# Patient Record
Sex: FEMALE | Race: WHITE | NOT HISPANIC OR LATINO | Employment: OTHER | URBAN - METROPOLITAN AREA
[De-identification: names, ages, dates, MRNs, and addresses within clinical notes are randomized per-mention and may not be internally consistent; named-entity substitution may affect disease eponyms.]

---

## 2017-01-24 ENCOUNTER — OFFICE VISIT (OUTPATIENT)
Dept: PULMONOLOGY | Facility: HOSPICE | Age: 72
End: 2017-01-24
Payer: MEDICARE

## 2017-01-24 VITALS
BODY MASS INDEX: 28.63 KG/M2 | HEART RATE: 96 BPM | RESPIRATION RATE: 16 BRPM | SYSTOLIC BLOOD PRESSURE: 150 MMHG | HEIGHT: 70 IN | TEMPERATURE: 97.3 F | WEIGHT: 200 LBS | OXYGEN SATURATION: 91 % | DIASTOLIC BLOOD PRESSURE: 90 MMHG

## 2017-01-24 DIAGNOSIS — J44.9 CHRONIC OBSTRUCTIVE PULMONARY DISEASE, UNSPECIFIED COPD, UNSPECIFIED CHRONIC BRONCHITIS TYPE: ICD-10-CM

## 2017-01-24 DIAGNOSIS — J44.9 CHRONIC OBSTRUCTIVE PULMONARY DISEASE, UNSPECIFIED COPD TYPE (HCC): ICD-10-CM

## 2017-01-24 PROCEDURE — G8482 FLU IMMUNIZE ORDER/ADMIN: HCPCS | Performed by: NURSE PRACTITIONER

## 2017-01-24 PROCEDURE — 4040F PNEUMOC VAC/ADMIN/RCVD: CPT | Performed by: NURSE PRACTITIONER

## 2017-01-24 PROCEDURE — 99213 OFFICE O/P EST LOW 20 MIN: CPT | Performed by: NURSE PRACTITIONER

## 2017-01-24 PROCEDURE — 3017F COLORECTAL CA SCREEN DOC REV: CPT | Performed by: NURSE PRACTITIONER

## 2017-01-24 PROCEDURE — 1036F TOBACCO NON-USER: CPT | Performed by: NURSE PRACTITIONER

## 2017-01-24 PROCEDURE — 3014F SCREEN MAMMO DOC REV: CPT | Performed by: NURSE PRACTITIONER

## 2017-01-24 PROCEDURE — 1101F PT FALLS ASSESS-DOCD LE1/YR: CPT | Performed by: NURSE PRACTITIONER

## 2017-01-24 PROCEDURE — G8432 DEP SCR NOT DOC, RNG: HCPCS | Performed by: NURSE PRACTITIONER

## 2017-01-24 PROCEDURE — G8420 CALC BMI NORM PARAMETERS: HCPCS | Performed by: NURSE PRACTITIONER

## 2017-01-24 RX ORDER — METHYLPREDNISOLONE 4 MG/1
TABLET ORAL
Qty: 21 TAB | Refills: 1 | Status: SHIPPED | OUTPATIENT
Start: 2017-01-24 | End: 2017-09-09

## 2017-01-24 RX ORDER — AZITHROMYCIN 250 MG/1
TABLET, FILM COATED ORAL
Qty: 6 TAB | Refills: 1 | Status: SHIPPED | OUTPATIENT
Start: 2017-01-24 | End: 2017-09-09

## 2017-01-24 ASSESSMENT — PULMONARY FUNCTION TESTS
FEV1/FVC_PERCENT_CHANGE: 900
FEV1_PREDICTED: 2.72
FEV1/FVC: 60
FVC: 2.51
FEV1_PERCENT_PREDICTED: 55
FVC_PERCENT_PREDICTED: 70
FEV1: 1.66
FVC: 2.54
FVC_PREDICTED: 3.58
FVC_PERCENT_PREDICTED: 70
FEV1: 1.51
FEV1_PERCENT_PREDICTED: 61
FEV1/FVC: 65.35
FEV1/FVC_PERCENT_PREDICTED: 76
FEV1/FVC_PERCENT_PREDICTED: 87
FEV1_PERCENT_CHANGE: 1
FEV1/FVC_PERCENT_PREDICTED: 79
FEV1_PERCENT_CHANGE: 9

## 2017-01-24 NOTE — PROCEDURES
Technician: Kim Reyes, RRT/CPFT  Technician Comments:  Good patient effort & cooperation.  The results of this test meet the ATS/ERS standards for acceptability and repeatability.  Test was performed on the [employer] Body Plethysmograph- Elite DX system.  Predicted equations for Spirometry are NHanes, DLCO- [facility].  The DLCO was uncorrected for Hgb.  A bronchodilator of Ventolin HFA- 2puffs via spacer were administered.    The FVC is 2.54 L or 70%, FEV1 is 1.66 L or 61%, FEV1/FVC 65%. TLC 83%. DLCO 128%. No bronchodilator response.    Interpretation:  PFT show moderately severe obstructive ventilatory defect, with FEV1 1.66 L or 61%.  Normal lung volumes and gas transfer.  The lack of bronchodilator response does not preclude using inhalers when clinically indicated.

## 2017-01-24 NOTE — MR AVS SNAPSHOT
"Jillian Gottlieb   2017 10:00 AM   Office Visit   MRN: [id number]    Department:  [facility]   Dept Phone:  [phone]    Description:  Female : 1945   Provider:  Eliana Meehan M.D.           Allergies as of 2017     Allergen Noted Reactions    Alcohol 2013   Vomiting, Nausea, Unspecified    injested-facial numbness, n/v  RXN=50 years ago    Pcn [Penicillins] 2012   Unspecified    Flushed; \"felt weird\"  Tolerates cephalosporins  RXN=age 30's      You were diagnosed with     Chronic obstructive pulmonary disease, unspecified COPD, unspecified chronic bronchitis type   [[id number]]         Vital Signs     Smoking Status                   Never Smoker            Basic Information     Date Of Birth Sex Race Ethnicity Preferred Language    1945 Female White Non- English      Problem List              ICD-10-CM Priority Class Noted - Resolved    Right kidney stone N20.0   2013 - Present    Hyperlipidemia E78.5   2015 - Present    Hypothyroidism E03.9   2015 - Present    Personal history of renal cancer Z85.528   2015 - Present    Asthma J45.909   2015 - Present    Calculus of kidney N20.0   2015 - Present    Impaired fasting glucose R73.01   2015 - Present    Essential hypertension I10   2016 - Present    Hyperglycemia R73.9   2016 - Present    Ureteral stone N20.1   2016 - Present    Kidney stones N20.0   3/4/2016 - Present    COPD (chronic obstructive pulmonary disease) (HCC) J44.9   2016 - Present    Chronic pain of right knee M25.561, G89.29   2016 - Present    Mass of left lower leg R22.42   2016 - Present    GERD (gastroesophageal reflux disease) K21.9   10/3/2016 - Present    Primary osteoarthritis of right knee M17.11   10/17/2016 - Present      Health Maintenance        Date Due Completion Dates    BONE DENSITY 2010 ---    MAMMOGRAM 2017    COLONOSCOPY 2020  "    IMM DTaP/Tdap/Td Vaccine (2 - Td) 2/10/2024 2/10/2014            Current Immunizations     13-VALENT PCV PREVNAR 10/3/2015    Influenza Vaccine Adult HD 9/1/2016    Pneumococcal polysaccharide vaccine (PPSV-23) 8/7/2013    SHINGLES VACCINE 5/3/2014    Tdap Vaccine 2/10/2014      Below and/or attached are the medications your provider expects you to take. Review all of your home medications and newly ordered medications with your provider and/or pharmacist. Follow medication instructions as directed by your provider and/or pharmacist. Please keep your medication list with you and share with your provider. Update the information when medications are discontinued, doses are changed, or new medications (including over-the-counter products) are added; and carry medication information at all times in the event of emergency situations     Allergies:  ALCOHOL - Vomiting,Nausea,Unspecified     PCN - Unspecified               Medications  Valid as of: January 24, 2017 - 11:01 AM    Generic Name Brand Name Tablet Size Instructions for use    Albuterol Sulfate (Aero Soln) albuterol 108 (90 BASE) MCG/ACT Inhale 2 Puffs by mouth every 6 hours as needed for Shortness of Breath.        Albuterol Sulfate (Nebu Soln) PROVENTIL 2.5mg/3ml 2.5 mg by Nebulization route every four hours as needed for Shortness of Breath.        Budesonide-Formoterol Fumarate (Aerosol) SYMBICORT 160-4.5 MCG/ACT Inhale 2 Puffs by mouth 2 Times a Day. RINSE MOUTH AFTER USE        Esomeprazole Magnesium (CAPSULE DELAYED RELEASE) NEXIUM 40 MG Take 1 Cap by mouth every morning before breakfast.        Fenofibrate (Tab) TRIGLIDE 160 MG Take 160 mg by mouth every evening.        Levothyroxine Sodium (Tab) SYNTHROID 88 MCG Take 1 Tab by mouth Every morning on an empty stomach.        Losartan Potassium-HCTZ (Tab) HYZAAR 100-25 MG Take 1 Tab by mouth every morning.        Metoprolol Succinate (TABLET SR 24 HR) TOPROL XL 25 MG Take 25 mg by mouth every evening.         Pregabalin (Cap) LYRICA 50 MG Take 1 Cap by mouth at bedtime as needed.        Raloxifene HCl (Tab) EVISTA 60 MG Take 60 mg by mouth every morning.        Simvastatin (Tab) ZOCOR 20 MG Take 20 mg by mouth every evening.        Sulfamethoxazole-Trimethoprim (Tab) BACTRIM 400-80 MG Take 1 Tab by mouth every day.        Tiotropium Bromide Monohydrate (Cap) SPIRIVA 18 MCG Inhale 1 Cap by mouth every day.        .                 Medicines prescribed today were sent to:     [facility] [address] [place] [address], [place] - [address]    [address] [place] [address]    Phone: [phone] Fax: [phone]    Open 24 Hours?: No      Medication refill instructions:       If your prescription bottle indicates you have medication refills left, it is not necessary to call your provider’s office. Please contact your pharmacy and they will refill your medication.    If your prescription bottle indicates you do not have any refills left, you may request refills at any time through one of the following ways: The online LicenseStream system (except Urgent Care), by calling your provider’s office, or by asking your pharmacy to contact your provider’s office with a refill request. Medication refills are processed only during regular business hours and may not be available until the next business day. Your provider may request additional information or to have a follow-up visit with you prior to refilling your medication.   *Please Note: Medication refills are assigned a new Rx number when refilled electronically. Your pharmacy may indicate that no refills were authorized even though a new prescription for the same medication is available at the pharmacy. Please request the medicine by name with the pharmacy before contacting your provider for a refill.           LicenseStream Access Code: [email or web address]  Expires: 2/12/2017 10:56 AM    LicenseStream  A secure, online tool to manage your health information     [employer]’s Exo Protein Bars  is a secure, online tool that connects you to your personalized health information from the privacy of your home -- day or night - making it very easy for you to manage your healthcare. Once the activation process is completed, you can even access your medical information using the Solvoyo marlene, which is available for free in the Apple Marlene store or Google Play store.     Solvoyo provides the following levels of access (as shown below):   My Chart Features   [facility] Doctor [facility]  Specialists [facility]  Urgent  Care Non-[facility]  Primary Care  Doctor   Email your healthcare team securely and privately 24/7 X X X    Manage appointments: schedule your next appointment; view details of past/upcoming appointments X      Request prescription refills. X      View recent personal medical records, including lab and immunizations X X X X   View health record, including health history, allergies, medications X X X X   Read reports about your outpatient visits, procedures, consult and ER notes X X X X   See your discharge summary, which is a recap of your hospital and/or ER visit that includes your diagnosis, lab results, and care plan. X X       How to register for Solvoyo:  1. Go to  [email or web address].  2. Click on the Sign Up Now box, which takes you to the New Member Sign Up page. You will need to provide the following information:  a. Enter your Solvoyo Access Code exactly as it appears at the top of this page. (You will not need to use this code after you’ve completed the sign-up process. If you do not sign up before the expiration date, you must request a new code.)   b. Enter your date of birth.   c. Enter your home email address.   d. Click Submit, and follow the next screen’s instructions.  3. Create a Versant Online Solutionst ID. This will be your Solvoyo login ID and cannot be changed, so think of one that is secure and easy to remember.  4. Create a Solvoyo password. You can change your password at any  time.  5. Enter your Password Reset Question and Answer. This can be used at a later time if you forget your password.   6. Enter your e-mail address. This allows you to receive e-mail notifications when new information is available in Likvat.  7. Click Sign Up. You can now view your health information.    For assistance activating your Decurate account, call [phone]

## 2017-01-24 NOTE — MR AVS SNAPSHOT
"Jillian Gottlieb   2017 11:00 AM   Office Visit   MRN: [id number]    Department:  [facility]   Dept Phone:  [phone]    Description:  Female : 1945   Provider:  ABDIEL Patel           Reason for Visit     COPD 6 Mth Follow UP/ PFT      Allergies as of 2017     Allergen Noted Reactions    Alcohol 2013   Vomiting, Nausea, Unspecified    injested-facial numbness, n/v  RXN=50 years ago    Pcn [Penicillins] 2012   Unspecified    Flushed; \"felt weird\"  Tolerates cephalosporins  RXN=age 30's      You were diagnosed with     Chronic obstructive pulmonary disease, unspecified COPD type (CMS-HCC)   [[id number]]         Vital Signs     Blood Pressure Pulse Temperature Respirations Height Weight    150/90 mmHg 96 36.3 °C (97.3 °F) 16 1.778 m (5' 10\") 90.719 kg (200 lb)    Body Mass Index Oxygen Saturation Smoking Status             28.70 kg/m2 91% Never Smoker          Basic Information     Date Of Birth Sex Race Ethnicity Preferred Language    1945 Female White Non- English      Problem List              ICD-10-CM Priority Class Noted - Resolved    Right kidney stone N20.0   2013 - Present    Hyperlipidemia E78.5   2015 - Present    Hypothyroidism E03.9   2015 - Present    Personal history of renal cancer Z85.528   2015 - Present    Asthma J45.909   2015 - Present    Calculus of kidney N20.0   2015 - Present    Impaired fasting glucose R73.01   2015 - Present    Essential hypertension I10   2016 - Present    Hyperglycemia R73.9   2016 - Present    Ureteral stone N20.1   2016 - Present    Kidney stones N20.0   3/4/2016 - Present    COPD (chronic obstructive pulmonary disease) ([facility]) J44.9   2016 - Present    Chronic pain of right knee M25.561, G89.29   2016 - Present    Mass of left lower leg R22.42   2016 - Present    GERD (gastroesophageal reflux disease) K21.9   10/3/2016 - Present    Primary " osteoarthritis of right knee M17.11   10/17/2016 - Present      Health Maintenance        Date Due Completion Dates    BONE DENSITY 8/28/2010 ---    MAMMOGRAM 12/30/2017 12/30/2016    COLONOSCOPY 11/11/2020 11/11/2015    IMM DTaP/Tdap/Td Vaccine (2 - Td) 2/10/2024 2/10/2014            Current Immunizations     13-VALENT PCV PREVNAR 10/3/2015    Influenza Vaccine Adult HD 9/1/2016    Pneumococcal polysaccharide vaccine (PPSV-23) 8/7/2013    SHINGLES VACCINE 5/3/2014    Tdap Vaccine 2/10/2014      Below and/or attached are the medications your provider expects you to take. Review all of your home medications and newly ordered medications with your provider and/or pharmacist. Follow medication instructions as directed by your provider and/or pharmacist. Please keep your medication list with you and share with your provider. Update the information when medications are discontinued, doses are changed, or new medications (including over-the-counter products) are added; and carry medication information at all times in the event of emergency situations     Allergies:  ALCOHOL - Vomiting,Nausea,Unspecified     PCN - Unspecified               Medications  Valid as of: January 24, 2017 - 11:10 AM    Generic Name Brand Name Tablet Size Instructions for use    Albuterol Sulfate (Aero Soln) albuterol 108 (90 BASE) MCG/ACT Inhale 2 Puffs by mouth every 6 hours as needed for Shortness of Breath.        Albuterol Sulfate (Nebu Soln) PROVENTIL 2.5mg/3ml 2.5 mg by Nebulization route every four hours as needed for Shortness of Breath.        Azithromycin (Tab) ZITHROMAX 250 MG Take 2 tablets on day 1, then take 1 tablet a day for 4 days.        Budesonide-Formoterol Fumarate (Aerosol) SYMBICORT 160-4.5 MCG/ACT Inhale 2 Puffs by mouth 2 Times a Day. RINSE MOUTH AFTER USE        Esomeprazole Magnesium (CAPSULE DELAYED RELEASE) NEXIUM 40 MG Take 1 Cap by mouth every morning before breakfast.        Fenofibrate (Tab) TRIGLIDE 160 MG Take  160 mg by mouth every evening.        Levothyroxine Sodium (Tab) SYNTHROID 88 MCG Take 1 Tab by mouth Every morning on an empty stomach.        Losartan Potassium-HCTZ (Tab) HYZAAR 100-25 MG Take 1 Tab by mouth every morning.        MethylPREDNISolone (Tablet Therapy Pack) MEDROL DOSEPAK 4 MG Take as directed.        Metoprolol Succinate (TABLET SR 24 HR) TOPROL XL 25 MG Take 25 mg by mouth every evening.        Pregabalin (Cap) LYRICA 50 MG Take 1 Cap by mouth at bedtime as needed.        Raloxifene HCl (Tab) EVISTA 60 MG Take 60 mg by mouth every morning.        Simvastatin (Tab) ZOCOR 20 MG Take 20 mg by mouth every evening.        Sulfamethoxazole-Trimethoprim (Tab) BACTRIM 400-80 MG Take 1 Tab by mouth every day.        Tiotropium Bromide Monohydrate (Cap) SPIRIVA 18 MCG Inhale 1 Cap by mouth every day.        .                 Medicines prescribed today were sent to:     [facility] [address] [place] [address], [place] - [address]    [address] [address]    Phone: [phone] Fax: [phone]    Open 24 Hours?: No      Medication refill instructions:       If your prescription bottle indicates you have medication refills left, it is not necessary to call your provider’s office. Please contact your pharmacy and they will refill your medication.    If your prescription bottle indicates you do not have any refills left, you may request refills at any time through one of the following ways: The online [employer] system (except Urgent Care), by calling your provider’s office, or by asking your pharmacy to contact your provider’s office with a refill request. Medication refills are processed only during regular business hours and may not be available until the next business day. Your provider may request additional information or to have a follow-up visit with you prior to refilling your medication.   *Please Note: Medication refills are assigned a new Rx number when refilled electronically. Your  pharmacy may indicate that no refills were authorized even though a new prescription for the same medication is available at the pharmacy. Please request the medicine by name with the pharmacy before contacting your provider for a refill.        Instructions    1. Continue Symbicort 160/4.5 µg 2 puffs twice daily, Spiriva 2.5 micrograms 2 inhalations daily, and Ventolin and albuterol nebulized treatments as needed.   2. Prescription for Z-Paul and Medrol Dosepak to have on hand for quick treatment bronchitic symptoms.            GigaMedia Access Code: [email or web address]  Expires: 2/12/2017 10:56 AM    GigaMedia  A secure, online tool to manage your health information     [employer]’s GigaMedia® is a secure, online tool that connects you to your personalized health information from the privacy of your home -- day or night - making it very easy for you to manage your healthcare. Once the activation process is completed, you can even access your medical information using the GigaMedia marlene, which is available for free in the Apple Marlene store or Google Play store.     GigaMedia provides the following levels of access (as shown below):   My Chart Features   [facility] Primary Care Doctor [facility]  Specialists [facility]  Urgent  Care Non-[facility]  Primary Care  Doctor   Email your healthcare team securely and privately 24/7 X X X    Manage appointments: schedule your next appointment; view details of past/upcoming appointments X      Request prescription refills. X      View recent personal medical records, including lab and immunizations X X X X   View health record, including health history, allergies, medications X X X X   Read reports about your outpatient visits, procedures, consult and ER notes X X X X   See your discharge summary, which is a recap of your hospital and/or ER visit that includes your diagnosis, lab results, and care plan. X X       How to register for GigaMedia:  1. Go to  [email or web address].  2. Click on the Sign  Up Now box, which takes you to the New Member Sign Up page. You will need to provide the following information:  a. Enter your Sentrinsic Access Code exactly as it appears at the top of this page. (You will not need to use this code after you’ve completed the sign-up process. If you do not sign up before the expiration date, you must request a new code.)   b. Enter your date of birth.   c. Enter your home email address.   d. Click Submit, and follow the next screen’s instructions.  3. Create a Sentrinsic ID. This will be your Sentrinsic login ID and cannot be changed, so think of one that is secure and easy to remember.  4. Create a Sentrinsic password. You can change your password at any time.  5. Enter your Password Reset Question and Answer. This can be used at a later time if you forget your password.   6. Enter your e-mail address. This allows you to receive e-mail notifications when new information is available in Sentrinsic.  7. Click Sign Up. You can now view your health information.    For assistance activating your Sentrinsic account, call [phone]

## 2017-01-24 NOTE — PATIENT INSTRUCTIONS
1. Continue Symbicort 160/4.5 µg 2 puffs twice daily, Spiriva 2.5 micrograms 2 inhalations daily, and Ventolin and albuterol nebulized treatments as needed.   2. Prescription for Z-Paul and Medrol Dosepak to have on hand for quick treatment bronchitic symptoms.

## 2017-02-13 RX ORDER — FENOFIBRATE 160 MG/1
TABLET ORAL
Qty: 90 TAB | Refills: 3 | Status: SHIPPED | OUTPATIENT
Start: 2017-02-13 | End: 2018-02-26 | Stop reason: SDUPTHER

## 2017-02-21 RX ORDER — SIMVASTATIN 20 MG
20 TABLET ORAL EVERY EVENING
Qty: 90 TAB | Refills: 3 | Status: SHIPPED | OUTPATIENT
Start: 2017-02-21 | End: 2018-02-15 | Stop reason: SDUPTHER

## 2017-02-27 RX ORDER — METOPROLOL SUCCINATE 25 MG/1
TABLET, EXTENDED RELEASE ORAL
Qty: 90 TAB | Refills: 3 | Status: SHIPPED | OUTPATIENT
Start: 2017-02-27 | End: 2018-02-25 | Stop reason: SDUPTHER

## 2017-03-01 ENCOUNTER — OFFICE VISIT (OUTPATIENT)
Dept: URGENT CARE | Facility: CLINIC | Age: 72
End: 2017-03-01
Payer: MEDICARE

## 2017-03-01 VITALS
SYSTOLIC BLOOD PRESSURE: 140 MMHG | HEART RATE: 94 BPM | DIASTOLIC BLOOD PRESSURE: 92 MMHG | OXYGEN SATURATION: 91 % | TEMPERATURE: 98.8 F | RESPIRATION RATE: 16 BRPM

## 2017-03-01 DIAGNOSIS — J32.9 OTHER SINUSITIS: ICD-10-CM

## 2017-03-01 DIAGNOSIS — J40 BRONCHITIS: ICD-10-CM

## 2017-03-01 PROCEDURE — 99214 OFFICE O/P EST MOD 30 MIN: CPT | Performed by: PHYSICIAN ASSISTANT

## 2017-03-01 PROCEDURE — 3014F SCREEN MAMMO DOC REV: CPT | Performed by: PHYSICIAN ASSISTANT

## 2017-03-01 PROCEDURE — 1036F TOBACCO NON-USER: CPT | Performed by: PHYSICIAN ASSISTANT

## 2017-03-01 PROCEDURE — 3017F COLORECTAL CA SCREEN DOC REV: CPT | Performed by: PHYSICIAN ASSISTANT

## 2017-03-01 PROCEDURE — G8432 DEP SCR NOT DOC, RNG: HCPCS | Performed by: PHYSICIAN ASSISTANT

## 2017-03-01 PROCEDURE — 4040F PNEUMOC VAC/ADMIN/RCVD: CPT | Performed by: PHYSICIAN ASSISTANT

## 2017-03-01 PROCEDURE — G8482 FLU IMMUNIZE ORDER/ADMIN: HCPCS | Performed by: PHYSICIAN ASSISTANT

## 2017-03-01 PROCEDURE — G8420 CALC BMI NORM PARAMETERS: HCPCS | Performed by: PHYSICIAN ASSISTANT

## 2017-03-01 PROCEDURE — 1101F PT FALLS ASSESS-DOCD LE1/YR: CPT | Performed by: PHYSICIAN ASSISTANT

## 2017-03-01 RX ORDER — METHYLPREDNISOLONE 4 MG/1
TABLET ORAL
Qty: 1 TAB | Refills: 0 | Status: SHIPPED | OUTPATIENT
Start: 2017-03-01 | End: 2017-09-09

## 2017-03-01 RX ORDER — LEVOFLOXACIN 500 MG/1
500 TABLET, FILM COATED ORAL DAILY
Qty: 10 TAB | Refills: 0 | Status: SHIPPED | OUTPATIENT
Start: 2017-03-01 | End: 2017-03-11

## 2017-03-01 RX ORDER — ALBUTEROL SULFATE 90 UG/1
2 AEROSOL, METERED RESPIRATORY (INHALATION) EVERY 4 HOURS PRN
Qty: 1 INHALER | Refills: 0 | Status: SHIPPED | OUTPATIENT
Start: 2017-03-01 | End: 2019-03-29 | Stop reason: SDUPTHER

## 2017-03-01 RX ORDER — PROMETHAZINE HYDROCHLORIDE AND CODEINE PHOSPHATE 6.25; 1 MG/5ML; MG/5ML
5-10 SYRUP ORAL 3 TIMES DAILY PRN
Qty: 150 ML | Refills: 0 | Status: SHIPPED | OUTPATIENT
Start: 2017-03-01 | End: 2017-09-09

## 2017-03-01 ASSESSMENT — ENCOUNTER SYMPTOMS
EYES NEGATIVE: 1
RHINORRHEA: 1
CARDIOVASCULAR NEGATIVE: 1
SHORTNESS OF BREATH: 0
CONSTITUTIONAL NEGATIVE: 1
FEVER: 0
COUGH: 1
SPUTUM PRODUCTION: 1
SORE THROAT: 0

## 2017-03-01 NOTE — PROGRESS NOTES
Subjective:      Jillian Gottlieb is a 71 y.o. female who presents with Cough and Sinus Problem            Cough  This is a new problem. The current episode started 1 to 4 weeks ago (2wks prod cough, sinus juliane/dc). The problem has been unchanged. The problem occurs every few minutes. The cough is productive of sputum. Associated symptoms include nasal congestion and rhinorrhea. Pertinent negatives include no fever, sore throat or shortness of breath. Nothing aggravates the symptoms. She has tried nothing for the symptoms. The treatment provided no relief. There is no history of asthma or environmental allergies.   Sinus Problem  Associated symptoms include congestion and coughing. Pertinent negatives include no shortness of breath or sore throat.       Review of Systems   Constitutional: Negative.  Negative for fever.   HENT: Positive for congestion and rhinorrhea. Negative for sore throat.    Eyes: Negative.    Respiratory: Positive for cough and sputum production. Negative for shortness of breath.    Cardiovascular: Negative.    Skin: Negative.    Endo/Heme/Allergies: Negative for environmental allergies.          Objective:     There were no vitals taken for this visit.     Physical Exam   Constitutional: She is oriented to person, place, and time. She appears well-developed and well-nourished. No distress.   HENT:   Head: Normocephalic and atraumatic.   Mouth/Throat: Oropharynx is clear and moist.   +maxill.sinus press.     Eyes: EOM are normal. Pupils are equal, round, and reactive to light.   Neck: Normal range of motion. Neck supple.   Cardiovascular: Normal rate.    Pulmonary/Chest: Effort normal and breath sounds normal. No respiratory distress. She has no wheezes. She has no rales.   Lymphadenopathy:     She has no cervical adenopathy.   Neurological: She is alert and oriented to person, place, and time.   Skin: Skin is warm and dry.   Psychiatric: She has a normal mood and affect. Her behavior is normal.  Judgment and thought content normal.   Nursing note and vitals reviewed.    Filed Vitals:    03/01/17 1237   Temp: 37.1 °C (98.8 °F)   Resp: 16     Active Ambulatory Problems     Diagnosis Date Noted   • Right kidney stone 01/31/2013   • Hyperlipidemia 04/20/2015   • Hypothyroidism 04/20/2015   • Personal history of renal cancer 04/20/2015   • Asthma 04/20/2015   • Calculus of kidney 06/26/2015   • Impaired fasting glucose 08/24/2015   • Essential hypertension 02/08/2016   • Hyperglycemia 02/08/2016   • Ureteral stone 02/12/2016   • Kidney stones 03/04/2016   • COPD (chronic obstructive pulmonary disease) ([facility]) 06/30/2016   • Chronic pain of right knee 07/01/2016   • Mass of left lower leg 07/27/2016   • GERD (gastroesophageal reflux disease) 10/03/2016   • Primary osteoarthritis of right knee 10/17/2016     Resolved Ambulatory Problems     Diagnosis Date Noted   • Right renal mass 01/31/2013   • DM2 (diabetes mellitus, type 2) (CMS-HCC) 02/13/2016   • Acute bronchitis 09/16/2016   • Acute sinusitis 09/16/2016   • Chest pain at rest 10/03/2016     Past Medical History   Diagnosis Date   • Hypertension    • Heart burn    • Indigestion    • Arthritis    • Cancer (CMS-HCC) 01/13   • Bronchitis 08/04/14   • Unspecified disorder of thyroid    • Unspecified hypothyroidism 4/20/2015   • Dyspnea    • Renal disorder    • Renal disorder 2013   • Acid reflux    • Diabetes (CMS-HCC)    • Breath shortness      Current Outpatient Prescriptions on File Prior to Visit   Medication Sig Dispense Refill   • metoprolol SR (TOPROL XL) 25 MG TABLET SR 24 HR TAKE ONE TABLET BY MOUTH ONCE DAILY 90 Tab 3   • simvastatin (ZOCOR) 20 MG Tab Take 1 Tab by mouth every evening. 90 Tab 3   • fenofibrate (TRIGLIDE) 160 MG tablet TAKE ONE TABLET BY MOUTH ONCE DAILY 90 Tab 3   • tiotropium (SPIRIVA HANDIHALER) 18 MCG Cap Inhale 1 Cap by mouth every day. 30 Cap 5   • LYRICA 50 MG capsule Take 1 Cap by mouth at bedtime as needed.     • esomeprazole  (NEXIUM) 40 MG delayed-release capsule Take 1 Cap by mouth every morning before breakfast. 90 Cap 3   • budesonide-formoterol (SYMBICORT) 160-4.5 MCG/ACT Aerosol Inhale 2 Puffs by mouth 2 Times a Day. RINSE MOUTH AFTER USE 1 Inhaler 6   • losartan-hydrochlorothiazide (HYZAAR) 100-25 MG per tablet Take 1 Tab by mouth every morning. 90 Tab 3   • levothyroxine (SYNTHROID) 88 MCG Tab Take 1 Tab by mouth Every morning on an empty stomach. 90 Tab 3   • albuterol (PROVENTIL) 2.5mg/3ml Nebu Soln solution for nebulization 2.5 mg by Nebulization route every four hours as needed for Shortness of Breath.     • albuterol (VENTOLIN OR PROVENTIL) 108 (90 BASE) MCG/ACT Aero Soln inhalation aerosol Inhale 2 Puffs by mouth every 6 hours as needed for Shortness of Breath.     • raloxifene (EVISTA) 60 MG Tab Take 60 mg by mouth every morning.     • azithromycin (ZITHROMAX) 250 MG Tab Take 2 tablets on day 1, then take 1 tablet a day for 4 days. 6 Tab 1   • MethylPREDNISolone (MEDROL DOSEPAK) 4 MG Tablet Therapy Pack Take as directed. 21 Tab 1   • sulfamethoxazole-trimethoprim (BACTRIM) 400-80 MG Tab Take 1 Tab by mouth every day.       No current facility-administered medications on file prior to visit.     Gargles, Cepacol lozenges, Aleve/Advil as needed for throat pain  Family History   Problem Relation Age of Onset   • Cancer Father      colon cancer   • Kidney Disease Mother      Alcohol and Pcn              Assessment/Plan:     ·  sinusitis, bronchitis/RAD      · levaquin rx; medrol [joint precautions given; has taken quins w/o prob before]; phen/cod; albut

## 2017-03-01 NOTE — MR AVS SNAPSHOT
"Jillian Gottlieb   3/1/2017 12:30 PM   Office Visit   MRN: [id number]    Department:  [facility]   Dept Phone:  [phone]    Description:  Female : 1945   Provider:  Rajat Card PA-C           Reason for Visit     Cough x 12 days, productive cough, chest congestion and little shortness of breath   \"Just finished taking z-noah \"    Sinus Problem x 12 days, nasal congestion, stuffy and runny nose, no energy      Allergies as of 3/1/2017     Allergen Noted Reactions    Alcohol 2013   Vomiting, Nausea, Unspecified    injested-facial numbness, n/v  RXN=50 years ago    Pcn [Penicillins] 2012   Unspecified    Flushed; \"felt weird\"  Tolerates cephalosporins  RXN=age 30's      You were diagnosed with     Other sinusitis   [3210701]       Bronchitis   [619535]         Vital Signs     Blood Pressure Pulse Temperature Respirations Oxygen Saturation Smoking Status    140/92 mmHg 94 37.1 °C (98.8 °F) 16 91% Never Smoker       Basic Information     Date Of Birth Sex Race Ethnicity Preferred Language    1945 Female White Non- English      Problem List              ICD-10-CM Priority Class Noted - Resolved    Right kidney stone N20.0   2013 - Present    Hyperlipidemia E78.5   2015 - Present    Hypothyroidism E03.9   2015 - Present    Personal history of renal cancer Z85.528   2015 - Present    Asthma J45.909   2015 - Present    Calculus of kidney N20.0   2015 - Present    Impaired fasting glucose R73.01   2015 - Present    Essential hypertension I10   2016 - Present    Hyperglycemia R73.9   2016 - Present    Ureteral stone N20.1   2016 - Present    Kidney stones N20.0   3/4/2016 - Present    COPD (chronic obstructive pulmonary disease) (HCC) J44.9   2016 - Present    Chronic pain of right knee M25.561, G89.29   2016 - Present    Mass of left lower leg R22.42   2016 - Present    GERD (gastroesophageal reflux disease) K21.9 "   10/3/2016 - Present    Primary osteoarthritis of right knee M17.11   10/17/2016 - Present      Health Maintenance        Date Due Completion Dates    BONE DENSITY 8/28/2010 ---    MAMMOGRAM 12/30/2017 12/30/2016    COLONOSCOPY 11/11/2020 11/11/2015    IMM DTaP/Tdap/Td Vaccine (2 - Td) 2/10/2024 2/10/2014            Current Immunizations     13-VALENT PCV PREVNAR 10/3/2015    Influenza Vaccine Adult HD 9/1/2016    Pneumococcal polysaccharide vaccine (PPSV-23) 8/7/2013    SHINGLES VACCINE 5/3/2014    Tdap Vaccine 2/10/2014      Below and/or attached are the medications your provider expects you to take. Review all of your home medications and newly ordered medications with your provider and/or pharmacist. Follow medication instructions as directed by your provider and/or pharmacist. Please keep your medication list with you and share with your provider. Update the information when medications are discontinued, doses are changed, or new medications (including over-the-counter products) are added; and carry medication information at all times in the event of emergency situations     Allergies:  ALCOHOL - Vomiting,Nausea,Unspecified     PCN - Unspecified               Medications  Valid as of: March 01, 2017 -  1:28 PM    Generic Name Brand Name Tablet Size Instructions for use    Albuterol Sulfate (Aero Soln) albuterol 108 (90 BASE) MCG/ACT Inhale 2 Puffs by mouth every 6 hours as needed for Shortness of Breath.        Albuterol Sulfate (Nebu Soln) PROVENTIL 2.5mg/3ml 2.5 mg by Nebulization route every four hours as needed for Shortness of Breath.        Albuterol Sulfate (Aero Soln) albuterol 108 (90 BASE) MCG/ACT Inhale 2 Puffs by mouth every four hours as needed for Shortness of Breath.        Azithromycin (Tab) ZITHROMAX 250 MG Take 2 tablets on day 1, then take 1 tablet a day for 4 days.        Budesonide-Formoterol Fumarate (Aerosol) SYMBICORT 160-4.5 MCG/ACT Inhale 2 Puffs by mouth 2 Times a Day. RINSE MOUTH  AFTER USE        Esomeprazole Magnesium (CAPSULE DELAYED RELEASE) NEXIUM 40 MG Take 1 Cap by mouth every morning before breakfast.        Fenofibrate (Tab) TRIGLIDE 160 MG TAKE ONE TABLET BY MOUTH ONCE DAILY        LevoFLOXacin (Tab) LEVAQUIN 500 MG Take 1 Tab by mouth every day for 10 days.        Levothyroxine Sodium (Tab) SYNTHROID 88 MCG Take 1 Tab by mouth Every morning on an empty stomach.        Losartan Potassium-HCTZ (Tab) HYZAAR 100-25 MG Take 1 Tab by mouth every morning.        MethylPREDNISolone (Tablet Therapy Pack) MEDROL DOSEPAK 4 MG Take as directed.        MethylPREDNISolone (Tablet Therapy Pack) MEDROL DOSEPAK 4 MG U.D.        Metoprolol Succinate (TABLET SR 24 HR) TOPROL XL 25 MG TAKE ONE TABLET BY MOUTH ONCE DAILY        Pregabalin (Cap) LYRICA 50 MG Take 1 Cap by mouth at bedtime as needed.        Promethazine-Codeine (Syrup) PHENERGAN-CODEINE 6.25-10 MG/5ML Take 5-10 mL by mouth 3 times a day as needed for Cough (or use qhs).        Raloxifene HCl (Tab) EVISTA 60 MG Take 60 mg by mouth every morning.        Simvastatin (Tab) ZOCOR 20 MG Take 1 Tab by mouth every evening.        Sulfamethoxazole-Trimethoprim (Tab) BACTRIM 400-80 MG Take 1 Tab by mouth every day.        Tiotropium Bromide Monohydrate (Cap) SPIRIVA 18 MCG Inhale 1 Cap by mouth every day.        .                 Medicines prescribed today were sent to:     [facility] [address] [place] [address], [place] [address]    [address] [facility] [place] [address]    Phone: [phone] Fax: [phone]    Open 24 Hours?: No      Medication refill instructions:       If your prescription bottle indicates you have medication refills left, it is not necessary to call your provider’s office. Please contact your pharmacy and they will refill your medication.    If your prescription bottle indicates you do not have any refills left, you may request refills at any time through one of the following ways: The online Ovuline system (except Urgent  Care), by calling your provider’s office, or by asking your pharmacy to contact your provider’s office with a refill request. Medication refills are processed only during regular business hours and may not be available until the next business day. Your provider may request additional information or to have a follow-up visit with you prior to refilling your medication.   *Please Note: Medication refills are assigned a new Rx number when refilled electronically. Your pharmacy may indicate that no refills were authorized even though a new prescription for the same medication is available at the pharmacy. Please request the medicine by name with the pharmacy before contacting your provider for a refill.           OralWise Access Code: [email or web address]  Expires: 3/13/2017  9:51 AM    OralWise  A secure, online tool to manage your health information     [employer]’s OralWise® is a secure, online tool that connects you to your personalized health information from the privacy of your home -- day or night - making it very easy for you to manage your healthcare. Once the activation process is completed, you can even access your medical information using the OralWise marlene, which is available for free in the Apple Marlene store or Google Play store.     OralWise provides the following levels of access (as shown below):   My Chart Features   [facility] Doctor [facility]  Specialists [facility]  Urgent  Care Non-[facility]  Primary Care  Doctor   Email your healthcare team securely and privately 24/7 X X X    Manage appointments: schedule your next appointment; view details of past/upcoming appointments X      Request prescription refills. X      View recent personal medical records, including lab and immunizations X X X X   View health record, including health history, allergies, medications X X X X   Read reports about your outpatient visits, procedures, consult and ER notes X X X X   See your discharge summary, which is a recap of your  hospital and/or ER visit that includes your diagnosis, lab results, and care plan. X X       How to register for Autopilot:  1. Go to  [email or web address].  2. Click on the Sign Up Now box, which takes you to the New Member Sign Up page. You will need to provide the following information:  a. Enter your Autopilot Access Code exactly as it appears at the top of this page. (You will not need to use this code after you’ve completed the sign-up process. If you do not sign up before the expiration date, you must request a new code.)   b. Enter your date of birth.   c. Enter your home email address.   d. Click Submit, and follow the next screen’s instructions.  3. Create a Noise Freakst ID. This will be your Autopilot login ID and cannot be changed, so think of one that is secure and easy to remember.  4. Create a Noise Freakst password. You can change your password at any time.  5. Enter your Password Reset Question and Answer. This can be used at a later time if you forget your password.   6. Enter your e-mail address. This allows you to receive e-mail notifications when new information is available in Autopilot.  7. Click Sign Up. You can now view your health information.    For assistance activating your Autopilot account, call [phone]

## 2017-03-21 ENCOUNTER — TELEPHONE (OUTPATIENT)
Dept: MEDICAL GROUP | Facility: MEDICAL CENTER | Age: 72
End: 2017-03-21

## 2017-03-21 RX ORDER — PANTOPRAZOLE SODIUM 40 MG/1
40 TABLET, DELAYED RELEASE ORAL
Qty: 90 TAB | Refills: 3 | Status: SHIPPED | OUTPATIENT
Start: 2017-03-21 | End: 2017-12-07

## 2017-03-21 NOTE — TELEPHONE ENCOUNTER
Received a fax from the pharmacy and Nexium is not covered by insurance. It does cover Omeprazole or Pantoprazole or we can start a PA at [phone]. Please advise.

## 2017-03-22 NOTE — TELEPHONE ENCOUNTER
Herve Conrad M.D.  Erna Harden, Med Ass't        Caller: Unspecified (Yesterday, 8:52 AM)                     I sent in a prescription for pantoprazole.  She also could  Try Pepcid Complete

## 2017-04-05 ENCOUNTER — TELEPHONE (OUTPATIENT)
Dept: PULMONOLOGY | Facility: HOSPICE | Age: 72
End: 2017-04-05

## 2017-04-05 DIAGNOSIS — J44.9 CHRONIC OBSTRUCTIVE PULMONARY DISEASE, UNSPECIFIED COPD TYPE (HCC): ICD-10-CM

## 2017-04-05 DIAGNOSIS — J45.20 MILD INTERMITTENT ASTHMA WITHOUT COMPLICATION: ICD-10-CM

## 2017-04-05 RX ORDER — ALBUTEROL SULFATE 2.5 MG/3ML
2.5 SOLUTION RESPIRATORY (INHALATION) EVERY 4 HOURS PRN
Qty: 360 ML | Refills: 5 | Status: SHIPPED | OUTPATIENT
Start: 2017-04-05 | End: 2017-12-07

## 2017-04-05 NOTE — TELEPHONE ENCOUNTER
Please send nebulizer order to DME - she is medicare, she can go anywhere. RX signed for nebulizer medication.

## 2017-04-05 NOTE — TELEPHONE ENCOUNTER
1. Caller Name: Jillian                                         Call Back Number: [phone] (home)       Patient approves a detailed voicemail message: yes    Patient called states her nebulizer broke and needs a new one also would like RX for Nebulizer sent to [facility] - [place], [place] - [address]. Per patient does not have a DME company. I do believe the pharmacy can supply her one.     Last seen 1/24/17 ROJELIO Greenfield  Next OV: Per Gin back in a YR, no pending appt   Last RX filled UNKNOWN   Diagnosis: COPD, Asthma

## 2017-04-05 NOTE — TELEPHONE ENCOUNTER
Order, demo, last Ov faxed to DME:  [facility] / ph [phone] / fax [phone]      Left message advised RX sent to the pharmacy, and Order for Nebulizer faxed to [facility]. I left [facility] and left number.

## 2017-04-11 ENCOUNTER — TELEPHONE (OUTPATIENT)
Dept: PULMONOLOGY | Facility: HOSPICE | Age: 72
End: 2017-04-11

## 2017-05-24 DIAGNOSIS — Z79.899 MEDICATION MANAGEMENT: ICD-10-CM

## 2017-05-24 RX ORDER — LEVOTHYROXINE SODIUM 88 UG/1
88 TABLET ORAL
Qty: 90 TAB | Refills: 3 | Status: SHIPPED | OUTPATIENT
Start: 2017-05-24 | End: 2018-01-29 | Stop reason: SDUPTHER

## 2017-06-12 ENCOUNTER — HOSPITAL ENCOUNTER (OUTPATIENT)
Dept: RADIOLOGY | Facility: MEDICAL CENTER | Age: 72
End: 2017-06-12
Attending: UROLOGY
Payer: MEDICARE

## 2017-06-12 DIAGNOSIS — Z87.442 PERSONAL HISTORY OF URINARY CALCULI: ICD-10-CM

## 2017-06-12 DIAGNOSIS — Z85.528 PERSONAL HISTORY OF MALIGNANT NEOPLASM OF KIDNEY(V10.52): ICD-10-CM

## 2017-06-12 PROCEDURE — 74000 DX-ABDOMEN-1 VIEW: CPT

## 2017-06-16 ENCOUNTER — HOSPITAL ENCOUNTER (OUTPATIENT)
Facility: MEDICAL CENTER | Age: 72
End: 2017-06-16
Attending: UROLOGY
Payer: MEDICARE

## 2017-06-16 PROCEDURE — 87086 URINE CULTURE/COLONY COUNT: CPT

## 2017-06-19 LAB
BACTERIA UR CULT: ABNORMAL
BACTERIA UR CULT: ABNORMAL
SIGNIFICANT IND 70042: ABNORMAL
SITE SITE: ABNORMAL
SOURCE SOURCE: ABNORMAL

## 2017-06-22 DIAGNOSIS — J44.9 CHRONIC OBSTRUCTIVE PULMONARY DISEASE, UNSPECIFIED COPD TYPE (HCC): ICD-10-CM

## 2017-06-22 RX ORDER — TIOTROPIUM BROMIDE 18 UG/1
CAPSULE ORAL; RESPIRATORY (INHALATION)
Qty: 30 CAP | Refills: 11 | Status: SHIPPED | OUTPATIENT
Start: 2017-06-22 | End: 2018-07-19 | Stop reason: SDUPTHER

## 2017-06-22 NOTE — TELEPHONE ENCOUNTER
Have we ever prescribed this med? Yes.  If yes, what date? 12/8/2016    Last OV: 01/24/2017 - Martha Greenfield     Next OV: none scheduled; not due till January 2018    DX: COPD    Medications: Spiriva

## 2017-06-26 ENCOUNTER — TELEPHONE (OUTPATIENT)
Dept: MEDICAL GROUP | Facility: MEDICAL CENTER | Age: 72
End: 2017-06-26

## 2017-06-26 NOTE — TELEPHONE ENCOUNTER
1. Caller Name: Jillian Gottlieb                                           Call Back Number: [phone] (home)         Patient approves a detailed voicemail message: N\      Patient said she had blood work done on 6/12 with Dr. Dobbins, and wanted to know if she should keep taking the medication simvastatin, patient said that Dr Dobbins stated that her liver function was up? Please advise

## 2017-07-05 DIAGNOSIS — I10 ESSENTIAL HYPERTENSION: ICD-10-CM

## 2017-07-05 DIAGNOSIS — E78.5 HYPERLIPIDEMIA, UNSPECIFIED HYPERLIPIDEMIA TYPE: ICD-10-CM

## 2017-07-05 DIAGNOSIS — R73.01 IMPAIRED FASTING GLUCOSE: ICD-10-CM

## 2017-07-05 DIAGNOSIS — E03.9 HYPOTHYROIDISM, UNSPECIFIED TYPE: ICD-10-CM

## 2017-07-05 DIAGNOSIS — J44.9 CHRONIC OBSTRUCTIVE PULMONARY DISEASE, UNSPECIFIED COPD TYPE (HCC): ICD-10-CM

## 2017-07-05 DIAGNOSIS — R79.89 ELEVATED LFTS: ICD-10-CM

## 2017-07-05 RX ORDER — BUDESONIDE AND FORMOTEROL FUMARATE DIHYDRATE 160; 4.5 UG/1; UG/1
AEROSOL RESPIRATORY (INHALATION)
Qty: 1 INHALER | Refills: 5 | Status: SHIPPED | OUTPATIENT
Start: 2017-07-05 | End: 2017-08-17 | Stop reason: SDUPTHER

## 2017-07-05 NOTE — TELEPHONE ENCOUNTER
Caller Name: Jillian Gottlieb                 Call Back Number: [phone] (home)         Patient approves a detailed voicemail message: N\A    Have we ever prescribed this med? Yes.  If yes, what date? 8/29/16    Last OV: 1/24/17- Martha Gin     Next OV: Annual, no follow up on file     DX: Copd    Medications: Symbicort     Current Outpatient Prescriptions   Medication Sig Dispense Refill   • SPIRIVA HANDIHALER 18 MCG Cap INHALE ONE CAPSULE BY MOUTH ONCE DAILY 30 Cap 11   • levothyroxine (SYNTHROID) 88 MCG Tab Take 1 Tab by mouth Every morning on an empty stomach. 90 Tab 3   • albuterol (PROVENTIL) 2.5mg/3ml Nebu Soln solution for nebulization 3 mL by Nebulization route every four hours as needed for Shortness of Breath. 360 mL 5   • pantoprazole (PROTONIX) 40 MG Tablet Delayed Response Take 1 Tab by mouth Every morning on an empty stomach. 90 Tab 3   • albuterol 108 (90 BASE) MCG/ACT Aero Soln inhalation aerosol Inhale 2 Puffs by mouth every four hours as needed for Shortness of Breath. 1 Inhaler 0   • promethazine-codeine (PHENERGAN-CODEINE) 6.25-10 MG/5ML Syrup Take 5-10 mL by mouth 3 times a day as needed for Cough (or use qhs). 150 mL 0   • MethylPREDNISolone (MEDROL DOSEPAK) 4 MG Tablet Therapy Pack U.D. 1 Tab 0   • metoprolol SR (TOPROL XL) 25 MG TABLET SR 24 HR TAKE ONE TABLET BY MOUTH ONCE DAILY 90 Tab 3   • simvastatin (ZOCOR) 20 MG Tab Take 1 Tab by mouth every evening. 90 Tab 3   • fenofibrate (TRIGLIDE) 160 MG tablet TAKE ONE TABLET BY MOUTH ONCE DAILY 90 Tab 3   • azithromycin (ZITHROMAX) 250 MG Tab Take 2 tablets on day 1, then take 1 tablet a day for 4 days. 6 Tab 1   • MethylPREDNISolone (MEDROL DOSEPAK) 4 MG Tablet Therapy Pack Take as directed. 21 Tab 1   • LYRICA 50 MG capsule Take 1 Cap by mouth at bedtime as needed.     • esomeprazole (NEXIUM) 40 MG delayed-release capsule Take 1 Cap by mouth every morning before breakfast. 90 Cap 3   • sulfamethoxazole-trimethoprim (BACTRIM) 400-80 MG  Tab Take 1 Tab by mouth every day.     • budesonide-formoterol (SYMBICORT) 160-4.5 MCG/ACT Aerosol Inhale 2 Puffs by mouth 2 Times a Day. RINSE MOUTH AFTER USE 1 Inhaler 6   • losartan-hydrochlorothiazide (HYZAAR) 100-25 MG per tablet Take 1 Tab by mouth every morning. 90 Tab 3   • albuterol (VENTOLIN OR PROVENTIL) 108 (90 BASE) MCG/ACT Aero Soln inhalation aerosol Inhale 2 Puffs by mouth every 6 hours as needed for Shortness of Breath.     • raloxifene (EVISTA) 60 MG Tab Take 60 mg by mouth every morning.       No current facility-administered medications for this visit.

## 2017-07-24 ENCOUNTER — TELEPHONE (OUTPATIENT)
Dept: PULMONOLOGY | Facility: HOSPICE | Age: 72
End: 2017-07-24

## 2017-07-24 DIAGNOSIS — B37.0 THRUSH: ICD-10-CM

## 2017-07-24 NOTE — TELEPHONE ENCOUNTER
Pt called requesting an order for Nystatin, states that she need the rx for her thrush.     Pt is requesting an Alternative for Symbicort and Spiriva 18 MCG, states that the rx's are too expensive ($300) pharmacy and she is in the doughnut hole.    Last OV: 1/24/17  Next OV: 1 yr return  COPD  Nystatin, Alternative to Spiriva 18 MCG/ Symbicort

## 2017-07-25 NOTE — TELEPHONE ENCOUNTER
Please check formulary to see what inhalers have better coverage.   Nystatin RX signed. OV if thrush does not resolve.

## 2017-08-16 ENCOUNTER — PATIENT OUTREACH (OUTPATIENT)
Dept: HEALTH INFORMATION MANAGEMENT | Facility: OTHER | Age: 72
End: 2017-08-16

## 2017-08-17 DIAGNOSIS — J44.9 CHRONIC OBSTRUCTIVE PULMONARY DISEASE, UNSPECIFIED COPD TYPE (HCC): ICD-10-CM

## 2017-08-17 RX ORDER — BUDESONIDE AND FORMOTEROL FUMARATE DIHYDRATE 160; 4.5 UG/1; UG/1
2 AEROSOL RESPIRATORY (INHALATION) 2 TIMES DAILY
Qty: 1 INHALER | Refills: 0 | Status: SHIPPED | OUTPATIENT
Start: 2017-08-17 | End: 2018-09-05 | Stop reason: SDUPTHER

## 2017-08-19 LAB
ALBUMIN SERPL-MCNC: 4.4 G/DL (ref 3.5–4.8)
ALBUMIN/GLOB SERPL: 2.2 {RATIO} (ref 1.2–2.2)
ALP SERPL-CCNC: 60 IU/L (ref 39–117)
ALT SERPL-CCNC: 52 IU/L (ref 0–32)
AST SERPL-CCNC: 58 IU/L (ref 0–40)
BILIRUB SERPL-MCNC: 0.4 MG/DL (ref 0–1.2)
BUN SERPL-MCNC: 24 MG/DL (ref 8–27)
BUN/CREAT SERPL: 25 (ref 12–28)
CALCIUM SERPL-MCNC: 9.7 MG/DL (ref 8.7–10.3)
CHLORIDE SERPL-SCNC: 100 MMOL/L (ref 96–106)
CHOLEST SERPL-MCNC: 155 MG/DL (ref 100–199)
CO2 SERPL-SCNC: 23 MMOL/L (ref 18–29)
COMMENT 011824: ABNORMAL
CREAT SERPL-MCNC: 0.95 MG/DL (ref 0.57–1)
GLOBULIN SER CALC-MCNC: 2 G/DL (ref 1.5–4.5)
GLUCOSE SERPL-MCNC: 124 MG/DL (ref 65–99)
HBA1C MFR BLD: 7.2 % (ref 4.8–5.6)
HDLC SERPL-MCNC: 45 MG/DL
LDLC SERPL CALC-MCNC: 53 MG/DL (ref 0–99)
POTASSIUM SERPL-SCNC: 4.5 MMOL/L (ref 3.5–5.2)
PROT SERPL-MCNC: 6.4 G/DL (ref 6–8.5)
SODIUM SERPL-SCNC: 140 MMOL/L (ref 134–144)
TRIGL SERPL-MCNC: 284 MG/DL (ref 0–149)
TSH SERPL DL<=0.005 MIU/L-ACNC: 1.35 UIU/ML (ref 0.45–4.5)
VLDLC SERPL CALC-MCNC: 57 MG/DL (ref 5–40)

## 2017-08-22 ENCOUNTER — OFFICE VISIT (OUTPATIENT)
Dept: MEDICAL GROUP | Facility: MEDICAL CENTER | Age: 72
End: 2017-08-22
Payer: MEDICARE

## 2017-08-22 VITALS
SYSTOLIC BLOOD PRESSURE: 136 MMHG | HEART RATE: 83 BPM | TEMPERATURE: 97.8 F | DIASTOLIC BLOOD PRESSURE: 80 MMHG | HEIGHT: 70 IN | WEIGHT: 196 LBS | BODY MASS INDEX: 28.06 KG/M2 | OXYGEN SATURATION: 95 % | RESPIRATION RATE: 16 BRPM

## 2017-08-22 DIAGNOSIS — J45.20 MILD INTERMITTENT ASTHMA WITHOUT COMPLICATION: ICD-10-CM

## 2017-08-22 DIAGNOSIS — R73.9 HYPERGLYCEMIA: ICD-10-CM

## 2017-08-22 DIAGNOSIS — E03.9 HYPOTHYROIDISM, UNSPECIFIED TYPE: ICD-10-CM

## 2017-08-22 DIAGNOSIS — M25.562 CHRONIC PAIN OF LEFT KNEE: ICD-10-CM

## 2017-08-22 DIAGNOSIS — G89.29 CHRONIC PAIN OF LEFT KNEE: ICD-10-CM

## 2017-08-22 DIAGNOSIS — E78.5 HYPERLIPIDEMIA, UNSPECIFIED HYPERLIPIDEMIA TYPE: ICD-10-CM

## 2017-08-22 DIAGNOSIS — R74.8 ELEVATED LIVER ENZYMES: ICD-10-CM

## 2017-08-22 DIAGNOSIS — I10 ESSENTIAL HYPERTENSION: ICD-10-CM

## 2017-08-22 DIAGNOSIS — K21.9 GASTROESOPHAGEAL REFLUX DISEASE WITHOUT ESOPHAGITIS: ICD-10-CM

## 2017-08-22 DIAGNOSIS — R73.01 IMPAIRED FASTING GLUCOSE: ICD-10-CM

## 2017-08-22 DIAGNOSIS — Z85.528 PERSONAL HISTORY OF RENAL CANCER: ICD-10-CM

## 2017-08-22 PROCEDURE — 99214 OFFICE O/P EST MOD 30 MIN: CPT | Performed by: INTERNAL MEDICINE

## 2017-08-22 RX ORDER — METFORMIN HYDROCHLORIDE 500 MG/1
1 TABLET, FILM COATED, EXTENDED RELEASE ORAL DAILY
Qty: 90 TAB | Refills: 3 | Status: SHIPPED | OUTPATIENT
Start: 2017-08-22 | End: 2017-08-23

## 2017-08-22 NOTE — PROGRESS NOTES
Attempt #:2    WebIZ Checked & Epic Updated: yes  HealthConnect Verified: yes  Verify PCP: yes    Communication Preference Obtained: yes     Review Care Team: yes    Annual Wellness Visit Scheduling  1. Scheduling Status:Scheduled     Care Gap Scheduling (Attempt to Schedule EACH Overdue Care Gap!)     Health Maintenance Due   Topic Date Due   • BONE DENSITY  Pt will schedule later    • Annual Wellness Visit  Scheduled          MyChart Activation: sent activation code  Voxify Marlene: no  Virtual Visits: no  Opt In to Text Messages: no

## 2017-08-22 NOTE — ASSESSMENT & PLAN NOTE
When she first came in today her blood pressure was elevated but had repeat result was in the normal range. She continues losartan with hydrochlorothiazide. She denies lightheadedness.

## 2017-08-22 NOTE — ASSESSMENT & PLAN NOTE
Alkaline phosphatase is normal and SGOT and SGPT are elevated at 58 and 52 respectively. She drinks minimal amounts of alcohol. She is unaware of any history of hepatitis.

## 2017-08-22 NOTE — ASSESSMENT & PLAN NOTE
She continues taking simvastatin and fenofibrate without significant difficulty. She denies significant myalgias. Recent lipids show cholesterol 155, triglycerides 284, HDL 45, LDL 53.

## 2017-08-22 NOTE — ASSESSMENT & PLAN NOTE
She has chronic pain involving her left knee and anticipates knee replacement surgery next spring. She reports that she recently also developed pain from her left knee to her left lateral foot that significantly limited her ability to walk. She was told that it was tendinitis. It is slowly improving with physical therapy.

## 2017-08-22 NOTE — PROGRESS NOTES
Subjective:     Chief Complaint   Patient presents with   • Follow-Up     routine f/u     Jillian Gottlieb is a 71 y.o. female here today for follow-up of her various health problems.    Personal history of renal cancer  This is followed by Dr. Cintron and without evidence of recurrence.    Impaired fasting glucose  Blood sugar is elevated at 124 and glycohemoglobin is 7.2. She tries to follow appropriate diet with limited success. She remains somewhat overweight. She cannot really exercise much because of left knee pain.    Hypothyroidism  Clinically she is euthyroid. She continues taking levothyroxin daily. TSH is normal at 1.35.    Hyperlipidemia  She continues taking simvastatin and fenofibrate without significant difficulty. She denies significant myalgias. Recent lipids show cholesterol 155, triglycerides 284, HDL 45, LDL 53.    GERD (gastroesophageal reflux disease)  Gastroesophageal reflux is under good control with diet and pantoprazole.    Essential hypertension  When she first came in today her blood pressure was elevated but had repeat result was in the normal range. She continues losartan with hydrochlorothiazide. She denies lightheadedness.    Elevated liver enzymes  Alkaline phosphatase is normal and SGOT and SGPT are elevated at 58 and 52 respectively. She drinks minimal amounts of alcohol. She is unaware of any history of hepatitis.    Asthma  Her asthma she reports is under fairly good control.    Chronic pain of left knee  She has chronic pain involving her left knee and anticipates knee replacement surgery next spring. She reports that she recently also developed pain from her left knee to her left lateral foot that significantly limited her ability to walk. She was told that it was tendinitis. It is slowly improving with physical therapy.         Diagnoses of Essential hypertension, Hyperglycemia, Hyperlipidemia, unspecified hyperlipidemia type, Elevated liver enzymes, Hypothyroidism, unspecified  type, Mild intermittent asthma without complication, Impaired fasting glucose, Gastroesophageal reflux disease without esophagitis, Personal history of renal cancer, and Chronic pain of left knee were pertinent to this visit.    Allergies: Alcohol and Pcn  Current medicines (including changes today)  Current Outpatient Prescriptions   Medication Sig Dispense Refill   • metformin ER modified (GLUMETZA) 500 MG TABLET SR 24 HR Take 1 Tab by mouth every day. 90 Tab 3   • budesonide-formoterol (SYMBICORT) 160-4.5 MCG/ACT Aerosol Inhale 2 Puffs by mouth 2 Times a Day. With spacer, rinse mouth after use 1 Inhaler 0   • Tiotropium Bromide Monohydrate (SPIRIVA RESPIMAT) 2.5 MCG/ACT Aero Soln Inhale 2 Inhalation by mouth every day. Assemble and prime. 1 Inhaler 0   • nystatin (MYCOSTATIN) 836906 UNIT/ML Suspension Swish and swallow 1 teaspoonful (= 5mL) 4 times daily. 250 mL 0   • SPIRIVA HANDIHALER 18 MCG Cap INHALE ONE CAPSULE BY MOUTH ONCE DAILY 30 Cap 11   • levothyroxine (SYNTHROID) 88 MCG Tab Take 1 Tab by mouth Every morning on an empty stomach. 90 Tab 3   • albuterol (PROVENTIL) 2.5mg/3ml Nebu Soln solution for nebulization 3 mL by Nebulization route every four hours as needed for Shortness of Breath. 360 mL 5   • pantoprazole (PROTONIX) 40 MG Tablet Delayed Response Take 1 Tab by mouth Every morning on an empty stomach. 90 Tab 3   • albuterol 108 (90 BASE) MCG/ACT Aero Soln inhalation aerosol Inhale 2 Puffs by mouth every four hours as needed for Shortness of Breath. 1 Inhaler 0   • metoprolol SR (TOPROL XL) 25 MG TABLET SR 24 HR TAKE ONE TABLET BY MOUTH ONCE DAILY 90 Tab 3   • simvastatin (ZOCOR) 20 MG Tab Take 1 Tab by mouth every evening. 90 Tab 3   • fenofibrate (TRIGLIDE) 160 MG tablet TAKE ONE TABLET BY MOUTH ONCE DAILY 90 Tab 3   • LYRICA 50 MG capsule Take 1 Cap by mouth at bedtime as needed.     • esomeprazole (NEXIUM) 40 MG delayed-release capsule Take 1 Cap by mouth every morning before breakfast. 90 Cap 3    • sulfamethoxazole-trimethoprim (BACTRIM) 400-80 MG Tab Take 1 Tab by mouth every day.     • losartan-hydrochlorothiazide (HYZAAR) 100-25 MG per tablet Take 1 Tab by mouth every morning. 90 Tab 3   • albuterol (VENTOLIN OR PROVENTIL) 108 (90 BASE) MCG/ACT Aero Soln inhalation aerosol Inhale 2 Puffs by mouth every 6 hours as needed for Shortness of Breath.     • raloxifene (EVISTA) 60 MG Tab Take 60 mg by mouth every morning.     • promethazine-codeine (PHENERGAN-CODEINE) 6.25-10 MG/5ML Syrup Take 5-10 mL by mouth 3 times a day as needed for Cough (or use qhs). 150 mL 0   • MethylPREDNISolone (MEDROL DOSEPAK) 4 MG Tablet Therapy Pack U.D. 1 Tab 0   • azithromycin (ZITHROMAX) 250 MG Tab Take 2 tablets on day 1, then take 1 tablet a day for 4 days. 6 Tab 1   • MethylPREDNISolone (MEDROL DOSEPAK) 4 MG Tablet Therapy Pack Take as directed. 21 Tab 1     No current facility-administered medications for this visit.       She  has a past medical history of Hypertension; Heart burn; Indigestion; Arthritis; Cancer (CMS-HCC) (01/13); Bronchitis (08/04/14); Asthma; Unspecified disorder of thyroid; Unspecified hypothyroidism (4/20/2015); Personal history of renal cancer (4/20/2015); Impaired fasting glucose (8/24/2015); Dyspnea; COPD (chronic obstructive pulmonary disease) (CMS-HCC); COPD (chronic obstructive pulmonary disease) (CMS-HCC) (6/30/2016); Chronic pain of right knee (7/1/2016); Mass of left lower leg (7/27/2016); Renal disorder; Renal disorder (2013); Acid reflux; Diabetes (CMS-HCC); Breath shortness; and Chronic pain of left knee (8/22/2017).  Health Maintenance: She was encouraged to get an annual wellness check.  ROS    Patient denies significant change in strength, weight or appetite.  No significant lightheadedness or headaches.  No change in vision, hearing, or swallowing.  No new dyspnea, coughing, chest pain, or palpitations.  No indigestion, abdominal pain, or change in bowel habits.  No change in  "urinating.  No new ankle swelling.       Objective:     PE:  /80 mmHg  Pulse 83  Temp(Src) 36.6 °C (97.8 °F)  Resp 16  Ht 1.778 m (5' 10\")  Wt 88.905 kg (196 lb)  BMI 28.12 kg/m2  SpO2 95%   Neck is supple without significant lymphadenopathy or masses.  Lungs are clear with normal breath sounds without wheezes or rales .  Cardiovascular: peripheral circulation is satisfactory, heart sounds are unchanged and unremarkable.  Abdomen is soft, without masses or tenderness, with normal bowel sounds.  Extremities are without significant edema, cyanosis or deformity.      Assessment and Plan:   The following treatment plan was discussed  1. Essential hypertension  BASIC METABOLIC PANEL    Continue same medications. We reviewed low-salt diet and weight loss.   2. Hyperglycemia  metformin ER modified (GLUMETZA) 500 MG TABLET SR 24 HR    HEMOGLOBIN A1C    Long discussion regarding appropriate diet. She also was started on metformin 500 mg daily. We reviewed potential side effects.   3. Hyperlipidemia, unspecified hyperlipidemia type      No medication changes at this point. We again discussed appropriate diet and weight loss.   4. Elevated liver enzymes  HEPATIC FUNCTION PANEL    HEPATITIS B SURFACE ANTIGEN    HEP C VIRUS ANTIBODY    We will check for hepatitis B and C and repeat hepatic panel.   5. Hypothyroidism, unspecified type      Continue levothyroxine, check TSH at least yearly.   6. Mild intermittent asthma without complication      Continue same medications. She will report any significant acute exacerbation.   7. Impaired fasting glucose      Long discussion regarding appropriate diet and weight loss. She was started on metformin. We reviewed potential side effects.   8. Gastroesophageal reflux disease without esophagitis      We again reviewed appropriate diet. She will try switching to Pepcid Complete.   9. Personal history of renal cancer      Follow-up regularly with Dr. Cintron.   10. Chronic pain of " left knee      Continue physical therapy and follow-up with orthopedics.       Followup: Return in about 3 months (around 11/22/2017) for Short.

## 2017-08-22 NOTE — ASSESSMENT & PLAN NOTE
Blood sugar is elevated at 124 and glycohemoglobin is 7.2. She tries to follow appropriate diet with limited success. She remains somewhat overweight. She cannot really exercise much because of left knee pain.

## 2017-08-22 NOTE — MR AVS SNAPSHOT
"Jillian Gottlieb   2017 10:00 AM   Office Visit   MRN: [id number]    Department:  [address] [place] [address]   Dept Phone:  [phone]    Description:  Female : 1945   Provider:  Herve Conrad M.D.           Reason for Visit     Follow-Up routine f/u      Allergies as of 2017     Allergen Noted Reactions    Alcohol 2013   Vomiting, Nausea, Unspecified    injested-facial numbness, n/v  RXN=50 years ago    Pcn [Penicillins] 2012   Unspecified    Flushed; \"felt weird\"  Tolerates cephalosporins  RXN=age 30's      You were diagnosed with     Essential hypertension   [1291053]       Hyperglycemia   [104676]       Hyperlipidemia, unspecified hyperlipidemia type   [8536479]       Elevated liver enzymes   [057198]         Vital Signs     Blood Pressure Pulse Temperature Respirations Height Weight    162/78 mmHg 83 36.6 °C (97.8 °F) 16 1.778 m (5' 10\") 88.905 kg (196 lb)    Body Mass Index Oxygen Saturation Smoking Status             28.12 kg/m2 95% Never Smoker          Basic Information     Date Of Birth Sex Race Ethnicity Preferred Language    1945 Female White Non- English      Your appointments     2017  1:40 PM   Established Patient with Herve Conrad M.D.   [facility] [address]    [address] [place] [address] [address]  [facility] [address]   [phone]           You will be receiving a confirmation call a few days before your appointment from our automated call confirmation system.              Problem List              ICD-10-CM Priority Class Noted - Resolved    Right kidney stone N20.0   2013 - Present    Hyperlipidemia E78.5   2015 - Present    Hypothyroidism E03.9   2015 - Present    Personal history of renal cancer Z85.528   2015 - Present    Asthma J45.909   2015 - Present    Calculus of kidney N20.0   2015 - Present    Impaired fasting glucose R73.01   2015 - Present    Essential hypertension I10   2016 - " Present    Hyperglycemia R73.9   2/8/2016 - Present    Ureteral stone N20.1   2/12/2016 - Present    Kidney stones N20.0   3/4/2016 - Present    COPD (chronic obstructive pulmonary disease) ([facility]) J44.9   6/30/2016 - Present    Chronic pain of right knee M25.561, G89.29   7/1/2016 - Present    Mass of left lower leg R22.42   7/27/2016 - Present    GERD (gastroesophageal reflux disease) K21.9   10/3/2016 - Present    Primary osteoarthritis of right knee M17.11   10/17/2016 - Present      Health Maintenance        Date Due Completion Dates    BONE DENSITY 8/28/2010 ---    MAMMOGRAM 12/30/2017 12/30/2016    COLONOSCOPY 11/11/2020 11/11/2015    IMM DTaP/Tdap/Td Vaccine (2 - Td) 2/10/2024 2/10/2014            Current Immunizations     13-VALENT PCV PREVNAR 10/3/2015    Influenza Vaccine Adult HD 8/18/2017, 9/1/2016    Pneumococcal polysaccharide vaccine (PPSV-23) 8/7/2013    SHINGLES VACCINE 5/3/2014    Tdap Vaccine 2/10/2014      Below and/or attached are the medications your provider expects you to take. Review all of your home medications and newly ordered medications with your provider and/or pharmacist. Follow medication instructions as directed by your provider and/or pharmacist. Please keep your medication list with you and share with your provider. Update the information when medications are discontinued, doses are changed, or new medications (including over-the-counter products) are added; and carry medication information at all times in the event of emergency situations     Allergies:  ALCOHOL - Vomiting,Nausea,Unspecified     PCN - Unspecified               Medications  Valid as of: August 22, 2017 - 10:28 AM    Generic Name Brand Name Tablet Size Instructions for use    Albuterol Sulfate (Aero Soln) albuterol 108 (90 Base) MCG/ACT Inhale 2 Puffs by mouth every 6 hours as needed for Shortness of Breath.        Albuterol Sulfate (Aero Soln) albuterol 108 (90 Base) MCG/ACT Inhale 2 Puffs by mouth every four hours  as needed for Shortness of Breath.        Albuterol Sulfate (Nebu Soln) PROVENTIL 2.5mg/3ml 3 mL by Nebulization route every four hours as needed for Shortness of Breath.        Azithromycin (Tab) ZITHROMAX 250 MG Take 2 tablets on day 1, then take 1 tablet a day for 4 days.        Budesonide-Formoterol Fumarate (Aerosol) SYMBICORT 160-4.5 MCG/ACT Inhale 2 Puffs by mouth 2 Times a Day. With spacer, rinse mouth after use        Esomeprazole Magnesium (CAPSULE DELAYED RELEASE) NEXIUM 40 MG Take 1 Cap by mouth every morning before breakfast.        Fenofibrate (Tab) TRIGLIDE 160 MG TAKE ONE TABLET BY MOUTH ONCE DAILY        Levothyroxine Sodium (Tab) SYNTHROID 88 MCG Take 1 Tab by mouth Every morning on an empty stomach.        Losartan Potassium-HCTZ (Tab) HYZAAR 100-25 MG Take 1 Tab by mouth every morning.        MetFORMIN HCl (TABLET SR 24 HR) GLUMETZA 500 MG Take 1 Tab by mouth every day.        MethylPREDNISolone (Tablet Therapy Pack) MEDROL DOSEPAK 4 MG Take as directed.        MethylPREDNISolone (Tablet Therapy Pack) MEDROL DOSEPAK 4 MG U.D.        Metoprolol Succinate (TABLET SR 24 HR) TOPROL XL 25 MG TAKE ONE TABLET BY MOUTH ONCE DAILY        Nystatin (Suspension) MYCOSTATIN 176663 UNIT/ML Swish and swallow 1 teaspoonful (= 5mL) 4 times daily.        Pantoprazole Sodium (Tablet Delayed Response) PROTONIX 40 MG Take 1 Tab by mouth Every morning on an empty stomach.        Pregabalin (Cap) LYRICA 50 MG Take 1 Cap by mouth at bedtime as needed.        Promethazine-Codeine (Syrup) PHENERGAN-CODEINE 6.25-10 MG/5ML Take 5-10 mL by mouth 3 times a day as needed for Cough (or use qhs).        Raloxifene HCl (Tab) EVISTA 60 MG Take 60 mg by mouth every morning.        Simvastatin (Tab) ZOCOR 20 MG Take 1 Tab by mouth every evening.        Sulfamethoxazole-Trimethoprim (Tab) BACTRIM 400-80 MG Take 1 Tab by mouth every day.        Tiotropium Bromide Monohydrate (Cap) SPIRIVA HANDIHALER 18 MCG INHALE ONE CAPSULE BY MOUTH  ONCE DAILY        Tiotropium Bromide Monohydrate (Aero Soln) Tiotropium Bromide Monohydrate 2.5 MCG/ACT Inhale 2 Inhalation by mouth every day. Assemble and prime.        .                 Medicines prescribed today were sent to:     [id number] - [place], [place] - [address]    [address] [place] [address]    Phone: [phone] Fax: [phone]    Open 24 Hours?: No    [facility] - [place], [place] - [address]    [address] [place] [address]    Phone: [phone] Fax: [phone]    Open 24 Hours?: No      Medication refill instructions:       If your prescription bottle indicates you have medication refills left, it is not necessary to call your provider’s office. Please contact your pharmacy and they will refill your medication.    If your prescription bottle indicates you do not have any refills left, you may request refills at any time through one of the following ways: The online [employer] system (except Urgent Care), by calling your provider’s office, or by asking your pharmacy to contact your provider’s office with a refill request. Medication refills are processed only during regular business hours and may not be available until the next business day. Your provider may request additional information or to have a follow-up visit with you prior to refilling your medication.   *Please Note: Medication refills are assigned a new Rx number when refilled electronically. Your pharmacy may indicate that no refills were authorized even though a new prescription for the same medication is available at the pharmacy. Please request the medicine by name with the pharmacy before contacting your provider for a refill.        Your To Do List     Future Labs/Procedures Complete By Expires    BASIC METABOLIC PANEL  As directed 8/23/2018    HEMOGLOBIN A1C  As directed 8/23/2018    HEP C VIRUS ANTIBODY  As directed 8/22/2018    HEPATIC FUNCTION PANEL  As directed 8/22/2018    HEPATITIS B  SURFACE ANTIGEN  As directed 8/23/2018         PDV Access Code: [email or web address]  Expires: 9/21/2017  9:45 AM    PDV  A secure, online tool to manage your health information     [facility]’s PDV® is a secure, online tool that connects you to your personalized health information from the privacy of your home -- day or night - making it very easy for you to manage your healthcare. Once the activation process is completed, you can even access your medical information using the PDV marlene, which is available for free in the Apple Marlene store or Google Play store.     PDV provides the following levels of access (as shown below):   My Chart Features   [facility] Doctor [facility]  Specialists [facility]  Urgent  Care Non-[facility]  Primary Care  Doctor   Email your healthcare team securely and privately 24/7 X X X    Manage appointments: schedule your next appointment; view details of past/upcoming appointments X      Request prescription refills. X      View recent personal medical records, including lab and immunizations X X X X   View health record, including health history, allergies, medications X X X X   Read reports about your outpatient visits, procedures, consult and ER notes X X X X   See your discharge summary, which is a recap of your hospital and/or ER visit that includes your diagnosis, lab results, and care plan. X X       How to register for PDV:  1. Go to  [email or web address].  2. Click on the Sign Up Now box, which takes you to the New Member Sign Up page. You will need to provide the following information:  a. Enter your PDV Access Code exactly as it appears at the top of this page. (You will not need to use this code after you’ve completed the sign-up process. If you do not sign up before the expiration date, you must request a new code.)   b. Enter your date of birth.   c. Enter your home email address.   d. Click Submit, and follow the next screen’s  instructions.  3. Create a [employer] ID. This will be your [employer] login ID and cannot be changed, so think of one that is secure and easy to remember.  4. Create a [employer] password. You can change your password at any time.  5. Enter your Password Reset Question and Answer. This can be used at a later time if you forget your password.   6. Enter your e-mail address. This allows you to receive e-mail notifications when new information is available in [facility] [employer].  7. Click Sign Up. You can now view your health information.    For assistance activating your [employer] account, call [phone]

## 2017-08-23 ENCOUNTER — TELEPHONE (OUTPATIENT)
Dept: MEDICAL GROUP | Facility: MEDICAL CENTER | Age: 72
End: 2017-08-23

## 2017-08-23 RX ORDER — METFORMIN HYDROCHLORIDE 500 MG/1
500 TABLET, EXTENDED RELEASE ORAL DAILY
Qty: 90 TAB | Refills: 3 | Status: SHIPPED | OUTPATIENT
Start: 2017-08-23 | End: 2018-08-13 | Stop reason: SDUPTHER

## 2017-08-23 NOTE — TELEPHONE ENCOUNTER
1. Caller Name: Jillian                      Call Back Number: [phone] (home)       2. Message: patient needs new script sent to pharmacy, her insurance will not cover the glumetza    3. Patient approves office to leave a detailed voicemail/MyChart message: N\A

## 2017-08-25 RX ORDER — RALOXIFENE HYDROCHLORIDE 60 MG/1
60 TABLET, FILM COATED ORAL EVERY MORNING
Qty: 30 TAB | Refills: 11 | Status: SHIPPED | OUTPATIENT
Start: 2017-08-25 | End: 2018-09-12 | Stop reason: SDUPTHER

## 2017-09-08 NOTE — ASSESSMENT & PLAN NOTE
Chronic condition managed with current medical regimen  Stable per review   Continue with diet management  Followed by Herve Conrad M.D..

## 2017-09-08 NOTE — ASSESSMENT & PLAN NOTE
Chronic condition managed with current medical regimen  Stable per review   Continue with current meds  Followed by Herve Conrad M.D..

## 2017-09-08 NOTE — ASSESSMENT & PLAN NOTE
10/3/2016   AST(SGOT) 34 12 - 45 U/L Final   ALT(SGPT) 41 2 - 50 U/L Final   Alkaline Phosphatase 56 30 - 99 U/L Final   Followed by Herve Conrad M.D..

## 2017-09-08 NOTE — ASSESSMENT & PLAN NOTE
Chronic condition managed with current medical regimen  Stable per review   Continue with current meds  Followed by ortho      ????? TKR..

## 2017-09-08 NOTE — ASSESSMENT & PLAN NOTE
Chronic condition managed with current medical regimen  Labs reviewed    Continue with current meds  Followed by Herve Conrad M.D..

## 2017-09-08 NOTE — ASSESSMENT & PLAN NOTE
8/18/2017   Glycohemoglobin 7.2  4.8 - 5.6 % Final     Diet managed  Followed by Herve Conrad M.D..

## 2017-09-08 NOTE — ASSESSMENT & PLAN NOTE
Chronic condition managed with current medical regimen  Stable per review   Continue with current meds, effective  Followed by pulm

## 2017-09-09 ENCOUNTER — OFFICE VISIT (OUTPATIENT)
Dept: MEDICAL GROUP | Facility: CLINIC | Age: 72
End: 2017-09-09

## 2017-09-09 VITALS
DIASTOLIC BLOOD PRESSURE: 84 MMHG | SYSTOLIC BLOOD PRESSURE: 148 MMHG | OXYGEN SATURATION: 95 % | TEMPERATURE: 97.9 F | HEIGHT: 71 IN | BODY MASS INDEX: 26.46 KG/M2 | HEART RATE: 83 BPM | WEIGHT: 189 LBS

## 2017-09-09 DIAGNOSIS — M17.11 PRIMARY OSTEOARTHRITIS OF RIGHT KNEE: ICD-10-CM

## 2017-09-09 DIAGNOSIS — I10 ESSENTIAL HYPERTENSION: ICD-10-CM

## 2017-09-09 DIAGNOSIS — N20.0 RIGHT KIDNEY STONE: ICD-10-CM

## 2017-09-09 DIAGNOSIS — E03.9 HYPOTHYROIDISM, UNSPECIFIED TYPE: ICD-10-CM

## 2017-09-09 DIAGNOSIS — M25.562 CHRONIC PAIN OF LEFT KNEE: ICD-10-CM

## 2017-09-09 DIAGNOSIS — E78.5 HYPERLIPIDEMIA, UNSPECIFIED HYPERLIPIDEMIA TYPE: ICD-10-CM

## 2017-09-09 DIAGNOSIS — N20.0 KIDNEY STONES: ICD-10-CM

## 2017-09-09 DIAGNOSIS — K21.9 GASTROESOPHAGEAL REFLUX DISEASE WITHOUT ESOPHAGITIS: ICD-10-CM

## 2017-09-09 DIAGNOSIS — Z85.528 PERSONAL HISTORY OF RENAL CANCER: ICD-10-CM

## 2017-09-09 DIAGNOSIS — R22.42 MASS OF LEFT LOWER LEG: ICD-10-CM

## 2017-09-09 DIAGNOSIS — R73.01 IMPAIRED FASTING GLUCOSE: ICD-10-CM

## 2017-09-09 DIAGNOSIS — J44.9 CHRONIC OBSTRUCTIVE PULMONARY DISEASE, UNSPECIFIED COPD TYPE (HCC): ICD-10-CM

## 2017-09-09 DIAGNOSIS — N20.0 CALCULUS OF KIDNEY: ICD-10-CM

## 2017-09-09 DIAGNOSIS — G89.29 CHRONIC PAIN OF LEFT KNEE: ICD-10-CM

## 2017-09-09 DIAGNOSIS — R74.8 ELEVATED LIVER ENZYMES: ICD-10-CM

## 2017-09-09 DIAGNOSIS — N20.1 URETERAL STONE: ICD-10-CM

## 2017-09-09 DIAGNOSIS — Z00.00 MEDICARE ANNUAL WELLNESS VISIT, SUBSEQUENT: Primary | ICD-10-CM

## 2017-09-09 DIAGNOSIS — J45.20 MILD INTERMITTENT ASTHMA WITHOUT COMPLICATION: ICD-10-CM

## 2017-09-09 DIAGNOSIS — Z96.651 H/O TOTAL KNEE REPLACEMENT, RIGHT: ICD-10-CM

## 2017-09-09 PROCEDURE — G0439 PPPS, SUBSEQ VISIT: HCPCS | Performed by: NURSE PRACTITIONER

## 2017-09-09 RX ORDER — SODIUM PHOSPHATE,MONO-DIBASIC 19G-7G/118
500 ENEMA (ML) RECTAL
COMMUNITY
End: 2017-12-07

## 2017-09-09 ASSESSMENT — PATIENT HEALTH QUESTIONNAIRE - PHQ9
CLINICAL INTERPRETATION OF PHQ2 SCORE: 1
SUM OF ALL RESPONSES TO PHQ QUESTIONS 1-9: 4
5. POOR APPETITE OR OVEREATING: 1 - SEVERAL DAYS

## 2017-09-09 NOTE — PROGRESS NOTES
CC:   Medicare Annual Wellness Visit    HPI:  Jillian is a 72 y.o. here for Medicare Annual Wellness Visit    Patient Active Problem List    Diagnosis Date Noted   • Elevated liver enzymes 08/22/2017   • Chronic pain of left knee 08/22/2017   • GERD (gastroesophageal reflux disease) 10/03/2016   • Mass of left lower leg 07/27/2016   • H/O total knee replacement 07/01/2016   • COPD (chronic obstructive pulmonary disease) (HCC) 06/30/2016   • Kidney stones 03/04/2016   • Essential hypertension 02/08/2016   • Impaired fasting glucose 08/24/2015   • Hyperlipidemia 04/20/2015   • Hypothyroidism 04/20/2015   • Personal history of renal cancer 04/20/2015   • Asthma 04/20/2015     Current Outpatient Prescriptions   Medication Sig Dispense Refill   • glucosamine Sulfate 500 MG Cap Take 500 mg by mouth 3 times a day, with meals.     • raloxifene (EVISTA) 60 MG Tab Take 1 Tab by mouth every morning. 30 Tab 11   • metformin ER (GLUCOPHAGE XR) 500 MG TABLET SR 24 HR Take 1 Tab by mouth every day. 90 Tab 3   • budesonide-formoterol (SYMBICORT) 160-4.5 MCG/ACT Aerosol Inhale 2 Puffs by mouth 2 Times a Day. With spacer, rinse mouth after use 1 Inhaler 0   • Tiotropium Bromide Monohydrate (SPIRIVA RESPIMAT) 2.5 MCG/ACT Aero Soln Inhale 2 Inhalation by mouth every day. Assemble and prime. 1 Inhaler 0   • SPIRIVA HANDIHALER 18 MCG Cap INHALE ONE CAPSULE BY MOUTH ONCE DAILY 30 Cap 11   • levothyroxine (SYNTHROID) 88 MCG Tab Take 1 Tab by mouth Every morning on an empty stomach. 90 Tab 3   • albuterol (PROVENTIL) 2.5mg/3ml Nebu Soln solution for nebulization 3 mL by Nebulization route every four hours as needed for Shortness of Breath. 360 mL 5   • pantoprazole (PROTONIX) 40 MG Tablet Delayed Response Take 1 Tab by mouth Every morning on an empty stomach. 90 Tab 3   • albuterol 108 (90 BASE) MCG/ACT Aero Soln inhalation aerosol Inhale 2 Puffs by mouth every four hours as needed for Shortness of Breath. 1 Inhaler 0   • metoprolol SR  (TOPROL XL) 25 MG TABLET SR 24 HR TAKE ONE TABLET BY MOUTH ONCE DAILY 90 Tab 3   • simvastatin (ZOCOR) 20 MG Tab Take 1 Tab by mouth every evening. 90 Tab 3   • fenofibrate (TRIGLIDE) 160 MG tablet TAKE ONE TABLET BY MOUTH ONCE DAILY 90 Tab 3   • sulfamethoxazole-trimethoprim (BACTRIM) 400-80 MG Tab Take 1 Tab by mouth every day.     • losartan-hydrochlorothiazide (HYZAAR) 100-25 MG per tablet Take 1 Tab by mouth every morning. 90 Tab 3   • LYRICA 50 MG capsule Take 1 Cap by mouth at bedtime as needed.       No current facility-administered medications for this visit.       Current supplements: no  Chronic narcotic pain medicines: no  Allergies: Alcohol and Pcn [penicillins]  Exercise: yes  Current social contact/activities: Has support of family and friends      Screening:  Depression Screening    Little interest or pleasure in doing things?  0 - not at all  Feeling down, depressed , or hopeless? 1 - several days  Trouble falling or staying asleep, or sleeping too much?  0 - not at all  Feeling tired or having little energy?  1 - several days  Poor appetite or overeating?  1 - several days  Feeling bad about yourself - or that you are a failure or have let yourself or your family down? 0 - not at all  Trouble concentrating on things, such as reading the newspaper or watching television? 0 - not at all  Moving or speaking so slowly that other people could have noticed.  Or the opposite - being so fidgety or restless that you have been moving around a lot more than usual?  1 - several days  Thoughts that you would be better off dead, or of hurting yourself?  0 - not at all  Patient Health Questionnaire Score: 4    If depressive symptoms identified deferred to follow up visit unless specifically addressed in assessment and plan.    Interpretation of PHQ-9 Total Score   Score Severity   1-4 No Depression   5-9 Mild Depression   10-14 Moderate Depression   15-19 Moderately Severe Depression   20-27 Severe  Depression      Screening for Cognitive Impairment    Three Minute Recall (apple, watch, dariana)  2/3    Draw clock face with all 12 numbers set to the hand to show 10 minutes past 11 o'clock  1 5  Cognitive concerns identified deferred for follow up unless specifically addressed in assessment and plan.    Fall Risk Assessment    Has the patient had two or more falls in the last year or any fall with injury in the last year?  No    Safety Assessment    Throw rugs on floor.  Yes  Handrails on all stairs.  Yes  Good lighting in all hallways.  Yes  Difficulty hearing.  No  Patient counseled about all safety risks that were identified.    Functional Assessment ADLs    Are there any barriers preventing you from cooking for yourself or meeting nutritional needs?  No.    Are there any barriers preventing you from driving safely or obtaining transportation?  No.    Are there any barriers preventing you from using a telephone or calling for help?  No.    Are there any barriers preventing you from shopping?  No.    Are there any barriers preventing you from taking care of your own finances?  No.    Are there any barriers preventing you from managing your medications?  No.    Are currently engaging any exercise or physical activity?  Yes.       Health Maintenance Summary                BONE DENSITY Overdue 8/28/2010     Annual Wellness Visit Overdue 2/8/2017      Not specified 2/8/2016     MAMMOGRAM Next Due 12/30/2017      Done 12/30/2016 [id number]    PFT SCREENING-FEV1 AND FEV/FVC RATIO / SPIROMETRY SHOULD BE PERFORMED ANNUALLY Next Due 1/24/2018      Done 1/24/2017 AMB PULMONARY FUNCTION TEST/LAB     Patient has more history with this topic...    COLONOSCOPY Next Due 11/11/2020      Done 11/11/2015 AMB REFERRAL TO GI FOR COLONOSCOPY    IMM DTaP/Tdap/Td Vaccine Next Due 2/10/2024      Done 2/10/2014 Imm Admin: Tdap Vaccine          Patient Care Team:  Herve Conrad M.D. as PCP - General (Internal  Medicine)  Fredy Riddle M.D. as Consulting Physician (Urology)  Gabriela Tomlinson M.D. as Consulting Physician (Pulmonary Medicine)  Ricky Castaneda M.D. as Consulting Physician (Orthopaedics)        Social History   Substance Use Topics   • Smoking status: Never Smoker   • Smokeless tobacco: Never Used   • Alcohol use No      Comment: allergic to it       Family History   Problem Relation Age of Onset   • Kidney Disease Mother    • Cancer Father      colon cancer     She  has a past medical history of Acid reflux; Arthritis; Asthma; Breath shortness; Bronchitis (08/04/14); Cancer (CMS-HCC) (01/13); Chronic pain of left knee (8/22/2017); Chronic pain of right knee (7/1/2016); COPD (chronic obstructive pulmonary disease) (CMS-HCC); COPD (chronic obstructive pulmonary disease) (CMS-HCC) (6/30/2016); Diabetes (CMS-HCC); Dyspnea; Heart burn; Hypertension; Impaired fasting glucose (8/24/2015); Indigestion; Mass of left lower leg (7/27/2016); Personal history of renal cancer (4/20/2015); Renal disorder; Renal disorder (2013); Unspecified disorder of thyroid; and Unspecified hypothyroidism (4/20/2015). She also has no past medical history of Encounter for long-term (current) use of other medications.   Past Surgical History:   Procedure Laterality Date   • KNEE ARTHROPLASTY TOTAL Right 10/17/2016    Procedure: KNEE ARTHROPLASTY TOTAL;  Surgeon: Ricky Castaneda M.D.;  Location: [facility];  Service:    • RECOVERY  3/4/2016    Procedure: IR2-PERQ NEPHRO-URETERAL CATH RIGHT-DR. RIDDLE-Surgery to follow in [facility] ;  Surgeon: Ir-Recovery Surgery;  Location: [facility];  Service:    • CYSTOSCOPY STENT REMOVAL Right 3/4/2016    Procedure: RIGID CYSTOSCOPY STENT REMOVAL;  Surgeon: Fredy Riddle M.D.;  Location: [facility];  Service:    • URETEROSCOPY Right 3/4/2016    Procedure: URETEROSCOPY;  Surgeon: Fredy Riddle M.D.;  Location: [facility];  Service:    • PERCUTANEOUS  NEPHROSTOLITHOTOMY Right 3/4/2016    Procedure: PERCUTANEOUS NEPHROSTOLITHOTOMY FOR NEPHROLITHOTRIPSY;  Surgeon: Fredy Cintron M.D.;  Location: [facility];  Service:    • CYSTOSCOPY STENT PLACEMENT Right 2/12/2016    Procedure: CYSTOSCOPY STENT PLACEMENT;  Surgeon: Fredy Cintron M.D.;  Location: [facility];  Service:    • CYSTOSCOPY STENT PLACEMENT Right 6/26/2015    Procedure: CYSTOSCOPY STENT PLACEMENT RIGID POSSIBLE STENT;  Surgeon: Fredy Cintron M.D.;  Location: [facility];  Service:    • URETEROSCOPY N/A 6/26/2015    Procedure: URETEROSCOPY;  Surgeon: Fredy Cintron M.D.;  Location: [facility];  Service:    • LASERTRIPSY Right 6/26/2015    Procedure: LASERTRIPSY LITHO;  Surgeon: Fredy Cintron M.D.;  Location: [facility];  Service:    • URETEROSCOPY  8/20/2014    Performed by Fredy Cintron M.D. at [facility]   • LASERTRIPSY  8/20/2014    Performed by Fredy Cintron M.D. at [facility]   • CYSTOSCOPY  8/20/2014    Performed by Fredy Cintron M.D. at [facility]   • CYSTOSCOPY STENT PLACEMENT  1/31/2013    Performed by Fredy Cintron M.D. at [facility]   • URETEROSCOPY  1/31/2013    Performed by Fredy Cintron M.D. at [facility]   • NEPHRECTOMY PARTIAL  1/31/2013    Performed by Fredy Cintron M.D. at [facility]   • LASERTRIPSY  1/31/2013    Performed by Fredy Cintron M.D. at [facility]   • OTHER ORTHOPEDIC SURGERY  2006    right knee meniscus   • OTHER ORTHOPEDIC SURGERY  1996    ORIF right arm   • OTHER  1995    sinus surgery   • GYN SURGERY  1994    hysterectomy   • OTHER  1980    thyroid lumpectomy   • CUATE BY LAPAROSCOPY     • OTHER ORTHOPEDIC SURGERY      achilles tendon reattach    • TONSILLECTOMY         ROS:    Ostomy or other tubes or amputations: no  Chronic oxygen use no  Last eye exam 2016  : Denies incontinence.   Gait: Uses no assistive device  "  Hearing excellent.    Dentition good    Exam: Blood pressure 148/84, pulse 83, temperature 36.6 °C (97.9 °F), height 1.791 m (5' 10.5\"), weight 85.7 kg (189 lb), SpO2 95 %. Body mass index is 26.74 kg/m².  Alert, oriented in no acute distress.  Eye contact is good, speech goal directed, affect calm      Assessment and Plan. The following treatment and monitoring plan is recommended for all problems as listed below:   Right kidney stone  duplicate    Hyperlipidemia  Chronic condition managed with current medical regimen  Labs reviewed    Continue with current meds  Followed by Herve Conrad M.D..        Hypothyroidism  Chronic condition managed with current medical regimen  Stable per review   Continue with current meds  Followed by Herve Conrad M.D..        Personal history of renal cancer  Occurrence ~ 7 yrs ago  No recurrence  Followed by specialty    Asthma  Chronic condition managed with current medical regimen  Stable per review   Continue with current meds, effective  Followed by pulm    Calculus of kidney  duplicate    Impaired fasting glucose  8/18/2017   Glycohemoglobin 7.2  4.8 - 5.6 % Final     Diet managed  Followed by Herve Conrad M.D..       Essential hypertension  Chronic condition managed with current medical regimen  Stable per review   Continue with current meds  Followed by Herve Conrad M.D..        Ureteral stone  duplicate    Kidney stones  Last event 3/4/2016, [place], surgical intervention  Followed by urology      COPD (chronic obstructive pulmonary disease) (HCC)  Chronic condition managed with current medical regimen  Stable per review   Continue with current meds  Followed by pulm       H/O total knee replacement  Since R TKR has good mobility and pain free  L TKR planned for 3/2018  Followed by ortho.        Mass of left lower leg  Followed by Herve Conrad M.D.   Per pt over past yr, believes it is gone    GERD (gastroesophageal reflux disease)  Chronic " condition managed with current medical regimen  Stable per review   Continue with diet management  Followed by Herve Conrad M.D..        Primary osteoarthritis of right knee  duplicate    Elevated liver enzymes  10/3/2016   AST(SGOT) 34 12 - 45 U/L Final   ALT(SGPT) 41 2 - 50 U/L Final   Alkaline Phosphatase 56 30 - 99 U/L Final   Followed by Herve Conrad M.D..       Chronic pain of left knee  L TKR planned for 3/2018  Followed by ortho      Health Care Screening recommendations reviewed with patient today: per Patient Instructions  DPA/Advanced directive: .has an advanced directive - a copy has been provided in chart    Next office visit for recheck of chronic medical conditions is due with Herve Conrad M.D. 11/27/2017    DEXA scan 2 yrs ago

## 2017-09-09 NOTE — PATIENT INSTRUCTIONS
Continue with care through Herve Conrad M.D..  Next Medicare Annual Wellness Visit is due in one year.    Continue care with specialists you are seeing for your chronic problems.    Attached is some preventative information for you to read.          Fall Prevention and Home Safety  Falls cause injuries and can affect all age groups. It is possible to prevent falls.   HOW TO PREVENT FALLS  · Wear shoes with rubber soles that do not have an opening for your toes.   · Keep the inside and outside of your house well lit.   · Use night lights throughout your home.   · Remove clutter from floors.   · Clean up floor spills.   · Remove throw rugs or fasten them to the floor with carpet tape.   · Do not place electrical cords across pathways.   · Put grab bars by your tub, shower, and toilet. Do not use towel bars as grab bars.   · Put handrails on both sides of the stairway. Fix loose handrails.   · Do not climb on stools or stepladders, if possible.   · Do not wax your floors.   · Repair uneven or unsafe sidewalks, walkways, or stairs.   · Keep items you use a lot within reach.   · Be aware of pets.   · Keep emergency numbers next to the telephone.   · Put smoke detectors in your home and near bedrooms.   Ask your doctor what other things you can do to prevent falls.  Document Released: 10/14/2010 Document Revised: 06/18/2013 Document Reviewed: 03/19/2013  ExitCare® Patient Information ©2013 [employer].    Exercise to Stay Healthy      Exercise helps you become and stay healthy.  EXERCISE IDEAS AND TIPS  Choose exercises that:  · You enjoy.   · Fit into your day.   You do not need to exercise really hard to be healthy. You can do exercises at a slow or medium level and stay healthy. You can:  · Stretch before and after working out.   · Try yoga, Pilates, or lalita chi.   · Lift weights.   · Walk fast, swim, jog, run, climb stairs, bicycle, dance, or rollerskate.   · Take aerobic classes.   Exercises that burn about 150  calories:  · Running 1 ½ miles in 15 minutes.   · Playing volleyball for 45 to 60 minutes.   · Washing and waxing a car for 45 to 60 minutes.   · Playing touch football for 45 minutes.   · Walking 1 ¾ miles in 35 minutes.   · Pushing a stroller 1 ½ miles in 30 minutes.   · Playing basketball for 30 minutes.   · Raking leaves for 30 minutes.   · Bicycling 5 miles in 30 minutes.   · Walking 2 miles in 30 minutes.   · Dancing for 30 minutes.   · Shoveling snow for 15 minutes.   · Swimming laps for 20 minutes.   · Walking up stairs for 15 minutes.   · Bicycling 4 miles in 15 minutes.   · Gardening for 30 to 45 minutes.   · Jumping rope for 15 minutes.   · Washing windows or floors for 45 to 60 minutes.     One suggestion is to start walking for 10 minutes after each meal.  This will help with digestion and help you to metabolize your meal.       For Weight Loss -   Recommend portion sizes with more fruits/vegetables/high fiber foods.  Stay away from white bread/pastas/white rice/white potatoes.  Increase Fluid intake to 6-8 glasses of water daily.   Eliminate soda's (diet and regular) from your fluid intake.      Document Released: 01/20/2012 Document Revised: 03/11/2013 Document Reviewed: 01/20/2012  ExitCare® Patient Information ©2013 [employer].    Fat and Cholesterol Control Diet  Cholesterol is a wax-like substance. It comes from your liver and is found in certain foods. There is good (HDL) and bad (LDL) cholesterol. Too much cholesterol in your blood can affect your heart. Certain foods can lower or raise your cholesterol. Eat foods that are low in cholesterol.  Saturated and trans fats are bad fats found in foods that will raise your cholesterol. Do not eat foods that are high in saturated and trans fats.  FOODS HIGHER IN SATURATED AND TRANS FATS  · Dairy products, such as whole milk, eggs, cheese, cream, and butter.   · Fatty meats, such as hot dogs, sausage, and salami.   · Fried foods.   · Trans fats which  are found in margarine and pre-made cookies, crackers, and baked goods.   · Tropical oils, such as coconut and palm oils.   Read package labels at the store. Do not buy products that use saturated or trans fats or hydrogenated oils. Find foods labeled:  · Low-fat.   · Low-saturated fat.   · Trans-fat-free.   · Low-cholesterol.   FOODS LOWER IN CHOLESTEROL  ·  Fruit.   · Vegetables.   · Beans, peas, and lentils.   · Fish.   · Lean meat, such as chicken (without skin) or ground turkey.   · Grains, such as barley, rice, couscous, bulgur wheat, and pasta.   · Heart-healthy tub margarine.   PREPARING YOUR FOOD  · Broil, bake, steam, or roast foods. Do not eaton food.   · Use non-stick cooking sprays.   · Use lemon or herbs to flavor food instead of using butter or stick margarine.   · Use nonfat yogurt, salsa, or low-fat dressings for salads.   Document Released: 06/18/2013 Document Reviewed: 06/18/2013  ExitCare® Patient Information ©2013 [employer].    Recommend annual flu vaccine.  Next due in Fall, 2017.  If you decide not to have the flu vaccine, recommend good handwashing and staying out of crowds during flu season.

## 2017-10-02 DIAGNOSIS — I10 ESSENTIAL HYPERTENSION: ICD-10-CM

## 2017-10-02 RX ORDER — LOSARTAN POTASSIUM AND HYDROCHLOROTHIAZIDE 25; 100 MG/1; MG/1
TABLET ORAL
Qty: 90 TAB | Refills: 3 | Status: SHIPPED | OUTPATIENT
Start: 2017-10-02 | End: 2018-10-11 | Stop reason: SDUPTHER

## 2017-10-26 ENCOUNTER — TELEPHONE (OUTPATIENT)
Dept: PULMONOLOGY | Facility: HOSPICE | Age: 72
End: 2017-10-26

## 2017-10-26 DIAGNOSIS — J44.9 CHRONIC OBSTRUCTIVE PULMONARY DISEASE, UNSPECIFIED COPD TYPE (HCC): ICD-10-CM

## 2017-10-26 NOTE — TELEPHONE ENCOUNTER
Pt called requesting surgical clearance for the knee surgery that she will have done on 12/18/17 w/ Dr. Castaneda.  PFT done on 1/24/17, not showing a CXR done this yr.  A pended order is attached to the message.    Last OV: 1/24/17- DBaker  Next OV: 1 yr return- no visit on file  COPD    This message was only sent to the pool for Milly Greenfield isn't in the office.

## 2017-11-19 LAB
ALBUMIN SERPL-MCNC: 4.7 G/DL (ref 3.5–4.8)
ALP SERPL-CCNC: 61 IU/L (ref 39–117)
ALT SERPL-CCNC: 37 IU/L (ref 0–32)
AST SERPL-CCNC: 30 IU/L (ref 0–40)
BILIRUB DIRECT SERPL-MCNC: 0.15 MG/DL (ref 0–0.4)
BILIRUB SERPL-MCNC: 0.5 MG/DL (ref 0–1.2)
BUN SERPL-MCNC: 28 MG/DL (ref 8–27)
BUN/CREAT SERPL: 30 (ref 12–28)
CALCIUM SERPL-MCNC: 9.8 MG/DL (ref 8.7–10.3)
CHLORIDE SERPL-SCNC: 102 MMOL/L (ref 96–106)
CO2 SERPL-SCNC: 22 MMOL/L (ref 18–29)
CREAT SERPL-MCNC: 0.94 MG/DL (ref 0.57–1)
GFR SERPLBLD CREATININE-BSD FMLA CKD-EPI: 61 ML/MIN/1.73
GFR SERPLBLD CREATININE-BSD FMLA CKD-EPI: 70 ML/MIN/1.73
GLUCOSE SERPL-MCNC: 141 MG/DL (ref 65–99)
HBA1C MFR BLD: 6.8 % (ref 4.8–5.6)
HBV E AG SERPL QL IA: NEGATIVE
HCV AB S/CO SERPL IA: <0.1 S/CO RATIO (ref 0–0.9)
POTASSIUM SERPL-SCNC: 4.4 MMOL/L (ref 3.5–5.2)
SODIUM SERPL-SCNC: 143 MMOL/L (ref 134–144)

## 2017-11-22 ENCOUNTER — OFFICE VISIT (OUTPATIENT)
Dept: MEDICAL GROUP | Facility: MEDICAL CENTER | Age: 72
End: 2017-11-22
Payer: MEDICARE

## 2017-11-22 VITALS
TEMPERATURE: 98.6 F | BODY MASS INDEX: 27.02 KG/M2 | SYSTOLIC BLOOD PRESSURE: 136 MMHG | OXYGEN SATURATION: 96 % | RESPIRATION RATE: 16 BRPM | HEIGHT: 71 IN | DIASTOLIC BLOOD PRESSURE: 72 MMHG | WEIGHT: 193 LBS | HEART RATE: 83 BPM

## 2017-11-22 DIAGNOSIS — I10 ESSENTIAL HYPERTENSION: ICD-10-CM

## 2017-11-22 DIAGNOSIS — Z85.528 PERSONAL HISTORY OF RENAL CANCER: ICD-10-CM

## 2017-11-22 DIAGNOSIS — E03.9 HYPOTHYROIDISM, UNSPECIFIED TYPE: ICD-10-CM

## 2017-11-22 DIAGNOSIS — E78.5 HYPERLIPIDEMIA, UNSPECIFIED HYPERLIPIDEMIA TYPE: ICD-10-CM

## 2017-11-22 DIAGNOSIS — M25.562 CHRONIC PAIN OF LEFT KNEE: ICD-10-CM

## 2017-11-22 DIAGNOSIS — R73.01 IMPAIRED FASTING GLUCOSE: ICD-10-CM

## 2017-11-22 DIAGNOSIS — J45.20 MILD INTERMITTENT ASTHMA WITHOUT COMPLICATION: ICD-10-CM

## 2017-11-22 DIAGNOSIS — G89.29 CHRONIC PAIN OF LEFT KNEE: ICD-10-CM

## 2017-11-22 DIAGNOSIS — Z96.651 HISTORY OF TOTAL RIGHT KNEE REPLACEMENT: ICD-10-CM

## 2017-11-22 DIAGNOSIS — K21.9 GASTROESOPHAGEAL REFLUX DISEASE WITHOUT ESOPHAGITIS: ICD-10-CM

## 2017-11-22 DIAGNOSIS — R74.8 ELEVATED LIVER ENZYMES: ICD-10-CM

## 2017-11-22 PROCEDURE — 99214 OFFICE O/P EST MOD 30 MIN: CPT | Performed by: INTERNAL MEDICINE

## 2017-11-22 NOTE — ASSESSMENT & PLAN NOTE
Liver enzymes were low elevated previously. Currently SGOT is normal at 30 and SGPT is minimally high at 37. Hepatitis A and B were both negative. She denies any history of hepatitis, jaundice, or liver disease.

## 2017-11-22 NOTE — ASSESSMENT & PLAN NOTE
Blood sugar remains elevated at 141, glycohemoglobin is better at 6.8. She continues taking metformin. She occasionally gets some loose bowel movements with it. She is not particularly careful about her diet. Weight is about the same.

## 2017-11-22 NOTE — ASSESSMENT & PLAN NOTE
Chronic pain in her left knee. She anticipates left total knee replacement by Dr. Castaneda on December 18.

## 2017-11-22 NOTE — ASSESSMENT & PLAN NOTE
Prior renal cancer without evidence of recurrence. She reports no recent change in urinating or gross hematuria.

## 2017-11-22 NOTE — PROGRESS NOTES
Subjective:     Chief Complaint   Patient presents with   • Medical Clearance     left knee replacement     Jillian Gottlieb is a 72 y.o. female here today forFollow-up of her various health problems and evaluation prior to elective left total knee replacement.    Personal history of renal cancer  Prior renal cancer without evidence of recurrence. She reports no recent change in urinating or gross hematuria.    Impaired fasting glucose  Blood sugar remains elevated at 141, glycohemoglobin is better at 6.8. She continues taking metformin. She occasionally gets some loose bowel movements with it. She is not particularly careful about her diet. Weight is about the same.    Hypothyroidism  Clinically she is euthyroid. TSH in August was normal.    Hyperlipidemia  She continues taking simvastatin and fenofibrate without difficulty. She tries to follow appropriate diet.    GERD (gastroesophageal reflux disease)  Gastroesophageal reflux is under fairly good control with Protonix. Weight is about the same.    Essential hypertension  Blood pressure when she came in today was a little high but repeat check was in the normal range. She continues losartan with hydrochlorothiazide. She denies lightheadedness or headaches.    Elevated liver enzymes  Liver enzymes were low elevated previously. Currently SGOT is normal at 30 and SGPT is minimally high at 37. Hepatitis A and B were both negative. She denies any history of hepatitis, jaundice, or liver disease.    Chronic pain of left knee  Chronic pain in her left knee. She anticipates left total knee replacement by Dr. Castaneda on December 18.    Asthma  Her asthma remains under good control. She uses her inhalers.       Diagnoses of Hyperlipidemia, unspecified hyperlipidemia type, Impaired fasting glucose, Essential hypertension, Elevated liver enzymes, Chronic pain of left knee, Gastroesophageal reflux disease without esophagitis, Hypothyroidism, unspecified type, Personal history of  renal cancer, Mild intermittent asthma without complication, and History of total right knee replacement were pertinent to this visit.    Allergies: Alcohol and Pcn [penicillins]  Current medicines (including changes today)  Current Outpatient Prescriptions   Medication Sig Dispense Refill   • losartan-hydrochlorothiazide (HYZAAR) 100-25 MG per tablet TAKE ONE TABLET BY MOUTH ONCE DAILY IN THE MORNING 90 Tab 3   • glucosamine Sulfate 500 MG Cap Take 500 mg by mouth 3 times a day, with meals.     • raloxifene (EVISTA) 60 MG Tab Take 1 Tab by mouth every morning. 30 Tab 11   • metformin ER (GLUCOPHAGE XR) 500 MG TABLET SR 24 HR Take 1 Tab by mouth every day. 90 Tab 3   • budesonide-formoterol (SYMBICORT) 160-4.5 MCG/ACT Aerosol Inhale 2 Puffs by mouth 2 Times a Day. With spacer, rinse mouth after use 1 Inhaler 0   • Tiotropium Bromide Monohydrate (SPIRIVA RESPIMAT) 2.5 MCG/ACT Aero Soln Inhale 2 Inhalation by mouth every day. Assemble and prime. 1 Inhaler 0   • SPIRIVA HANDIHALER 18 MCG Cap INHALE ONE CAPSULE BY MOUTH ONCE DAILY 30 Cap 11   • levothyroxine (SYNTHROID) 88 MCG Tab Take 1 Tab by mouth Every morning on an empty stomach. 90 Tab 3   • albuterol (PROVENTIL) 2.5mg/3ml Nebu Soln solution for nebulization 3 mL by Nebulization route every four hours as needed for Shortness of Breath. 360 mL 5   • pantoprazole (PROTONIX) 40 MG Tablet Delayed Response Take 1 Tab by mouth Every morning on an empty stomach. 90 Tab 3   • albuterol 108 (90 BASE) MCG/ACT Aero Soln inhalation aerosol Inhale 2 Puffs by mouth every four hours as needed for Shortness of Breath. 1 Inhaler 0   • metoprolol SR (TOPROL XL) 25 MG TABLET SR 24 HR TAKE ONE TABLET BY MOUTH ONCE DAILY 90 Tab 3   • simvastatin (ZOCOR) 20 MG Tab Take 1 Tab by mouth every evening. 90 Tab 3   • fenofibrate (TRIGLIDE) 160 MG tablet TAKE ONE TABLET BY MOUTH ONCE DAILY 90 Tab 3   • LYRICA 50 MG capsule Take 1 Cap by mouth at bedtime as needed.     •  "sulfamethoxazole-trimethoprim (BACTRIM) 400-80 MG Tab Take 1 Tab by mouth every day.       No current facility-administered medications for this visit.        She  has a past medical history of Acid reflux; Arthritis; Asthma; Breath shortness; Bronchitis (08/04/14); Cancer (CMS-HCC) (01/13); Chronic pain of left knee (8/22/2017); Chronic pain of right knee (7/1/2016); COPD (chronic obstructive pulmonary disease) (CMS-HCC); COPD (chronic obstructive pulmonary disease) (CMS-HCC) (6/30/2016); Diabetes (CMS-HCC); Dyspnea; Heart burn; Hypertension; Impaired fasting glucose (8/24/2015); Indigestion; Mass of left lower leg (7/27/2016); Personal history of renal cancer (4/20/2015); Renal disorder; Renal disorder (2013); Unspecified disorder of thyroid; and Unspecified hypothyroidism (4/20/2015). She also has no past medical history of Encounter for long-term (current) use of other medications.    ROS  Patient denies significant change in strength, weight or appetite.  No significant lightheadedness or headaches.  No change in vision, hearing, or swallowing.  No new dyspnea, coughing, chest pain, or palpitations. Asthma is under good control.  No indigestion, abdominal pain, or change in bowel habits. Gastroesophageal reflux is under good control.  No change in urinating.  No new ankle swelling.       Objective:     PE:  /72   Pulse 83   Temp 37 °C (98.6 °F)   Resp 16   Ht 1.791 m (5' 10.5\")   Wt 87.5 kg (193 lb)   SpO2 96%   BMI 27.30 kg/m²    Neck is supple without significant lymphadenopathy or masses.  Lungs are clear with normal breath sounds without wheezes or rales .  Cardiovascular: peripheral circulation is satisfactory, heart sounds are unchanged and unremarkable.  Abdomen is soft, without masses or tenderness, with normal bowel sounds.  Extremities are without significant edema, cyanosis or deformity.      Assessment and Plan:   The following treatment plan was discussed  1. Hyperlipidemia, " unspecified hyperlipidemia type  LIPID PROFILE    No medication change. We again reviewed appropriate diet. Check panel at next visit.   2. Impaired fasting glucose  HEMOGLOBIN A1C    We again discussed appropriate diet and importance of exercise. She will continue metformin. We may need to increase the dose.   3. Essential hypertension  BASIC METABOLIC PANEL    We reviewed low-salt diet. No medication change.   4. Elevated liver enzymes  HEPATIC FUNCTION PANEL    We will continue to follow liver enzymes and evaluate further as indicated.   5. Chronic pain of left knee      Proceed with total knee replacement. At this time I see no significant medical contraindication to the contemplated surgery.   6. Gastroesophageal reflux disease without esophagitis      We reviewed appropriate conservative therapy of gastroesophageal reflux. She will continue Protonix as needed.   7. Hypothyroidism, unspecified type      Continue levothyroxine, check TSH at least yearly.   8. Personal history of renal cancer      Follow up regularly with Dr. Cintron.   9. Mild intermittent asthma without complication      Continue same regimen. Report any significant exacerbations. Follow up regularly with Dr. Tomlinson.   10. History of total right knee replacement         Followup: Return in about 4 months (around 3/22/2018) for Short. At this time I see no significant medical contraindication to the contemplated surgery.

## 2017-11-22 NOTE — ASSESSMENT & PLAN NOTE
Blood pressure when she came in today was a little high but repeat check was in the normal range. She continues losartan with hydrochlorothiazide. She denies lightheadedness or headaches.

## 2017-11-22 NOTE — ASSESSMENT & PLAN NOTE
She continues taking simvastatin and fenofibrate without difficulty. She tries to follow appropriate diet.

## 2017-12-01 ENCOUNTER — OFFICE VISIT (OUTPATIENT)
Dept: PULMONOLOGY | Facility: HOSPICE | Age: 72
End: 2017-12-01
Payer: MEDICARE

## 2017-12-01 ENCOUNTER — APPOINTMENT (OUTPATIENT)
Dept: RADIOLOGY | Facility: IMAGING CENTER | Age: 72
End: 2017-12-01
Payer: MEDICARE

## 2017-12-01 VITALS
HEIGHT: 70 IN | BODY MASS INDEX: 27.2 KG/M2 | HEART RATE: 79 BPM | RESPIRATION RATE: 16 BRPM | SYSTOLIC BLOOD PRESSURE: 120 MMHG | OXYGEN SATURATION: 97 % | TEMPERATURE: 98.1 F | DIASTOLIC BLOOD PRESSURE: 80 MMHG | WEIGHT: 190 LBS

## 2017-12-01 DIAGNOSIS — J44.9 CHRONIC OBSTRUCTIVE PULMONARY DISEASE, UNSPECIFIED COPD TYPE (HCC): ICD-10-CM

## 2017-12-01 PROCEDURE — 99213 OFFICE O/P EST LOW 20 MIN: CPT | Performed by: NURSE PRACTITIONER

## 2017-12-01 PROCEDURE — 71020 DX-CHEST-2 VIEWS: CPT | Mod: TC | Performed by: NURSE PRACTITIONER

## 2017-12-01 NOTE — PROGRESS NOTES
"Chief Complaint   Patient presents with   • COPD     Surgery Clearance  12/18/17/ Knee Surgery/ PFT done 1/17/ Xray         HPI:  This is a 72 y.o. female with a history of chronic obstructive pulmonary disease. Pulmonary function tests from 1/24/17 indicating FEV1 1.51 L, 55% predicted, FEV1/FVC 60%, DLCO 128% predicted. The patient is compliant with Symbicort 160/4.5 µg 2 inhalations twice daily, Spiriva 2.5 µg 2 inhalations daily and Ventolin and albuterol nebulized treatments as needed. A chest x-ray was obtained today indicating no acute cardiopulmonary process. The patient is here for surgical clearance for left total knee replacement. Ultimately she has been doing well. She denies shortness of breath and wheezing. She has no fevers, chills, sweats, or hemoptysis. She is feeling tired in the afternoon was unsure whether that is related to.    Past Medical History:   Diagnosis Date   • Acid reflux    • Arthritis     knees, hands, hips, neck-Osteo   • Asthma     inhalers   • Breath shortness     asthma related   • Bronchitis 08/04/14   • Cancer (CMS-HCC) 01/13    R kidney   • Chronic pain of left knee 8/22/2017   • Chronic pain of right knee 7/1/2016   • COPD (chronic obstructive pulmonary disease) (CMS-HCC)    • COPD (chronic obstructive pulmonary disease) (CMS-HCC) 6/30/2016   • Diabetes (CMS-HCC)     \"pre\"   • Dyspnea    • Heart burn    • Hypertension    • Impaired fasting glucose 8/24/2015   • Indigestion    • Mass of left lower leg 7/27/2016   • Personal history of renal cancer 4/20/2015   • Renal disorder     stones   • Renal disorder 2013    cancer right kidney 7% removed   • Unspecified disorder of thyroid     benign tumor removal   • Unspecified hypothyroidism 4/20/2015       Past Surgical History:   Procedure Laterality Date   • KNEE ARTHROPLASTY TOTAL Right 10/17/2016    Procedure: KNEE ARTHROPLASTY TOTAL;  Surgeon: Ricky Castaneda M.D.;  Location: SURGERY [facility];  Service:    • RECOVERY  " 3/4/2016    Procedure: IR2-PERQ NEPHRO-URETERAL CATH RIGHT-DR. RIDDLE-Surgery to follow in [facility] ;  Surgeon: Stefan-Recovery Surgery;  Location: SURGERY [facility];  Service:    • CYSTOSCOPY STENT REMOVAL Right 3/4/2016    Procedure: RIGID CYSTOSCOPY STENT REMOVAL;  Surgeon: Fredy Riddle M.D.;  Location: SURGERY [facility];  Service:    • URETEROSCOPY Right 3/4/2016    Procedure: URETEROSCOPY;  Surgeon: Fredy Riddle M.D.;  Location: [facility];  Service:    • PERCUTANEOUS NEPHROSTOLITHOTOMY Right 3/4/2016    Procedure: PERCUTANEOUS NEPHROSTOLITHOTOMY FOR NEPHROLITHOTRIPSY;  Surgeon: Fredy Riddle M.D.;  Location: [facility];  Service:    • CYSTOSCOPY STENT PLACEMENT Right 2/12/2016    Procedure: CYSTOSCOPY STENT PLACEMENT;  Surgeon: Fredy Riddle M.D.;  Location: [facility];  Service:    • CYSTOSCOPY STENT PLACEMENT Right 6/26/2015    Procedure: CYSTOSCOPY STENT PLACEMENT RIGID POSSIBLE STENT;  Surgeon: Fredy Riddle M.D.;  Location: [facility];  Service:    • URETEROSCOPY N/A 6/26/2015    Procedure: URETEROSCOPY;  Surgeon: Fredy Riddle M.D.;  Location: [facility];  Service:    • LASERTRIPSY Right 6/26/2015    Procedure: LASERTRIPSY LITHO;  Surgeon: Fredy Riddle M.D.;  Location: [facility];  Service:    • URETEROSCOPY  8/20/2014    Performed by Fredy Riddle M.D. at [facility]   • LASERTRIPSY  8/20/2014    Performed by Fredy Riddle M.D. at [facility]   • CYSTOSCOPY  8/20/2014    Performed by Fredy Riddle M.D. at [facility]   • CYSTOSCOPY STENT PLACEMENT  1/31/2013    Performed by Fredy Riddle M.D. at [facility]   • URETEROSCOPY  1/31/2013    Performed by Fredy Riddle M.D. at [facility]   • NEPHRECTOMY PARTIAL  1/31/2013    Performed by Fredy Riddle M.D. at SURGERY [facility]   • LASERTRIPSY  1/31/2013    Performed by Fredy Riddle M.D. at [facility]  "TOWER ORS   • OTHER ORTHOPEDIC SURGERY  2006    right knee meniscus   • OTHER ORTHOPEDIC SURGERY  1996    ORIF right arm   • OTHER  1995    sinus surgery   • GYN SURGERY  1994    hysterectomy   • OTHER  1980    thyroid lumpectomy   • CUATE BY LAPAROSCOPY     • OTHER ORTHOPEDIC SURGERY      achilles tendon reattach    • TONSILLECTOMY         Social History   Substance Use Topics   • Smoking status: Never Smoker   • Smokeless tobacco: Never Used   • Alcohol use No      Comment: allergic to it       ROS:   Constitutional: Denies fevers, chills, sweats, fatigue, and weight loss.  Eyes: Denies glasses.  Ears/nose/mouth/throat: Denies injury.  Cardiovascular: Denies chest pain, tightness.  Respiratory: See history of present illness.  GI: Denies heartburn, difficulty swallowing, nausea, and vomiting.  Neurological: Denies frequent headaches, dizziness, weakness.    Vitals:  Vitals:    12/01/17 0804   Height: 1.778 m (5' 10\")   Weight: 86.2 kg (190 lb)   Weight % change since last entry.: 0 %   BP: 120/80   Pulse: 79   BMI (Calculated): 27.26   Resp: 16   Temp: 36.7 °C (98.1 °F)   O2 sat % room air: 97 %       Allergies:  Alcohol and Pcn [penicillins]    Medications:  Current Outpatient Prescriptions   Medication Sig Dispense Refill   • budesonide-formoterol (SYMBICORT) 160-4.5 MCG/ACT Aerosol Inhale 2 Puffs by mouth 2 Times a Day. With spacer, rinse mouth after use 1 Inhaler 0   • Tiotropium Bromide Monohydrate (SPIRIVA RESPIMAT) 2.5 MCG/ACT Aero Soln Inhale 2 Inhalation by mouth every day. Assemble and prime. 1 Inhaler 0   • albuterol (PROVENTIL) 2.5mg/3ml Nebu Soln solution for nebulization 3 mL by Nebulization route every four hours as needed for Shortness of Breath. 360 mL 5   • albuterol 108 (90 BASE) MCG/ACT Aero Soln inhalation aerosol Inhale 2 Puffs by mouth every four hours as needed for Shortness of Breath. 1 Inhaler 0   • losartan-hydrochlorothiazide (HYZAAR) 100-25 MG per tablet TAKE ONE TABLET BY MOUTH ONCE " DAILY IN THE MORNING 90 Tab 3   • glucosamine Sulfate 500 MG Cap Take 500 mg by mouth 3 times a day, with meals.     • raloxifene (EVISTA) 60 MG Tab Take 1 Tab by mouth every morning. 30 Tab 11   • metformin ER (GLUCOPHAGE XR) 500 MG TABLET SR 24 HR Take 1 Tab by mouth every day. 90 Tab 3   • SPIRIVA HANDIHALER 18 MCG Cap INHALE ONE CAPSULE BY MOUTH ONCE DAILY 30 Cap 11   • levothyroxine (SYNTHROID) 88 MCG Tab Take 1 Tab by mouth Every morning on an empty stomach. 90 Tab 3   • pantoprazole (PROTONIX) 40 MG Tablet Delayed Response Take 1 Tab by mouth Every morning on an empty stomach. 90 Tab 3   • metoprolol SR (TOPROL XL) 25 MG TABLET SR 24 HR TAKE ONE TABLET BY MOUTH ONCE DAILY 90 Tab 3   • simvastatin (ZOCOR) 20 MG Tab Take 1 Tab by mouth every evening. 90 Tab 3   • fenofibrate (TRIGLIDE) 160 MG tablet TAKE ONE TABLET BY MOUTH ONCE DAILY 90 Tab 3   • LYRICA 50 MG capsule Take 1 Cap by mouth at bedtime as needed.     • sulfamethoxazole-trimethoprim (BACTRIM) 400-80 MG Tab Take 1 Tab by mouth every day.       No current facility-administered medications for this visit.        PHYSICAL EXAM:  Appearance: Well-developed, well-nourished, no acute distress.  Eyes. PERRL.  Hearing: Grossly intact.  Oropharynx: Tongue normal, posterior pharynx without erythema or exudate.  Respiratory effort: No intercostal retractions or use of accessory muscles.  Lung auscultation: No crackles, wheezing.  Heart auscultation: No murmur, gallop, or rub. Regular rate and rhythm.  Extremities: No cyanosis or edema.  Gait and Station: Normal  Orientation: Oriented to time, place, and person.    Assessment:  1. Chronic obstructive pulmonary disease, unspecified COPD type (CMS-HCC)           Plan:  1. Continue Simcor 160/4.5 µg 2 inhalations twice daily, Spiriva 2.5 µg 2 inhalations daily, and Ventolin and albuterol nebulized treatments.    Return in about 1 year (around 12/1/2018).

## 2017-12-01 NOTE — PATIENT INSTRUCTIONS
1. Continue Simcor 160/4.5 µg 2 inhalations twice daily, Spiriva 2.5 µg 2 inhalations daily, and Ventolin and albuterol nebulized treatments.

## 2017-12-07 DIAGNOSIS — Z01.810 PRE-OPERATIVE CARDIOVASCULAR EXAMINATION: ICD-10-CM

## 2017-12-07 DIAGNOSIS — Z01.812 PRE-OPERATIVE LABORATORY EXAMINATION: ICD-10-CM

## 2017-12-07 LAB
ANION GAP SERPL CALC-SCNC: 9 MMOL/L (ref 0–11.9)
APPEARANCE UR: CLEAR
BASOPHILS # BLD AUTO: 0.4 % (ref 0–1.8)
BASOPHILS # BLD: 0.03 K/UL (ref 0–0.12)
BILIRUB UR QL STRIP.AUTO: NEGATIVE
BUN SERPL-MCNC: 25 MG/DL (ref 8–22)
CALCIUM SERPL-MCNC: 10.2 MG/DL (ref 8.5–10.5)
CHLORIDE SERPL-SCNC: 104 MMOL/L (ref 96–112)
CO2 SERPL-SCNC: 26 MMOL/L (ref 20–33)
COLOR UR: YELLOW
CREAT SERPL-MCNC: 0.92 MG/DL (ref 0.5–1.4)
CULTURE IF INDICATED INDCX: NO UA CULTURE
EKG IMPRESSION: NORMAL
EOSINOPHIL # BLD AUTO: 0.09 K/UL (ref 0–0.51)
EOSINOPHIL NFR BLD: 1.2 % (ref 0–6.9)
ERYTHROCYTE [DISTWIDTH] IN BLOOD BY AUTOMATED COUNT: 42.8 FL (ref 35.9–50)
GFR SERPL CREATININE-BSD FRML MDRD: 60 ML/MIN/1.73 M 2
GLUCOSE SERPL-MCNC: 109 MG/DL (ref 65–99)
GLUCOSE UR STRIP.AUTO-MCNC: NEGATIVE MG/DL
HCT VFR BLD AUTO: 47 % (ref 37–47)
HGB BLD-MCNC: 15.6 G/DL (ref 12–16)
IMM GRANULOCYTES # BLD AUTO: 0.03 K/UL (ref 0–0.11)
IMM GRANULOCYTES NFR BLD AUTO: 0.4 % (ref 0–0.9)
KETONES UR STRIP.AUTO-MCNC: NEGATIVE MG/DL
LEUKOCYTE ESTERASE UR QL STRIP.AUTO: NEGATIVE
LYMPHOCYTES # BLD AUTO: 2.4 K/UL (ref 1–4.8)
LYMPHOCYTES NFR BLD: 32.1 % (ref 22–41)
MCH RBC QN AUTO: 31.5 PG (ref 27–33)
MCHC RBC AUTO-ENTMCNC: 33.2 G/DL (ref 33.6–35)
MCV RBC AUTO: 94.9 FL (ref 81.4–97.8)
MICRO URNS: NORMAL
MONOCYTES # BLD AUTO: 0.41 K/UL (ref 0–0.85)
MONOCYTES NFR BLD AUTO: 5.5 % (ref 0–13.4)
NEUTROPHILS # BLD AUTO: 4.51 K/UL (ref 2–7.15)
NEUTROPHILS NFR BLD: 60.4 % (ref 44–72)
NITRITE UR QL STRIP.AUTO: NEGATIVE
NRBC # BLD AUTO: 0 K/UL
NRBC BLD AUTO-RTO: 0 /100 WBC
PH UR STRIP.AUTO: 5 [PH]
PLATELET # BLD AUTO: 339 K/UL (ref 164–446)
PMV BLD AUTO: 10.7 FL (ref 9–12.9)
POTASSIUM SERPL-SCNC: 3.7 MMOL/L (ref 3.6–5.5)
PROT UR QL STRIP: NEGATIVE MG/DL
RBC # BLD AUTO: 4.95 M/UL (ref 4.2–5.4)
RBC UR QL AUTO: NEGATIVE
SCCMEC + MECA PNL NOSE NAA+PROBE: NEGATIVE
SCCMEC + MECA PNL NOSE NAA+PROBE: NEGATIVE
SODIUM SERPL-SCNC: 139 MMOL/L (ref 135–145)
SP GR UR STRIP.AUTO: 1.02
UROBILINOGEN UR STRIP.AUTO-MCNC: NORMAL MG/DL
WBC # BLD AUTO: 7.5 K/UL (ref 4.8–10.8)

## 2017-12-07 PROCEDURE — 36415 COLL VENOUS BLD VENIPUNCTURE: CPT

## 2017-12-07 PROCEDURE — 87641 MR-STAPH DNA AMP PROBE: CPT

## 2017-12-07 PROCEDURE — 93005 ELECTROCARDIOGRAM TRACING: CPT

## 2017-12-07 PROCEDURE — 80048 BASIC METABOLIC PNL TOTAL CA: CPT

## 2017-12-07 PROCEDURE — 85025 COMPLETE CBC W/AUTO DIFF WBC: CPT

## 2017-12-07 PROCEDURE — 87640 STAPH A DNA AMP PROBE: CPT

## 2017-12-07 PROCEDURE — 93010 ELECTROCARDIOGRAM REPORT: CPT | Performed by: INTERNAL MEDICINE

## 2017-12-07 PROCEDURE — 81003 URINALYSIS AUTO W/O SCOPE: CPT

## 2017-12-07 RX ORDER — MULTIVIT WITH MINERALS/LUTEIN
1 TABLET ORAL DAILY
COMMUNITY
End: 2021-08-04

## 2017-12-07 NOTE — DISCHARGE PLANNING
DISCHARGE PLANNING NOTE - TOTAL JOINT     Procedure: Procedure(s):  KNEE ARTHROPLASTY TOTAL  Procedure Date: 12/18/2017  Insurance:  Payor: MEDICARE / Plan: MEDICARE PART A & B  Equipment currently available at home? cane, crutches, front-wheel walker, shower chair and Polar ice machine, sock aide, long shoe horn  Steps into the home? 3  Steps within the home? 0  Toilet height? ADA  Type of shower? walk-in shower  Who will be with you during your recovery? daughter, spouse  Is Outpatient Physical Therapy set up after surgery? Yes   Did you take the Total Joint Class and where? No, previous total knee replacement Oct 2016       Plan: There are no identified discharge needs. Anticipate discharge home.

## 2017-12-15 RX ORDER — CEFAZOLIN SODIUM 2 G/100ML
2 INJECTION, SOLUTION INTRAVENOUS
Status: DISCONTINUED | OUTPATIENT
Start: 2017-12-18 | End: 2017-12-19 | Stop reason: HOSPADM

## 2017-12-18 ENCOUNTER — HOSPITAL ENCOUNTER (INPATIENT)
Facility: MEDICAL CENTER | Age: 72
LOS: 1 days | DRG: 470 | End: 2017-12-19
Attending: ORTHOPAEDIC SURGERY | Admitting: ORTHOPAEDIC SURGERY
Payer: MEDICARE

## 2017-12-18 ENCOUNTER — APPOINTMENT (OUTPATIENT)
Dept: RADIOLOGY | Facility: MEDICAL CENTER | Age: 72
DRG: 470 | End: 2017-12-18
Attending: PHYSICIAN ASSISTANT
Payer: MEDICARE

## 2017-12-18 LAB — GLUCOSE BLD-MCNC: 128 MG/DL (ref 65–99)

## 2017-12-18 PROCEDURE — 160029 HCHG SURGERY MINUTES - 1ST 30 MINS LEVEL 4: Performed by: ORTHOPAEDIC SURGERY

## 2017-12-18 PROCEDURE — 73560 X-RAY EXAM OF KNEE 1 OR 2: CPT | Mod: LT

## 2017-12-18 PROCEDURE — 501838 HCHG SUTURE GENERAL: Performed by: ORTHOPAEDIC SURGERY

## 2017-12-18 PROCEDURE — 700111 HCHG RX REV CODE 636 W/ 250 OVERRIDE (IP)

## 2017-12-18 PROCEDURE — 700101 HCHG RX REV CODE 250

## 2017-12-18 PROCEDURE — 160036 HCHG PACU - EA ADDL 30 MINS PHASE I: Performed by: ORTHOPAEDIC SURGERY

## 2017-12-18 PROCEDURE — 700105 HCHG RX REV CODE 258

## 2017-12-18 PROCEDURE — 82962 GLUCOSE BLOOD TEST: CPT

## 2017-12-18 PROCEDURE — 502000 HCHG MISC OR IMPLANTS RC 0278: Performed by: ORTHOPAEDIC SURGERY

## 2017-12-18 PROCEDURE — 700102 HCHG RX REV CODE 250 W/ 637 OVERRIDE(OP)

## 2017-12-18 PROCEDURE — 700105 HCHG RX REV CODE 258: Performed by: PHYSICIAN ASSISTANT

## 2017-12-18 PROCEDURE — 501480 HCHG STOCKINETTE: Performed by: ORTHOPAEDIC SURGERY

## 2017-12-18 PROCEDURE — A9270 NON-COVERED ITEM OR SERVICE: HCPCS | Performed by: ORTHOPAEDIC SURGERY

## 2017-12-18 PROCEDURE — 3E0T3BZ INTRODUCTION OF ANESTHETIC AGENT INTO PERIPHERAL NERVES AND PLEXI, PERCUTANEOUS APPROACH: ICD-10-PCS | Performed by: ORTHOPAEDIC SURGERY

## 2017-12-18 PROCEDURE — 770001 HCHG ROOM/CARE - MED/SURG/GYN PRIV*

## 2017-12-18 PROCEDURE — A9270 NON-COVERED ITEM OR SERVICE: HCPCS

## 2017-12-18 PROCEDURE — 160035 HCHG PACU - 1ST 60 MINS PHASE I: Performed by: ORTHOPAEDIC SURGERY

## 2017-12-18 PROCEDURE — 700111 HCHG RX REV CODE 636 W/ 250 OVERRIDE (IP): Performed by: PHYSICIAN ASSISTANT

## 2017-12-18 PROCEDURE — 160041 HCHG SURGERY MINUTES - EA ADDL 1 MIN LEVEL 4: Performed by: ORTHOPAEDIC SURGERY

## 2017-12-18 PROCEDURE — 160002 HCHG RECOVERY MINUTES (STAT): Performed by: ORTHOPAEDIC SURGERY

## 2017-12-18 PROCEDURE — 502579 HCHG PACK, TOTAL KNEE: Performed by: ORTHOPAEDIC SURGERY

## 2017-12-18 PROCEDURE — 0SRD0J9 REPLACEMENT OF LEFT KNEE JOINT WITH SYNTHETIC SUBSTITUTE, CEMENTED, OPEN APPROACH: ICD-10-PCS | Performed by: ORTHOPAEDIC SURGERY

## 2017-12-18 PROCEDURE — 160048 HCHG OR STATISTICAL LEVEL 1-5: Performed by: ORTHOPAEDIC SURGERY

## 2017-12-18 PROCEDURE — A9270 NON-COVERED ITEM OR SERVICE: HCPCS | Performed by: PHYSICIAN ASSISTANT

## 2017-12-18 PROCEDURE — 700101 HCHG RX REV CODE 250: Performed by: PHYSICIAN ASSISTANT

## 2017-12-18 PROCEDURE — 700102 HCHG RX REV CODE 250 W/ 637 OVERRIDE(OP): Performed by: ORTHOPAEDIC SURGERY

## 2017-12-18 PROCEDURE — 700105 HCHG RX REV CODE 258: Performed by: ORTHOPAEDIC SURGERY

## 2017-12-18 PROCEDURE — 160023 HCHG SPINAL: Performed by: ORTHOPAEDIC SURGERY

## 2017-12-18 PROCEDURE — 700102 HCHG RX REV CODE 250 W/ 637 OVERRIDE(OP): Performed by: PHYSICIAN ASSISTANT

## 2017-12-18 PROCEDURE — 700101 HCHG RX REV CODE 250: Performed by: ORTHOPAEDIC SURGERY

## 2017-12-18 DEVICE — BONE CEMENT SIMPLEX FULL DOSE - (10EA/PK): Type: IMPLANTABLE DEVICE | Status: FUNCTIONAL

## 2017-12-18 DEVICE — IMPLANTABLE DEVICE: Type: IMPLANTABLE DEVICE | Status: FUNCTIONAL

## 2017-12-18 DEVICE — IMPLANT GNS II RESURF PAT 35MM (1EA): Type: IMPLANTABLE DEVICE | Status: FUNCTIONAL

## 2017-12-18 RX ORDER — BUDESONIDE AND FORMOTEROL FUMARATE DIHYDRATE 160; 4.5 UG/1; UG/1
2 AEROSOL RESPIRATORY (INHALATION) 2 TIMES DAILY
Status: DISCONTINUED | OUTPATIENT
Start: 2017-12-18 | End: 2017-12-19 | Stop reason: HOSPADM

## 2017-12-18 RX ORDER — ONDANSETRON 4 MG/1
4 TABLET, ORALLY DISINTEGRATING ORAL EVERY 4 HOURS PRN
Status: DISCONTINUED | OUTPATIENT
Start: 2017-12-18 | End: 2017-12-19 | Stop reason: HOSPADM

## 2017-12-18 RX ORDER — CELECOXIB 200 MG/1
CAPSULE ORAL
Status: COMPLETED
Start: 2017-12-18 | End: 2017-12-18

## 2017-12-18 RX ORDER — METOPROLOL SUCCINATE 25 MG/1
25 TABLET, EXTENDED RELEASE ORAL
Status: DISCONTINUED | OUTPATIENT
Start: 2017-12-18 | End: 2017-12-19 | Stop reason: HOSPADM

## 2017-12-18 RX ORDER — LIDOCAINE HYDROCHLORIDE 10 MG/ML
0.5 INJECTION, SOLUTION INFILTRATION; PERINEURAL
Status: ACTIVE | OUTPATIENT
Start: 2017-12-18 | End: 2017-12-19

## 2017-12-18 RX ORDER — OXYCODONE HCL 10 MG/1
TABLET, FILM COATED, EXTENDED RELEASE ORAL
Status: COMPLETED
Start: 2017-12-18 | End: 2017-12-18

## 2017-12-18 RX ORDER — PROCHLORPERAZINE MALEATE 10 MG
10 TABLET ORAL EVERY 6 HOURS PRN
Status: DISCONTINUED | OUTPATIENT
Start: 2017-12-18 | End: 2017-12-19 | Stop reason: HOSPADM

## 2017-12-18 RX ORDER — ONDANSETRON 2 MG/ML
INJECTION INTRAMUSCULAR; INTRAVENOUS
Status: COMPLETED
Start: 2017-12-18 | End: 2017-12-18

## 2017-12-18 RX ORDER — DEXAMETHASONE SODIUM PHOSPHATE 4 MG/ML
4 INJECTION, SOLUTION INTRA-ARTICULAR; INTRALESIONAL; INTRAMUSCULAR; INTRAVENOUS; SOFT TISSUE
Status: DISCONTINUED | OUTPATIENT
Start: 2017-12-18 | End: 2017-12-19 | Stop reason: HOSPADM

## 2017-12-18 RX ORDER — KETOROLAC TROMETHAMINE 30 MG/ML
15 INJECTION, SOLUTION INTRAMUSCULAR; INTRAVENOUS EVERY 6 HOURS
Status: DISCONTINUED | OUTPATIENT
Start: 2017-12-18 | End: 2017-12-19 | Stop reason: HOSPADM

## 2017-12-18 RX ORDER — DIAZEPAM 5 MG/1
5 TABLET ORAL EVERY 6 HOURS PRN
Status: DISCONTINUED | OUTPATIENT
Start: 2017-12-18 | End: 2017-12-19 | Stop reason: HOSPADM

## 2017-12-18 RX ORDER — ONDANSETRON 2 MG/ML
4 INJECTION INTRAMUSCULAR; INTRAVENOUS EVERY 4 HOURS PRN
Status: DISCONTINUED | OUTPATIENT
Start: 2017-12-18 | End: 2017-12-19 | Stop reason: HOSPADM

## 2017-12-18 RX ORDER — CEFAZOLIN SODIUM 2 G/100ML
2 INJECTION, SOLUTION INTRAVENOUS EVERY 8 HOURS
Status: COMPLETED | OUTPATIENT
Start: 2017-12-18 | End: 2017-12-19

## 2017-12-18 RX ORDER — SCOLOPAMINE TRANSDERMAL SYSTEM 1 MG/1
1 PATCH, EXTENDED RELEASE TRANSDERMAL
Status: DISCONTINUED | OUTPATIENT
Start: 2017-12-18 | End: 2017-12-19 | Stop reason: HOSPADM

## 2017-12-18 RX ORDER — PROMETHAZINE HYDROCHLORIDE 25 MG/1
25 SUPPOSITORY RECTAL EVERY 6 HOURS PRN
Status: DISCONTINUED | OUTPATIENT
Start: 2017-12-18 | End: 2017-12-19 | Stop reason: HOSPADM

## 2017-12-18 RX ORDER — DIPHENHYDRAMINE HCL 25 MG
25 TABLET ORAL EVERY 6 HOURS PRN
Status: DISCONTINUED | OUTPATIENT
Start: 2017-12-18 | End: 2017-12-19 | Stop reason: HOSPADM

## 2017-12-18 RX ORDER — METFORMIN HYDROCHLORIDE 500 MG/1
500 TABLET, EXTENDED RELEASE ORAL
Status: DISCONTINUED | OUTPATIENT
Start: 2017-12-18 | End: 2017-12-19 | Stop reason: HOSPADM

## 2017-12-18 RX ORDER — RALOXIFENE HYDROCHLORIDE 60 MG/1
60 TABLET, FILM COATED ORAL EVERY MORNING
Status: DISCONTINUED | OUTPATIENT
Start: 2017-12-19 | End: 2017-12-19 | Stop reason: HOSPADM

## 2017-12-18 RX ORDER — AMOXICILLIN 250 MG
1 CAPSULE ORAL NIGHTLY
Status: DISCONTINUED | OUTPATIENT
Start: 2017-12-18 | End: 2017-12-19 | Stop reason: HOSPADM

## 2017-12-18 RX ORDER — TIOTROPIUM BROMIDE 18 UG/1
1 CAPSULE ORAL; RESPIRATORY (INHALATION)
Status: DISCONTINUED | OUTPATIENT
Start: 2017-12-18 | End: 2017-12-19 | Stop reason: HOSPADM

## 2017-12-18 RX ORDER — SODIUM CHLORIDE 9 MG/ML
INJECTION, SOLUTION INTRAVENOUS CONTINUOUS
Status: DISCONTINUED | OUTPATIENT
Start: 2017-12-18 | End: 2017-12-18 | Stop reason: ALTCHOICE

## 2017-12-18 RX ORDER — HYDROCHLOROTHIAZIDE 25 MG/1
25 TABLET ORAL
Status: DISCONTINUED | OUTPATIENT
Start: 2017-12-18 | End: 2017-12-19 | Stop reason: HOSPADM

## 2017-12-18 RX ORDER — DEXAMETHASONE SODIUM PHOSPHATE 4 MG/ML
8 INJECTION, SOLUTION INTRA-ARTICULAR; INTRALESIONAL; INTRAMUSCULAR; INTRAVENOUS; SOFT TISSUE ONCE
Status: COMPLETED | OUTPATIENT
Start: 2017-12-19 | End: 2017-12-19

## 2017-12-18 RX ORDER — ACETAMINOPHEN 500 MG
TABLET ORAL
Status: COMPLETED
Start: 2017-12-18 | End: 2017-12-18

## 2017-12-18 RX ORDER — KETOROLAC TROMETHAMINE 30 MG/ML
INJECTION, SOLUTION INTRAMUSCULAR; INTRAVENOUS
Status: DISCONTINUED | OUTPATIENT
Start: 2017-12-18 | End: 2017-12-18 | Stop reason: HOSPADM

## 2017-12-18 RX ORDER — AMOXICILLIN 250 MG
1 CAPSULE ORAL
Status: DISCONTINUED | OUTPATIENT
Start: 2017-12-18 | End: 2017-12-19 | Stop reason: HOSPADM

## 2017-12-18 RX ORDER — HYDROMORPHONE HYDROCHLORIDE 2 MG/ML
0.5 INJECTION, SOLUTION INTRAMUSCULAR; INTRAVENOUS; SUBCUTANEOUS
Status: DISCONTINUED | OUTPATIENT
Start: 2017-12-18 | End: 2017-12-19 | Stop reason: HOSPADM

## 2017-12-18 RX ORDER — ACETAMINOPHEN 500 MG
TABLET ORAL
Status: DISPENSED
Start: 2017-12-18 | End: 2017-12-19

## 2017-12-18 RX ORDER — LEVOTHYROXINE SODIUM 88 UG/1
88 TABLET ORAL
Status: DISCONTINUED | OUTPATIENT
Start: 2017-12-19 | End: 2017-12-19 | Stop reason: HOSPADM

## 2017-12-18 RX ORDER — DEXTROSE AND SODIUM CHLORIDE 5; .45 G/100ML; G/100ML
INJECTION, SOLUTION INTRAVENOUS
Status: COMPLETED
Start: 2017-12-18 | End: 2017-12-18

## 2017-12-18 RX ORDER — MAGNESIUM HYDROXIDE 1200 MG/15ML
LIQUID ORAL
Status: DISCONTINUED | OUTPATIENT
Start: 2017-12-18 | End: 2017-12-18 | Stop reason: HOSPADM

## 2017-12-18 RX ORDER — LIDOCAINE AND PRILOCAINE 25; 25 MG/G; MG/G
1 CREAM TOPICAL
Status: ACTIVE | OUTPATIENT
Start: 2017-12-18 | End: 2017-12-19

## 2017-12-18 RX ORDER — CHLORPROMAZINE HYDROCHLORIDE 25 MG/ML
25 INJECTION INTRAMUSCULAR EVERY 6 HOURS PRN
Status: DISCONTINUED | OUTPATIENT
Start: 2017-12-18 | End: 2017-12-19 | Stop reason: HOSPADM

## 2017-12-18 RX ORDER — TAMSULOSIN HYDROCHLORIDE 0.4 MG/1
0.4 CAPSULE ORAL DAILY
Status: DISCONTINUED | OUTPATIENT
Start: 2017-12-18 | End: 2017-12-19 | Stop reason: HOSPADM

## 2017-12-18 RX ORDER — DOCUSATE SODIUM 100 MG/1
100 CAPSULE, LIQUID FILLED ORAL 2 TIMES DAILY
Status: DISCONTINUED | OUTPATIENT
Start: 2017-12-18 | End: 2017-12-19 | Stop reason: HOSPADM

## 2017-12-18 RX ORDER — DIPHENHYDRAMINE HYDROCHLORIDE 50 MG/ML
25 INJECTION INTRAMUSCULAR; INTRAVENOUS EVERY 6 HOURS PRN
Status: DISCONTINUED | OUTPATIENT
Start: 2017-12-18 | End: 2017-12-19 | Stop reason: HOSPADM

## 2017-12-18 RX ORDER — DEXTROSE AND SODIUM CHLORIDE 5; .45 G/100ML; G/100ML
INJECTION, SOLUTION INTRAVENOUS CONTINUOUS
Status: DISCONTINUED | OUTPATIENT
Start: 2017-12-18 | End: 2017-12-19 | Stop reason: HOSPADM

## 2017-12-18 RX ORDER — LOSARTAN POTASSIUM 50 MG/1
100 TABLET ORAL
Status: DISCONTINUED | OUTPATIENT
Start: 2017-12-18 | End: 2017-12-19 | Stop reason: HOSPADM

## 2017-12-18 RX ORDER — SIMVASTATIN 20 MG
20 TABLET ORAL EVERY EVENING
Status: DISCONTINUED | OUTPATIENT
Start: 2017-12-18 | End: 2017-12-19 | Stop reason: HOSPADM

## 2017-12-18 RX ORDER — ACETAMINOPHEN 500 MG
1000 TABLET ORAL EVERY 8 HOURS
Status: DISCONTINUED | OUTPATIENT
Start: 2017-12-18 | End: 2017-12-19 | Stop reason: HOSPADM

## 2017-12-18 RX ORDER — HALOPERIDOL 5 MG/ML
1 INJECTION INTRAMUSCULAR EVERY 6 HOURS PRN
Status: DISCONTINUED | OUTPATIENT
Start: 2017-12-18 | End: 2017-12-19 | Stop reason: HOSPADM

## 2017-12-18 RX ORDER — PREGABALIN 75 MG/1
75 CAPSULE ORAL 3 TIMES DAILY
Status: DISCONTINUED | OUTPATIENT
Start: 2017-12-18 | End: 2017-12-19 | Stop reason: HOSPADM

## 2017-12-18 RX ORDER — OXYCODONE HYDROCHLORIDE 5 MG/1
5 TABLET ORAL EVERY 4 HOURS PRN
Status: DISCONTINUED | OUTPATIENT
Start: 2017-12-18 | End: 2017-12-19 | Stop reason: HOSPADM

## 2017-12-18 RX ORDER — TRAMADOL HYDROCHLORIDE 50 MG/1
50 TABLET ORAL EVERY 4 HOURS PRN
Status: DISCONTINUED | OUTPATIENT
Start: 2017-12-18 | End: 2017-12-19 | Stop reason: HOSPADM

## 2017-12-18 RX ORDER — OXYCODONE HYDROCHLORIDE 10 MG/1
10 TABLET ORAL EVERY 4 HOURS PRN
Status: DISCONTINUED | OUTPATIENT
Start: 2017-12-18 | End: 2017-12-19 | Stop reason: HOSPADM

## 2017-12-18 RX ORDER — LOSARTAN POTASSIUM AND HYDROCHLOROTHIAZIDE 25; 100 MG/1; MG/1
1 TABLET ORAL DAILY
Status: DISCONTINUED | OUTPATIENT
Start: 2017-12-18 | End: 2017-12-18

## 2017-12-18 RX ORDER — KETOROLAC TROMETHAMINE 30 MG/ML
INJECTION, SOLUTION INTRAMUSCULAR; INTRAVENOUS
Status: COMPLETED
Start: 2017-12-18 | End: 2017-12-18

## 2017-12-18 RX ORDER — ZOLPIDEM TARTRATE 5 MG/1
5 TABLET ORAL NIGHTLY PRN
Status: DISCONTINUED | OUTPATIENT
Start: 2017-12-19 | End: 2017-12-19 | Stop reason: HOSPADM

## 2017-12-18 RX ORDER — CHLORPROMAZINE HYDROCHLORIDE 10 MG/1
25 TABLET, FILM COATED ORAL EVERY 6 HOURS PRN
Status: DISCONTINUED | OUTPATIENT
Start: 2017-12-18 | End: 2017-12-19 | Stop reason: HOSPADM

## 2017-12-18 RX ORDER — LIDOCAINE HYDROCHLORIDE 10 MG/ML
INJECTION, SOLUTION INFILTRATION; PERINEURAL
Status: DISPENSED
Start: 2017-12-18 | End: 2017-12-18

## 2017-12-18 RX ORDER — ENEMA 19; 7 G/133ML; G/133ML
1 ENEMA RECTAL
Status: DISCONTINUED | OUTPATIENT
Start: 2017-12-18 | End: 2017-12-19 | Stop reason: HOSPADM

## 2017-12-18 RX ORDER — POLYETHYLENE GLYCOL 3350 17 G/17G
1 POWDER, FOR SOLUTION ORAL 2 TIMES DAILY PRN
Status: DISCONTINUED | OUTPATIENT
Start: 2017-12-18 | End: 2017-12-19 | Stop reason: HOSPADM

## 2017-12-18 RX ORDER — SULFAMETHOXAZOLE AND TRIMETHOPRIM 400; 80 MG/1; MG/1
1 TABLET ORAL DAILY
Status: DISCONTINUED | OUTPATIENT
Start: 2017-12-19 | End: 2017-12-19 | Stop reason: HOSPADM

## 2017-12-18 RX ORDER — BISACODYL 10 MG
10 SUPPOSITORY, RECTAL RECTAL
Status: DISCONTINUED | OUTPATIENT
Start: 2017-12-18 | End: 2017-12-19 | Stop reason: HOSPADM

## 2017-12-18 RX ORDER — BUPIVACAINE HYDROCHLORIDE AND EPINEPHRINE 2.5; 5 MG/ML; UG/ML
INJECTION, SOLUTION EPIDURAL; INFILTRATION; INTRACAUDAL; PERINEURAL
Status: DISCONTINUED | OUTPATIENT
Start: 2017-12-18 | End: 2017-12-18 | Stop reason: HOSPADM

## 2017-12-18 RX ADMIN — DEXTROSE AND SODIUM CHLORIDE: 5; 450 INJECTION, SOLUTION INTRAVENOUS at 12:05

## 2017-12-18 RX ADMIN — KETOROLAC TROMETHAMINE 15 MG: 30 INJECTION, SOLUTION INTRAMUSCULAR at 12:47

## 2017-12-18 RX ADMIN — SIMVASTATIN 20 MG: 20 TABLET, FILM COATED ORAL at 20:48

## 2017-12-18 RX ADMIN — ACETAMINOPHEN 1000 MG: 500 TABLET, FILM COATED ORAL at 08:44

## 2017-12-18 RX ADMIN — DEXTROSE AND SODIUM CHLORIDE: 5; 450 INJECTION, SOLUTION INTRAVENOUS at 20:56

## 2017-12-18 RX ADMIN — KETOROLAC TROMETHAMINE 15 MG: 30 INJECTION, SOLUTION INTRAMUSCULAR at 18:17

## 2017-12-18 RX ADMIN — CELECOXIB 400 MG: 200 CAPSULE ORAL at 08:44

## 2017-12-18 RX ADMIN — ONDANSETRON 4 MG: 2 INJECTION INTRAMUSCULAR; INTRAVENOUS at 12:10

## 2017-12-18 RX ADMIN — METOPROLOL SUCCINATE 25 MG: 25 TABLET, EXTENDED RELEASE ORAL at 18:17

## 2017-12-18 RX ADMIN — CEFAZOLIN SODIUM 2 G: 2 INJECTION, SOLUTION INTRAVENOUS at 18:46

## 2017-12-18 RX ADMIN — TRANEXAMIC ACID 1000 MG: 100 INJECTION, SOLUTION INTRAVENOUS at 12:12

## 2017-12-18 RX ADMIN — SODIUM CHLORIDE: 9 INJECTION, SOLUTION INTRAVENOUS at 08:45

## 2017-12-18 RX ADMIN — ONDANSETRON 4 MG: 2 INJECTION INTRAMUSCULAR; INTRAVENOUS at 20:48

## 2017-12-18 RX ADMIN — OXYCODONE HYDROCHLORIDE 10 MG: 10 TABLET, FILM COATED, EXTENDED RELEASE ORAL at 08:44

## 2017-12-18 RX ADMIN — ASPIRIN 81 MG: 81 TABLET, COATED ORAL at 18:17

## 2017-12-18 ASSESSMENT — PAIN SCALES - GENERAL
PAINLEVEL_OUTOF10: 0
PAINLEVEL_OUTOF10: 3
PAINLEVEL_OUTOF10: 0
PAINLEVEL_OUTOF10: 1
PAINLEVEL_OUTOF10: 0

## 2017-12-18 ASSESSMENT — PATIENT HEALTH QUESTIONNAIRE - PHQ9
1. LITTLE INTEREST OR PLEASURE IN DOING THINGS: NOT AT ALL
SUM OF ALL RESPONSES TO PHQ QUESTIONS 1-9: 0
SUM OF ALL RESPONSES TO PHQ9 QUESTIONS 1 AND 2: 0
2. FEELING DOWN, DEPRESSED, IRRITABLE, OR HOPELESS: NOT AT ALL

## 2017-12-18 ASSESSMENT — LIFESTYLE VARIABLES
DO YOU DRINK ALCOHOL: NO
EVER_SMOKED: NEVER
ALCOHOL_USE: NO
EVER_SMOKED: NEVER

## 2017-12-18 ASSESSMENT — COPD QUESTIONNAIRES
COPD SCREENING SCORE: 4
DURING THE PAST 4 WEEKS HOW MUCH DID YOU FEEL SHORT OF BREATH: NONE/LITTLE OF THE TIME
HAVE YOU SMOKED AT LEAST 100 CIGARETTES IN YOUR ENTIRE LIFE: NO/DON'T KNOW
DO YOU EVER COUGH UP ANY MUCUS OR PHLEGM?: NO/ONLY WITH OCCASIONAL COLDS OR INFECTIONS

## 2017-12-18 NOTE — OP REPORT
DATE OF SERVICE:  12/18/2017    PREOPERATIVE DIAGNOSIS:  Degenerative arthritis, left knee.    POSTOPERATIVE DIAGNOSIS:  Degenerative arthritis, left knee.    PROCEDURE PERFORMED:  Left total knee arthroplasty.    SURGEON:  Ricky Castaneda MD    ANESTHESIA:  Spinal and adductor canal block.    ANESTHESIOLOGIST:  Charlie Quintero MD    ASSISTANT:  Nixon Erwin PA-C    ESTIMATED BLOOD LOSS:  100 mL    COMPONENTS PLACED:  Cemented [employer] size 5 cobalt chrome   posterior stabilized femur, size 4x9 mm all poly tibia, and 35 mm round   patella.    FINDINGS:  Severe medial and patellofemoral disease.    COMPLICATIONS:  None.    INDICATIONS FOR SURGERY:  The patient is a 72-year-old female with   progressively worsening pain and limitation of function secondary to   degenerative arthritis of the left knee.  She has tried to manage this with   activity modification, antiinflammatory medications, physical therapy,   bracing, and injections.  She is now still having regular limiting pain.  Her   exam shows a varus deformity of the left knee with slight flexion contracture,   a lot of crepitus, grating and palpable osteophytes.  Range of motion was   only about 5-110, stability was good.  Her x-rays, standing 3 views of the   left knee shows severe bone-on-bone degenerative arthritis of the medial and   patellofemoral joint.  She is therefore a candidate for left knee replacement.    The radiographic findings are subchondral sclerosis and peripheral   osteophytes.    SUMMARY OF PROCEDURE:  The patient was brought to the operating room and   anesthesia was administered.  Antibiotics and tranexamic acid were given IV.    Left leg was prepped and draped in usual sterile manner.  Leg was   exsanguinated, tourniquet inflated, time-out was called.  I made an anterior   incision centered over the medial aspect of the patella and then a standard   medial parapatellar arthrotomy with a medial release done distally.   Patella   was everted.  It was severely worn.  We took it down from 25 of maximum   thickness to 15 mm uniform thickness.  The 35 button had good coverage.  We   medialized it, prepared it, and resected the excess bone and osteophytes.    That was retracted laterally and the knee flexed up.  Tourniquet was released   at this point.  We used a 5-degree jig on the distal femur and just took the   standard amount off that was figured.  With all the spur removal, we would be   able to regain full extension.  It measured to a size 5.  We set rotation of   Whitesides line and made all the cuts for a size 5 posterior stabilized insert   and all peripheral osteophytes were removed.    The tibia was then exposed.  The extramedullary jig utilized and lined off the   medial third tubercle, centered the ankle with neutral slope and took the   standard amount off the less involved lateral side.  Tibia cut piece was   removed.  There were large amount of posterior osteophytes, which were   tentatively removed as well as the posterior menisci and the cruciate   remnants.  Medial osteophytes removed from the tibia and a size 4 tibia had   the best coverage.  We pinned it and trialed it.  We had nice full extension   with the 9, good symmetrical stability throughout motion.  Patella tracked   perfectly.  We were comfortable at this point.  We made all appropriate   punches.  The knee was irrigated out thoroughly and the components were   cemented into place.  Cement debris was removed.  The wound was soaked with   dilute Betadine for a few minutes and then flushed with saline.    We placed the knee in flexion, closed the tendon with a running #2 Quill.  The   skin was closed with interrupted 2-0 Vicryl and running 3-0 Monocryl.  We   injected the knee intraarticularly with solution of analgesic and anesthetic.    A silver impregnated dressing was placed followed by compression wrap and a   PolarCare unit.  The patient was stable  during the procedure and went to   recovery room in good condition.       ____________________________________     MD ZACKARY VIEYRA / MICAH    DD:  12/18/2017 10:41:24  DT:  12/18/2017 10:58:55    D#:  [id number]  Job#:  [id number]

## 2017-12-18 NOTE — H&P
CHIEF COMPLAINT:  Left knee pain.    HISTORY OF PRESENT ILLNESS:  The patient is a very pleasant 72-year-old   female.  She is a patient of Dr. Mo.  She has suffered with severe left   knee arthritis for quite some time.  She has tried and failed conservative   measures with activity modifications, physical therapy, home exercises, the   use of a cane, weight loss, oral pain medicine, and anti-inflammatories, none   of which have improved her symptoms.  Her plan is to proceed with a left total   knee arthroplasty by Dr. Castaneda on the date of admission.    ALLERGIES:  PENICILLIN.    HOME MEDICATIONS:  Spiriva, Lyrica, aspirin, Bactrim, albuterol, metoprolol,   Symbicort, fenofibrate, simvastatin, levothyroxine, raloxifene, losartan, and   metformin.    PAST MEDICAL HISTORY:  Significant for arthritis, asthma, insulin resistance,   hypertension, osteopenia, hypothyroidism, dyslipidemia, and hypertension.    SOCIAL HISTORY:  She is , lives with her .  She does not smoke.    She is allergic to oral alcohol.    REVIEW OF SYSTEMS:  ROS is negative per CMS guidelines.    PHYSICAL EXAMINATION:  GENERAL:  She is a well-nourished, well-developed 72-year-old female, in no   acute distress.  She is alert and oriented x3.  HEENT:  Eyes are PERRL.  SKIN:  Intact, warm, pink and dry.  LUNGS:  Clear to auscultation bilaterally.  HEART:  Regular rate and rhythm.  ABDOMEN:  Soft, nontender, nondistended.  EXTREMITIES:  On examination of her left knee, she has varus deformity, trace   effusion, no palpable warmth.  The knee is stable, no obvious laxity.    Tenderness to palpation in medial joint line, well-maintained active range of   motion and strength with crepitus.  Distal CMS is intact.    RADIOGRAPHS:  Four views of the left knee shows severe medial compartment   arthritis with varus deformity, complete joint space ablation, osteophyte   formation, and subchondral sclerosis.    IMPRESSION:  Severe left knee  arthritis.    PLAN:  Plan is to proceed with a left total knee arthroplasty by Dr. Castaneda on   the date of admission.  The risks versus benefits and treatment alternatives   have been discussed.  She has been consented for the procedure.  She   understands there are no guarantees with surgery.       ____________________________________     ZULEMA ESPARZA / MICAH    DD:  12/17/2017 19:05:56  DT:  12/17/2017 19:29:21    D#:  [id number]  Job#:  [id number]

## 2017-12-18 NOTE — OR SURGEON
Immediate Post OP Note    PreOp Diagnosis: DJD L knee    PostOp Diagnosis: same    Procedure(s):  KNEE ARTHROPLASTY TOTAL - Wound Class: Clean    Surgeon(s):  Ricky Castaneda M.D.    Anesthesiologist/Type of Anesthesia:  Anesthesiologist: Charlie Quintero M.D./Spinal    Surgical Staff:  Assistant: NY Ontiveros  Circulator: Babita Saldivar R.N.  Relief Circulator: Heather Holm R.N.  Scrub Person: Suzanne Guido    Specimens:  * No specimens in log *    Estimated Blood Loss: 100    Findings: severe medial and PF disease    Complications: none        12/18/2017 10:35 AM Ricky Castaneda

## 2017-12-19 VITALS
RESPIRATION RATE: 16 BRPM | BODY MASS INDEX: 26.08 KG/M2 | HEART RATE: 72 BPM | DIASTOLIC BLOOD PRESSURE: 70 MMHG | WEIGHT: 186.29 LBS | OXYGEN SATURATION: 91 % | SYSTOLIC BLOOD PRESSURE: 135 MMHG | TEMPERATURE: 98.3 F | HEIGHT: 71 IN

## 2017-12-19 LAB
HCT VFR BLD AUTO: 39.4 % (ref 37–47)
HGB BLD-MCNC: 13.4 G/DL (ref 12–16)

## 2017-12-19 PROCEDURE — 85014 HEMATOCRIT: CPT

## 2017-12-19 PROCEDURE — A9270 NON-COVERED ITEM OR SERVICE: HCPCS | Performed by: PHYSICIAN ASSISTANT

## 2017-12-19 PROCEDURE — 97165 OT EVAL LOW COMPLEX 30 MIN: CPT

## 2017-12-19 PROCEDURE — G8987 SELF CARE CURRENT STATUS: HCPCS | Mod: CI

## 2017-12-19 PROCEDURE — 85018 HEMOGLOBIN: CPT

## 2017-12-19 PROCEDURE — G8989 SELF CARE D/C STATUS: HCPCS | Mod: CI

## 2017-12-19 PROCEDURE — 700102 HCHG RX REV CODE 250 W/ 637 OVERRIDE(OP): Performed by: PHYSICIAN ASSISTANT

## 2017-12-19 PROCEDURE — 97161 PT EVAL LOW COMPLEX 20 MIN: CPT

## 2017-12-19 PROCEDURE — G8988 SELF CARE GOAL STATUS: HCPCS | Mod: CI

## 2017-12-19 PROCEDURE — G8979 MOBILITY GOAL STATUS: HCPCS | Mod: CI

## 2017-12-19 PROCEDURE — 36415 COLL VENOUS BLD VENIPUNCTURE: CPT

## 2017-12-19 PROCEDURE — G8978 MOBILITY CURRENT STATUS: HCPCS | Mod: CI

## 2017-12-19 PROCEDURE — 700112 HCHG RX REV CODE 229: Performed by: PHYSICIAN ASSISTANT

## 2017-12-19 PROCEDURE — G8980 MOBILITY D/C STATUS: HCPCS | Mod: CI

## 2017-12-19 PROCEDURE — 700111 HCHG RX REV CODE 636 W/ 250 OVERRIDE (IP): Performed by: PHYSICIAN ASSISTANT

## 2017-12-19 RX ADMIN — LEVOTHYROXINE SODIUM 88 MCG: 88 TABLET ORAL at 06:35

## 2017-12-19 RX ADMIN — PREGABALIN 75 MG: 75 CAPSULE ORAL at 08:42

## 2017-12-19 RX ADMIN — KETOROLAC TROMETHAMINE 15 MG: 30 INJECTION, SOLUTION INTRAMUSCULAR at 06:40

## 2017-12-19 RX ADMIN — TAMSULOSIN HYDROCHLORIDE 0.4 MG: 0.4 CAPSULE ORAL at 08:42

## 2017-12-19 RX ADMIN — LOSARTAN POTASSIUM 100 MG: 50 TABLET, FILM COATED ORAL at 08:42

## 2017-12-19 RX ADMIN — SULFAMETHOXAZOLE AND TRIMETHOPRIM 1 TABLET: 400; 80 TABLET ORAL at 08:42

## 2017-12-19 RX ADMIN — DEXAMETHASONE SODIUM PHOSPHATE 8 MG: 4 INJECTION, SOLUTION INTRAMUSCULAR; INTRAVENOUS at 06:14

## 2017-12-19 RX ADMIN — BUDESONIDE AND FORMOTEROL FUMARATE DIHYDRATE 2 PUFF: 160; 4.5 AEROSOL RESPIRATORY (INHALATION) at 08:41

## 2017-12-19 RX ADMIN — KETOROLAC TROMETHAMINE 15 MG: 30 INJECTION, SOLUTION INTRAMUSCULAR at 01:00

## 2017-12-19 RX ADMIN — HYDROCHLOROTHIAZIDE 25 MG: 25 TABLET ORAL at 08:43

## 2017-12-19 RX ADMIN — CEFAZOLIN SODIUM 2 G: 2 INJECTION, SOLUTION INTRAVENOUS at 01:45

## 2017-12-19 RX ADMIN — ASPIRIN 81 MG: 81 TABLET, COATED ORAL at 08:43

## 2017-12-19 RX ADMIN — DOCUSATE SODIUM 100 MG: 100 CAPSULE ORAL at 08:43

## 2017-12-19 RX ADMIN — ONDANSETRON 4 MG: 2 INJECTION INTRAMUSCULAR; INTRAVENOUS at 06:20

## 2017-12-19 RX ADMIN — RALOXIFENE HYDROCHLORIDE 60 MG: 60 TABLET, FILM COATED ORAL at 08:42

## 2017-12-19 RX ADMIN — METOPROLOL SUCCINATE 25 MG: 25 TABLET, EXTENDED RELEASE ORAL at 08:43

## 2017-12-19 RX ADMIN — METFORMIN HYDROCHLORIDE 500 MG: 500 TABLET, EXTENDED RELEASE ORAL at 08:42

## 2017-12-19 ASSESSMENT — PAIN SCALES - GENERAL
PAINLEVEL_OUTOF10: 0

## 2017-12-19 ASSESSMENT — COGNITIVE AND FUNCTIONAL STATUS - GENERAL
DAILY ACTIVITIY SCORE: 21
MOBILITY SCORE: 20
TOILETING: A LITTLE
SUGGESTED CMS G CODE MODIFIER DAILY ACTIVITY: CJ
HELP NEEDED FOR BATHING: A LITTLE
STANDING UP FROM CHAIR USING ARMS: A LITTLE
WALKING IN HOSPITAL ROOM: A LITTLE
MOVING FROM LYING ON BACK TO SITTING ON SIDE OF FLAT BED: A LITTLE
SUGGESTED CMS G CODE MODIFIER MOBILITY: CJ
CLIMB 3 TO 5 STEPS WITH RAILING: A LITTLE
DRESSING REGULAR LOWER BODY CLOTHING: A LITTLE

## 2017-12-19 ASSESSMENT — GAIT ASSESSMENTS
DISTANCE (FEET): 175
DEVIATION: DECREASED TOE OFF;DECREASED HEEL STRIKE;ANTALGIC
ASSISTIVE DEVICE: FRONT WHEEL WALKER
GAIT LEVEL OF ASSIST: SUPERVISED

## 2017-12-19 ASSESSMENT — ACTIVITIES OF DAILY LIVING (ADL): TOILETING: INDEPENDENT

## 2017-12-19 ASSESSMENT — LIFESTYLE VARIABLES: EVER_SMOKED: NEVER

## 2017-12-19 NOTE — DISCHARGE SUMMARY
DATE OF DISCHARGE:  12/19/2017    DISCHARGE DIAGNOSIS:  Degenerative arthritis, left knee.    HOSPITAL COURSE:  Patient is admitted for left knee replacement.  She has done   very well postoperatively with minimal pain, excellent motion, walking well,   and no fever.    DISPOSITION:  Transfer home.    DISCHARGE MEDICATIONS:  She will continue with all of her home meds.  She has   got plenty of pain killers so not to prescribe anything.  She is going to take   1 baby aspirin twice daily for a few weeks for DVT prophylaxis.    FOLLOWUP:  Followup will be in our office next week for wound check.       ____________________________________     MD ZACKARY VIEYRA / MICAH    DD:  12/19/2017 08:02:34  DT:  12/19/2017 08:09:04    D#:  [id number]  Job#:  [id number]

## 2017-12-19 NOTE — PROGRESS NOTES
Pt discharge home via wheelchair with . IV d/c prior to discharge. Discharge instructions given to pt, pt verbalized understanding. All questions and concerns answered. Pt has follow up appointment with MD. All questions and concerns answered. No further needs noted.

## 2017-12-19 NOTE — CARE PLAN
Problem: Safety  Goal: Will remain free from injury  Outcome: PROGRESSING AS EXPECTED  Call light within reach. Pt calls for assistance at all times.  Bed in lowest position, upper bedrails up, personal possessions within reach, treaded socks on.  Hourly rounding in place.        Problem: Mobility  Goal: Risk for activity intolerance will decrease  Pt up to restroom multiple times throughout NOC shift, becoming more steady with each ambulation. Pt explains the numbness in her LLE is decreasing and she is feeling more steady on her feet. Calls for assistance, up with FWW and one person assist. Able to move/ bend left knee without pain or resistance.

## 2017-12-19 NOTE — CARE PLAN
Problem: Safety  Goal: Free from accidental injury    Intervention: Initiate Safety Measures  Bed in the lowest locked position. Call light within reach. Hourly rounding in place.       Problem: Risk for Impaired Mobility  Goal: Mobilization  Outcome: PROGRESSING AS EXPECTED  Pt up standby assist with FWW    Problem: Elimination  Goal: Regular urinary elimination    Intervention: Monitor ability to void  Pt voiding with no difficulty       Problem: Pain  Goal: Alleviation of Pain or a reduction in pain to the patient's comfort goal    Intervention: Pain Management-Medications  No pain noted at this time      Problem: Risk for Deep Vein Thrombosis/Venous Thromboembolism  Goal: DVT/VTE Prevention Measures in Place    Intervention: Intermittent sequential compression device/foot pumps/RUI hose  SCD's on

## 2017-12-19 NOTE — DISCHARGE PLANNING
Care Transition Team Assessment    IHD met with pt at bedside. Pt currently lives at home with her spouse, who will be able to provide transportation home upon d/c. Pt does not have home O2 or HHC at this time. She states that she has a walker, cane, and wheelchair available at home if needed.     Information Source  Orientation : Oriented x 4  Information Given By: Patient  Informant's Name: Jillian  Who is responsible for making decisions for patient? : Patient         Elopement Risk  Legal Hold: No  Ambulatory or Self Mobile in Wheelchair: Yes  Disoriented: No  Psychiatric Symptoms: None  History of Wandering: No  Elopement this Admit: No  Vocalizing Wanting to Leave: No  Displays Behaviors, Body Language Wanting to Leave: No-Not at Risk for Elopement  Elopement Risk: Not at Risk for Elopement    Interdisciplinary Discharge Planning  Does Admitting Nurse Feel This Could be a Complex Discharge?: Yes  Primary Care Physician: Dr. Shakir Conrad  Lives with - Patient's Self Care Capacity: Spouse  Patient or legal guardian wants to designate a caregiver (see row info): No  Support Systems: Family Member(s)  Housing / Facility: [address]  Do You Take your Prescribed Medications Regularly: Yes  Able to Return to Previous ADL's: Future Time w/Therapy  Mobility Issues: Yes  Prior Services: Home-Independent  Patient Expects to be Discharged to:: tomorrow  Assistance Needed: No  Durable Medical Equipment: Not Applicable    Discharge Preparedness  What is your plan after discharge?: Home with help  What are your discharge supports?: Spouse  Prior Functional Level: Ambulatory, Drives Self, Independent with Activities of Daily Living, Independent with Medication Management  Difficulity with ADLs: None  Difficulity with IADLs: None    Functional Assesment  Prior Functional Level: Ambulatory, Drives Self, Independent with Activities of Daily Living, Independent with Medication Management    Finances  Financial Barriers to  Discharge: No  Prescription Coverage: Yes ([employer] in [facility])    Vision / Hearing Impairment  Vision Impairment : Yes  Right Eye Vision: Wears Glasses  Left Eye Vision: Wears Glasses  Hearing Impairment : No    Values / Beliefs / Concerns  Cultural Requests During Hospitalization: [facility]/Dr Teo Calderon  Spiritual Requests During Hospitalization: No  Special Hospitalization Concerns: none         Domestic Abuse  Have you ever been the victim of abuse or violence?: No  Physical Abuse or Sexual Abuse: No  Verbal Abuse or Emotional Abuse: No  Possible Abuse Reported to:: Not Applicable    Psychological Assessment  History of Substance Abuse: None  History of Psychiatric Problems: No  Non-compliant with Treatment: No    Discharge Risks or Barriers  Discharge risks or barriers?: No    Anticipated Discharge Information  Anticipated discharge disposition: Home  Discharge Address: [address] [place] [address]  Discharge Contact Phone Number: [phone]

## 2017-12-19 NOTE — PROGRESS NOTES
"Assumed care of pt at 1915, report received from day RN. Pt resting in bed, A&Ox4, denies pain at this time.  Dressing to left knee CDI. Explained feeling slight nausea, relieved with PRN zofran per MAR. Up to restroom with one assist and FWW. When in restroom pt describe her knee \"buckled\" as she had a slight stumble but caught herself with assist of the RN. Was able to void 300mL. Running D5 1/2 NS in patent right forearm 18g. Pillow under left ankle for comfort. SCDs and polar ice in place. Daughter at bedside. Needs met at this time. Rounding in place.   "

## 2017-12-19 NOTE — PROGRESS NOTES
Pt arrived to unit 1730. A&Ox4 denies pain. Polar ice in use. SCD's in use. CMS intact. FELIX. IVF fluids infusing, no void yet. Pt straight cath in PACU at 1642. 2 RN skin check done with Malena HELMS, witness skin assessment. No breakdown noted. Pt oriented to unit and call light fall precaution in place, call light in place. Hourly rounding in place.

## 2017-12-19 NOTE — DISCHARGE INSTRUCTIONS
Discharge Instructions  *Follow up with Dr. Castaneda at scheduled appointment  *Weight bearing as tolerated                     *Activity as tolerated  *Use assistive device for all activity  *Continue exercises provided by physical therapy and range of motion  *Elevate leg as needed; Ok to have pillow under the ankle NO pillow under knee  *Ice as needed (20 minutes every 1-2 hours)  *Keep silver dressing in place until follow up with doctor, ok to remove ace wrap  *Ok to shower with waterproof dressing in place  *No soaking of the incision; no baths, hot tubs, or swimming until cleared by doctor  *Aspirin 81 mg twice a day for blood clot prevention           *Take medications as prescribed by doctor  *Call doctor’s office with any questions or concerns     Discharged to home by car with relative. Discharged via wheelchair, hospital escort: Yes.  Special equipment needed: Not Applicable    Be sure to schedule a follow-up appointment with your primary care doctor or any specialists as instructed.     Discharge Plan:   Diet Plan: Discussed  Activity Level: Discussed  Confirmed Follow up Appointment: Patient to Call and Schedule Appointment  Confirmed Symptoms Management: Discussed  Medication Reconciliation Updated: Yes  Influenza Vaccine Indication: Not indicated: Previously immunized this influenza season and > 8 years of age    I understand that a diet low in cholesterol, fat, and sodium is recommended for good health. Unless I have been given specific instructions below for another diet, I accept this instruction as my diet prescription.   Other diet: Regular as tolerated    Special Instructions: Discharge instructions for the Orthopedic Patient    Follow up with Primary Care Physician within 2 weeks of discharge to home, regarding:  Review of medications and diagnostic testing.  Surveillance for medical complications.  Workup and treatment of osteoporosis, if appropriate.     -Is this a Joint Replacement patient?  Yes Total Joint Knee Replacement Discharge Instructions    Pain  - The goal is to slowly wean off the prescription pain medicine.  - Ice can be used for pain control.  20 minutes at a time is recommended, and never directly against your skin or incision.  - Most patients are off the pain pills by 3 weeks; others may require a low level of pain medications for many months.  If your pain continues to be severe, follow up with your physician.  Infection    Knee joint infections; occur in fewer than 2% of patients. The most common causes of infection following total knee replacement surgery are from bacteria that enter the bloodstream during dental procedures, urinary tract infections, or skin infections. These bacteria can lodge around your knee replacement and cause an infection.  - Keep the incision as clean and dry as possible.  - Always wash your hands before touching your incision.  - Skin infections tend to develop around 7-10 days after surgery; most can be treated with oral antibiotics.  - Dental Care should be delayed for 3 months after surgery, your surgeon recommends taking a dose of antibiotics 1 hour prior to any dental procedure. After 2 years, most surgeons recommend antibiotics only before an extensive procedure.  Ask your surgeon what he recommends.  - Signs and symptoms of infection can include:  low grade fever, redness, pain, swelling and drainage from your incision.  Notify your surgeon immediately if you develop any of these symptoms.  Other instructions  - Bowel habits - constipation is extremely common and is caused by a combination of anesthesia, lack of mobility and pain medicine.  Use stool softeners or laxatives if necessary. It is important not to ignore this problem, as bowel obstructions can be a serious complication after joint replacement surgery.  - Mood/Energy Level - Many patients experience a lack of energy and endurance for up to 2-3 months after surgery.  Some may also feel down  and can even become depressed.  This is likely due to the postoperative anemia, change in activity level, lack of sleep, pain medicine and just the emotional reaction to the surgery itself that is a big disruption in a person’s life.  This usually passes.  If symptoms persist, follow up with your primary physician.  - Returning to work - Your surgeon will give you more specific instructions. Depending on the type of activities you perform, it may be 6 to 8 weeks before you return to work.   Generally, if you work a sedentary job requiring little standing or walking, most patients may return within 2-6 weeks.  Manual labor jobs involving walking, lifting and standing may take longer. Your surgeon’s office can provide a release to part-time or light duty work early on in your recovery and progress you to full duty as able.    - Driving - If your left knee was replaced and you have an automatic transmission, you may be able to begin driving in a week or so, provided you are no longer taking narcotic pain medication. If your right knee was replaced, avoid driving for 6 to 8 weeks. Remember that your reflexes may not be as sharp as before your surgery. Ask your surgeon for specific instructions.   - Avoiding falls - A fall during the first few weeks after surgery can damage your new knee and may result in a need for further surgery.   throw rugs and tack down loose carpeting.  Be aware of floor hazards such as pets, small objects or uneven surfaces.    - [employer] Metal Detectors - The sensitivity of metal detectors varies and it is likely that your prosthesis will cause an alarm.  Inform the  of your artificial joint.  Diet  - Resume your normal diet as tolerated.  - It is important to achieve a healthy nutritional status by eating a well balanced diet on a regular basis.  - Your physician may recommend that you take iron and vitamin supplements.   - Continue to drink plenty of  fluids.  Shower/Bathing  - You may shower as soon as you get home from the hospital unless otherwise instructed.  - Keep your incision out of water.  To keep the incision dry when showering, cover it with a plastic bag or plastic wrap.  - Pat incision dry if it gets wet.  Don’t rub.  - Do not submerge in a bath until staples are out and the incision is completely healed. (Approximately 6-8 weeks)  Dressing Change:  Procedure (if recommended by your physician)  - Wash hands.  - Open all new dressing change materials.  - Remove old dressing and discard.  - Inspect incision for redness, increase in clear drainage, yellow/green drainage, odor and surrounding skin hot to touch.  -  ABD (large gauze) pad or “island dressing” by one corner and lay over the incision.  Be careful not to touch the inside of the dressing that will lay over the incision.  - Secure in place as instructed (Ace wrap or tape).    Swelling/Bruising    - Swelling can last from 3-6 months.  - Elevate your leg higher than your heart while reclining.   The first week you are home you should elevate your leg an equal amount of time, as you are active.    - Anti-inflammatory pills can be taken once you have stopped the blood thinners.  - The swelling is usually worse after you go home since you are upright for longer periods of time.  - Bruising is common and can involve the entire leg including the thigh, calf and even foot. Bruising often does not appear until after you arrive home and it can be quite dramatic- purple, black, and green.  The bruising you can see is not usually concerning and will subside without any treatment.      Blood Clot Prevention  Blood clots in the legs and the less common, but frightening, clots that travel to the lungs are a real focus of our preventative. Most patients are at standard risk for them, but those patients who are at higher risk include people who have had previous clots, a family history of clotting,  smoking, diabetes, obesity, advanced age, use of estrogen and a sedentary lifestyle.    - Signs of blood clots in legs - Swelling in thigh, calf or ankle that does not go down with elevation.  Pain, heat and tenderness in calf, back of calf or groin area.  NOTE: blood clots can occur in either leg.  - You have been receiving anticoagulant therapy (blood thinners) in the hospital and you may be instructed to continue at home depending on your risk factors.  - Your risk for developing a clot continues for up to 2-3 months after surgery.  You should avoid prolonged sitting and dehydration during that time (long air trips and car trips).  If you do take a trip during this time, please get up and move around every 1- 1.5 hours.  - If you are prescribed blood-thinning medication for home, follow instructions as directed. (Handouts provided if applicable).      Activity  Once home, you should continue to stay active. The key is to remember not to overdo it! While you can expect some good days and some bad days, you should notice a gradual improvement and a gradual increase in your endurance over the next 6 to 12 months. Exercise is a critical component of home care, particularly during the first few weeks after surgery.     - Normal activities of daily living You should be able to resume most within 3 to 6 weeks following surgery. Some pain with activity and at night is common for several weeks after surgery  -  Physical Therapy Exercises - Continue to do the exercises prescribed for at least two months after surgery. Riding a stationary bicycle can help maintain muscle tone and keep your knee flexible. Try to achieve the maximum degree of bending and extension possible. (handout provided by Therapist).  - Sexual Activity -. Your surgeon can tell you when it safe to resume sexual activity.    - Sleeping Positions - You can safely sleep on your back, on either side, or on your stomach.   - Other Activities - Walk as much as  you like, but remember that walking is no substitute for the exercises your doctor and physical therapist will prescribe. Lower impact activities are preferred.  If you have specific questions, consult your Surgeon.    When to Call the Doctor   Call the physician if:   - Fever over 100.5? F  - Increased pain, drainage, redness, odor or heat around the incision area  - Shaking chills  - Increased knee pain with activity and rest  - Increased pain in calf, tenderness or redness above or below the knee  - Increased swelling of calf, ankle, foot  - Sudden increased shortness of breath, sudden onset of chest pain, localized chest pain with coughing  - Incision opening  Or, if there are any questions or concerns about medications or care.       -Is this patient being discharged with medication to prevent blood clots?  Yes, Aspirin Aspirin, ASA oral tablets  What is this medicine?  ASPIRIN (AS pir in) is a pain reliever. It is used to treat mild pain and fever. This medicine is also used as directed by a doctor to prevent and to treat heart attacks, to prevent strokes, and to treat arthritis or inflammation.  This medicine may be used for other purposes; ask your health care provider or pharmacist if you have questions.  COMMON BRAND NAME(S): Aspir-Low, Aspir-Edilma , Aspirtab , Unity 4 Humanity Advanced Aspirin, Unity 4 Humanity Aspirin Extra Strength, Unity 4 Humanity Aspirin Plus, Merced Aspirin, Merced Genuine Aspirin, Unity 4 Humanity Womens Aspirin , Bufferin Extra Strength, Bufferin Low Dose, Bufferin  What should I tell my health care provider before I take this medicine?  They need to know if you have any of these conditions:  -anemia  -asthma  -bleeding problems  -child with chickenpox, the flu, or other viral infection  -diabetes  -gout  -if you frequently drink alcohol containing drinks  -kidney disease  -liver disease  -low level of vitamin K  -lupus  -smoke tobacco  -stomach ulcers or other problems  -an unusual or allergic reaction to aspirin, tartrazine  dye, other medicines, dyes, or preservatives  -pregnant or trying to get pregnant  -breast-feeding  How should I use this medicine?  Take this medicine by mouth with a glass of water. Follow the directions on the package or prescription label. You can take this medicine with or without food. If it upsets your stomach, take it with food. Do not take your medicine more often than directed.  Talk to your pediatrician regarding the use of this medicine in children. While this drug may be prescribed for children as young as 12 years of age for selected conditions, precautions do apply. Children and teenagers should not use this medicine to treat chicken pox or flu symptoms unless directed by a doctor.  Patients over 65 years old may have a stronger reaction and need a smaller dose.  Overdosage: If you think you have taken too much of this medicine contact a poison control center or emergency room at once.  NOTE: This medicine is only for you. Do not share this medicine with others.  What if I miss a dose?  If you are taking this medicine on a regular schedule and miss a dose, take it as soon as you can. If it is almost time for your next dose, take only that dose. Do not take double or extra doses.  What may interact with this medicine?  Do not take this medicine with any of the following medications:  -cidofovir  -ketorolac  -probenecid  This medicine may also interact with the following medications:  -alcohol  -alendronate  -bismuth subsalicylate  -flavocoxid  -herbal supplements like feverfew, garlic, kvng, ginkgo biloba, horse chestnut  -medicines for diabetes or glaucoma like acetazolamide, methazolamide  -medicines for gout  -medicines that treat or prevent blood clots like enoxaparin, heparin, ticlopidine, warfarin  -other aspirin and aspirin-like medicines  -NSAIDs, medicines for pain and inflammation, like ibuprofen or naproxen  -pemetrexed  -sulfinpyrazone  -varicella live vaccine  This list may not describe  all possible interactions. Give your health care provider a list of all the medicines, herbs, non-prescription drugs, or dietary supplements you use. Also tell them if you smoke, drink alcohol, or use illegal drugs. Some items may interact with your medicine.  What should I watch for while using this medicine?  If you are treating yourself for pain, tell your doctor or health care professional if the pain lasts more than 10 days, if it gets worse, or if there is a new or different kind of pain. Tell your doctor if you see redness or swelling. Also, check with your doctor if you have a fever that lasts for more than 3 days. Only take this medicine to prevent heart attacks or blood clotting if prescribed by your doctor or health care professional.  Do not take aspirin or aspirin-like medicines with this medicine. Too much aspirin can be dangerous. Always read the labels carefully.  This medicine can irritate your stomach or cause bleeding problems. Do not smoke cigarettes or drink alcohol while taking this medicine. Do not lie down for 30 minutes after taking this medicine to prevent irritation to your throat.  If you are scheduled for any medical or dental procedure, tell your healthcare provider that you are taking this medicine. You may need to stop taking this medicine before the procedure.  What side effects may I notice from receiving this medicine?  Side effects that you should report to your doctor or health care professional as soon as possible:  -allergic reactions like skin rash, itching or hives, swelling of the face, lips, or tongue  -black, tarry stools  -bloody, coffee ground-like vomit  -breathing problems  -changes in hearing, ringing in the ears  -confusion  -general ill feeling or flu-like symptoms  -pain on swallowing  -redness, blistering, peeling or loosening of the skin, including inside the mouth or nose  -trouble passing urine or change in the amount of urine  -unusual bleeding or  bruising  -unusually weak or tired  -yellowing of the eyes or skin  Side effects that usually do not require medical attention (report to your doctor or health care professional if they continue or are bothersome):  -diarrhea or constipation  -nausea, vomiting  -stomach gas, heartburn  This list may not describe all possible side effects. Call your doctor for medical advice about side effects. You may report side effects to FDA at 5-169-EGK-7792.  Where should I keep my medicine?  Keep out of the reach of children.  Store at room temperature between 15 and 30 degrees C (59 and 86 degrees F). Protect from heat and moisture. Do not use this medicine if it has a strong vinegar smell. Throw away any unused medicine after the expiration date.  NOTE: This sheet is a summary. It may not cover all possible information. If you have questions about this medicine, talk to your doctor, pharmacist, or health care provider.  © 2014, [employer]Gold Standard. (3/10/2009 10:44:17 AM)      · Is patient discharged on Warfarin / Coumadin?   No     · Is patient Post Blood Transfusion?  No      Total Knee Replacement   Total knee replacement is a surgery to replace your damaged knee joint. Your knee joint is replaced with a man-made (artificial) knee joint. The man-made knee joint is called a prosthesis. This surgery is done to reduce pain. It is also done to allow you to be more active.  BEFORE THE PROCEDURE   · Do not eat or drink anything after midnight on the night before the procedure or as told by your doctor.  · Ask your doctor if you need to change or stop any medicines. This is important if you take diabetes medicines or blood thinners.  PROCEDURE   · You will be given medicine that makes you relax (sedative). This will be given through an IV tube.  · Then you will be given one of the following:  ¨ A medicine that numbs your body from the waist down (spinal anesthetic).  ¨ A medicine that makes you fall asleep (general  anesthetic).  ¨ A medicine to block feeling in your leg (nerve block).  · A cut (incision) will be made in the front of your knee.  · Damaged parts of your knee joint will be taken out.  · A new metal liner will be put over the part of the thigh bone that is taken out.  · A plastic liner will be put over the shin bone.  · A plastic piece will often be put over the surface of your knee cap.  AFTER THE PROCEDURE   · You will be taken to the recovery area.  · You may have tubes to drain fluid from your knee.  · Once you are doing okay, you will be taken to your hospital room.  · You will do some exercises (physical therapy) at the hospital.  · Your doctor may want you to spend some time in an extended-care facility. They can help you begin walking again before you go home.  · Your doctor may have you take medicine to thin your blood. This helps to prevent blood clots in your leg.     This information is not intended to replace advice given to you by your health care provider. Make sure you discuss any questions you have with your health care provider.     Document Released: 03/11/2013 Document Revised: 05/03/2016 Document Reviewed: 03/11/2013  [employer] Patient Education ©2016 [employer]        Total Knee Replacement, Care After  These instructions give you information on caring for yourself after your procedure. Your doctor also may give you specific instructions. Call your doctor if you have any problems or questions after your procedure.  HOME CARE  · See a physical therapist as told by your doctor.  · Take medicines as told by your doctor.  · Do not lift or drive until your doctor says it is okay.  · Use crutches, a walker, or a cane as told by your doctor.  · If you were given a knee joint motion machine, use it as told.  GET HELP IF:  · You have trouble breathing.  · Your wound is red, puffy, or is getting more painful.  · You have yellowish-white fluid (pus) coming from your wound.  · You have a  bad smell coming from your wound.  · Your wound will not stop bleeding.  · Your wound opens up after sutures (stitches) or staples are removed.  · You have a fever.  GET HELP RIGHT AWAY IF:   · You have a rash.  · You have shortness of breath or chest pain.  · You have pain or puffiness (swelling) in your calf or thigh.  · Your ability to move your knee is decreasing rather than increasing.  · Your knee pain is increasing daily.  MAKE SURE YOU:   · Understand these instructions.  · Will watch your condition.  · Will get help right away if you are not doing well or get worse.     This information is not intended to replace advice given to you by your health care provider. Make sure you discuss any questions you have with your health care provider.     Document Released: 03/11/2013 Document Revised: 01/08/2016 Document Reviewed: 03/11/2013  [employer] Interactive Patient Education ©2016 [employer]    Infection Control in the Home  If you have an infection or you are taking care of someone who has an infection, it is important to know how to keep the infection from spreading. Follow these guidelines to help stop the spread of infection, and talk to your health care provider.  HOW ARE INFECTIONS SPREAD?  In order for an infection to spread, the following must be present:  · A germ. This may be a virus, bacteria, fungus, or parasite.  · A place for the germ to live. This may be:  ¨ On or in a person, animal, plant, or food.  ¨ In soil or water.  ¨ On surfaces, such as a door handle.  · A susceptible host. This is a person or animal who does not have resistance (immunity) to the germ.  · A way for the germ to enter the host. This may occur by:  ¨ Direct contact. This may happen by making contact--such as shaking hands or hugging--with an infected person or animal. Some germs can also travel through the air and spread to you if an infected person coughs or sneezes on you or near you.  ¨ Indirect contact. This is when the  germ enters the host through contact with an infected object. Examples include eating contaminated food, drinking contaminated water, or touching a contaminated surface with your hands and then touching your face, nose, or mouth soon after that.  HOW CAN I HELP TO PREVENT INFECTION FROM SPREADING?  There are several things that you can do to help prevent infection from spreading.  Hand Washing  It is very important to wash your hands correctly, following these steps:  1. Wet your hands with clean, running water.  2. Apply soap to your hands. Liquid soap is better than bar soap.  3. Rub your hands together quickly to create lather.  4. Keep rubbing your hands together for at least 20 seconds. Thoroughly scrub all parts of your hands, including under your fingernails and between your fingers.  5. Rinse your hands with clean, running water until all of the soap is gone.  6. Dry your hands with an air dryer or a clean paper or cloth towel, or let your hands air-dry. Do not use your clothing or a soiled towel to dry your hands.  7. If you are in a public restroom, use your towel to turn off the water faucet and to open the bathroom door.  Make sure to wash your hands:  · Before:  ¨ Visiting a baby or anyone with a weakened or lowered defense (immune) system.  ¨ Putting in and taking out any contact lenses.  · After:  ¨ Working or playing outside.  ¨ Touching an animal or its toys or leash.  ¨ Handling livestock.  ¨ Using the bathroom or helping a child or adult to use the bathroom.  ¨ Using household  or toxic chemicals.  ¨ Touching or taking out the garbage.  ¨ Touching anything dirty around your home.  ¨ Handling soiled clothes or rags.  ¨ Taking care of a sick child. This includes touching used tissues, toys, and clothes.  ¨ Sneezing, coughing, or blowing your nose.  ¨ Using public transportation.  ¨ Shaking hands.  ¨ Using a phone, including your mobile phone.  ¨ Touching money.  · Before and  after:  ¨ Preparing food.  ¨ Preparing a bottle for a baby.  ¨ Feeding a baby or a young child.  ¨ Eating.  ¨ Visiting or taking care of someone who is sick.  ¨ Changing a diaper.  ¨ Changing a bandage (dressing) or taking care of an injury or wound.  ¨ Giving or taking medicine.  Taking Care of Your Home  · Make sure that you have enough cleaning supplies at all times. These include:  ¨ Disinfectants.  ¨ Reusable cleaning cloths. Wash these after each use.  ¨ Paper towels.  ¨ Utility gloves. Replace your gloves if they are cracked or torn or if they start to peel.  · Use bleach safely. Never mix it with other cleaning products, especially those that contain ammonia. This mixture can create a dangerous gas that may be deadly.  · Take care of your cleaning supplies. Toilet brushes, mops, and sponges can breed germs. Soak them in bleach and water for 5 minutes after each use.  · Do not pour used mop water down the sink. Pour it down the toilet instead.  · Maintain proper ventilation in your home.  · If you have a pet, ensure that your pet stays clean. Do not let people with weak immune systems touch bird droppings, fish tank water, or a litter box.  ¨ If you have a cat, be sure to change the litter every day.  · In the bathroom, make sure you:  ¨ Provide liquid soap.  ¨ Change towels and washcloths frequently. Avoid sharing towels and washcloths.  ¨ Change toothbrushes often and store them separately in a clean, dry place.  ¨ Disinfect the toilet.  ¨ Clean the tub, shower, and sink with standard cleaning products.    ¨ Mop the floor with a standard .  ¨ Do not share personal items, such as razors, toothbrushes, drinking glasses, deodorant, ceron, brushes, towels, and washcloths.    · In the kitchen, make sure you:  ¨ Store food carefully.  ¨ Refrigerate leftovers promptly in covered containers.  ¨ Throw out stale or spoiled food.  ¨ Clean the inside of your refrigerator each week.  ¨ Keep your refrigerator set  at 40°F (4°C) or less, and set your freezer at 0°F (-18°C) or less.  ¨ Thaw foods in the refrigerator or microwave, not at room temperature.  ¨ Serve foods at the proper temperature. Do not eat raw meat. Make sure it is cooked to the appropriate temperature. Cook eggs until they are firm.  ¨ Wash fruits and vegetables under running water.  ¨ Use separate cutting boards, plates, and utensils for raw foods and cooked foods.  ¨ Keep work surfaces clean.  ¨ Use a clean spoon each time you sample food while cooking.  ¨ Wash your dishes in hot, soapy water. Air-dry your dishes or use a .  ¨ Do not share forks, cups, or spoons during meals.  · Wear gloves if laundry is visibly soiled.  · Change linens each week or whenever they are soiled.  · Do not shake soiled linens. Doing that may send germs into the air. Put dressings, sanitary or incontinence pads, diapers, and gloves in plastic garbage bags for disposal.     This information is not intended to replace advice given to you by your health care provider. Make sure you discuss any questions you have with your health care provider.     Document Released: 09/26/2009 Document Revised: 01/08/2016 Document Reviewed: 08/20/2015  [employer] Interactive Patient Education ©2016 [employer]      Pain Medicine Instructions  HOW CAN PAIN MEDICINE AFFECT ME?  You were prescribed pain medicine. This medicine may:  Make you tired or sleepy.  Affect how well you can:  Drive  Do certain activities.  Pain medicine may not make all of your pain go away. You should be comfortable enough to:  Move.  Breathe.  Take care of yourself.  HOW OFTEN SHOULD I TAKE PAIN MEDICINE AND HOW MUCH SHOULD I TAKE?  Take pain medicine only as told by your doctor and only as needed for pain.  You do not need to take pain medicine if you are not having pain, unless your doctor tells you to do that.  You can take less than the prescribed dose if you find that less medicine helps your pain.  WHAT SHOULD  I AVOID WHILE I AM TAKING PAIN MEDICINE?  Follow these instructions after you start taking pain medicine, while you are taking the medicine, and for 8 hours after you stop taking the medicine:  Do not drive.  Do not use machinery.  Do not use power tools.  Do not sign legal documents.  Do not drink alcohol.  Do not take sleeping pills.  Do not take care of children by yourself.  Do not do any activities that involve climbing or being in high places.  Do not go into any body of water unless there is an adult nearby who can watch and help you. This includes:  Lakes.  Rivers.  Oceans.  Spas.  Swimming pools.  HOW CAN I KEEP OTHERS SAFE WHILE I AM TAKING PAIN MEDICINE?  Store your pain medicine as told by your doctor. Make sure that you keep it where children and pets cannot reach it.  Do not share your pain medicine with anyone.  Do not save any leftover pills. If you have any leftover pain medicine, get rid of it or destroy it as told by your doctor.  WHAT ELSE DO I NEED TO KNOW ABOUT TAKING PAIN MEDICINE?  Use a poop (stool) softener if you have trouble pooping (constipation) because of your pain medicine. Eating more fruits and vegetables also helps with constipation.  Write down the times when you take your pain medicine. Look at the times before you take your next dose of medicine.  If your pain is very bad, do not take more pills than told by your doctor. Call your doctor for help.  Your pain medicine might have acetaminophen in it. Do not take any other acetaminophen while you are taking this medicine. An overdose of acetaminophen can do very bad damage to your liver. If you are taking any medicines in addition to your pain medicine, check the active ingredients on those medicines to see if acetaminophen is listed.  WHEN SHOULD I CALL MY DOCTOR?  Your medicine is not helping the pain.  You do either of these soon after you take the medicine:  Throw up (vomit).  Have watery poop (diarrhea).  You have new pain in  areas that did not hurt before.  You have an allergic reaction to your medicine. This may include:  Feeling itchy.  Swelling.  Feeling dizzy.  Getting a new rash.  WHEN SHOULD I CALL 911 OR GO TO THE EMERGENCY ROOM?  You feel dizzy or you faint.  You feel very confused.  You throw up again and again.  Your skin or lips turn pale or bluish in color.  You are:  Short of breath.  Breathing much more slowly than usual.  You have a very bad allergic reaction to your medicine. This includes:  Developing a swollen tongue.  Having trouble breathing.     This information is not intended to replace advice given to you by your health care provider. Make sure you discuss any questions you have with your health care provider.     Document Released: 06/05/2009 Document Revised: 05/03/2016 Document Reviewed: 10/22/2015  [employer] Interactive Patient Education ©2016 [employer]    Depression / Suicide Risk    As you are discharged from this [facility] facility, it is important to learn how to keep safe from harming yourself.    Recognize the warning signs:  · Abrupt changes in personality, positive or negative- including increase in energy   · Giving away possessions  · Change in eating patterns- significant weight changes-  positive or negative  · Change in sleeping patterns- unable to sleep or sleeping all the time   · Unwillingness or inability to communicate  · Depression  · Unusual sadness, discouragement and loneliness  · Talk of wanting to die  · Neglect of personal appearance   · Rebelliousness- reckless behavior  · Withdrawal from people/activities they love  · Confusion- inability to concentrate     If you or a loved one observes any of these behaviors or has concerns about self-harm, here's what you can do:  · Talk about it- your feelings and reasons for harming yourself  · Remove any means that you might use to hurt yourself (examples: pills, rope, extension cords, firearm)  · Get professional help from the  community (Mental Health, Substance Abuse, psychological counseling)  · Do not be alone:Call your Safe Contact- someone whom you trust who will be there for you.  · Call your local CRISIS HOTLINE 028-7371 or 423-053-4562  · Call your local Children's Mobile Crisis Response Team [place] (145) 250-3340 or www.DuraFizz  · Call the toll free National Suicide Prevention Hotlines   · National Suicide Prevention Lifeline 827-918-EWDV (9851)  · National Hope Line Network 800-SUICIDE (966-2526)

## 2017-12-19 NOTE — PROGRESS NOTES
"S: Dong well, no pain    O: Great knee ROM    Blood pressure 120/71, pulse 60, temperature 36.1 °C (97 °F), resp. rate 16, height 1.803 m (5' 11\"), weight 84.5 kg (186 lb 4.6 oz), SpO2 95 %.    Recent Labs      12/19/17   0426   HEMOGLOBIN  13.4   HEMATOCRIT  39.4         Intake/Output Summary (Last 24 hours) at 12/19/17 0759  Last data filed at 12/18/17 1642   Gross per 24 hour   Intake             1500 ml   Output              575 ml   Net              925 ml         A:  Patient Active Problem List    Diagnosis Date Noted   • Elevated liver enzymes 08/22/2017   • Chronic pain of left knee 08/22/2017   • GERD (gastroesophageal reflux disease) 10/03/2016   • Mass of left lower leg 07/27/2016   • H/O total knee replacement 07/01/2016   • COPD (chronic obstructive pulmonary disease) (HCC) 06/30/2016   • Kidney stones 03/04/2016   • Essential hypertension 02/08/2016   • Impaired fasting glucose 08/24/2015   • Hyperlipidemia 04/20/2015   • Hypothyroidism 04/20/2015   • Personal history of renal cancer 04/20/2015   • Asthma 04/20/2015       Stable after TKA    PLAN: Home today after PT  "

## 2017-12-19 NOTE — PROGRESS NOTES
"Pt did well through NOC shift, up multiple times to the restroom w one assist and FWW. Voiding adequately. Denied pain throughout night. CMS intact. Polar ice and SCDs in place.  in place, denies SOB.+ for flautus, no BM, bowel sounds active. Pt on 2L NC throughout night with sats above 92%. Agreed to get OOB for breakfast, walked to restroom and to chair and described feeling \"flushed and nauseous\" and requested to lay back in bed. Given zofran with relief. Refused tylenol stating she \"does not need at this time\". Resting in bed, doing well.    "

## 2017-12-20 ENCOUNTER — PATIENT OUTREACH (OUTPATIENT)
Dept: HEALTH INFORMATION MANAGEMENT | Facility: OTHER | Age: 72
End: 2017-12-20

## 2017-12-21 ENCOUNTER — PATIENT OUTREACH (OUTPATIENT)
Dept: HEALTH INFORMATION MANAGEMENT | Facility: OTHER | Age: 72
End: 2017-12-21

## 2018-01-19 ENCOUNTER — PATIENT OUTREACH (OUTPATIENT)
Dept: HEALTH INFORMATION MANAGEMENT | Facility: OTHER | Age: 73
End: 2018-01-19

## 2018-01-22 NOTE — PROGRESS NOTES
Patient Jillian Gottlieb discharged 12/19. IHD confirmed patient kept 1 appointment scheduled with Orthopedic Surgeon, Dr Castaneda and received Physical Therapy twice a week starting 12/26 at [facility]. The patient has future appointment scheduled with Orthopedic Surgeon 1/25 and with PCP 3/26;  declined sooner appointment.

## 2018-01-29 DIAGNOSIS — Z79.899 MEDICATION MANAGEMENT: ICD-10-CM

## 2018-01-29 RX ORDER — LEVOTHYROXINE SODIUM 88 UG/1
88 TABLET ORAL
Qty: 90 TAB | Refills: 3 | Status: SHIPPED | OUTPATIENT
Start: 2018-01-29 | End: 2019-02-05 | Stop reason: SDUPTHER

## 2018-02-15 RX ORDER — SIMVASTATIN 20 MG
TABLET ORAL
Qty: 90 TAB | Refills: 3 | Status: SHIPPED | OUTPATIENT
Start: 2018-02-15 | End: 2019-02-06

## 2018-02-26 RX ORDER — FENOFIBRATE 160 MG/1
TABLET ORAL
Qty: 90 TAB | Refills: 3 | Status: CANCELLED | OUTPATIENT
Start: 2018-02-26

## 2018-02-26 RX ORDER — METOPROLOL SUCCINATE 25 MG/1
TABLET, EXTENDED RELEASE ORAL
Qty: 90 TAB | Refills: 3 | Status: SHIPPED | OUTPATIENT
Start: 2018-02-26 | End: 2019-02-06

## 2018-02-26 RX ORDER — FENOFIBRATE 160 MG/1
160 TABLET ORAL DAILY
Qty: 90 TAB | Refills: 3 | Status: SHIPPED | OUTPATIENT
Start: 2018-02-26 | End: 2019-02-06

## 2018-03-07 NOTE — PROGRESS NOTES
"Chief Complaint   Patient presents with   • COPD     6 Mth Follow UP/ PFT         HPI:  This is a 71 y.o. female with a history of chronic obstructive pulmonary disease.  Pulmonary function tests from 1/24/17 indicated FEV1 1.51 L, 55% predicted, FEV1/FVC 60%, and DLCO 128% predicted. The patient is compliant with Symbicort 160/4.5 µg 2 puffs twice daily, Spiriva 2.5 micrograms 2 inhalations daily, and Ventolin and albuterol nebulized treatments as needed. In general she is doing quite well. She denies fevers, chills, sweats. She had her right knee replaced approximately 3 months ago. She has been doing well since then. She's had use her nebulizer a few times in the past 2 weeks. There is been some improvement to her pulmonary function tests. She is then practicing abdominal breathing and has lost some weight. She is less short of breath than previously.    Past Medical History   Diagnosis Date   • Hypertension    • Heart burn    • Indigestion    • Arthritis      knees, hands, hips, neck-Osteo   • Cancer (CMS-HCC) 01/13     R kidney   • Bronchitis 08/04/14   • Asthma      inhalers   • Unspecified disorder of thyroid      benign tumor removal   • Unspecified hypothyroidism 4/20/2015   • Personal history of renal cancer 4/20/2015   • Impaired fasting glucose 8/24/2015   • Dyspnea    • COPD (chronic obstructive pulmonary disease) (CMS-HCC)    • COPD (chronic obstructive pulmonary disease) (CMS-HCC) 6/30/2016   • Chronic pain of right knee 7/1/2016   • Mass of left lower leg 7/27/2016   • Renal disorder      stones   • Renal disorder 2013     cancer right kidney 7% removed   • Acid reflux    • Diabetes (CMS-HCC)      \"pre\"   • Breath shortness      asthma related       Past Surgical History   Procedure Laterality Date   • Darleen by laparoscopy     • Tonsillectomy     • Cystoscopy stent placement  1/31/2013     Performed by Fredy Cintron M.D. at SUR[facility]ERY [facility]   • Ureteroscopy  1/31/2013     Performed by Fredy EVERETT" History and physical reviewed in chart. No changes. Proceed with surgery.     MIRNA Riddle at [facility]   • Nephrectomy partial  1/31/2013     Performed by Fredy Riddle M.D. at [facility]   • Lasertripsy  1/31/2013     Performed by Fredy Riddle M.D. at [facility]   • Gyn surgery  1994     hysterectomy   • Ureteroscopy  8/20/2014     Performed by Fredy Riddle M.D. at [facility]   • Lasertripsy  8/20/2014     Performed by Fredy Riddle M.D. at [facility]   • Cystoscopy  8/20/2014     Performed by Fredy Riddle M.D. at [facility]   • Other  1980     thyroid lumpectomy   • Other  1995     sinus surgery   • Cystoscopy stent placement Right 6/26/2015     Procedure: CYSTOSCOPY STENT PLACEMENT RIGID POSSIBLE STENT;  Surgeon: Fredy Riddle M.D.;  Location: [facility];  Service:    • Ureteroscopy N/A 6/26/2015     Procedure: URETEROSCOPY;  Surgeon: Fredy Riddle M.D.;  Location: [facility];  Service:    • Lasertripsy Right 6/26/2015     Procedure: LASERTRIPSY LITHO;  Surgeon: Fredy Riddle M.D.;  Location: [facility];  Service:    • Cystoscopy stent placement Right 2/12/2016     Procedure: CYSTOSCOPY STENT PLACEMENT;  Surgeon: Fredy Riddle M.D.;  Location: [facility];  Service:    • Recovery  3/4/2016     Procedure: IR2-PERQ NEPHRO-URETERAL CATH RIGHT-DR. RIDDLE-Surgery to follow in [facility] ;  Surgeon: Ir-Recovery Surgery;  Location: [facility];  Service:    • Cystoscopy stent removal Right 3/4/2016     Procedure: RIGID CYSTOSCOPY STENT REMOVAL;  Surgeon: Fredy Riddle M.D.;  Location: [facility];  Service:    • Ureteroscopy Right 3/4/2016     Procedure: URETEROSCOPY;  Surgeon: Fredy Riddle M.D.;  Location: [facility];  Service:    • Percutaneous nephrostolithotomy Right 3/4/2016     Procedure: PERCUTANEOUS NEPHROSTOLITHOTOMY FOR NEPHROLITHOTRIPSY;  Surgeon: Fredy Riddle M.D.;  Location: SURGERY [facility];  Service:    •  "Other orthopedic surgery       achilles tendon reattach    • Other orthopedic surgery  2006     right knee meniscus   • Other orthopedic surgery  1996     ORIF right arm   • Knee arthroplasty total Right 10/17/2016     Procedure: KNEE ARTHROPLASTY TOTAL;  Surgeon: Ricky Castaneda M.D.;  Location: SURGERY [facility];  Service:        Social History   Substance Use Topics   • Smoking status: Never Smoker    • Smokeless tobacco: Never Used   • Alcohol Use: No      Comment: allergic to it       ROS:   Constitutional: Denies fevers, chills, sweats, fatigue, and weight loss.  Eyes: Glasses.  Ears/nose/mouth/throat: Denies injury.  Cardiovascular: Denies chest pain, tightness.  Respiratory: See history of present illness.  GI: Denies heartburn, difficulty swallowing, nausea, and vomiting.  Neurological: Denies frequent headaches, dizziness, weakness.    Vitals:  Filed Vitals:    01/24/17 1052   Height: 1.778 m (5' 10\")   Weight: 90.719 kg (200 lb)   Weight % change since last entry.: 0 %   BP: 150/90   Pulse: 96   BMI (Calculated): 28.7   Resp: 16   Temp: 36.3 °C (97.3 °F)   O2 sat % room air: 91 %       Allergies:  Alcohol and Pcn    Medications:  Current Outpatient Prescriptions   Medication Sig Dispense Refill   • azithromycin (ZITHROMAX) 250 MG Tab Take 2 tablets on day 1, then take 1 tablet a day for 4 days. 6 Tab 1   • MethylPREDNISolone (MEDROL DOSEPAK) 4 MG Tablet Therapy Pack Take as directed. 21 Tab 1   • tiotropium (SPIRIVA HANDIHALER) 18 MCG Cap Inhale 1 Cap by mouth every day. 30 Cap 5   • LYRICA 50 MG capsule Take 1 Cap by mouth at bedtime as needed.     • esomeprazole (NEXIUM) 40 MG delayed-release capsule Take 1 Cap by mouth every morning before breakfast. 90 Cap 3   • sulfamethoxazole-trimethoprim (BACTRIM) 400-80 MG Tab Take 1 Tab by mouth every day.     • budesonide-formoterol (SYMBICORT) 160-4.5 MCG/ACT Aerosol Inhale 2 Puffs by mouth 2 Times a Day. RINSE MOUTH AFTER USE 1 Inhaler 6   • " losartan-hydrochlorothiazide (HYZAAR) 100-25 MG per tablet Take 1 Tab by mouth every morning. 90 Tab 3   • fenofibrate (TRIGLIDE) 160 MG tablet Take 160 mg by mouth every evening.     • levothyroxine (SYNTHROID) 88 MCG Tab Take 1 Tab by mouth Every morning on an empty stomach. 90 Tab 3   • albuterol (PROVENTIL) 2.5mg/3ml Nebu Soln solution for nebulization 2.5 mg by Nebulization route every four hours as needed for Shortness of Breath.     • metoprolol SR (TOPROL XL) 25 MG TABLET SR 24 HR Take 25 mg by mouth every evening.     • simvastatin (ZOCOR) 20 MG Tab Take 20 mg by mouth every evening.     • albuterol (VENTOLIN OR PROVENTIL) 108 (90 BASE) MCG/ACT Aero Soln inhalation aerosol Inhale 2 Puffs by mouth every 6 hours as needed for Shortness of Breath.     • raloxifene (EVISTA) 60 MG Tab Take 60 mg by mouth every morning.       No current facility-administered medications for this visit.       PHYSICAL EXAM:  Appearance: Well-developed, well-nourished, no acute distress.  Eyes. PERRL.  Hearing: Grossly intact.  Oropharynx: Tongue normal, posterior pharynx without erythema or exudate.  Respiratory effort: No intercostal retractions or use of accessory muscles.  Lung auscultation: No crackles, wheezing.  Heart auscultation: No murmur, gallop, or rub. Regular rate and rhythm.  Extremities: No cyanosis or edema.  Gait and Station: Normal  Orientation: Oriented to time, place, and person.    Assessment:  1. Chronic obstructive pulmonary disease, unspecified COPD type (CMS-[facility])  azithromycin (ZITHROMAX) 250 MG Tab    MethylPREDNISolone (MEDROL DOSEPAK) 4 MG Tablet Therapy Pack         Plan:  1. Continue Symbicort 160/4.5 µg 2 puffs twice daily, Spiriva 2.5 micrograms 2 inhalations daily, and Ventolin and albuterol nebulized treatments as needed.   2. Prescription for Z-Paul and Medrol Dosepak to have on hand for quick treatment bronchitic symptoms.    Return in about 1 year (around 1/24/2018) for With Milly Greenfield  APRN.

## 2018-03-24 LAB
ALBUMIN SERPL-MCNC: 4.6 G/DL (ref 3.5–4.8)
ALP SERPL-CCNC: 59 IU/L (ref 39–117)
ALT SERPL-CCNC: 25 IU/L (ref 0–32)
AST SERPL-CCNC: 27 IU/L (ref 0–40)
BILIRUB DIRECT SERPL-MCNC: 0.11 MG/DL (ref 0–0.4)
BILIRUB SERPL-MCNC: 0.4 MG/DL (ref 0–1.2)
BUN SERPL-MCNC: 29 MG/DL (ref 8–27)
BUN/CREAT SERPL: 34 (ref 12–28)
CALCIUM SERPL-MCNC: 9.6 MG/DL (ref 8.7–10.3)
CHLORIDE SERPL-SCNC: 102 MMOL/L (ref 96–106)
CHOLEST SERPL-MCNC: 158 MG/DL (ref 100–199)
CO2 SERPL-SCNC: 23 MMOL/L (ref 18–29)
CREAT SERPL-MCNC: 0.85 MG/DL (ref 0.57–1)
GFR SERPLBLD CREATININE-BSD FMLA CKD-EPI: 69 ML/MIN/1.73
GFR SERPLBLD CREATININE-BSD FMLA CKD-EPI: 79 ML/MIN/1.73
GLUCOSE SERPL-MCNC: 114 MG/DL (ref 65–99)
HBA1C MFR BLD: 6.4 % (ref 4.8–5.6)
HDLC SERPL-MCNC: 54 MG/DL
LABORATORY COMMENT REPORT: ABNORMAL
LDLC SERPL CALC-MCNC: 72 MG/DL (ref 0–99)
POTASSIUM SERPL-SCNC: 4.4 MMOL/L (ref 3.5–5.2)
PROT SERPL-MCNC: 6.7 G/DL (ref 6–8.5)
SODIUM SERPL-SCNC: 142 MMOL/L (ref 134–144)
TRIGL SERPL-MCNC: 160 MG/DL (ref 0–149)
VLDLC SERPL CALC-MCNC: 32 MG/DL (ref 5–40)

## 2018-03-26 ENCOUNTER — OFFICE VISIT (OUTPATIENT)
Dept: MEDICAL GROUP | Facility: MEDICAL CENTER | Age: 73
End: 2018-03-26
Payer: MEDICARE

## 2018-03-26 VITALS
TEMPERATURE: 98.9 F | BODY MASS INDEX: 26.34 KG/M2 | OXYGEN SATURATION: 100 % | RESPIRATION RATE: 18 BRPM | DIASTOLIC BLOOD PRESSURE: 70 MMHG | HEIGHT: 70 IN | WEIGHT: 184 LBS | HEART RATE: 77 BPM | SYSTOLIC BLOOD PRESSURE: 124 MMHG

## 2018-03-26 DIAGNOSIS — Z96.653 HISTORY OF TOTAL BILATERAL KNEE REPLACEMENT: ICD-10-CM

## 2018-03-26 DIAGNOSIS — R73.01 IMPAIRED FASTING GLUCOSE: ICD-10-CM

## 2018-03-26 DIAGNOSIS — E78.5 HYPERLIPIDEMIA, UNSPECIFIED HYPERLIPIDEMIA TYPE: ICD-10-CM

## 2018-03-26 DIAGNOSIS — I10 ESSENTIAL HYPERTENSION: ICD-10-CM

## 2018-03-26 DIAGNOSIS — K21.9 GASTROESOPHAGEAL REFLUX DISEASE WITHOUT ESOPHAGITIS: ICD-10-CM

## 2018-03-26 DIAGNOSIS — Z85.528 PERSONAL HISTORY OF RENAL CANCER: ICD-10-CM

## 2018-03-26 DIAGNOSIS — R74.8 ELEVATED LIVER ENZYMES: ICD-10-CM

## 2018-03-26 DIAGNOSIS — E03.9 HYPOTHYROIDISM, UNSPECIFIED TYPE: ICD-10-CM

## 2018-03-26 DIAGNOSIS — J45.20 MILD INTERMITTENT ASTHMA WITHOUT COMPLICATION: ICD-10-CM

## 2018-03-26 PROCEDURE — 99214 OFFICE O/P EST MOD 30 MIN: CPT | Performed by: INTERNAL MEDICINE

## 2018-03-26 NOTE — PROGRESS NOTES
Subjective:     Chief Complaint   Patient presents with   • Blood Pressure Problem     follow up     Jillian Gottlieb is a 72 y.o. female here today for follow-up of her hyperlipidemia, asthma, elevated blood sugar, hypertension, gastroesophageal reflux, elevated liver enzymes.    Hyperlipidemia  She has hyperlipidemia for which she is taking fenofibrate and simvastatin. She tries to follow appropriate diet with some success. Weight is down about 6 pounds. Cholesterol is 158, triglycerides 160, HDL 54, LDL 72.    Hypothyroidism  Clinically she is euthyroid. She continues taking levothyroxine.    Personal history of renal cancer  Prior renal cancer followed by Dr. Cintron. No evidence of recurrence. It is been 5 years.    Asthma  She reports that her asthma is under fairly good control. She denies any wheezing or significant dyspnea.    Impaired fasting glucose  Blood sugar again is little high at 114 with glycohemoglobin 6.4. She continues taking metformin without difficulty. She denies low blood sugar reactions.    Essential hypertension  Blood pressure is satisfactory. She continues losartan with hydrochlorothiazide. She denies lightheadedness.    H/O total knee replacement  She has had bilateral knee replacements and is quite pleased with the result.    GERD (gastroesophageal reflux disease)  Gastroesophageal reflux is moderately well controlled primarily with diet    Elevated liver enzymes  Liver enzymes are normal currently.       Diagnoses of Essential hypertension, Impaired fasting glucose, Hyperlipidemia, unspecified hyperlipidemia type, Hypothyroidism, unspecified type, Personal history of renal cancer, Mild intermittent asthma without complication, Gastroesophageal reflux disease without esophagitis, Elevated liver enzymes, and History of total bilateral knee replacement were pertinent to this visit.    Allergies: Alcohol and Pcn [penicillins]  Current medicines (including changes today)  Current Outpatient  Prescriptions   Medication Sig Dispense Refill   • metoprolol SR (TOPROL XL) 25 MG TABLET SR 24 HR TAKE ONE TABLET BY MOUTH ONCE DAILY 90 Tab 3   • fenofibrate (TRIGLIDE) 160 MG tablet Take 1 Tab by mouth every day. 90 Tab 3   • simvastatin (ZOCOR) 20 MG Tab TAKE ONE TABLET BY MOUTH IN THE EVENING 90 Tab 3   • levothyroxine (SYNTHROID) 88 MCG Tab Take 1 Tab by mouth Every morning on an empty stomach. 90 Tab 3   • vitamin E (VITAMIN E) 1000 UNIT Cap Take 1 Cap by mouth every day.     • multivitamin (THERAGRAN) Tab Take 1 Tab by mouth every day.     • vitamin D (CHOLECALCIFEROL) 1000 UNIT Tab Take 1,000 Units by mouth every day.     • Pseudoephedrine-Guaifenesin (MUCINEX D PO) Take 1 Tab by mouth every bedtime.     • aspirin EC (ECOTRIN) 81 MG Tablet Delayed Response Take 81 mg by mouth every day.     • losartan-hydrochlorothiazide (HYZAAR) 100-25 MG per tablet TAKE ONE TABLET BY MOUTH ONCE DAILY IN THE MORNING 90 Tab 3   • raloxifene (EVISTA) 60 MG Tab Take 1 Tab by mouth every morning. 30 Tab 11   • metformin ER (GLUCOPHAGE XR) 500 MG TABLET SR 24 HR Take 1 Tab by mouth every day. (Patient taking differently: Take 500 mg by mouth every evening.) 90 Tab 3   • budesonide-formoterol (SYMBICORT) 160-4.5 MCG/ACT Aerosol Inhale 2 Puffs by mouth 2 Times a Day. With spacer, rinse mouth after use 1 Inhaler 0   • SPIRIVA HANDIHALER 18 MCG Cap INHALE ONE CAPSULE BY MOUTH ONCE DAILY 30 Cap 11   • albuterol 108 (90 BASE) MCG/ACT Aero Soln inhalation aerosol Inhale 2 Puffs by mouth every four hours as needed for Shortness of Breath. 1 Inhaler 0   • sulfamethoxazole-trimethoprim (BACTRIM) 400-80 MG Tab Take 1 Tab by mouth every day. Kidney stone prevention       No current facility-administered medications for this visit.        She  has a past medical history of Acid reflux; Arthritis; Asthma; Breath shortness; Bronchitis (08/04/14); Cancer ([facility]) (01/13); Chronic pain of left knee (8/22/2017); Chronic pain of right knee  "(7/1/2016); COPD (chronic obstructive pulmonary disease) (CMS-HCC); COPD (chronic obstructive pulmonary disease) (CMS-HCC) (6/30/2016); Diabetes (CMS-HCC); Dyspnea; Emphysema of lung (CMS-HCC); Heart burn; Hypertension; Impaired fasting glucose (8/24/2015); Indigestion; Mass of left lower leg (7/27/2016); Personal history of renal cancer (4/20/2015); Renal disorder; Renal disorder (2013); Unspecified disorder of thyroid; and Unspecified hypothyroidism (4/20/2015). She also has no past medical history of Encounter for long-term (current) use of other medications.    ROS    Patient denies significant change in strength, weight or appetite.  No significant lightheadedness or headaches.  No change in vision, hearing, or swallowing.  No new dyspnea, coughing, chest pain, or palpitations. Asthma is not bothering her significantly currently.  No indigestion, abdominal pain, or change in bowel habits. Gastroesophageal reflux is under fairly good control.  No change in urinating.  No new ankle swelling.       Objective:     PE:  /70   Pulse 77   Temp 37.2 °C (98.9 °F)   Resp 18   Ht 1.778 m (5' 10\")   Wt 83.5 kg (184 lb)   SpO2 100%   BMI 26.40 kg/m²    Neck is supple without significant lymphadenopathy or masses.  Lungs are clear with normal breath sounds without wheezes or rales .  Cardiovascular: peripheral circulation is satisfactory, heart sounds are unchanged and unremarkable.  Abdomen is soft, without masses or tenderness, with normal bowel sounds.  Extremities are without significant edema, cyanosis or deformity.      Assessment and Plan:   The following treatment plan was discussed  1. Essential hypertension  BASIC METABOLIC PANEL    No medication change. Continue low-salt diet.   2. Impaired fasting glucose  HEMOGLOBIN A1C    MICROALBUMIN CREAT RATIO URINE    Continue same regimen. We briefly reviewed appropriate diet and weight loss and exercise.   3. Hyperlipidemia, unspecified hyperlipidemia type  " LIPID PROFILE    No medication change. We again reviewed appropriate diet.   4. Hypothyroidism, unspecified type  TSH    Continue levothyroxine, check TSH at next visit.   5. Personal history of renal cancer      Continue follow-up with Dr. Cintron.   6. Mild intermittent asthma without complication      She will report any acute exacerbations of her asthma. She is followed for this by pulmonary.   7. Gastroesophageal reflux disease without esophagitis      We again briefly reviewed appropriate diet.   8. Elevated liver enzymes      Continue to follow liver enzymes periodically.   9. History of total bilateral knee replacement         Followup: Return in about 6 months (around 9/26/2018) for Short.

## 2018-03-26 NOTE — ASSESSMENT & PLAN NOTE
She has hyperlipidemia for which she is taking fenofibrate and simvastatin. She tries to follow appropriate diet with some success. Weight is down about 6 pounds. Cholesterol is 158, triglycerides 160, HDL 54, LDL 72.

## 2018-03-26 NOTE — ASSESSMENT & PLAN NOTE
She reports that her asthma is under fairly good control. She denies any wheezing or significant dyspnea.

## 2018-03-26 NOTE — ASSESSMENT & PLAN NOTE
Blood pressure is satisfactory. She continues losartan with hydrochlorothiazide. She denies lightheadedness.

## 2018-05-24 ENCOUNTER — HOSPITAL ENCOUNTER (OUTPATIENT)
Dept: RADIOLOGY | Facility: MEDICAL CENTER | Age: 73
End: 2018-05-24
Attending: UROLOGY
Payer: MEDICARE

## 2018-05-24 DIAGNOSIS — Z85.528 PERSONAL HISTORY OF KIDNEY CANCER: ICD-10-CM

## 2018-05-24 DIAGNOSIS — Z87.442 PERSONAL HISTORY OF URINARY CALCULI: ICD-10-CM

## 2018-05-24 PROCEDURE — 74018 RADEX ABDOMEN 1 VIEW: CPT

## 2018-05-25 ENCOUNTER — OFFICE VISIT (OUTPATIENT)
Dept: URGENT CARE | Facility: CLINIC | Age: 73
End: 2018-05-25
Payer: MEDICARE

## 2018-05-25 VITALS
WEIGHT: 189 LBS | OXYGEN SATURATION: 95 % | HEART RATE: 78 BPM | RESPIRATION RATE: 16 BRPM | DIASTOLIC BLOOD PRESSURE: 78 MMHG | SYSTOLIC BLOOD PRESSURE: 132 MMHG | BODY MASS INDEX: 27.06 KG/M2 | TEMPERATURE: 98.9 F | HEIGHT: 70 IN

## 2018-05-25 DIAGNOSIS — M54.31 SCIATICA OF RIGHT SIDE: ICD-10-CM

## 2018-05-25 LAB
APPEARANCE UR: NORMAL
BILIRUB UR STRIP-MCNC: NORMAL MG/DL
COLOR UR AUTO: YELLOW
GLUCOSE UR STRIP.AUTO-MCNC: NORMAL MG/DL
KETONES UR STRIP.AUTO-MCNC: NORMAL MG/DL
LEUKOCYTE ESTERASE UR QL STRIP.AUTO: NORMAL
NITRITE UR QL STRIP.AUTO: NORMAL
PH UR STRIP.AUTO: 6 [PH] (ref 5–8)
PROT UR QL STRIP: NORMAL MG/DL
RBC UR QL AUTO: NORMAL
SP GR UR STRIP.AUTO: 1.02
UROBILINOGEN UR STRIP-MCNC: NORMAL MG/DL

## 2018-05-25 PROCEDURE — 81002 URINALYSIS NONAUTO W/O SCOPE: CPT | Performed by: PHYSICIAN ASSISTANT

## 2018-05-25 PROCEDURE — 99214 OFFICE O/P EST MOD 30 MIN: CPT | Performed by: PHYSICIAN ASSISTANT

## 2018-05-25 RX ORDER — METHYLPREDNISOLONE 4 MG/1
TABLET ORAL
Qty: 1 KIT | Refills: 0 | Status: SHIPPED | OUTPATIENT
Start: 2018-05-25 | End: 2019-04-22

## 2018-05-25 ASSESSMENT — ENCOUNTER SYMPTOMS
SENSORY CHANGE: 0
DOUBLE VISION: 0
SHORTNESS OF BREATH: 0
FOCAL WEAKNESS: 0
BLURRED VISION: 0
TREMORS: 0
PALPITATIONS: 0
BACK PAIN: 1
FEVER: 0
SEIZURES: 0
CHILLS: 0
SPEECH CHANGE: 0
COUGH: 0
HEADACHES: 0
LOSS OF CONSCIOUSNESS: 0
TINGLING: 0
DIZZINESS: 0

## 2018-05-26 NOTE — PROGRESS NOTES
Subjective:      Jillian Gottlieb is a 72 y.o. female who presents with Back Pain (x2wks right lower back pain radiates down right leg )            Back Pain   This is a new problem. The current episode started 1 to 4 weeks ago. The pain is present in the gluteal. The pain is moderate. Pertinent negatives include no chest pain, fever, headaches or tingling. She has tried nothing for the symptoms.       Review of Systems   Constitutional: Negative for chills and fever.   Eyes: Negative for blurred vision and double vision.   Respiratory: Negative for cough and shortness of breath.    Cardiovascular: Negative for chest pain and palpitations.   Musculoskeletal: Positive for back pain.   Skin: Negative for rash.   Neurological: Negative for dizziness, tingling, tremors, sensory change, speech change, focal weakness, seizures, loss of consciousness and headaches.   All other systems reviewed and are negative.    PMH:  has a past medical history of Acid reflux; Arthritis; Asthma; Breath shortness; Bronchitis (08/04/14); Cancer ([facility]) (01/13); Chronic pain of left knee (8/22/2017); Chronic pain of right knee (7/1/2016); COPD (chronic obstructive pulmonary disease) ([facility]); COPD (chronic obstructive pulmonary disease) ([facility]) (6/30/2016); Diabetes ([facility]); Dyspnea; Emphysema of lung ([facility]); Heart burn; Hypertension; Impaired fasting glucose (8/24/2015); Indigestion; Mass of left lower leg (7/27/2016); Personal history of renal cancer (4/20/2015); Renal disorder; Renal disorder (2013); Unspecified disorder of thyroid; and Unspecified hypothyroidism (4/20/2015). She also has no past medical history of Encounter for long-term (current) use of other medications.  MEDS:   Current Outpatient Prescriptions:   •  MethylPREDNISolone (MEDROL DOSEPAK) 4 MG Tablet Therapy Pack, Take as directed on package., Disp: 1 Kit, Rfl: 0  •  SYMBICORT 160-4.5 MCG/ACT Aerosol, INHALE 2 PUFFS BY MOUTH TWICE A DAY RINSE MOUTH AFTER USE, Disp: 1 Inhaler, Rfl:  5  •  metoprolol SR (TOPROL XL) 25 MG TABLET SR 24 HR, TAKE ONE TABLET BY MOUTH ONCE DAILY, Disp: 90 Tab, Rfl: 3  •  fenofibrate (TRIGLIDE) 160 MG tablet, Take 1 Tab by mouth every day., Disp: 90 Tab, Rfl: 3  •  simvastatin (ZOCOR) 20 MG Tab, TAKE ONE TABLET BY MOUTH IN THE EVENING, Disp: 90 Tab, Rfl: 3  •  levothyroxine (SYNTHROID) 88 MCG Tab, Take 1 Tab by mouth Every morning on an empty stomach., Disp: 90 Tab, Rfl: 3  •  vitamin E (VITAMIN E) 1000 UNIT Cap, Take 1 Cap by mouth every day., Disp: , Rfl:   •  multivitamin (THERAGRAN) Tab, Take 1 Tab by mouth every day., Disp: , Rfl:   •  vitamin D (CHOLECALCIFEROL) 1000 UNIT Tab, Take 1,000 Units by mouth every day., Disp: , Rfl:   •  Pseudoephedrine-Guaifenesin (MUCINEX D PO), Take 1 Tab by mouth every bedtime., Disp: , Rfl:   •  aspirin EC (ECOTRIN) 81 MG Tablet Delayed Response, Take 81 mg by mouth every day., Disp: , Rfl:   •  losartan-hydrochlorothiazide (HYZAAR) 100-25 MG per tablet, TAKE ONE TABLET BY MOUTH ONCE DAILY IN THE MORNING, Disp: 90 Tab, Rfl: 3  •  raloxifene (EVISTA) 60 MG Tab, Take 1 Tab by mouth every morning., Disp: 30 Tab, Rfl: 11  •  metformin ER (GLUCOPHAGE XR) 500 MG TABLET SR 24 HR, Take 1 Tab by mouth every day. (Patient taking differently: Take 500 mg by mouth every evening.), Disp: 90 Tab, Rfl: 3  •  budesonide-formoterol (SYMBICORT) 160-4.5 MCG/ACT Aerosol, Inhale 2 Puffs by mouth 2 Times a Day. With spacer, rinse mouth after use, Disp: 1 Inhaler, Rfl: 0  •  SPIRIVA HANDIHALER 18 MCG Cap, INHALE ONE CAPSULE BY MOUTH ONCE DAILY, Disp: 30 Cap, Rfl: 11  •  albuterol 108 (90 BASE) MCG/ACT Aero Soln inhalation aerosol, Inhale 2 Puffs by mouth every four hours as needed for Shortness of Breath., Disp: 1 Inhaler, Rfl: 0  •  sulfamethoxazole-trimethoprim (BACTRIM) 400-80 MG Tab, Take 1 Tab by mouth every day. Kidney stone prevention, Disp: , Rfl:   ALLERGIES:   Allergies   Allergen Reactions   • Alcohol Vomiting, Nausea and Unspecified      "injested-facial numbness, n/v  RXN=50 years ago   • Pcn [Penicillins] Unspecified     Flushed; \"felt weird\"  Tolerates cephalosporins  RXN=age 30's     SURGHX:   Past Surgical History:   Procedure Laterality Date   • KNEE ARTHROPLASTY TOTAL Left 12/18/2017    Procedure: KNEE ARTHROPLASTY TOTAL;  Surgeon: Ricky Castaneda M.D.;  Location: [facility];  Service: Orthopedics   • KNEE ARTHROPLASTY TOTAL Right 10/17/2016    Procedure: KNEE ARTHROPLASTY TOTAL;  Surgeon: Ricky Castaneda M.D.;  Location: [facility];  Service:    • RECOVERY  3/4/2016    Procedure: IR2-PERQ NEPHRO-URETERAL CATH RIGHT-DR. RIDDLE-Surgery to follow in [facility] ;  Surgeon: Ir-[facility];  Location: [facility];  Service:    • CYSTOSCOPY STENT REMOVAL Right 3/4/2016    Procedure: RIGID CYSTOSCOPY STENT REMOVAL;  Surgeon: Fredy Riddle M.D.;  Location: [facility];  Service:    • URETEROSCOPY Right 3/4/2016    Procedure: URETEROSCOPY;  Surgeon: Fredy Riddle M.D.;  Location: [facility];  Service:    • PERCUTANEOUS NEPHROSTOLITHOTOMY Right 3/4/2016    Procedure: PERCUTANEOUS NEPHROSTOLITHOTOMY FOR NEPHROLITHOTRIPSY;  Surgeon: Fredy Riddle M.D.;  Location: [facility];  Service:    • CYSTOSCOPY STENT PLACEMENT Right 2/12/2016    Procedure: CYSTOSCOPY STENT PLACEMENT;  Surgeon: Fredy Riddle M.D.;  Location: [facility];  Service:    • CYSTOSCOPY STENT PLACEMENT Right 6/26/2015    Procedure: CYSTOSCOPY STENT PLACEMENT RIGID POSSIBLE STENT;  Surgeon: Fredy Riddle M.D.;  Location: [facility];  Service:    • URETEROSCOPY N/A 6/26/2015    Procedure: URETEROSCOPY;  Surgeon: Fredy Riddle M.D.;  Location: [facility];  Service:    • LASERTRIPSY Right 6/26/2015    Procedure: LASERTRIPSY LITHO;  Surgeon: Fredy Riddle M.D.;  Location: [facility];  Service:    • URETEROSCOPY  8/20/2014    Performed by Fredy Riddle M.D. at SURGERY " "[facility]   • LASERTRIPSY  8/20/2014    Performed by Fredy Cintron M.D. at SURGERY [facility]   • CYSTOSCOPY  8/20/2014    Performed by Fredy Cintron M.D. at SURGERY [facility]   • CYSTOSCOPY STENT PLACEMENT  1/31/2013    Performed by Fredy Cintron M.D. at SURGERY [facility]   • URETEROSCOPY  1/31/2013    Performed by Fredy Cintron M.D. at SURGERY [facility]   • NEPHRECTOMY PARTIAL  1/31/2013    Performed by Fredy Cintron M.D. at SURGERY [facility]   • LASERTRIPSY  1/31/2013    Performed by Fredy Cintron M.D. at SURGERY [facility]   • OTHER ORTHOPEDIC SURGERY  2006    right knee meniscus   • OTHER ORTHOPEDIC SURGERY  1996    ORIF right arm   • OTHER  1995    sinus surgery   • GYN SURGERY  1994    hysterectomy   • OTHER  1980    thyroid lumpectomy   • CUATE BY LAPAROSCOPY     • OTHER ORTHOPEDIC SURGERY      achilles tendon reattach    • TONSILLECTOMY       SOCHX:  reports that she has never smoked. She has never used smokeless tobacco. She reports that she does not drink alcohol or use drugs.  FH: Family history was reviewed, no pertinent findings to report  Medications, Allergies, and current problem list reviewed today in Epic       Objective:     /78   Pulse 78   Temp 37.2 °C (98.9 °F)   Resp 16   Ht 1.778 m (5' 10\")   Wt 85.7 kg (189 lb)   SpO2 95%   Breastfeeding? No   BMI 27.12 kg/m²      Physical Exam   Constitutional: She is oriented to person, place, and time. She appears well-developed and well-nourished.  Non-toxic appearance. She does not have a sickly appearance. She does not appear ill. No distress.   HENT:   Head: Normocephalic and atraumatic.   Right Ear: External ear normal.   Left Ear: External ear normal.   Eyes: Conjunctivae and EOM are normal.   Neck: Normal range of motion. Neck supple.   Cardiovascular: Normal rate, regular rhythm, normal heart sounds, intact distal pulses and normal pulses.    Pulmonary/Chest: Effort normal and breath sounds " normal.   Musculoskeletal: She exhibits tenderness. She exhibits no edema or deformity.        Right upper leg: She exhibits tenderness.        Legs:  POS straight leg raise.   Neurological: She is alert and oriented to person, place, and time. She has normal reflexes. She displays normal reflexes. She exhibits normal muscle tone. Coordination normal.   Skin: Skin is warm and dry. She is not diaphoretic.   Psychiatric: She has a normal mood and affect. Her behavior is normal. Judgment and thought content normal.   Vitals reviewed.              Assessment/Plan:     1. Sciatica of right side    - MethylPREDNISolone (MEDROL DOSEPAK) 4 MG Tablet Therapy Pack; Take as directed on package.  Dispense: 1 Kit; Refill: 0    Differential diagnosis, natural history, supportive care discussed. Follow-up with primary care provider within 7-10 days, emergency room precautions discussed.  Patient and/or family appears understanding of information.  Handout and review of patients diagnosis and treatment was discussed extensively.

## 2018-05-26 NOTE — PATIENT INSTRUCTIONS
Sciatica  Sciatica is pain, numbness, weakness, or tingling along the path of the sciatic nerve. The sciatic nerve starts in the lower back and runs down the back of each leg. The nerve controls the muscles in the lower leg and in the back of the knee. It also provides feeling (sensation) to the back of the thigh, the lower leg, and the sole of the foot. Sciatica is a symptom of another medical condition that pinches or puts pressure on the sciatic nerve.  Generally, sciatica only affects one side of the body. Sciatica usually goes away on its own or with treatment. In some cases, sciatica may keep coming back (recur).  What are the causes?  This condition is caused by pressure on the sciatic nerve, or pinching of the sciatic nerve. This may be the result of:  · A disk in between the bones of the spine (vertebrae) bulging out too far (herniated disk).  · Age-related changes in the spinal disks (degenerative disk disease).  · A pain disorder that affects a muscle in the buttock (piriformis syndrome).  · Extra bone growth (bone spur) near the sciatic nerve.  · An injury or break (fracture) of the pelvis.  · Pregnancy.  · Tumor (rare).  What increases the risk?  The following factors may make you more likely to develop this condition:  · Playing sports that place pressure or stress on the spine, such as football or weight lifting.  · Having poor strength and flexibility.  · A history of back injury.  · A history of back surgery.  · Sitting for long periods of time.  · Doing activities that involve repetitive bending or lifting.  · Obesity.  What are the signs or symptoms?  Symptoms can vary from mild to very severe, and they may include:  · Any of these problems in the lower back, leg, hip, or buttock:  ¨ Mild tingling or dull aches.  ¨ Burning sensations.  ¨ Sharp pains.  · Numbness in the back of the calf or the sole of the foot.  · Leg weakness.  · Severe back pain that makes movement difficult.  These symptoms may  get worse when you cough, sneeze, or laugh, or when you sit or stand for long periods of time. Being overweight may also make symptoms worse. In some cases, symptoms may recur over time.  How is this diagnosed?  This condition may be diagnosed based on:  · Your symptoms.  · A physical exam. Your health care provider may ask you to do certain movements to check whether those movements trigger your symptoms.  · You may have tests, including:  ¨ Blood tests.  ¨ X-rays.  ¨ MRI.  ¨ CT scan.  How is this treated?  In many cases, this condition improves on its own, without any treatment. However, treatment may include:  · Reducing or modifying physical activity during periods of pain.  · Exercising and stretching to strengthen your abdomen and improve the flexibility of your spine.  · Icing and applying heat to the affected area.  · Medicines that help:  ¨ To relieve pain and swelling.  ¨ To relax your muscles.  · Injections of medicines that help to relieve pain, irritation, and inflammation around the sciatic nerve (steroids).  · Surgery.  Follow these instructions at home:  Medicines  · Take over-the-counter and prescription medicines only as told by your health care provider.  · Do not drive or operate heavy machinery while taking prescription pain medicine.  Managing pain  · If directed, apply ice to the affected area.  ¨ Put ice in a plastic bag.  ¨ Place a towel between your skin and the bag.  ¨ Leave the ice on for 20 minutes, 2-3 times a day.  · After icing, apply heat to the affected area before you exercise or as often as told by your health care provider. Use the heat source that your health care provider recommends, such as a moist heat pack or a heating pad.  ¨ Place a towel between your skin and the heat source.  ¨ Leave the heat on for 20-30 minutes.  ¨ Remove the heat if your skin turns bright red. This is especially important if you are unable to feel pain, heat, or cold. You may have a greater risk of  getting burned.  Activity  · Return to your normal activities as told by your health care provider. Ask your health care provider what activities are safe for you.  ¨ Avoid activities that make your symptoms worse.  · Take brief periods of rest throughout the day. Resting in a lying or standing position is usually better than sitting to rest.  ¨ When you rest for longer periods, mix in some mild activity or stretching between periods of rest. This will help to prevent stiffness and pain.  ¨ Avoid sitting for long periods of time without moving. Get up and move around at least one time each hour.  · Exercise and stretch regularly, as told by your health care provider.  · Do not lift anything that is heavier than 10 lb (4.5 kg) while you have symptoms of sciatica. When you do not have symptoms, you should still avoid heavy lifting, especially repetitive heavy lifting.  · When you lift objects, always use proper lifting technique, which includes:  ¨ Bending your knees.  ¨ Keeping the load close to your body.  ¨ Avoiding twisting.  General instructions  · Use good posture.  ¨ Avoid leaning forward while sitting.  ¨ Avoid hunching over while standing.  · Maintain a healthy weight. Excess weight puts extra stress on your back and makes it difficult to maintain good posture.  · Wear supportive, comfortable shoes. Avoid wearing high heels.  · Avoid sleeping on a mattress that is too soft or too hard. A mattress that is firm enough to support your back when you sleep may help to reduce your pain.  · Keep all follow-up visits as told by your health care provider. This is important.  Contact a health care provider if:  · You have pain that wakes you up when you are sleeping.  · You have pain that gets worse when you lie down.  · Your pain is worse than you have experienced in the past.  · Your pain lasts longer than 4 weeks.  · You experience unexplained weight loss.  Get help right away if:  · You lose control of your bowel  or bladder (incontinence).  · You have:  ¨ Weakness in your lower back, pelvis, buttocks, or legs that gets worse.  ¨ Redness or swelling of your back.  ¨ A burning sensation when you urinate.  This information is not intended to replace advice given to you by your health care provider. Make sure you discuss any questions you have with your health care provider.  Document Released: 12/12/2002 Document Revised: 05/23/2017 Document Reviewed: 08/26/2016  [employer] Interactive Patient Education © 2017 [employer]    Sciatica Rehab  Ask your health care provider which exercises are safe for you. Do exercises exactly as told by your health care provider and adjust them as directed. It is normal to feel mild stretching, pulling, tightness, or discomfort as you do these exercises, but you should stop right away if you feel sudden pain or your pain gets worse. Do not begin these exercises until told by your health care provider.  Stretching and range of motion exercises  These exercises warm up your muscles and joints and improve the movement and flexibility of your hips and your back. These exercises also help to relieve pain, numbness, and tingling.  Exercise A: Sciatic nerve glide  1. Sit in a chair with your head facing down toward your chest. Place your hands behind your back. Let your shoulders slump forward.  2. Slowly straighten one of your knees while you tilt your head back as if you are looking toward the ceiling. Only straighten your leg as far as you can without making your symptoms worse.  3. Hold for __________ seconds.  4. Slowly return to the starting position.  5. Repeat with your other leg.  Repeat __________ times. Complete this exercise __________ times a day.  Exercise B: Knee to chest with hip adduction and internal rotation  1. Lie on your back on a firm surface with both legs straight.  2. Bend one of your knees and move it up toward your chest until you feel a gentle stretch in your lower back and  buttock. Then, move your knee toward the shoulder that is on the opposite side from your leg.  ¨ Hold your leg in this position by holding onto the front of your knee.  3. Hold for __________ seconds.  4. Slowly return to the starting position.  5. Repeat with your other leg.  Repeat __________ times. Complete this exercise __________ times a day.  Exercise C: Prone extension on elbows  1. Lie on your abdomen on a firm surface. A bed may be too soft for this exercise.  2. Prop yourself up on your elbows.  3. Use your arms to help lift your chest up until you feel a gentle stretch in your abdomen and your lower back.  ¨ This will place some of your body weight on your elbows. If this is uncomfortable, try stacking pillows under your chest.  ¨ Your hips should stay down, against the surface that you are lying on. Keep your hip and back muscles relaxed.  4. Hold for __________ seconds.  5. Slowly relax your upper body and return to the starting position.  Repeat __________ times. Complete this exercise __________ times a day.  Strengthening exercises  These exercises build strength and endurance in your back. Endurance is the ability to use your muscles for a long time, even after they get tired.  Exercise D: Pelvic tilt  1. Lie on your back on a firm surface. Bend your knees and keep your feet flat.  2. Tense your abdominal muscles. Tip your pelvis up toward the ceiling and flatten your lower back into the floor.  ¨ To help with this exercise, you may place a small towel under your lower back and try to push your back into the towel.  3. Hold for __________ seconds.  4. Let your muscles relax completely before you repeat this exercise.  Repeat __________ times. Complete this exercise __________ times a day.  Exercise E: Alternating arm and leg raises  1. Get on your hands and knees on a firm surface. If you are on a hard floor, you may want to use padding to cushion your knees, such as an exercise mat.  2. Line up  your arms and legs. Your hands should be below your shoulders, and your knees should be below your hips.  3. Lift your left leg behind you. At the same time, raise your right arm and straighten it in front of you.  ¨ Do not lift your leg higher than your hip.  ¨ Do not lift your arm higher than your shoulder.  ¨ Keep your abdominal and back muscles tight.  ¨ Keep your hips facing the ground.  ¨ Do not arch your back.  ¨ Keep your balance carefully, and do not hold your breath.  4. Hold for __________ seconds.  5. Slowly return to the starting position and repeat with your right leg and your left arm.  Repeat __________ times. Complete this exercise __________ times a day.  Posture and body mechanics     Body mechanics refers to the movements and positions of your body while you do your daily activities. Posture is part of body mechanics. Good posture and healthy body mechanics can help to relieve stress in your body's tissues and joints. Good posture means that your spine is in its natural S-curve position (your spine is neutral), your shoulders are pulled back slightly, and your head is not tipped forward. The following are general guidelines for applying improved posture and body mechanics to your everyday activities.  Standing    · When standing, keep your spine neutral and your feet about hip-width apart. Keep a slight bend in your knees. Your ears, shoulders, and hips should line up.  · When you do a task in which you  one place for a long time, place one foot up on a stable object that is 2-4 inches (5-10 cm) high, such as a footstool. This helps keep your spine neutral.  Sitting  · When sitting, keep your spine neutral and keep your feet flat on the floor. Use a footrest, if necessary, and keep your thighs parallel to the floor. Avoid rounding your shoulders, and avoid tilting your head forward.  · When working at a desk or a computer, keep your desk at a height where your hands are slightly lower  than your elbows. Slide your chair under your desk so you are close enough to maintain good posture.  · When working at a computer, place your monitor at a height where you are looking straight ahead and you do not have to tilt your head forward or downward to look at the screen.  Resting    · When lying down and resting, avoid positions that are most painful for you.  · If you have pain with activities such as sitting, bending, stooping, or squatting (flexion-based activities), lie in a position in which your body does not bend very much. For example, avoid curling up on your side with your arms and knees near your chest (fetal position).  · If you have pain with activities such as standing for a long time or reaching with your arms (extension-based activities), lie with your spine in a neutral position and bend your knees slightly. Try the following positions:  ¨ Lying on your side with a pillow between your knees.  ¨ Lying on your back with a pillow under your knees.  Lifting    · When lifting objects, keep your feet at least shoulder-width apart and tighten your abdominal muscles.  · Bend your knees and hips and keep your spine neutral. It is important to lift using the strength of your legs, not your back. Do not lock your knees straight out.  · Always ask for help to lift heavy or awkward objects.  This information is not intended to replace advice given to you by your health care provider. Make sure you discuss any questions you have with your health care provider.  Document Released: 12/18/2006 Document Revised: 08/24/2017 Document Reviewed: 09/02/2016  [employer] Interactive Patient Education © 2017 [employer]

## 2018-08-13 RX ORDER — METFORMIN HYDROCHLORIDE 500 MG/1
TABLET, EXTENDED RELEASE ORAL
Qty: 90 TAB | Refills: 3 | Status: SHIPPED | OUTPATIENT
Start: 2018-08-13 | End: 2019-02-06 | Stop reason: SDUPTHER

## 2018-09-05 DIAGNOSIS — J44.9 CHRONIC OBSTRUCTIVE PULMONARY DISEASE, UNSPECIFIED COPD TYPE (HCC): ICD-10-CM

## 2018-09-05 RX ORDER — BUDESONIDE AND FORMOTEROL FUMARATE DIHYDRATE 160; 4.5 UG/1; UG/1
2 AEROSOL RESPIRATORY (INHALATION) 2 TIMES DAILY
Qty: 2 INHALER | Refills: 0 | Status: SHIPPED | OUTPATIENT
Start: 2018-09-05 | End: 2019-01-09

## 2018-09-05 NOTE — TELEPHONE ENCOUNTER
Pt notified on vm that samples were approved and at the [facility] on [address] for , [phone].

## 2018-09-05 NOTE — TELEPHONE ENCOUNTER
Caller Name: Jillian Gottlieb                 Call Back Number: [phone] (home)         Patient approves a detailed voicemail message: yes    Have we ever prescribed this med? Yes.  If yes, what date? 7/20/18    Last OV: 12/1/17- [facility]    Next OV: 12/4/18    DX: COPD    Medications: samples of Symbicort, Spiriva 2.5MCG    Message sent to the pool, Martha Greenfield is no longer w/ us.

## 2018-09-25 LAB
ALBUMIN/CREAT UR: 7.5 MG/G CREAT (ref 0–30)
BUN SERPL-MCNC: 29 MG/DL (ref 8–27)
BUN/CREAT SERPL: 34 (ref 12–28)
CALCIUM SERPL-MCNC: 9.8 MG/DL (ref 8.7–10.3)
CHLORIDE SERPL-SCNC: 101 MMOL/L (ref 96–106)
CHOLEST SERPL-MCNC: 160 MG/DL (ref 100–199)
CO2 SERPL-SCNC: 22 MMOL/L (ref 20–29)
CREAT SERPL-MCNC: 0.86 MG/DL (ref 0.57–1)
CREAT UR-MCNC: 109.1 MG/DL
GLUCOSE SERPL-MCNC: 136 MG/DL (ref 65–99)
HBA1C MFR BLD: 7 % (ref 4.8–5.6)
HDLC SERPL-MCNC: 53 MG/DL
IF AFRICAN AMERICAN  100797: 78 ML/MIN/1.73
IF NON AFRICAN AMER 100791: 67 ML/MIN/1.73
LABORATORY COMMENT REPORT: ABNORMAL
LDLC SERPL CALC-MCNC: 67 MG/DL (ref 0–99)
MICROALBUMIN UR-MCNC: 8.2 UG/ML
POTASSIUM SERPL-SCNC: 4.3 MMOL/L (ref 3.5–5.2)
SODIUM SERPL-SCNC: 141 MMOL/L (ref 134–144)
TRIGL SERPL-MCNC: 198 MG/DL (ref 0–149)
TSH SERPL DL<=0.005 MIU/L-ACNC: 1.25 UIU/ML (ref 0.45–4.5)
VLDLC SERPL CALC-MCNC: 40 MG/DL (ref 5–40)

## 2018-09-26 ENCOUNTER — OFFICE VISIT (OUTPATIENT)
Dept: MEDICAL GROUP | Facility: MEDICAL CENTER | Age: 73
End: 2018-09-26
Payer: MEDICARE

## 2018-09-26 VITALS
HEART RATE: 81 BPM | DIASTOLIC BLOOD PRESSURE: 74 MMHG | TEMPERATURE: 98.7 F | OXYGEN SATURATION: 94 % | HEIGHT: 70 IN | WEIGHT: 193 LBS | RESPIRATION RATE: 16 BRPM | SYSTOLIC BLOOD PRESSURE: 128 MMHG | BODY MASS INDEX: 27.63 KG/M2

## 2018-09-26 DIAGNOSIS — R73.01 IMPAIRED FASTING GLUCOSE: ICD-10-CM

## 2018-09-26 DIAGNOSIS — K21.9 GASTROESOPHAGEAL REFLUX DISEASE WITHOUT ESOPHAGITIS: ICD-10-CM

## 2018-09-26 DIAGNOSIS — E03.9 HYPOTHYROIDISM, UNSPECIFIED TYPE: ICD-10-CM

## 2018-09-26 DIAGNOSIS — J45.20 MILD INTERMITTENT ASTHMA WITHOUT COMPLICATION: ICD-10-CM

## 2018-09-26 DIAGNOSIS — I10 ESSENTIAL HYPERTENSION: ICD-10-CM

## 2018-09-26 DIAGNOSIS — E78.5 HYPERLIPIDEMIA, UNSPECIFIED HYPERLIPIDEMIA TYPE: ICD-10-CM

## 2018-09-26 DIAGNOSIS — Z85.528 PERSONAL HISTORY OF RENAL CANCER: ICD-10-CM

## 2018-09-26 PROCEDURE — 99214 OFFICE O/P EST MOD 30 MIN: CPT | Performed by: INTERNAL MEDICINE

## 2018-09-26 ASSESSMENT — PATIENT HEALTH QUESTIONNAIRE - PHQ9: CLINICAL INTERPRETATION OF PHQ2 SCORE: 0
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
## 2018-09-27 NOTE — ASSESSMENT & PLAN NOTE
Blood sugar remains a little high at 136 with glycohemoglobin 7 and microalbumin to creatinine ratio 7.5.  She continues metformin without difficulty.
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
## 2018-09-27 NOTE — ASSESSMENT & PLAN NOTE
Hyperlipidemia is controlled with fenofibrate and simvastatin.  Most recent cholesterol is 160, triglycerides 198, HDL 53, LDL 67.  She does not exercise much.

## 2018-11-12 DIAGNOSIS — I10 ESSENTIAL HYPERTENSION: ICD-10-CM

## 2018-11-12 RX ORDER — LOSARTAN POTASSIUM AND HYDROCHLOROTHIAZIDE 25; 100 MG/1; MG/1
TABLET ORAL
Qty: 90 TAB | Refills: 3 | Status: SHIPPED | OUTPATIENT
Start: 2018-11-12 | End: 2019-02-06

## 2018-12-04 ENCOUNTER — OFFICE VISIT (OUTPATIENT)
Dept: PULMONOLOGY | Facility: HOSPICE | Age: 73
End: 2018-12-04
Payer: MEDICARE

## 2018-12-04 VITALS
OXYGEN SATURATION: 94 % | BODY MASS INDEX: 27.63 KG/M2 | HEART RATE: 79 BPM | TEMPERATURE: 97.7 F | WEIGHT: 193 LBS | HEIGHT: 70 IN | SYSTOLIC BLOOD PRESSURE: 128 MMHG | RESPIRATION RATE: 16 BRPM | DIASTOLIC BLOOD PRESSURE: 82 MMHG

## 2018-12-04 DIAGNOSIS — J44.9 CHRONIC OBSTRUCTIVE PULMONARY DISEASE, UNSPECIFIED COPD TYPE (HCC): ICD-10-CM

## 2018-12-04 DIAGNOSIS — J01.90 ACUTE SINUSITIS, RECURRENCE NOT SPECIFIED, UNSPECIFIED LOCATION: ICD-10-CM

## 2018-12-04 PROCEDURE — 99213 OFFICE O/P EST LOW 20 MIN: CPT | Performed by: PHYSICIAN ASSISTANT

## 2018-12-04 RX ORDER — AZITHROMYCIN 250 MG/1
TABLET, FILM COATED ORAL
Qty: 6 TAB | Refills: 0 | Status: SHIPPED | OUTPATIENT
Start: 2018-12-04 | End: 2019-04-22

## 2018-12-04 NOTE — PATIENT INSTRUCTIONS
1-requested antibiotic for sinus infection, past history with current symptoms  2-spacer provided for symbicort use  3-consider TRELEGY for new year  4-Spiriva respimat for preferred delivery system, prescription sent

## 2018-12-05 NOTE — PROGRESS NOTES
CC    HPI:  Jillian Gottlieb is a 73 y.o. year old female here today for follow-up on her COPD.  Patient is also complaining of sinus infection with symptoms including headache, facial pain, nasal congestion.  Patient reports frequent sinus infections in the past for which she had emergency pack of antibiotics.  Did discuss current recommendations for rhinosinusitis but patient prefers antibiotic treatment with azithromycin.  Prescription sent.  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  Patient reports she is able to be very active, she reports using her MDI about 1 time per month and her nebulizer about 4 times per year.  Patient states interest in combination therapy rather than her current Symbicort and Spiriva.  No change at this time.  Patient apparently had both Spiriva HandiHaler and Respimat and requested Respimat as her preferred delivery system, prescription updated.  New spacer provided to use with her Symbicort per patient request.  Reviewed in office vital signs including a blood pressure of 128/82 and BMI of 27.69, encouraged activity as tolerated, portion control and judicious menu selection.    ROS:   Constitutional: Denies fever, chills, night sweats, unintentional weight loss, increased fatigue  Skin: Reports a thickness to her inner left cheek she equates with previous thrush infection, denies other rashes, hair or nail changes, lumps, sores  Eyes: Patient wears glasses, cataract surgery pending in February  ENT: Denies dentures, no difficulty swallowing, no hoarseness or persistent sore throat, reports some nasal congestion, no runny nose, no earache, past history of sinus infections  GI: Denies heartburn or reflux  Respiratory: Occasional cough dry, no wheezing, occasional shortness of  "breath as per HPI, infrequent rescue inhaler use denies prior history of pneumonia or bronchitis, no occupational exposure  CV: Reports heart murmur as a child, recent stress test negative, denies edema, irregular heartbeat, chest pain or pressure and orthopnea    Past Medical History:   Diagnosis Date   • Acid reflux    • Arthritis     knees, hands, hips, neck-Osteo   • Asthma     inhalers daily   • Breath shortness     asthma related   • Bronchitis 08/04/14   • Cancer ([facility]) 01/13    R kidney   • Chronic pain of left knee 8/22/2017   • Chronic pain of right knee 7/1/2016   • COPD (chronic obstructive pulmonary disease) ([facility])    • COPD (chronic obstructive pulmonary disease) ([facility]) 6/30/2016   • Diabetes ([facility])     \"pre\"   • Dyspnea    • Emphysema of lung ([facility])    • Heart burn    • Hypertension    • Impaired fasting glucose 8/24/2015   • Indigestion    • Mass of left lower leg 7/27/2016   • Personal history of renal cancer 4/20/2015   • Renal disorder     stones   • Renal disorder 2013    cancer right kidney 7% removed   • Unspecified disorder of thyroid     benign tumor removal   • Unspecified hypothyroidism 4/20/2015       Past Surgical History:   Procedure Laterality Date   • KNEE ARTHROPLASTY TOTAL Left 12/18/2017    Procedure: KNEE ARTHROPLASTY TOTAL;  Surgeon: Ricky Castaneda M.D.;  Location: [facility];  Service: Orthopedics   • KNEE ARTHROPLASTY TOTAL Right 10/17/2016    Procedure: KNEE ARTHROPLASTY TOTAL;  Surgeon: Ricky Castaneda M.D.;  Location: [facility];  Service:    • RECOVERY  3/4/2016    Procedure: IR2-PERQ NEPHRO-URETERAL CATH RIGHT-DR. RIDDLE-Surgery to follow in [facility] ;  Surgeon: Ir-Recovery Surgery;  Location: [facility];  Service:    • CYSTOSCOPY STENT REMOVAL Right 3/4/2016    Procedure: RIGID CYSTOSCOPY STENT REMOVAL;  Surgeon: Fredy Riddle M.D.;  Location: [facility];  Service:    • URETEROSCOPY Right 3/4/2016    Procedure: URETEROSCOPY;  " Surgeon: Fredy Cintron M.D.;  Location: [facility];  Service:    • PERCUTANEOUS NEPHROSTOLITHOTOMY Right 3/4/2016    Procedure: PERCUTANEOUS NEPHROSTOLITHOTOMY FOR NEPHROLITHOTRIPSY;  Surgeon: Fredy Cintron M.D.;  Location: [facility];  Service:    • CYSTOSCOPY STENT PLACEMENT Right 2/12/2016    Procedure: CYSTOSCOPY STENT PLACEMENT;  Surgeon: Fredy Cintron M.D.;  Location: [facility];  Service:    • CYSTOSCOPY STENT PLACEMENT Right 6/26/2015    Procedure: CYSTOSCOPY STENT PLACEMENT RIGID POSSIBLE STENT;  Surgeon: Fredy Cintron M.D.;  Location: [facility];  Service:    • URETEROSCOPY N/A 6/26/2015    Procedure: URETEROSCOPY;  Surgeon: Fredy Cintron M.D.;  Location: [facility];  Service:    • LASERTRIPSY Right 6/26/2015    Procedure: LASERTRIPSY LITHO;  Surgeon: Fredy Cintron M.D.;  Location: [facility];  Service:    • URETEROSCOPY  8/20/2014    Performed by Fredy Cintron M.D. at [facility]   • LASERTRIPSY  8/20/2014    Performed by Fredy Cintron M.D. at [facility]   • CYSTOSCOPY  8/20/2014    Performed by Fredy Cintron M.D. at [facility]   • CYSTOSCOPY STENT PLACEMENT  1/31/2013    Performed by Fredy Cintron M.D. at [facility]   • URETEROSCOPY  1/31/2013    Performed by Fredy Cintron M.D. at [facility]   • NEPHRECTOMY PARTIAL  1/31/2013    Performed by Fredy Cintron M.D. at [facility]   • LASERTRIPSY  1/31/2013    Performed by Fredy Cintron M.D. at [facility]   • OTHER ORTHOPEDIC SURGERY  2006    right knee meniscus   • OTHER ORTHOPEDIC SURGERY  1996    ORIF right arm   • OTHER  1995    sinus surgery   • GYN SURGERY  1994    hysterectomy   • OTHER  1980    thyroid lumpectomy   • CUATE BY LAPAROSCOPY     • OTHER ORTHOPEDIC SURGERY      achilles tendon reattach    • TONSILLECTOMY         Family History   Problem Relation Age of Onset   • Kidney Disease  "Mother    • Cancer Father         colon cancer       Social History     Social History   • Marital status:      Spouse name: N/A   • Number of children: N/A   • Years of education: N/A     Occupational History   • Not on file.     Social History Main Topics   • Smoking status: Never Smoker   • Smokeless tobacco: Never Used   • Alcohol use No      Comment: allergic to it   • Drug use: No   • Sexual activity: Not Currently     Partners: Male     Birth control/ protection: Post-Menopausal     Other Topics Concern   • Not on file     Social History Narrative   • No narrative on file       Allergies as of 12/04/2018 - Reviewed 12/04/2018   Allergen Reaction Noted   • Alcohol Vomiting, Nausea, and Unspecified 01/29/2013   • Pcn [penicillins] Unspecified 05/09/2012        @Vital signs for this encounter:  Vitals:    12/04/18 1425 12/04/18 1433   Height: 1.778 m (5' 10\")    Weight: 87.5 kg (193 lb)    Weight % change since last entry.: 0 %    BP: 128/82    Pulse: 79    BMI (Calculated): 27.69    Resp: 16    Temp: 36.5 °C (97.7 °F)    TempSrc: Oral    O2 sat % room air:  94 %       Current medications as of today   Current Outpatient Prescriptions   Medication Sig Dispense Refill   • Tiotropium Bromide Monohydrate (SPIRIVA RESPIMAT) 2.5 MCG/ACT Aero Soln Inhale 2 Inhalation by mouth every day. Two inhalations once daily.  Assemble and prime. 2 Inhaler 6   • azithromycin (ZITHROMAX) 250 MG Tab Take 2 tablets on day 1, then take 1 tablet a day for 4 days. 6 Tab 0   • losartan-hydrochlorothiazide (HYZAAR) 100-25 MG per tablet TAKE ONE TABLET BY MOUTH ONCE DAILY IN THE MORNING 90 Tab 3   • SYMBICORT 160-4.5 MCG/ACT Aerosol INHALE 2 PUFFS BY MOUTH TWICE A DAY RINSE MOUTH AFTER USE 1 Inhaler 5   • losartan-hydrochlorothiazide (HYZAAR) 100-25 MG per tablet TAKE ONE TABLET BY MOUTH ONCE DAILY IN THE MORNING 90 Tab 3   • raloxifene (EVISTA) 60 MG Tab TAKE ONE TABLET BY MOUTH IN THE MORNING 90 Tab 3   • budesonide-formoterol " (SYMBICORT) 160-4.5 MCG/ACT Aerosol Inhale 2 Puffs by mouth 2 Times a Day. With spacer, rinse mouth after use 2 Inhaler 0   • metFORMIN ER (GLUCOPHAGE XR) 500 MG TABLET SR 24 HR TAKE ONE TABLET BY MOUTH ONCE DAILY 90 Tab 3   • SPIRIVA HANDIHALER 18 MCG Cap INHALE ONE PUFF BY MOUTH ONCE DAILY 90 Cap 3   • MethylPREDNISolone (MEDROL DOSEPAK) 4 MG Tablet Therapy Pack Take as directed on package. (Patient not taking: Reported on 12/4/2018) 1 Kit 0   • metoprolol SR (TOPROL XL) 25 MG TABLET SR 24 HR TAKE ONE TABLET BY MOUTH ONCE DAILY 90 Tab 3   • fenofibrate (TRIGLIDE) 160 MG tablet Take 1 Tab by mouth every day. 90 Tab 3   • simvastatin (ZOCOR) 20 MG Tab TAKE ONE TABLET BY MOUTH IN THE EVENING 90 Tab 3   • levothyroxine (SYNTHROID) 88 MCG Tab Take 1 Tab by mouth Every morning on an empty stomach. 90 Tab 3   • vitamin E (VITAMIN E) 1000 UNIT Cap Take 1 Cap by mouth every day.     • multivitamin (THERAGRAN) Tab Take 1 Tab by mouth every day.     • vitamin D (CHOLECALCIFEROL) 1000 UNIT Tab Take 1,000 Units by mouth every day.     • Pseudoephedrine-Guaifenesin (MUCINEX D PO) Take 1 Tab by mouth every bedtime.     • aspirin EC (ECOTRIN) 81 MG Tablet Delayed Response Take 81 mg by mouth every day.     • albuterol 108 (90 BASE) MCG/ACT Aero Soln inhalation aerosol Inhale 2 Puffs by mouth every four hours as needed for Shortness of Breath. 1 Inhaler 0   • sulfamethoxazole-trimethoprim (BACTRIM) 400-80 MG Tab Take 1 Tab by mouth every day. Kidney stone prevention       No current facility-administered medications for this visit.          Physical Exam:   Gen:           Alert and oriented, No apparent distress. Mood and affect appropriate, normal interaction   Eyes:          sclere white, conjunctive moist.  Hearing:     Grossly intact.  Dentition:    Good dentition.  Oropharynx:   Tongue normal, posterior pharynx with mild erythema without exudate.   Neck:        Supple, trachea midline, no masses.  Respiratory Effort: No  intercostal retractions or use of accessory muscles.   Lung Auscultation:      Clear to auscultation bilaterally; no rales, rhonchi or wheezing.  CV:            Regular rate and rhythm. No murmurs, rubs or gallops appreciated.  No edema  Digits, Nails, Ext: No clubbing, cyanosis, petechiae, or nodes.   Skin:        No rashes, lesions or ulcers noted on back or exposed skin surfaces.                       Assessment:  1. Chronic obstructive pulmonary disease, unspecified COPD type ([facility])  Tiotropium Bromide Monohydrate (SPIRIVA RESPIMAT) 2.5 MCG/ACT Aero Soln   2. Acute sinusitis, recurrence not specified, unspecified location  Zithromax       Immunizations:    Flu: Given 8/29/18  Pneumovax 23: Given 8/7/2013  Prevnar 13: Given 10/3/2015    Plan:  1-requested antibiotic for sinus infection, past history with current symptoms  2-spacer provided for symbicort use  3-consider TRELEGY for new year  4-Spiriva respimat for preferred delivery system, prescription sent    This dictation was created using voice recognition software. The accuracy of the dictation is limited to the abilities of the software. I expect there may be some errors of grammar and possibly content.

## 2019-01-31 LAB
BUN SERPL-MCNC: 24 MG/DL (ref 8–27)
BUN/CREAT SERPL: 26 (ref 12–28)
CALCIUM SERPL-MCNC: 10.1 MG/DL (ref 8.7–10.3)
CHLORIDE SERPL-SCNC: 99 MMOL/L (ref 96–106)
CHOLEST SERPL-MCNC: 150 MG/DL (ref 100–199)
CO2 SERPL-SCNC: 23 MMOL/L (ref 20–29)
CREAT SERPL-MCNC: 0.91 MG/DL (ref 0.57–1)
GLUCOSE SERPL-MCNC: 128 MG/DL (ref 65–99)
HBA1C MFR BLD: 7.1 % (ref 4.8–5.6)
HDLC SERPL-MCNC: 52 MG/DL
LABORATORY COMMENT REPORT: ABNORMAL
LDLC SERPL CALC-MCNC: 56 MG/DL (ref 0–99)
POTASSIUM SERPL-SCNC: 4.4 MMOL/L (ref 3.5–5.2)
SODIUM SERPL-SCNC: 139 MMOL/L (ref 134–144)
TRIGL SERPL-MCNC: 209 MG/DL (ref 0–149)
VLDLC SERPL CALC-MCNC: 42 MG/DL (ref 5–40)

## 2019-02-05 DIAGNOSIS — Z79.899 MEDICATION MANAGEMENT: ICD-10-CM

## 2019-02-05 RX ORDER — LEVOTHYROXINE SODIUM 88 UG/1
TABLET ORAL
Qty: 90 TAB | Refills: 3 | Status: SHIPPED | OUTPATIENT
Start: 2019-02-05 | End: 2019-02-06

## 2019-02-06 ENCOUNTER — OFFICE VISIT (OUTPATIENT)
Dept: MEDICAL GROUP | Facility: MEDICAL CENTER | Age: 74
End: 2019-02-06
Payer: MEDICARE

## 2019-02-06 VITALS
BODY MASS INDEX: 26.89 KG/M2 | TEMPERATURE: 96 F | WEIGHT: 187.83 LBS | DIASTOLIC BLOOD PRESSURE: 80 MMHG | OXYGEN SATURATION: 95 % | HEIGHT: 70 IN | SYSTOLIC BLOOD PRESSURE: 130 MMHG | HEART RATE: 88 BPM

## 2019-02-06 DIAGNOSIS — M89.9 BONE DISEASE: ICD-10-CM

## 2019-02-06 DIAGNOSIS — N20.0 KIDNEY STONES: ICD-10-CM

## 2019-02-06 DIAGNOSIS — Z96.653 HISTORY OF TOTAL BILATERAL KNEE REPLACEMENT: ICD-10-CM

## 2019-02-06 DIAGNOSIS — J44.9 CHRONIC OBSTRUCTIVE PULMONARY DISEASE, UNSPECIFIED COPD TYPE (HCC): ICD-10-CM

## 2019-02-06 DIAGNOSIS — I10 ESSENTIAL HYPERTENSION: ICD-10-CM

## 2019-02-06 DIAGNOSIS — Z85.528 PERSONAL HISTORY OF RENAL CANCER: ICD-10-CM

## 2019-02-06 DIAGNOSIS — E03.9 HYPOTHYROIDISM, UNSPECIFIED TYPE: ICD-10-CM

## 2019-02-06 DIAGNOSIS — Z12.39 SCREENING FOR BREAST CANCER: ICD-10-CM

## 2019-02-06 DIAGNOSIS — J45.20 MILD INTERMITTENT ASTHMA WITHOUT COMPLICATION: ICD-10-CM

## 2019-02-06 DIAGNOSIS — E78.5 HYPERLIPIDEMIA, UNSPECIFIED HYPERLIPIDEMIA TYPE: ICD-10-CM

## 2019-02-06 DIAGNOSIS — K21.9 GASTROESOPHAGEAL REFLUX DISEASE WITHOUT ESOPHAGITIS: ICD-10-CM

## 2019-02-06 DIAGNOSIS — R73.01 IMPAIRED FASTING GLUCOSE: ICD-10-CM

## 2019-02-06 PROBLEM — M25.562 CHRONIC PAIN OF LEFT KNEE: Status: RESOLVED | Noted: 2017-08-22 | Resolved: 2019-02-06

## 2019-02-06 PROBLEM — G89.29 CHRONIC PAIN OF LEFT KNEE: Status: RESOLVED | Noted: 2017-08-22 | Resolved: 2019-02-06

## 2019-02-06 PROCEDURE — G0439 PPPS, SUBSEQ VISIT: HCPCS | Performed by: INTERNAL MEDICINE

## 2019-02-06 RX ORDER — LEVOTHYROXINE SODIUM 88 UG/1
TABLET ORAL
COMMUNITY
End: 2020-02-13

## 2019-02-06 RX ORDER — LOSARTAN POTASSIUM AND HYDROCHLOROTHIAZIDE 25; 100 MG/1; MG/1
TABLET ORAL
COMMUNITY
End: 2019-02-06

## 2019-02-06 RX ORDER — RALOXIFENE HYDROCHLORIDE 60 MG/1
TABLET, FILM COATED ORAL
COMMUNITY
End: 2019-10-02

## 2019-02-06 RX ORDER — BUDESONIDE AND FORMOTEROL FUMARATE DIHYDRATE 160; 4.5 UG/1; UG/1
AEROSOL RESPIRATORY (INHALATION)
COMMUNITY
End: 2019-02-06

## 2019-02-06 RX ORDER — SIMVASTATIN 20 MG
TABLET ORAL
COMMUNITY
End: 2020-01-27

## 2019-02-06 RX ORDER — METOPROLOL SUCCINATE 25 MG/1
TABLET, EXTENDED RELEASE ORAL
COMMUNITY
End: 2020-12-15

## 2019-02-06 RX ORDER — NEOMYCIN SULFATE, POLYMYXIN B SULFATE AND DEXAMETHASONE 3.5; 10000; 1 MG/ML; [USP'U]/ML; MG/ML
SUSPENSION/ DROPS OPHTHALMIC
COMMUNITY
Start: 2019-01-29 | End: 2019-04-22

## 2019-02-06 RX ORDER — BUDESONIDE AND FORMOTEROL FUMARATE DIHYDRATE 160; 4.5 UG/1; UG/1
AEROSOL RESPIRATORY (INHALATION)
COMMUNITY
End: 2019-04-16

## 2019-02-06 RX ORDER — METFORMIN HYDROCHLORIDE 500 MG/1
500 TABLET, EXTENDED RELEASE ORAL 2 TIMES DAILY
Qty: 180 TAB | Refills: 3 | Status: SHIPPED | OUTPATIENT
Start: 2019-02-06 | End: 2020-01-31

## 2019-02-06 RX ORDER — FENOFIBRATE 160 MG/1
TABLET ORAL
COMMUNITY
End: 2020-12-15

## 2019-02-06 RX ORDER — TIOTROPIUM BROMIDE 18 UG/1
CAPSULE ORAL; RESPIRATORY (INHALATION)
COMMUNITY
End: 2019-02-06

## 2019-02-06 RX ORDER — METFORMIN HYDROCHLORIDE 500 MG/1
TABLET, EXTENDED RELEASE ORAL
COMMUNITY
End: 2019-02-06

## 2019-02-06 ASSESSMENT — ACTIVITIES OF DAILY LIVING (ADL): BATHING_REQUIRES_ASSISTANCE: 0

## 2019-02-06 ASSESSMENT — ENCOUNTER SYMPTOMS: GENERAL WELL-BEING: GOOD

## 2019-02-06 ASSESSMENT — PATIENT HEALTH QUESTIONNAIRE - PHQ9: CLINICAL INTERPRETATION OF PHQ2 SCORE: 0

## 2019-02-06 NOTE — PROGRESS NOTES
Chief Complaint   Patient presents with   • Annual Wellness Visit         HPI:  Jillian is a 73 y.o. here for Medicare Annual Wellness Visit    Patient Active Problem List    Diagnosis Date Noted   • Elevated liver enzymes 08/22/2017   • Chronic pain of left knee 08/22/2017   • GERD (gastroesophageal reflux disease) 10/03/2016   • Mass of left lower leg 07/27/2016   • H/O total knee replacement 07/01/2016   • COPD (chronic obstructive pulmonary disease) (HCC) 06/30/2016   • Kidney stones 03/04/2016   • Essential hypertension 02/08/2016   • Impaired fasting glucose 08/24/2015   • Hyperlipidemia 04/20/2015   • Hypothyroidism 04/20/2015   • Personal history of renal cancer 04/20/2015   • Asthma 04/20/2015       Current Outpatient Prescriptions   Medication Sig Dispense Refill   • levothyroxine (SYNTHROID) 88 MCG Tab TAKE ONE TABLET BY MOUTH ONCE DAILY IN THE MORNING ON AN EMPTY STOMACH 90 Tab 3   • Tiotropium Bromide Monohydrate (SPIRIVA RESPIMAT) 2.5 MCG/ACT Aero Soln Inhale 2 Inhalation by mouth every day. Two inhalations once daily.  Assemble and prime. 2 Inhaler 6   • losartan-hydrochlorothiazide (HYZAAR) 100-25 MG per tablet TAKE ONE TABLET BY MOUTH ONCE DAILY IN THE MORNING 90 Tab 3   • raloxifene (EVISTA) 60 MG Tab TAKE ONE TABLET BY MOUTH IN THE MORNING 90 Tab 3   • metFORMIN ER (GLUCOPHAGE XR) 500 MG TABLET SR 24 HR TAKE ONE TABLET BY MOUTH ONCE DAILY 90 Tab 3   • metoprolol SR (TOPROL XL) 25 MG TABLET SR 24 HR TAKE ONE TABLET BY MOUTH ONCE DAILY 90 Tab 3   • fenofibrate (TRIGLIDE) 160 MG tablet Take 1 Tab by mouth every day. 90 Tab 3   • simvastatin (ZOCOR) 20 MG Tab TAKE ONE TABLET BY MOUTH IN THE EVENING 90 Tab 3   • multivitamin (THERAGRAN) Tab Take 1 Tab by mouth every day.     • Pseudoephedrine-Guaifenesin (MUCINEX D PO) Take 1 Tab by mouth every bedtime.     • albuterol 108 (90 BASE) MCG/ACT Aero Soln inhalation aerosol Inhale 2 Puffs by mouth every four hours as needed for Shortness of Breath. 1 Inhaler  0   • SYMBICORT 160-4.5 MCG/ACT Aerosol INHALE 2 PUFFS BY MOUTH TWICE DAILY, RINSE  MOUTH  AFTER  USE (Patient not taking: Reported on 2/6/2019) 3 Inhaler 1   • azithromycin (ZITHROMAX) 250 MG Tab Take 2 tablets on day 1, then take 1 tablet a day for 4 days. (Patient not taking: Reported on 2/6/2019) 6 Tab 0   • losartan-hydrochlorothiazide (HYZAAR) 100-25 MG per tablet TAKE ONE TABLET BY MOUTH ONCE DAILY IN THE MORNING (Patient not taking: Reported on 2/6/2019) 90 Tab 3   • SPIRIVA HANDIHALER 18 MCG Cap INHALE ONE PUFF BY MOUTH ONCE DAILY (Patient not taking: Reported on 2/6/2019) 90 Cap 3   • MethylPREDNISolone (MEDROL DOSEPAK) 4 MG Tablet Therapy Pack Take as directed on package. (Patient not taking: Reported on 12/4/2018) 1 Kit 0   • vitamin E (VITAMIN E) 1000 UNIT Cap Take 1 Cap by mouth every day.     • vitamin D (CHOLECALCIFEROL) 1000 UNIT Tab Take 1,000 Units by mouth every day.     • aspirin EC (ECOTRIN) 81 MG Tablet Delayed Response Take 81 mg by mouth every day.     • sulfamethoxazole-trimethoprim (BACTRIM) 400-80 MG Tab Take 1 Tab by mouth every day. Kidney stone prevention       No current facility-administered medications for this visit.         Patient is taking medications as noted in medication list.  Current supplements as per medication list.     Allergies: Alcohol and Pcn [penicillins]    Current social contact/activities: PT reports living with  and 4 dogs, visit with with family and friends, baby sits grand kids, Religious     Is patient current with immunizations? No, due for SHINGRIX (Shingles). Patient is interested in receiving NONE today.    She  reports that she has never smoked. She has never used smokeless tobacco. She reports that she does not drink alcohol or use drugs.  Counseling given: Not Answered        DPA/Advanced directive: Patient has Advanced Directive on file.     ROS:    Gait: Uses no assistive device   Ostomy: No   Other tubes: No   Amputations: No   Chronic oxygen  use No   Last eye exam 11/2018   Wears hearing aids: No   : Denies any urinary leakage during the last 6 months     Screening:    COPD    1.  Is patient under the care of a pulmonologist? Yes, CareTeam was updated.  2.  Has patient ever completed a PFT or spirometry? Yes, on 01/24/17.  3.  Is patient on oxygen or CPAP? No.  4.  Has patient ever had instruction on inhaler technique by health care professional? Yes  5.  Is patient interested in a referral to respiratory therapy for more information on COPD, inhaler technique, and/or information on establishing an action plan?  No, patient is NOT interested.      Depression Screening    Little interest or pleasure in doing things?  0 - not at all  Feeling down, depressed, or hopeless? 0 - not at all  Patient Health Questionnaire Score: 0    If depressive symptoms identified deferred to follow up visit unless specifically addressed in assessment and plan.    Interpretation of PHQ-9 Total Score   Score Severity   1-4 No Depression   5-9 Mild Depression   10-14 Moderate Depression   15-19 Moderately Severe Depression   20-27 Severe Depression    Screening for Cognitive Impairment    Three Minute Recall (leader, season, table)  3/3    Isaac clock face with all 12 numbers and set the hands to show 10 past 11.  Yes 2/2  If cognitive concerns identified, deferred for follow up unless specifically addressed in assessment and plan.    Fall Risk Assessment    Has the patient had two or more falls in the last year or any fall with injury in the last year?  No  If fall risk identified, deferred for follow up unless specifically addressed in assessment and plan.    Safety Assessment    Throw rugs on floor.  No  Handrails on all stairs.  Yes  Good lighting in all hallways.  Yes  Difficulty hearing.  No  Patient counseled about all safety risks that were identified.    Functional Assessment ADLs    Are there any barriers preventing you from cooking for yourself or meeting  nutritional needs?  No.    Are there any barriers preventing you from driving safely or obtaining transportation?  No.    Are there any barriers preventing you from using a telephone or calling for help?  No.    Are there any barriers preventing you from shopping?  No.    Are there any barriers preventing you from taking care of your own finances?  No.    Are there any barriers preventing you from managing your medications?  No.    Are there any barriers preventing you from showering, bathing or dressing yourself?  No.    Are you currently engaging in any exercise or physical activity?  Yes.  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   What is your perception of your health?  Good.    Health Maintenance Summary                BONE DENSITY Overdue 8/28/2010     IMM ZOSTER VACCINES Overdue 6/28/2014      Done 5/3/2014 Imm Admin: Zoster Vaccine Live (ZVL) (Zostavax)    MAMMOGRAM Overdue 12/30/2017      Done 12/30/2016 [id number]    PFT SCREENING-FEV1 AND FEV/FVC RATIO / SPIROMETRY SHOULD BE PERFORMED ANNUALLY Overdue 1/24/2018      Done 1/24/2017 AMB PULMONARY FUNCTION TEST/LAB     Patient has more history with this topic...    Annual Wellness Visit Overdue 9/10/2018      Done 9/9/2017 Visit Dx: Medicare annual wellness visit, subsequent     Patient has more history with this topic...    COLONOSCOPY Next Due 11/11/2020      Done 11/11/2015 AMB REFERRAL TO GI FOR COLONOSCOPY    IMM DTaP/Tdap/Td Vaccine Next Due 2/10/2024      Done 2/10/2014 Imm Admin: Tdap Vaccine          Patient Care Team:  Herve Conrad M.D. as PCP - General (Internal Medicine)  Fredy Cintron M.D. as Consulting Physician (Urology)  Gabriela Tomlinson M.D. as Consulting Physician (Pulmonary Medicine)  Ricky Castaneda M.D. as Consulting Physician (Orthopaedics)    Social History   Substance Use Topics   • Smoking status: Never Smoker   • Smokeless tobacco: Never Used    • Alcohol use No      Comment: allergic to it     Family History   Problem Relation Age of Onset   • Kidney Disease Mother    • Colon Cancer Father      She  has a past medical history of Acid reflux; Arthritis; Asthma; Breath shortness; Bronchitis (08/04/14); Cancer ([facility]) (01/13); Chronic pain of left knee (8/22/2017); Chronic pain of right knee (7/1/2016); COPD (chronic obstructive pulmonary disease) ([facility]); COPD (chronic obstructive pulmonary disease) ([facility]) (6/30/2016); Diabetes ([facility]); Dyspnea; Emphysema of lung ([facility]); Heart burn; Hypertension; Impaired fasting glucose (8/24/2015); Indigestion; Mass of left lower leg (7/27/2016); Personal history of renal cancer (4/20/2015); Renal disorder; Renal disorder (2013); Unspecified disorder of thyroid; and Unspecified hypothyroidism (4/20/2015). She also has no past medical history of Encounter for long-term (current) use of other medications.   Past Surgical History:   Procedure Laterality Date   • KNEE ARTHROPLASTY TOTAL Left 12/18/2017    Procedure: KNEE ARTHROPLASTY TOTAL;  Surgeon: Ricky Castaneda M.D.;  Location: [facility];  Service: Orthopedics   • KNEE ARTHROPLASTY TOTAL Right 10/17/2016    Procedure: KNEE ARTHROPLASTY TOTAL;  Surgeon: Ricky Castaneda M.D.;  Location: [facility];  Service:    • RECOVERY  3/4/2016    Procedure: IR2-PERQ NEPHRO-URETERAL CATH RIGHT-DR. RIDDLE-Surgery to follow in [facility] ;  Surgeon: Ir-Recovery Surgery;  Location: [facility];  Service:    • CYSTOSCOPY STENT REMOVAL Right 3/4/2016    Procedure: RIGID CYSTOSCOPY STENT REMOVAL;  Surgeon: Fredy Riddle M.D.;  Location: [facility];  Service:    • URETEROSCOPY Right 3/4/2016    Procedure: URETEROSCOPY;  Surgeon: Fredy Riddle M.D.;  Location: [facility];  Service:    • PERCUTANEOUS NEPHROSTOLITHOTOMY Right 3/4/2016    Procedure: PERCUTANEOUS NEPHROSTOLITHOTOMY FOR NEPHROLITHOTRIPSY;  Surgeon: Fredy Riddle M.D.;   "Location: [facility];  Service:    • CYSTOSCOPY STENT PLACEMENT Right 2/12/2016    Procedure: CYSTOSCOPY STENT PLACEMENT;  Surgeon: Fredy Cnitron M.D.;  Location: [facility];  Service:    • CYSTOSCOPY STENT PLACEMENT Right 6/26/2015    Procedure: CYSTOSCOPY STENT PLACEMENT RIGID POSSIBLE STENT;  Surgeon: Fredy Cinrton M.D.;  Location: [facility];  Service:    • URETEROSCOPY N/A 6/26/2015    Procedure: URETEROSCOPY;  Surgeon: Fredy Cintron M.D.;  Location: [facility];  Service:    • LASERTRIPSY Right 6/26/2015    Procedure: LASERTRIPSY LITHO;  Surgeon: Fredy Cintron M.D.;  Location: [facility];  Service:    • URETEROSCOPY  8/20/2014    Performed by Fredy Cintron M.D. at [facility]   • LASERTRIPSY  8/20/2014    Performed by Fredy Cintron M.D. at [facility]   • CYSTOSCOPY  8/20/2014    Performed by Fredy Cintron M.D. at [facility]   • CYSTOSCOPY STENT PLACEMENT  1/31/2013    Performed by Fredy Cintron M.D. at [facility]   • URETEROSCOPY  1/31/2013    Performed by Fredy Cintron M.D. at [facility]   • NEPHRECTOMY PARTIAL  1/31/2013    Performed by Fredy Cintron M.D. at [facility]   • LASERTRIPSY  1/31/2013    Performed by Fredy Cintron M.D. at [facility]   • OTHER ORTHOPEDIC SURGERY  2006    right knee meniscus   • OTHER ORTHOPEDIC SURGERY  1996    ORIF right arm   • OTHER  1995    sinus surgery   • GYN SURGERY  1994    hysterectomy   • OTHER  1980    thyroid lumpectomy   • CUATE BY LAPAROSCOPY     • OTHER ORTHOPEDIC SURGERY      achilles tendon reattach    • TONSILLECTOMY             Exam:     Blood pressure 152/88, pulse 88, temperature (!) 35.6 °C (96 °F), temperature source Temporal, height 1.77 m (5' 9.69\"), weight 85.2 kg (187 lb 13.3 oz), SpO2 95 %, not currently breastfeeding. Body mass index is 27.2 kg/m².    Hearing good.    Dentition good  Alert, oriented " in no acute distress.  Eye contact is good, speech goal directed, affect calm.  Neck is supple and without significant lymphadenopathy.  Lungs are clear without forced expiratory wheeze.  Cardiovascular peripheral circulation is satisfactory.  Heart sounds are unremarkable.  Abdomen is soft and without obvious masses or tenderness.  There are normal bowel sounds.  Breast, pelvic, and rectal exams were not performed at this time.  Extremities are without significant edema, cyanosis, or deformity.  Brief neurologic exam is unremarkable with essentially normal sensation, strength, gait, and cerebellar functions.      Assessment and Plan. The following treatment and monitoring plan is recommended:      Asthma  She has fairly mild asthma for which she is followed by pulmonary.  She uses her inhaler only occasionally.    COPD (chronic obstructive pulmonary disease) (HCC)  She has chronic lung disease followed by pulmonary.  She is essentially asymptomatic she reports.    Essential hypertension  She has a history of hypertension.  Blood pressure was mildly elevated when she came to the office today but repeat check was much improved.  She continues losartan with hydrochlorothiazide and she tries to follow a low-salt diet with some success.    GERD (gastroesophageal reflux disease)  She has gastroesophageal reflux that is currently stable and treated primarily with diet.    Hyperlipidemia  She has hyperlipidemia for which she is on fenofibrate and simvastatin.  Most recent cholesterol is 150, triglycerides are 209, HDL 52, LDL 56.  She will continue same medications.  We spent a long time discussing appropriate diet.    Hypothyroidism  She is on levothyroxine for hypothyroidism.  Clinically she is euthyroid.    Impaired fasting glucose  Blood sugar again is elevated at 128 and glycohemoglobin is 7.1.  She continues metformin 500 mg daily.  We had a long discussion regarding appropriate diet.  She thinks she can make big  improvements.  We agreed to increase the Metformin to 500 mg twice a day.  We briefly again reviewed potential side effects.    Personal history of renal cancer  She has a history of renal cancer treated with partial nephrectomy by Dr. Cintron about 6 years ago.  There is no evidence of recurrence.          Services suggested: No services needed at this time  Health Care Screening recommendations as per orders if indicated.  Referrals offered: PT/OT/Nutrition counseling/Behavioral Health/Smoking cessation as per orders if indicated.    Discussion today about general wellness and lifestyle habits:    · Prevent falls and reduce trip hazards; Cautioned about securing or removing rugs.  · Have a working fire alarm and carbon monoxide detector;   · Engage in regular physical activity and social activities       Follow-up: 4 months if not sooner.

## 2019-02-07 NOTE — ASSESSMENT & PLAN NOTE
She has a history of hypertension.  Blood pressure was mildly elevated when she came to the office today but repeat check was much improved.  She continues losartan with hydrochlorothiazide and she tries to follow a low-salt diet with some success.

## 2019-02-07 NOTE — ASSESSMENT & PLAN NOTE
Blood sugar again is elevated at 128 and glycohemoglobin is 7.1.  She continues metformin 500 mg daily.  We had a long discussion regarding appropriate diet.  She thinks she can make big improvements.  We agreed to increase the Metformin to 500 mg twice a day.  We briefly again reviewed potential side effects.

## 2019-02-07 NOTE — ASSESSMENT & PLAN NOTE
She has a history of renal cancer treated with partial nephrectomy by Dr. Cintron about 6 years ago.  There is no evidence of recurrence.

## 2019-02-07 NOTE — ASSESSMENT & PLAN NOTE
She has fairly mild asthma for which she is followed by pulmonary.  She uses her inhaler only occasionally.

## 2019-02-07 NOTE — ASSESSMENT & PLAN NOTE
She has hyperlipidemia for which she is on fenofibrate and simvastatin.  Most recent cholesterol is 150, triglycerides are 209, HDL 52, LDL 56.  She will continue same medications.  We spent a long time discussing appropriate diet.

## 2019-03-29 DIAGNOSIS — J40 BRONCHITIS: ICD-10-CM

## 2019-03-29 RX ORDER — ALBUTEROL SULFATE 90 UG/1
2 AEROSOL, METERED RESPIRATORY (INHALATION) EVERY 4 HOURS PRN
Qty: 8.5 G | Refills: 6 | Status: SHIPPED | OUTPATIENT
Start: 2019-03-29 | End: 2020-12-10

## 2019-03-29 NOTE — TELEPHONE ENCOUNTER
Have we ever prescribed this med? Yes.  If yes, what date? 03/01/17    Last OV: 12/04/18    Next OV: None scheduled     DX: COPD    Medication: albuterol

## 2019-04-04 ENCOUNTER — HOSPITAL ENCOUNTER (OUTPATIENT)
Dept: RADIOLOGY | Facility: MEDICAL CENTER | Age: 74
End: 2019-04-04

## 2019-04-09 ENCOUNTER — HOSPITAL ENCOUNTER (OUTPATIENT)
Dept: RADIOLOGY | Facility: MEDICAL CENTER | Age: 74
End: 2019-04-09
Attending: INTERNAL MEDICINE
Payer: MEDICARE

## 2019-04-09 DIAGNOSIS — M89.9 BONE DISEASE: ICD-10-CM

## 2019-04-09 DIAGNOSIS — Z12.39 SCREENING FOR BREAST CANCER: ICD-10-CM

## 2019-04-09 PROCEDURE — 77080 DXA BONE DENSITY AXIAL: CPT

## 2019-04-09 PROCEDURE — 77063 BREAST TOMOSYNTHESIS BI: CPT

## 2019-04-09 RX ORDER — CLINDAMYCIN HYDROCHLORIDE 300 MG/1
600 CAPSULE ORAL ONCE
Qty: 2 CAP | Refills: 0 | Status: SHIPPED | OUTPATIENT
Start: 2019-04-09 | End: 2019-04-09

## 2019-04-10 NOTE — PROGRESS NOTES
Patient was demanding antibiotics prior to dental procedure because of her knee replacement which was 1 year and 4 months ago.  I told her that that was not indicated but she insisted.

## 2019-04-15 ENCOUNTER — TELEPHONE (OUTPATIENT)
Dept: PULMONOLOGY | Facility: HOSPICE | Age: 74
End: 2019-04-15

## 2019-04-15 NOTE — TELEPHONE ENCOUNTER
Pt called  was informed by her [facility] pharmacy, there is an adviar generic. States will be less cost effective for the pt. Pt states currently uses Symbicort and Spiriva never used advair. States cost her 250 a month for both Symbicort and spiriva. Informed the patient might need to schedule appt to review change in medication with provider     Last seen 12/4/18 LALA Grove PA-C  Plan:  1-requested antibiotic for sinus infection, past history with current symptoms  2-spacer provided for symbicort use  3-consider TRELEGY for new year  4-Spiriva respimat for preferred delivery system, prescription sent     This dictation was created using voice recognition software. The accuracy of the dictation is limited to the abilities of the software. I expect there may be some errors of grammar and possibly content.     Next OV no pending appt

## 2019-04-15 NOTE — TELEPHONE ENCOUNTER
Janet Grove P.A.-C.   You 3 minutes ago (4:52 PM)      Okay to use generic for Advair instead of brand. (Routing comment)

## 2019-04-16 RX ORDER — FLUTICASONE PROPIONATE AND SALMETEROL XINAFOATE 115; 21 UG/1; UG/1
2 AEROSOL, METERED RESPIRATORY (INHALATION) 2 TIMES DAILY
Qty: 1 INHALER | Refills: 11 | Status: SHIPPED | OUTPATIENT
Start: 2019-04-16 | End: 2019-04-17

## 2019-04-16 NOTE — TELEPHONE ENCOUNTER
You can place prescription for Advair I can call her pharmacy and request they give her generic. I will need Advair type HFA or Discuss and dose.

## 2019-04-17 NOTE — TELEPHONE ENCOUNTER
fluticasone-salmeterol (ADVAIR HFA) 115-21 MCG/ACT inhaler   Pharmacy is currently closed will try again later.

## 2019-04-17 NOTE — TELEPHONE ENCOUNTER
Left vm informed adviar  has been prescribed by LALA Grove PA-C. Directions where given informed Pharmacy should be able to prescribe generic brand       [facility] - [place], [place] - [address]  [address]  [place] [address]  Phone: [phone] Fax: [phone]    I spoke to the pharmacist states only generic for Diskus Not HFA   Name of generic fluticasone-salmeterol propionate. Please advise

## 2019-04-18 NOTE — TELEPHONE ENCOUNTER
Janet Grove P.A.-C.   You 13 hours ago (6:12 PM)      Hopefully, this resolves issue.  Order for generic diskus 250 (Routing comment)

## 2019-04-22 ENCOUNTER — OFFICE VISIT (OUTPATIENT)
Dept: URGENT CARE | Facility: CLINIC | Age: 74
End: 2019-04-22
Payer: MEDICARE

## 2019-04-22 VITALS
SYSTOLIC BLOOD PRESSURE: 132 MMHG | TEMPERATURE: 97.9 F | HEART RATE: 77 BPM | OXYGEN SATURATION: 94 % | BODY MASS INDEX: 26.77 KG/M2 | WEIGHT: 187 LBS | DIASTOLIC BLOOD PRESSURE: 78 MMHG | RESPIRATION RATE: 13 BRPM | HEIGHT: 70 IN

## 2019-04-22 DIAGNOSIS — R21 RASH: ICD-10-CM

## 2019-04-22 PROCEDURE — 99214 OFFICE O/P EST MOD 30 MIN: CPT | Performed by: FAMILY MEDICINE

## 2019-04-22 RX ORDER — CLOBETASOL PROPIONATE 0.5 MG/G
OINTMENT TOPICAL
Qty: 30 G | Refills: 1 | Status: SHIPPED | OUTPATIENT
Start: 2019-04-22 | End: 2021-08-04

## 2019-04-22 NOTE — PROGRESS NOTES
"Subjective:      Jillian Gottlieb is a 73 y.o. female who presents with Rash (X 1 week,both legs, spread to upper body b)      - This is a pleasant and non toxic appearing 73 y.o. female with itchy rash x 1 wk. Started after going to someone's house.           ALLERGIES:  Alcohol and Pcn [penicillins]     PMH:  Past Medical History:   Diagnosis Date   • Acid reflux    • Arthritis     knees, hands, hips, neck-Osteo   • Asthma     inhalers daily   • Breath shortness     asthma related   • Bronchitis 08/04/14   • Cancer (HCC) 01/13    R kidney   • Chronic pain of left knee 8/22/2017   • Chronic pain of right knee 7/1/2016   • COPD (chronic obstructive pulmonary disease) (HCC)    • COPD (chronic obstructive pulmonary disease) (HCC) 6/30/2016   • Diabetes ([facility])     \"pre\"   • Dyspnea    • Emphysema of lung ([facility])    • Heart burn    • Hypertension    • Impaired fasting glucose 8/24/2015   • Indigestion    • Mass of left lower leg 7/27/2016   • Personal history of renal cancer 4/20/2015   • Renal disorder     stones   • Renal disorder 2013    cancer right kidney 7% removed   • Unspecified disorder of thyroid     benign tumor removal   • Unspecified hypothyroidism 4/20/2015        PSH:  Past Surgical History:   Procedure Laterality Date   • KNEE ARTHROPLASTY TOTAL Left 12/18/2017    Procedure: KNEE ARTHROPLASTY TOTAL;  Surgeon: Ricky Castaneda M.D.;  Location: [facility];  Service: Orthopedics   • KNEE ARTHROPLASTY TOTAL Right 10/17/2016    Procedure: KNEE ARTHROPLASTY TOTAL;  Surgeon: Ricky Castaneda M.D.;  Location: [facility];  Service:    • RECOVERY  3/4/2016    Procedure: IR2-PERQ NEPHRO-URETERAL CATH RIGHT-DR. RIDDLE-Surgery to follow in [facility] ;  Surgeon: Ir-Recovery Surgery;  Location: SURGERY [facility];  Service:    • CYSTOSCOPY STENT REMOVAL Right 3/4/2016    Procedure: RIGID CYSTOSCOPY STENT REMOVAL;  Surgeon: Fredy Riddle M.D.;  Location: [facility];  Service:    • " URETEROSCOPY Right 3/4/2016    Procedure: URETEROSCOPY;  Surgeon: Fredy Cintron M.D.;  Location: [facility];  Service:    • PERCUTANEOUS NEPHROSTOLITHOTOMY Right 3/4/2016    Procedure: PERCUTANEOUS NEPHROSTOLITHOTOMY FOR NEPHROLITHOTRIPSY;  Surgeon: Fredy Cintron M.D.;  Location: [facility];  Service:    • CYSTOSCOPY STENT PLACEMENT Right 2/12/2016    Procedure: CYSTOSCOPY STENT PLACEMENT;  Surgeon: Fredy Cintron M.D.;  Location: [facility];  Service:    • CYSTOSCOPY STENT PLACEMENT Right 6/26/2015    Procedure: CYSTOSCOPY STENT PLACEMENT RIGID POSSIBLE STENT;  Surgeon: Fredy Cintron M.D.;  Location: [facility];  Service:    • URETEROSCOPY N/A 6/26/2015    Procedure: URETEROSCOPY;  Surgeon: Fredy Cintron M.D.;  Location: [facility];  Service:    • LASERTRIPSY Right 6/26/2015    Procedure: LASERTRIPSY LITHO;  Surgeon: Fredy Cintron M.D.;  Location: [facility];  Service:    • URETEROSCOPY  8/20/2014    Performed by Fredy Cintron M.D. at [facility]   • LASERTRIPSY  8/20/2014    Performed by Fredy Cintron M.D. at [facility]   • CYSTOSCOPY  8/20/2014    Performed by Fredy Cintron M.D. at [facility]   • CYSTOSCOPY STENT PLACEMENT  1/31/2013    Performed by Fredy Cintron M.D. at [facility]   • URETEROSCOPY  1/31/2013    Performed by Fredy Cintron M.D. at [facility]   • NEPHRECTOMY PARTIAL  1/31/2013    Performed by Fredy Cintron M.D. at [facility]   • LASERTRIPSY  1/31/2013    Performed by Fredy Cintron M.D. at [facility]   • OTHER ORTHOPEDIC SURGERY  2006    right knee meniscus   • OTHER ORTHOPEDIC SURGERY  1996    ORIF right arm   • OTHER  1995    sinus surgery   • GYN SURGERY  1994    hysterectomy   • OTHER  1980    thyroid lumpectomy   • BREAST BIOPSY Left 1976    benign   • CUATE BY LAPAROSCOPY     • OTHER ORTHOPEDIC SURGERY      achilles tendon  reattach    • TONSILLECTOMY         MEDS:    Current Outpatient Prescriptions:   •  clobetasol (TEMOVATE) 0.05 % Ointment, BID x 1 wk, Disp: 30 g, Rfl: 1  •  fluticasone-salmeterol (ADVAIR) 250-50 MCG/DOSE AEROSOL POWDER, BREATH ACTIVATED, Inhale 1 Puff by mouth every 12 hours., Disp: 1 Inhaler, Rfl: 4  •  albuterol (PROAIR HFA) 108 (90 Base) MCG/ACT Aero Soln inhalation aerosol, Inhale 2 Puffs by mouth every four hours as needed for Shortness of Breath., Disp: 8.5 g, Rfl: 6  •  fenofibrate (TRIGLIDE) 160 MG tablet, TAKE 1 TABLET BY MOUTH ONCE DAILY, Disp: 90 Tab, Rfl: 3  •  simvastatin (ZOCOR) 20 MG Tab, TAKE 1 TABLET BY MOUTH ONCE DAILY IN THE EVENING, Disp: 90 Tab, Rfl: 3  •  metoprolol SR (TOPROL XL) 25 MG TABLET SR 24 HR, TAKE 1 TABLET BY MOUTH ONCE DAILY, Disp: 90 Tab, Rfl: 3  •  metFORMIN ER (GLUCOPHAGE XR) 500 MG TABLET SR 24 HR, Take 1 Tab by mouth 2 times a day., Disp: 180 Tab, Rfl: 3  •  fenofibrate (TRIGLIDE) 160 MG tablet, fenofibrate 160 mg tablet, Disp: , Rfl:   •  levothyroxine (SYNTHROID) 88 MCG Tab, levothyroxine 88 mcg tablet, Disp: , Rfl:   •  metoprolol SR (TOPROL XL) 25 MG TABLET SR 24 HR, metoprolol succinate ER 25 mg tablet,extended release 24 hr, Disp: , Rfl:   •  raloxifene (EVISTA) 60 MG Tab, raloxifene 60 mg tablet, Disp: , Rfl:   •  simvastatin (ZOCOR) 20 MG Tab, simvastatin 20 mg tablet, Disp: , Rfl:   •  BOOSTRIX 5-2.5-18.5 Suspension, , Disp: , Rfl:   •  Tiotropium Bromide Monohydrate (SPIRIVA RESPIMAT) 2.5 MCG/ACT Aero Soln, Inhale 2 Inhalation by mouth every day. Two inhalations once daily.  Assemble and prime., Disp: 2 Inhaler, Rfl: 6  •  vitamin E (VITAMIN E) 1000 UNIT Cap, Take 1 Cap by mouth every day., Disp: , Rfl:   •  multivitamin (THERAGRAN) Tab, Take 1 Tab by mouth every day., Disp: , Rfl:   •  vitamin D (CHOLECALCIFEROL) 1000 UNIT Tab, Take 1,000 Units by mouth every day., Disp: , Rfl:   •  aspirin EC (ECOTRIN) 81 MG Tablet Delayed Response, Take 81 mg by mouth every day.,  "Disp: , Rfl:     ** I have documented what I find to be significant in regards to past medical, social, family and surgical history  in my HPI or under PMH/PSH/FH review section, otherwise it is contributory **         HPI    Review of Systems   Skin: Positive for rash.   All other systems reviewed and are negative.         Objective:     /78   Pulse 77   Temp 36.6 °C (97.9 °F)   Resp 13   Ht 1.77 m (5' 9.69\")   Wt 84.8 kg (187 lb)   SpO2 94%   BMI 27.07 kg/m²      Physical Exam   Constitutional: She appears well-developed. No distress.   HENT:   Head: Normocephalic and atraumatic.   Cardiovascular: Regular rhythm.    No murmur heard.  Pulmonary/Chest: Effort normal. No respiratory distress.   Neurological: She is alert. She exhibits normal muscle tone.   Skin: Skin is warm and dry.   Psychiatric: She has a normal mood and affect. Judgment normal.   Nursing note and vitals reviewed.    Red insect bite like papules Lt leg and Rt axilla and Rt flank. ~ 10-15             Assessment/Plan:         1. Rash  clobetasol (TEMOVATE) 0.05 % Ointment             Dx & d/c instructions discussed w/ patient and/or family members.     ER precautions (worsening signs symptoms and when to go to ER) discussed.    Follow up here or PCP or ER in 2-3 days if symptoms not improving, ER if feeling/getting worse.    Any realistic/common medication side effects (i.e. Rash, GI upset/constipation, sedation, elevation of BP or blood sugars) discussed.     Patient left in stable condition              "

## 2019-05-13 ENCOUNTER — TELEPHONE (OUTPATIENT)
Dept: PULMONOLOGY | Facility: HOSPICE | Age: 74
End: 2019-05-13

## 2019-05-13 RX ORDER — METHYLPREDNISOLONE 4 MG/1
TABLET ORAL
Qty: 21 TAB | Refills: 0 | Status: SHIPPED | OUTPATIENT
Start: 2019-05-13 | End: 2019-12-18

## 2019-05-13 RX ORDER — AZITHROMYCIN 250 MG/1
TABLET, FILM COATED ORAL
Qty: 6 TAB | Refills: 0 | Status: SHIPPED | OUTPATIENT
Start: 2019-05-13 | End: 2019-12-18

## 2019-05-13 NOTE — TELEPHONE ENCOUNTER
The patient called and she states that she is getting bronchitis.  She states that Dr. Tomlinson told her that she could just call to get a zpack and prednisone when needed.  I tried to make her an appt, but she refused.  Please advise, thank you.

## 2019-05-14 NOTE — TELEPHONE ENCOUNTER
Left the patient a  mssg telling her that meds were sent to her pharmacy and that if she gets worse, to seek medical help.

## 2019-05-14 NOTE — TELEPHONE ENCOUNTER
Patient does not appear to have taken Z-Paul since December and no recent Medrol Dosepak.  Prescription sent to preferred pharmacy.  Patient to seek medical care should her symptoms worsen.

## 2019-05-18 ENCOUNTER — OFFICE VISIT (OUTPATIENT)
Dept: URGENT CARE | Facility: CLINIC | Age: 74
End: 2019-05-18
Payer: MEDICARE

## 2019-05-18 ENCOUNTER — HOSPITAL ENCOUNTER (OUTPATIENT)
Dept: RADIOLOGY | Facility: MEDICAL CENTER | Age: 74
End: 2019-05-18
Attending: PHYSICIAN ASSISTANT
Payer: MEDICARE

## 2019-05-18 VITALS
RESPIRATION RATE: 20 BRPM | TEMPERATURE: 98.5 F | BODY MASS INDEX: 26.39 KG/M2 | DIASTOLIC BLOOD PRESSURE: 82 MMHG | WEIGHT: 178.2 LBS | OXYGEN SATURATION: 98 % | SYSTOLIC BLOOD PRESSURE: 128 MMHG | HEIGHT: 69 IN | HEART RATE: 74 BPM

## 2019-05-18 DIAGNOSIS — J45.901 ASTHMA WITH ACUTE EXACERBATION, UNSPECIFIED ASTHMA SEVERITY, UNSPECIFIED WHETHER PERSISTENT: ICD-10-CM

## 2019-05-18 DIAGNOSIS — R05.9 COUGH: ICD-10-CM

## 2019-05-18 DIAGNOSIS — J44.9 CHRONIC OBSTRUCTIVE PULMONARY DISEASE, UNSPECIFIED COPD TYPE (HCC): ICD-10-CM

## 2019-05-18 DIAGNOSIS — J22 LOWER RESPIRATORY INFECTION (E.G., BRONCHITIS, PNEUMONIA, PNEUMONITIS, PULMONITIS): ICD-10-CM

## 2019-05-18 PROCEDURE — 71046 X-RAY EXAM CHEST 2 VIEWS: CPT

## 2019-05-18 PROCEDURE — 99214 OFFICE O/P EST MOD 30 MIN: CPT | Performed by: PHYSICIAN ASSISTANT

## 2019-05-18 RX ORDER — DOXYCYCLINE HYCLATE 100 MG
100 TABLET ORAL 2 TIMES DAILY
Qty: 20 TAB | Refills: 0 | Status: SHIPPED | OUTPATIENT
Start: 2019-05-18 | End: 2019-05-28

## 2019-05-18 RX ORDER — ALBUTEROL SULFATE 0.63 MG/3ML
0.63 SOLUTION RESPIRATORY (INHALATION) EVERY 4 HOURS PRN
Qty: 90 ML | Refills: 1 | Status: SHIPPED | OUTPATIENT
Start: 2019-05-18 | End: 2020-12-15 | Stop reason: SDUPTHER

## 2019-05-18 ASSESSMENT — ENCOUNTER SYMPTOMS
MYALGIAS: 0
RHINORRHEA: 1
SORE THROAT: 0
FEVER: 0
SHORTNESS OF BREATH: 1
HEADACHES: 0
COUGH: 1
CHILLS: 0
WHEEZING: 1

## 2019-05-18 ASSESSMENT — COPD QUESTIONNAIRES: COPD: 1

## 2019-05-18 NOTE — PROGRESS NOTES
Subjective:   Jillian Gottlieb is a 73 y.o. female who presents for Cough (cold, not breathing well patient has asthma, x3 weeks)        Patient complains of cough and asthma exacerbation x3 weeks.  She states that she called her pulmonologist and was placed on azithromycin and a 5-day course of prednisone.  She has noticed an improvement in her symptoms however her symptoms have not improved especially normally do with these treatments.  Her pulmonologist instructed her to seek medical reevaluation.      Cough   This is a new problem. The current episode started 1 to 4 weeks ago. The problem has been gradually worsening. The problem occurs constantly. The cough is productive of sputum. Associated symptoms include postnasal drip, rhinorrhea, shortness of breath and wheezing. Pertinent negatives include no chills, fever, headaches, myalgias or sore throat. She has tried a beta-agonist inhaler, oral steroids and steroid inhaler (antibiotic) for the symptoms. The treatment provided mild relief. Her past medical history is significant for asthma, bronchitis, COPD and emphysema. There is no history of pneumonia.     Review of Systems   Constitutional: Negative for chills and fever.   HENT: Positive for postnasal drip and rhinorrhea. Negative for sore throat.    Respiratory: Positive for cough, shortness of breath and wheezing.    Musculoskeletal: Negative for myalgias.   Neurological: Negative for headaches.       PMH:  has a past medical history of Acid reflux; Arthritis; Asthma; Breath shortness; Bronchitis (08/04/14); Cancer ([facility]) (01/13); Chronic pain of left knee (8/22/2017); Chronic pain of right knee (7/1/2016); COPD (chronic obstructive pulmonary disease) ([facility]); COPD (chronic obstructive pulmonary disease) ([facility]) (6/30/2016); Diabetes ([facility]); Dyspnea; Emphysema of lung ([facility]); Heart burn; Hypertension; Impaired fasting glucose (8/24/2015); Indigestion; Mass of left lower leg (7/27/2016); Personal history of renal  cancer (4/20/2015); Renal disorder; Renal disorder (2013); Unspecified disorder of thyroid; and Unspecified hypothyroidism (4/20/2015). She also has no past medical history of Encounter for long-term (current) use of other medications.  MEDS:   Current Outpatient Prescriptions:   •  doxycycline (VIBRAMYCIN) 100 MG Tab, Take 1 Tab by mouth 2 times a day for 10 days., Disp: 20 Tab, Rfl: 0  •  albuterol (ACCUNEB) 0.63 MG/3ML nebulizer solution, 3 mL by Nebulization route every four hours as needed for Shortness of Breath., Disp: 90 mL, Rfl: 1  •  azithromycin (ZITHROMAX) 250 MG Tab, Take 2 tablets on day 1, then take 1 tablet a day for 4 days., Disp: 6 Tab, Rfl: 0  •  MethylPREDNISolone (MEDROL DOSEPAK) 4 MG Tablet Therapy Pack, Take as directed., Disp: 21 Tab, Rfl: 0  •  fluticasone-salmeterol (ADVAIR) 250-50 MCG/DOSE AEROSOL POWDER, BREATH ACTIVATED, Inhale 1 Puff by mouth every 12 hours., Disp: 1 Inhaler, Rfl: 4  •  fenofibrate (TRIGLIDE) 160 MG tablet, TAKE 1 TABLET BY MOUTH ONCE DAILY, Disp: 90 Tab, Rfl: 3  •  simvastatin (ZOCOR) 20 MG Tab, TAKE 1 TABLET BY MOUTH ONCE DAILY IN THE EVENING, Disp: 90 Tab, Rfl: 3  •  metoprolol SR (TOPROL XL) 25 MG TABLET SR 24 HR, TAKE 1 TABLET BY MOUTH ONCE DAILY, Disp: 90 Tab, Rfl: 3  •  metFORMIN ER (GLUCOPHAGE XR) 500 MG TABLET SR 24 HR, Take 1 Tab by mouth 2 times a day., Disp: 180 Tab, Rfl: 3  •  fenofibrate (TRIGLIDE) 160 MG tablet, fenofibrate 160 mg tablet, Disp: , Rfl:   •  levothyroxine (SYNTHROID) 88 MCG Tab, levothyroxine 88 mcg tablet, Disp: , Rfl:   •  metoprolol SR (TOPROL XL) 25 MG TABLET SR 24 HR, metoprolol succinate ER 25 mg tablet,extended release 24 hr, Disp: , Rfl:   •  raloxifene (EVISTA) 60 MG Tab, raloxifene 60 mg tablet, Disp: , Rfl:   •  simvastatin (ZOCOR) 20 MG Tab, simvastatin 20 mg tablet, Disp: , Rfl:   •  Tiotropium Bromide Monohydrate (SPIRIVA RESPIMAT) 2.5 MCG/ACT Aero Soln, Inhale 2 Inhalation by mouth every day. Two inhalations once daily.   "Assemble and prime., Disp: 2 Inhaler, Rfl: 6  •  multivitamin (THERAGRAN) Tab, Take 1 Tab by mouth every day., Disp: , Rfl:   •  clobetasol (TEMOVATE) 0.05 % Ointment, BID x 1 wk (Patient not taking: Reported on 5/18/2019), Disp: 30 g, Rfl: 1  •  albuterol (PROAIR HFA) 108 (90 Base) MCG/ACT Aero Soln inhalation aerosol, Inhale 2 Puffs by mouth every four hours as needed for Shortness of Breath. (Patient not taking: Reported on 5/18/2019), Disp: 8.5 g, Rfl: 6  •  BOOSTRIX 5-2.5-18.5 Suspension, , Disp: , Rfl:   •  vitamin E (VITAMIN E) 1000 UNIT Cap, Take 1 Cap by mouth every day., Disp: , Rfl:   •  vitamin D (CHOLECALCIFEROL) 1000 UNIT Tab, Take 1,000 Units by mouth every day., Disp: , Rfl:   •  aspirin EC (ECOTRIN) 81 MG Tablet Delayed Response, Take 81 mg by mouth every day., Disp: , Rfl:   ALLERGIES:   Allergies   Allergen Reactions   • Alcohol Vomiting, Nausea and Unspecified     injested-facial numbness, n/v  RXN=50 years ago   • Pcn [Penicillins] Unspecified     Flushed; \"felt weird\"  Tolerates cephalosporins  RXN=age 30's     SURGHX:   Past Surgical History:   Procedure Laterality Date   • KNEE ARTHROPLASTY TOTAL Left 12/18/2017    Procedure: KNEE ARTHROPLASTY TOTAL;  Surgeon: Ricky Castaneda M.D.;  Location: [facility];  Service: Orthopedics   • KNEE ARTHROPLASTY TOTAL Right 10/17/2016    Procedure: KNEE ARTHROPLASTY TOTAL;  Surgeon: Ricky Castaneda M.D.;  Location: [facility];  Service:    • RECOVERY  3/4/2016    Procedure: IR2-PERQ NEPHRO-URETERAL CATH RIGHT-DR. RIDDLE-Surgery to follow in [facility] ;  Surgeon: Ir-Recovery Surgery;  Location: [facility];  Service:    • CYSTOSCOPY STENT REMOVAL Right 3/4/2016    Procedure: RIGID CYSTOSCOPY STENT REMOVAL;  Surgeon: Fredy Riddle M.D.;  Location: [facility];  Service:    • URETEROSCOPY Right 3/4/2016    Procedure: URETEROSCOPY;  Surgeon: Fredy Riddle M.D.;  Location: SURGERY [facility];  Service:    • " PERCUTANEOUS NEPHROSTOLITHOTOMY Right 3/4/2016    Procedure: PERCUTANEOUS NEPHROSTOLITHOTOMY FOR NEPHROLITHOTRIPSY;  Surgeon: Fredy Cintron M.D.;  Location: [facility];  Service:    • CYSTOSCOPY STENT PLACEMENT Right 2/12/2016    Procedure: CYSTOSCOPY STENT PLACEMENT;  Surgeon: Fredy Cintron M.D.;  Location: [facility];  Service:    • CYSTOSCOPY STENT PLACEMENT Right 6/26/2015    Procedure: CYSTOSCOPY STENT PLACEMENT RIGID POSSIBLE STENT;  Surgeon: Fredy Cintron M.D.;  Location: [facility];  Service:    • URETEROSCOPY N/A 6/26/2015    Procedure: URETEROSCOPY;  Surgeon: Fredy Cintron M.D.;  Location: [facility];  Service:    • LASERTRIPSY Right 6/26/2015    Procedure: LASERTRIPSY LITHO;  Surgeon: Frdey Cintron M.D.;  Location: [facility];  Service:    • URETEROSCOPY  8/20/2014    Performed by Fredy Cintron M.D. at [facility]   • LASERTRIPSY  8/20/2014    Performed by Fredy Cintron M.D. at [facility]   • CYSTOSCOPY  8/20/2014    Performed by Fredy Cintron M.D. at [facility]   • CYSTOSCOPY STENT PLACEMENT  1/31/2013    Performed by Fredy Cintron M.D. at [facility]   • URETEROSCOPY  1/31/2013    Performed by Fredy Cintron M.D. at [facility]   • NEPHRECTOMY PARTIAL  1/31/2013    Performed by Fredy Cintron M.D. at [facility]   • LASERTRIPSY  1/31/2013    Performed by Fredy Cintron M.D. at [facility]   • OTHER ORTHOPEDIC SURGERY  2006    right knee meniscus   • OTHER ORTHOPEDIC SURGERY  1996    ORIF right arm   • OTHER  1995    sinus surgery   • GYN SURGERY  1994    hysterectomy   • OTHER  1980    thyroid lumpectomy   • BREAST BIOPSY Left 1976    benign   • CUATE BY LAPAROSCOPY     • OTHER ORTHOPEDIC SURGERY      achilles tendon reattach    • TONSILLECTOMY       SOCHX:  reports that she has never smoked. She has never used smokeless tobacco. She reports that she does  "not drink alcohol or use drugs.  FH: Family history was reviewed, no pertinent findings to report   Objective:   /82 (BP Location: Left arm, Patient Position: Sitting, BP Cuff Size: Adult)   Pulse 74   Temp 36.9 °C (98.5 °F) (Temporal)   Resp 20   Ht 1.753 m (5' 9\")   Wt 80.8 kg (178 lb 3.2 oz)   SpO2 98%   BMI 26.32 kg/m²   Physical Exam   Constitutional: She appears well-developed and well-nourished.  Non-toxic appearance. No distress.   HENT:   Head: Normocephalic and atraumatic.   Right Ear: Hearing, tympanic membrane, external ear and ear canal normal.   Left Ear: Hearing, tympanic membrane, external ear and ear canal normal.   Nose: Nose normal. No mucosal edema or rhinorrhea.   Mouth/Throat: Uvula is midline, oropharynx is clear and moist and mucous membranes are normal.   Neck: Neck supple.   Cardiovascular: Normal rate, regular rhythm, S1 normal, S2 normal and normal heart sounds.  Exam reveals no gallop and no friction rub.    No murmur heard.  Pulmonary/Chest: Effort normal. No respiratory distress. She has no decreased breath sounds. She has wheezes in the right upper field, the right middle field, the right lower field, the left upper field, the left middle field and the left lower field. She has rhonchi in the right upper field, the right middle field, the left upper field and the left middle field. She has no rales.   Persistent dry cough.   Neurological: She is alert.   Skin: Skin is warm and dry. Capillary refill takes less than 2 seconds.   Psychiatric: She has a normal mood and affect. Her speech is normal and behavior is normal. Judgment and thought content normal. Cognition and memory are normal.   Vitals reviewed.        Assessment/Plan:   1. Lower respiratory infection (e.g., bronchitis, pneumonia, pneumonitis, pulmonitis)  - doxycycline (VIBRAMYCIN) 100 MG Tab; Take 1 Tab by mouth 2 times a day for 10 days.  Dispense: 20 Tab; Refill: 0    2. Asthma with acute exacerbation, " unspecified asthma severity, unspecified whether persistent  - albuterol (ACCUNEB) 0.63 MG/3ML nebulizer solution; 3 mL by Nebulization route every four hours as needed for Shortness of Breath.  Dispense: 90 mL; Refill: 1    3. Chronic obstructive pulmonary disease, unspecified COPD type (HCC)  - albuterol (ACCUNEB) 0.63 MG/3ML nebulizer solution; 3 mL by Nebulization route every four hours as needed for Shortness of Breath.  Dispense: 90 mL; Refill: 1    4. Cough  - DX-CHEST-2 VIEWS; Future    Patient to finish course of prednisone.  Additionally I will start her on a new antibiotic as I have concerns for pneumonia.  Chest x-ray obtained to further evaluate for pneumonia.  Patient is well-appearing, afebrile and vital signs stable.  Outpatient care is appropriate at this time.  She was given strict follow-up and ED precautions.  She verbalized understanding of these precautions.    CXR: no evidence of pleural effusion, focal consolidation, infiltrates, or cardiomegaly by my read. Radiology impression:  FINDINGS:  There is linear atelectasis within the left lingula.  The heart is normal in size.  There is no evidence of pleural effusion.  There are surgical changes involving the right proximal humerus.        Impression       Linear atelectasis within the left lingula.        Contacted patient to discuss chest x-ray results.  No evidence of pneumonia on chest x-ray, but she was informed that she does have some mild atelectasis. Patient instructed to ensure that she is taking deep breaths.  I reviewed return and ED precautions. Follow-up with pulmonology as planned.    Differential diagnosis, natural history, supportive care, and indications for immediate follow-up discussed.

## 2019-06-01 LAB
BUN SERPL-MCNC: 25 MG/DL (ref 8–27)
BUN/CREAT SERPL: 27 (ref 12–28)
CHLORIDE SERPL-SCNC: 101 MMOL/L (ref 96–106)
CHOLEST SERPL-MCNC: 161 MG/DL (ref 100–199)
CO2 SERPL-SCNC: 23 MMOL/L (ref 20–29)
CREAT SERPL-MCNC: 0.94 MG/DL (ref 0.57–1)
GLUCOSE SERPL-MCNC: 126 MG/DL (ref 65–99)
HBA1C MFR BLD: 6.5 % (ref 4.8–5.6)
HDLC SERPL-MCNC: 61 MG/DL
LABORATORY COMMENT REPORT: ABNORMAL
LDLC SERPL CALC-MCNC: 68 MG/DL (ref 0–99)
POTASSIUM SERPL-SCNC: 4.3 MMOL/L (ref 3.5–5.2)
SODIUM SERPL-SCNC: 140 MMOL/L (ref 134–144)
TRIGL SERPL-MCNC: 158 MG/DL (ref 0–149)
VLDLC SERPL CALC-MCNC: 32 MG/DL (ref 5–40)

## 2019-06-05 ENCOUNTER — OFFICE VISIT (OUTPATIENT)
Dept: MEDICAL GROUP | Facility: MEDICAL CENTER | Age: 74
End: 2019-06-05
Payer: MEDICARE

## 2019-06-05 VITALS
HEART RATE: 77 BPM | DIASTOLIC BLOOD PRESSURE: 78 MMHG | HEIGHT: 69 IN | WEIGHT: 179 LBS | OXYGEN SATURATION: 97 % | SYSTOLIC BLOOD PRESSURE: 122 MMHG | TEMPERATURE: 98.4 F | BODY MASS INDEX: 26.51 KG/M2

## 2019-06-05 DIAGNOSIS — E78.5 HYPERLIPIDEMIA, UNSPECIFIED HYPERLIPIDEMIA TYPE: ICD-10-CM

## 2019-06-05 DIAGNOSIS — E03.9 HYPOTHYROIDISM, UNSPECIFIED TYPE: ICD-10-CM

## 2019-06-05 DIAGNOSIS — J45.20 MILD INTERMITTENT ASTHMA WITHOUT COMPLICATION: ICD-10-CM

## 2019-06-05 DIAGNOSIS — I10 ESSENTIAL HYPERTENSION: ICD-10-CM

## 2019-06-05 DIAGNOSIS — Z85.528 PERSONAL HISTORY OF RENAL CANCER: ICD-10-CM

## 2019-06-05 DIAGNOSIS — K21.9 GASTROESOPHAGEAL REFLUX DISEASE WITHOUT ESOPHAGITIS: ICD-10-CM

## 2019-06-05 DIAGNOSIS — R73.01 IMPAIRED FASTING GLUCOSE: ICD-10-CM

## 2019-06-05 PROBLEM — R74.8 ELEVATED LIVER ENZYMES: Status: RESOLVED | Noted: 2017-08-22 | Resolved: 2019-06-05

## 2019-06-05 PROCEDURE — 99214 OFFICE O/P EST MOD 30 MIN: CPT | Performed by: INTERNAL MEDICINE

## 2019-06-05 RX ORDER — LOSARTAN POTASSIUM AND HYDROCHLOROTHIAZIDE 25; 100 MG/1; MG/1
TABLET ORAL
Refills: 3 | COMMUNITY
Start: 2019-05-06 | End: 2019-11-20

## 2019-06-05 RX ORDER — TIOTROPIUM BROMIDE 18 UG/1
CAPSULE ORAL; RESPIRATORY (INHALATION)
Refills: 3 | COMMUNITY
Start: 2019-04-27 | End: 2021-04-28

## 2019-06-06 NOTE — ASSESSMENT & PLAN NOTE
She continues fenofibrate and simvastatin for her hyperlipidemia.  Most recent cholesterol is 161, triglycerides 158, HDL 61, LDL 60.  She tries to follow appropriate diet.  She has been able to lose about 10 pounds in the last few months.

## 2019-06-06 NOTE — ASSESSMENT & PLAN NOTE
Her blood pressure is under good control with metoprolol.  She denies significant lightheadedness.

## 2019-06-06 NOTE — PROGRESS NOTES
Subjective:     Chief Complaint   Patient presents with   • Hypertension   • Follow-Up     Jillian Gottlieb is a 73 y.o. female here today for follow-up of her hypertension and gastroesophageal reflux and hyperlipidemia and hypothyroidism as well as impaired fasting glucose.    Asthma  Her at her asthma is under good control.  She is followed for this by pulmonary.  She denies significant recent exacerbation.    Essential hypertension  Her blood pressure is under good control with metoprolol.  She denies significant lightheadedness.    GERD (gastroesophageal reflux disease)  Gastroesophageal reflux is being treated with appropriate diet and currently is quiescent.  She has lost about 10 pounds in the last few months.    Hyperlipidemia  She continues fenofibrate and simvastatin for her hyperlipidemia.  Most recent cholesterol is 161, triglycerides 158, HDL 61, LDL 60.  She tries to follow appropriate diet.  She has been able to lose about 10 pounds in the last few months.    Hypothyroidism  She continues levothyroxine.  Clinically she is euthyroid.  We will check TSH at next visit.    Impaired fasting glucose  Most recent blood sugars 126 and glycohemoglobin quite a bit better at 6.5.  She continues metformin without difficulty.  She denies low blood sugar reactions.    Personal history of renal cancer  She has a history of renal cancer without evidence of recurrence.  She is followed for this by Dr. Cintron.       Diagnoses of Hyperlipidemia, unspecified hyperlipidemia type, Hypothyroidism, unspecified type, Impaired fasting glucose, Essential hypertension, Personal history of renal cancer, Mild intermittent asthma without complication, and Gastroesophageal reflux disease without esophagitis were pertinent to this visit.    Allergies: Alcohol and Pcn [penicillins]  Current medicines (including changes today)  Current Outpatient Prescriptions   Medication Sig Dispense Refill   • fluticasone-salmeterol (ADVAIR) 250-50  MCG/DOSE AEROSOL POWDER, BREATH ACTIVATED Inhale 1 Puff by mouth every 12 hours. 1 Inhaler 4   • fenofibrate (TRIGLIDE) 160 MG tablet TAKE 1 TABLET BY MOUTH ONCE DAILY 90 Tab 3   • simvastatin (ZOCOR) 20 MG Tab TAKE 1 TABLET BY MOUTH ONCE DAILY IN THE EVENING 90 Tab 3   • metoprolol SR (TOPROL XL) 25 MG TABLET SR 24 HR TAKE 1 TABLET BY MOUTH ONCE DAILY 90 Tab 3   • metFORMIN ER (GLUCOPHAGE XR) 500 MG TABLET SR 24 HR Take 1 Tab by mouth 2 times a day. 180 Tab 3   • fenofibrate (TRIGLIDE) 160 MG tablet fenofibrate 160 mg tablet     • levothyroxine (SYNTHROID) 88 MCG Tab levothyroxine 88 mcg tablet     • metoprolol SR (TOPROL XL) 25 MG TABLET SR 24 HR metoprolol succinate ER 25 mg tablet,extended release 24 hr     • raloxifene (EVISTA) 60 MG Tab raloxifene 60 mg tablet     • simvastatin (ZOCOR) 20 MG Tab simvastatin 20 mg tablet     • Tiotropium Bromide Monohydrate (SPIRIVA RESPIMAT) 2.5 MCG/ACT Aero Soln Inhale 2 Inhalation by mouth every day. Two inhalations once daily.  Assemble and prime. 2 Inhaler 6   • multivitamin (THERAGRAN) Tab Take 1 Tab by mouth every day.     • albuterol (ACCUNEB) 0.63 MG/3ML nebulizer solution 3 mL by Nebulization route every four hours as needed for Shortness of Breath. (Patient not taking: Reported on 6/5/2019) 90 mL 1   • azithromycin (ZITHROMAX) 250 MG Tab Take 2 tablets on day 1, then take 1 tablet a day for 4 days. (Patient not taking: Reported on 6/5/2019) 6 Tab 0   • MethylPREDNISolone (MEDROL DOSEPAK) 4 MG Tablet Therapy Pack Take as directed. (Patient not taking: Reported on 6/5/2019) 21 Tab 0   • clobetasol (TEMOVATE) 0.05 % Ointment BID x 1 wk (Patient not taking: Reported on 5/18/2019) 30 g 1   • albuterol (PROAIR HFA) 108 (90 Base) MCG/ACT Aero Soln inhalation aerosol Inhale 2 Puffs by mouth every four hours as needed for Shortness of Breath. (Patient not taking: Reported on 5/18/2019) 8.5 g 6   • BOOSTRIX 5-2.5-18.5 Suspension      • vitamin E (VITAMIN E) 1000 UNIT Cap Take  "1 Cap by mouth every day.     • vitamin D (CHOLECALCIFEROL) 1000 UNIT Tab Take 1,000 Units by mouth every day.     • aspirin EC (ECOTRIN) 81 MG Tablet Delayed Response Take 81 mg by mouth every day.       No current facility-administered medications for this visit.        She  has a past medical history of Acid reflux; Arthritis; Asthma; Breath shortness; Bronchitis (08/04/14); Cancer ([facility]) (01/13); Chronic pain of left knee (8/22/2017); Chronic pain of right knee (7/1/2016); COPD (chronic obstructive pulmonary disease) ([facility]); COPD (chronic obstructive pulmonary disease) ([facility]) (6/30/2016); Diabetes ([facility]); Dyspnea; Emphysema of lung ([facility]); Heart burn; Hypertension; Impaired fasting glucose (8/24/2015); Indigestion; Mass of left lower leg (7/27/2016); Personal history of renal cancer (4/20/2015); Renal disorder; Renal disorder (2013); Unspecified disorder of thyroid; and Unspecified hypothyroidism (4/20/2015). She also has no past medical history of Encounter for long-term (current) use of other medications.  Health Maintenance:   ROS    Patient denies significant change in strength, weight or appetite.  No significant lightheadedness or headaches.  No change in vision, hearing, or swallowing.  No new dyspnea, coughing, chest pain, or palpitations.  No indigestion, abdominal pain, or change in bowel habits.  No change in urinating.  No new ankle swelling.       Objective:     PE:  /78 (BP Location: Left arm, Patient Position: Sitting)   Pulse 77   Temp 36.9 °C (98.4 °F)   Ht 1.753 m (5' 9\")   Wt 81.2 kg (179 lb)   SpO2 97%   BMI 26.43 kg/m²   Neck is supple without significant lymphadenopathy or masses.  Lungs are clear with normal breath sounds without wheezes or rales .  Cardiovascular: peripheral circulation is satisfactory, heart sounds are unchanged and unremarkable.  Abdomen is soft, without masses or tenderness, with normal bowel sounds.  Extremities are without significant edema, cyanosis or " deformity.      Assessment and Plan:   The following treatment plan was discussed  1. Hyperlipidemia, unspecified hyperlipidemia type  Lipid Profile    No medication change.  We reviewed again appropriate diet.   2. Hypothyroidism, unspecified type  TSH    Recheck TSH at next visit and yearly if not sooner.   3. Impaired fasting glucose  HEMOGLOBIN A1C    Continue fairly small dose of metformin and appropriate diet.  Exercise as tolerated.   4. Essential hypertension  Basic Metabolic Panel    Continue low-salt diet and metoprolol.  Consider ARB.   5. Personal history of renal cancer      Follow-up regularly with Dr. Cintron.   6. Mild intermittent asthma without complication      She will report any significant exacerbations of asthma.   7. Gastroesophageal reflux disease without esophagitis      We again briefly reviewed appropriate diet.  Continue working at weight loss.       Followup: Return in about 6 months (around 12/5/2019) for Short.

## 2019-06-06 NOTE — ASSESSMENT & PLAN NOTE
Gastroesophageal reflux is being treated with appropriate diet and currently is quiescent.  She has lost about 10 pounds in the last few months.

## 2019-06-06 NOTE — ASSESSMENT & PLAN NOTE
Her at her asthma is under good control.  She is followed for this by pulmonary.  She denies significant recent exacerbation.

## 2019-06-06 NOTE — ASSESSMENT & PLAN NOTE
Most recent blood sugars 126 and glycohemoglobin quite a bit better at 6.5.  She continues metformin without difficulty.  She denies low blood sugar reactions.

## 2019-06-06 NOTE — ASSESSMENT & PLAN NOTE
She has a history of renal cancer without evidence of recurrence.  She is followed for this by Dr. Cintron.

## 2019-06-24 ENCOUNTER — OFFICE VISIT (OUTPATIENT)
Dept: MEDICAL GROUP | Facility: MEDICAL CENTER | Age: 74
End: 2019-06-24
Payer: MEDICARE

## 2019-06-24 VITALS
WEIGHT: 184 LBS | TEMPERATURE: 97.9 F | BODY MASS INDEX: 27.25 KG/M2 | DIASTOLIC BLOOD PRESSURE: 80 MMHG | HEART RATE: 77 BPM | SYSTOLIC BLOOD PRESSURE: 122 MMHG | OXYGEN SATURATION: 97 % | HEIGHT: 69 IN

## 2019-06-24 DIAGNOSIS — I10 ESSENTIAL HYPERTENSION: ICD-10-CM

## 2019-06-24 DIAGNOSIS — L91.8 SKIN TAG: ICD-10-CM

## 2019-06-24 DIAGNOSIS — R22.42 MASS OF LEFT LOWER LEG: ICD-10-CM

## 2019-06-24 PROCEDURE — 99213 OFFICE O/P EST LOW 20 MIN: CPT | Mod: 25 | Performed by: INTERNAL MEDICINE

## 2019-06-24 PROCEDURE — 11200 RMVL SKIN TAGS UP TO&INC 15: CPT | Performed by: INTERNAL MEDICINE

## 2019-06-25 NOTE — ASSESSMENT & PLAN NOTE
She has some skin tags.  There are 2 on the left side of her neck that she would like to have removed.

## 2019-06-25 NOTE — ASSESSMENT & PLAN NOTE
After her last visit here she noticed a lump in her left lateral leg that she wanted to have checked.  She has not noticed any recent change in it.  It is not particularly tender.

## 2019-06-25 NOTE — ASSESSMENT & PLAN NOTE
She has hypertension for which she is on losartan with hydrochlorothiazide.  She denies significant lightheadedness.  Blood pressure is satisfactory.  She tries to avoid salt.  She is a little overweight and she has gained 5 pounds since earlier this month.

## 2019-06-25 NOTE — PROGRESS NOTES
Subjective:     Chief Complaint   Patient presents with   • Nodule     left leg, x2 weeks, no pain or swelling, no redness    • Hyperlipidemia     Jillian Gottlieb is a 73 y.o. female here today for evaluation of a lump that she noted in her left thigh and some skin tags that she would like to have removed.    Mass of left lower leg  After her last visit here she noticed a lump in her left lateral leg that she wanted to have checked.  She has not noticed any recent change in it.  It is not particularly tender.    Skin tag  She has some skin tags.  There are 2 on the left side of her neck that she would like to have removed.    Essential hypertension  She has hypertension for which she is on losartan with hydrochlorothiazide.  She denies significant lightheadedness.  Blood pressure is satisfactory.  She tries to avoid salt.  She is a little overweight and she has gained 5 pounds since earlier this month.       Diagnoses of Mass of left lower leg, Skin tag, and Essential hypertension were pertinent to this visit.    Allergies: Alcohol and Pcn [penicillins]  Current medicines (including changes today)  Current Outpatient Prescriptions   Medication Sig Dispense Refill   • ALBUTEROL SULFATE HFA INH      • losartan-hydrochlorothiazide (HYZAAR) 100-25 MG per tablet   3   • SPIRIVA HANDIHALER 18 MCG Cap   3   • albuterol (ACCUNEB) 0.63 MG/3ML nebulizer solution 3 mL by Nebulization route every four hours as needed for Shortness of Breath. 90 mL 1   • azithromycin (ZITHROMAX) 250 MG Tab Take 2 tablets on day 1, then take 1 tablet a day for 4 days. 6 Tab 0   • MethylPREDNISolone (MEDROL DOSEPAK) 4 MG Tablet Therapy Pack Take as directed. 21 Tab 0   • clobetasol (TEMOVATE) 0.05 % Ointment BID x 1 wk 30 g 1   • fluticasone-salmeterol (ADVAIR) 250-50 MCG/DOSE AEROSOL POWDER, BREATH ACTIVATED Inhale 1 Puff by mouth every 12 hours. 1 Inhaler 4   • albuterol (PROAIR HFA) 108 (90 Base) MCG/ACT Aero Soln inhalation aerosol Inhale 2  Puffs by mouth every four hours as needed for Shortness of Breath. 8.5 g 6   • fenofibrate (TRIGLIDE) 160 MG tablet TAKE 1 TABLET BY MOUTH ONCE DAILY 90 Tab 3   • simvastatin (ZOCOR) 20 MG Tab TAKE 1 TABLET BY MOUTH ONCE DAILY IN THE EVENING 90 Tab 3   • metoprolol SR (TOPROL XL) 25 MG TABLET SR 24 HR TAKE 1 TABLET BY MOUTH ONCE DAILY 90 Tab 3   • metFORMIN ER (GLUCOPHAGE XR) 500 MG TABLET SR 24 HR Take 1 Tab by mouth 2 times a day. 180 Tab 3   • fenofibrate (TRIGLIDE) 160 MG tablet fenofibrate 160 mg tablet     • levothyroxine (SYNTHROID) 88 MCG Tab levothyroxine 88 mcg tablet     • metoprolol SR (TOPROL XL) 25 MG TABLET SR 24 HR metoprolol succinate ER 25 mg tablet,extended release 24 hr     • raloxifene (EVISTA) 60 MG Tab raloxifene 60 mg tablet     • simvastatin (ZOCOR) 20 MG Tab simvastatin 20 mg tablet     • BOOSTRIX 5-2.5-18.5 Suspension      • Tiotropium Bromide Monohydrate (SPIRIVA RESPIMAT) 2.5 MCG/ACT Aero Soln Inhale 2 Inhalation by mouth every day. Two inhalations once daily.  Assemble and prime. 2 Inhaler 6   • vitamin E (VITAMIN E) 1000 UNIT Cap Take 1 Cap by mouth every day.     • multivitamin (THERAGRAN) Tab Take 1 Tab by mouth every day.     • vitamin D (CHOLECALCIFEROL) 1000 UNIT Tab Take 1,000 Units by mouth every day.     • aspirin EC (ECOTRIN) 81 MG Tablet Delayed Response Take 81 mg by mouth every day.       No current facility-administered medications for this visit.        She  has a past medical history of Acid reflux; Arthritis; Asthma; Breath shortness; Bronchitis (08/04/14); Cancer ([facility]) (01/13); Chronic pain of left knee (8/22/2017); Chronic pain of right knee (7/1/2016); COPD (chronic obstructive pulmonary disease) ([facility]); COPD (chronic obstructive pulmonary disease) ([facility]) (6/30/2016); Diabetes ([facility]); Dyspnea; Emphysema of lung ([facility]); Heart burn; Hypertension; Impaired fasting glucose (8/24/2015); Indigestion; Mass of left lower leg (7/27/2016); Personal history of renal cancer  "(4/20/2015); Renal disorder; Renal disorder (2013); Skin tag (6/24/2019); Unspecified disorder of thyroid; and Unspecified hypothyroidism (4/20/2015). She also has no past medical history of Encounter for long-term (current) use of other medications.    ROS    Patient denies significant change in strength, weight or appetite.  No significant lightheadedness or headaches.  No change in vision, hearing, or swallowing.  No new dyspnea, coughing, chest pain, or palpitations.  No indigestion, abdominal pain, or change in bowel habits.  No change in urinating.  No new ankle swelling.  She noted the lump in her left thigh soon after her last visit earlier this month.  The lump does not seem to have changed at all she notes.       Objective:     PE:  /80 (BP Location: Left arm, Patient Position: Sitting)   Pulse 77   Temp 36.6 °C (97.9 °F)   Ht 1.753 m (5' 9\")   Wt 83.5 kg (184 lb)   SpO2 97%   BMI 27.17 kg/m²    Neck is supple without significant lymphadenopathy or masses.  Lungs are clear with normal breath sounds without wheezes or rales .  Cardiovascular: peripheral circulation is satisfactory, heart sounds are unchanged and unremarkable.  Abdomen is soft, without masses or tenderness, with normal bowel sounds.  Extremities are without significant edema, cyanosis or deformity.  Integument there are 2 skin tags on her left lateral neck that appear benign and she would like to have removed.  There is a 1 cm smooth round mass under the skin of her left lateral thigh.  There is no dimpling.  Borders are distinct.    Procedure note.  After informed consent, both skin tags were treated using the double freeze-and-thaw technique.  She had no significant discomfort and there was no obvious evidence of complication.  She was advised in appropriate care of the tags until they fall off.  If she notes any redness or swelling or other evidence of infection or she is at all otherwise concerned, she will let us " know.      Assessment and Plan:   The following treatment plan was discussed  1. Mass of left lower leg      She was reassured.  If she notes any change, she will contact us and we will arrange for its removal.  We will otherwise check it again at her next visit if not sooner.   2. Skin tag      2 skin tags were removed using the double freeze-and-thaw technique.  Please see procedure note.   3. Essential hypertension      Continue same medications.  Continue working and low-salt diet.       Followup: She will keep her next scheduled appointment or contact us as needed sooner.

## 2019-08-05 DIAGNOSIS — J44.9 CHRONIC OBSTRUCTIVE PULMONARY DISEASE, UNSPECIFIED COPD TYPE (HCC): ICD-10-CM

## 2019-08-05 NOTE — TELEPHONE ENCOUNTER
Caller Name: Jillian Gottlieb                 Call Back Number: [phone] (home)         Patient approves a detailed voicemail message: N\A    Have we ever prescribed this med? Yes.  If yes, what date? 4/27/19    Last OV: 12/4/18 LALA Grove PA-C   1-requested antibiotic for sinus infection, past history with current symptoms  2-spacer provided for symbicort use  3-consider TRELEGY for new year  4-Spiriva respimat for preferred delivery system, prescription sent  Next OV: no pending appt     DX: COPD     Medications:  Requested Prescriptions     Pending Prescriptions Disp Refills   • SPIRIVA HANDIHALER 18 MCG Cap [Pharmacy Med Name: SPIRIVA HANDIHLR 18MCG CAP]  3     Sig: INHALE 1 PUFF BY MOUTH ONCE DAILY

## 2019-08-06 ENCOUNTER — HOSPITAL ENCOUNTER (OUTPATIENT)
Facility: MEDICAL CENTER | Age: 74
End: 2019-08-06
Attending: PHYSICIAN ASSISTANT
Payer: MEDICARE

## 2019-08-06 ENCOUNTER — OFFICE VISIT (OUTPATIENT)
Dept: URGENT CARE | Facility: CLINIC | Age: 74
End: 2019-08-06
Payer: MEDICARE

## 2019-08-06 VITALS
WEIGHT: 185 LBS | BODY MASS INDEX: 27.4 KG/M2 | HEART RATE: 78 BPM | SYSTOLIC BLOOD PRESSURE: 120 MMHG | RESPIRATION RATE: 20 BRPM | TEMPERATURE: 98.6 F | OXYGEN SATURATION: 95 % | DIASTOLIC BLOOD PRESSURE: 78 MMHG | HEIGHT: 69 IN

## 2019-08-06 DIAGNOSIS — R30.0 DYSURIA: ICD-10-CM

## 2019-08-06 LAB
APPEARANCE UR: NORMAL
BILIRUB UR STRIP-MCNC: NORMAL MG/DL
COLOR UR AUTO: NORMAL
GLUCOSE UR STRIP.AUTO-MCNC: NORMAL MG/DL
KETONES UR STRIP.AUTO-MCNC: NORMAL MG/DL
LEUKOCYTE ESTERASE UR QL STRIP.AUTO: NORMAL
NITRITE UR QL STRIP.AUTO: NORMAL
PH UR STRIP.AUTO: 5 [PH] (ref 5–8)
PROT UR QL STRIP: NORMAL MG/DL
RBC UR QL AUTO: NORMAL
SP GR UR STRIP.AUTO: 1.03
UROBILINOGEN UR STRIP-MCNC: 0.2 MG/DL

## 2019-08-06 PROCEDURE — 87086 URINE CULTURE/COLONY COUNT: CPT

## 2019-08-06 PROCEDURE — 81002 URINALYSIS NONAUTO W/O SCOPE: CPT | Performed by: PHYSICIAN ASSISTANT

## 2019-08-06 PROCEDURE — 99214 OFFICE O/P EST MOD 30 MIN: CPT | Performed by: PHYSICIAN ASSISTANT

## 2019-08-06 RX ORDER — TIOTROPIUM BROMIDE 18 UG/1
CAPSULE ORAL; RESPIRATORY (INHALATION)
Qty: 90 CAP | Refills: 3 | Status: SHIPPED | OUTPATIENT
Start: 2019-08-06 | End: 2020-12-28

## 2019-08-06 RX ORDER — CIPROFLOXACIN 500 MG/1
500 TABLET, FILM COATED ORAL EVERY 12 HOURS
Qty: 14 TAB | Refills: 0 | Status: SHIPPED | OUTPATIENT
Start: 2019-08-06 | End: 2019-08-13

## 2019-08-06 ASSESSMENT — ENCOUNTER SYMPTOMS
CHILLS: 0
FEVER: 0

## 2019-08-06 NOTE — TELEPHONE ENCOUNTER
Janet Grove P.A.-C.  You 13 hours ago (7:13 PM)      Since patient has follow up pending I would like to revisit Trelegy vs refilling Spiriva at present.     Routing comment

## 2019-08-06 NOTE — PROGRESS NOTES
Subjective:   Jillian Gottlieb is a 73 y.o. female who presents today with   Chief Complaint   Patient presents with   • Dysuria     Couple days urinary pain amnd back pain .       Dysuria    This is a new problem. Episode onset: 2 days. The problem occurs every urination. The problem has been unchanged. The quality of the pain is described as burning. There has been no fever. Associated symptoms include urgency. Pertinent negatives include no chills, discharge or hematuria. Treatments tried: AZO. The treatment provided mild relief. Her past medical history is significant for kidney stones. kidney cancer     Patient does have a hx of kidney cancer and kidney stones. These symptoms feel similar to previous UTIs. Patient has taken AZO  PMH:  has a past medical history of Acid reflux, Arthritis, Asthma, Breath shortness, Bronchitis (08/04/14), Cancer ([facility]) (01/13), Chronic pain of left knee (8/22/2017), Chronic pain of right knee (7/1/2016), COPD (chronic obstructive pulmonary disease) ([facility]), COPD (chronic obstructive pulmonary disease) ([facility]) (6/30/2016), Diabetes ([facility]), Dyspnea, Emphysema of lung ([facility]), Heart burn, Hypertension, Impaired fasting glucose (8/24/2015), Indigestion, Mass of left lower leg (7/27/2016), Personal history of renal cancer (4/20/2015), Renal disorder, Renal disorder (2013), Skin tag (6/24/2019), Unspecified disorder of thyroid, and Unspecified hypothyroidism (4/20/2015). She also has no past medical history of Encounter for long-term (current) use of other medications.  MEDS:   Current Outpatient Medications:   •  ciprofloxacin (CIPRO) 500 MG Tab, Take 1 Tab by mouth every 12 hours for 7 days., Disp: 14 Tab, Rfl: 0  •  SPIRIVA HANDIHALER 18 MCG Cap, INHALE 1 PUFF BY MOUTH ONCE DAILY, Disp: 90 Cap, Rfl: 3  •  ALBUTEROL SULFATE HFA INH, , Disp: , Rfl:   •  losartan-hydrochlorothiazide (HYZAAR) 100-25 MG per tablet, , Disp: , Rfl: 3  •  SPIRIVA HANDIHALER 18 MCG Cap, , Disp: , Rfl: 3  •   albuterol (ACCUNEB) 0.63 MG/3ML nebulizer solution, 3 mL by Nebulization route every four hours as needed for Shortness of Breath., Disp: 90 mL, Rfl: 1  •  azithromycin (ZITHROMAX) 250 MG Tab, Take 2 tablets on day 1, then take 1 tablet a day for 4 days., Disp: 6 Tab, Rfl: 0  •  MethylPREDNISolone (MEDROL DOSEPAK) 4 MG Tablet Therapy Pack, Take as directed., Disp: 21 Tab, Rfl: 0  •  clobetasol (TEMOVATE) 0.05 % Ointment, BID x 1 wk, Disp: 30 g, Rfl: 1  •  fluticasone-salmeterol (ADVAIR) 250-50 MCG/DOSE AEROSOL POWDER, BREATH ACTIVATED, Inhale 1 Puff by mouth every 12 hours., Disp: 1 Inhaler, Rfl: 4  •  albuterol (PROAIR HFA) 108 (90 Base) MCG/ACT Aero Soln inhalation aerosol, Inhale 2 Puffs by mouth every four hours as needed for Shortness of Breath., Disp: 8.5 g, Rfl: 6  •  fenofibrate (TRIGLIDE) 160 MG tablet, TAKE 1 TABLET BY MOUTH ONCE DAILY, Disp: 90 Tab, Rfl: 3  •  simvastatin (ZOCOR) 20 MG Tab, TAKE 1 TABLET BY MOUTH ONCE DAILY IN THE EVENING, Disp: 90 Tab, Rfl: 3  •  metoprolol SR (TOPROL XL) 25 MG TABLET SR 24 HR, TAKE 1 TABLET BY MOUTH ONCE DAILY, Disp: 90 Tab, Rfl: 3  •  metFORMIN ER (GLUCOPHAGE XR) 500 MG TABLET SR 24 HR, Take 1 Tab by mouth 2 times a day., Disp: 180 Tab, Rfl: 3  •  fenofibrate (TRIGLIDE) 160 MG tablet, fenofibrate 160 mg tablet, Disp: , Rfl:   •  levothyroxine (SYNTHROID) 88 MCG Tab, levothyroxine 88 mcg tablet, Disp: , Rfl:   •  metoprolol SR (TOPROL XL) 25 MG TABLET SR 24 HR, metoprolol succinate ER 25 mg tablet,extended release 24 hr, Disp: , Rfl:   •  raloxifene (EVISTA) 60 MG Tab, raloxifene 60 mg tablet, Disp: , Rfl:   •  simvastatin (ZOCOR) 20 MG Tab, simvastatin 20 mg tablet, Disp: , Rfl:   •  BOOSTRIX 5-2.5-18.5 Suspension, , Disp: , Rfl:   •  Tiotropium Bromide Monohydrate (SPIRIVA RESPIMAT) 2.5 MCG/ACT Aero Soln, Inhale 2 Inhalation by mouth every day. Two inhalations once daily.  Assemble and prime., Disp: 2 Inhaler, Rfl: 6  •  vitamin E (VITAMIN E) 1000 UNIT Cap, Take 1 Cap  "by mouth every day., Disp: , Rfl:   •  multivitamin (THERAGRAN) Tab, Take 1 Tab by mouth every day., Disp: , Rfl:   •  vitamin D (CHOLECALCIFEROL) 1000 UNIT Tab, Take 1,000 Units by mouth every day., Disp: , Rfl:   •  aspirin EC (ECOTRIN) 81 MG Tablet Delayed Response, Take 81 mg by mouth every day., Disp: , Rfl:   ALLERGIES:   Allergies   Allergen Reactions   • Alcohol Vomiting, Nausea and Unspecified     injested-facial numbness, n/v  RXN=50 years ago   • Pcn [Penicillins] Unspecified     Flushed; \"felt weird\"  Tolerates cephalosporins  RXN=age 30's     SURGHX:   Past Surgical History:   Procedure Laterality Date   • KNEE ARTHROPLASTY TOTAL Left 12/18/2017    Procedure: KNEE ARTHROPLASTY TOTAL;  Surgeon: Ricky Castaneda M.D.;  Location: [facility];  Service: Orthopedics   • KNEE ARTHROPLASTY TOTAL Right 10/17/2016    Procedure: KNEE ARTHROPLASTY TOTAL;  Surgeon: Ricky Castaneda M.D.;  Location: [facility];  Service:    • RECOVERY  3/4/2016    Procedure: IR2-PERQ NEPHRO-URETERAL CATH RIGHT-DR. RIDDLE-Surgery to follow in [facility] ;  Surgeon: Ir-Recovery Surgery;  Location: [facility];  Service:    • CYSTOSCOPY STENT REMOVAL Right 3/4/2016    Procedure: RIGID CYSTOSCOPY STENT REMOVAL;  Surgeon: Fredy Riddle M.D.;  Location: [facility];  Service:    • URETEROSCOPY Right 3/4/2016    Procedure: URETEROSCOPY;  Surgeon: Fredy Riddle M.D.;  Location: [facility];  Service:    • PERCUTANEOUS NEPHROSTOLITHOTOMY Right 3/4/2016    Procedure: PERCUTANEOUS NEPHROSTOLITHOTOMY FOR NEPHROLITHOTRIPSY;  Surgeon: Fredy Riddle M.D.;  Location: [facility];  Service:    • CYSTOSCOPY STENT PLACEMENT Right 2/12/2016    Procedure: CYSTOSCOPY STENT PLACEMENT;  Surgeon: Fredy Riddle M.D.;  Location: [facility];  Service:    • CYSTOSCOPY STENT PLACEMENT Right 6/26/2015    Procedure: CYSTOSCOPY STENT PLACEMENT RIGID POSSIBLE STENT;  Surgeon: Fredy EVERETT" "MIRNA Cintron;  Location: [facility];  Service:    • URETEROSCOPY N/A 6/26/2015    Procedure: URETEROSCOPY;  Surgeon: Fredy Cintron M.D.;  Location: SUR[facility]ERY [facility];  Service:    • LASERTRIPSY Right 6/26/2015    Procedure: LASERTRIPSY LITHO;  Surgeon: Fredy Cintron M.D.;  Location: [facility];  Service:    • URETEROSCOPY  8/20/2014    Performed by Fredy Cintron M.D. at [facility]   • LASERTRIPSY  8/20/2014    Performed by Fredy Cintron M.D. at [facility]   • CYSTOSCOPY  8/20/2014    Performed by Fredy Cintron M.D. at [facility]   • CYSTOSCOPY STENT PLACEMENT  1/31/2013    Performed by Fredy Cintron M.D. at [facility]   • URETEROSCOPY  1/31/2013    Performed by Fredy Cintron M.D. at [facility]   • NEPHRECTOMY PARTIAL  1/31/2013    Performed by Fredy Cintron M.D. at [facility]   • LASERTRIPSY  1/31/2013    Performed by Fredy Cintron M.D. at [facility]   • OTHER ORTHOPEDIC SURGERY  2006    right knee meniscus   • OTHER ORTHOPEDIC SURGERY  1996    ORIF right arm   • OTHER  1995    sinus surgery   • GYN SURGERY  1994    hysterectomy   • OTHER  1980    thyroid lumpectomy   • BREAST BIOPSY Left 1976    benign   • CUATE BY LAPAROSCOPY     • OTHER ORTHOPEDIC SURGERY      achilles tendon reattach    • TONSILLECTOMY       SOCHX:  reports that she has never smoked. She has never used smokeless tobacco. She reports that she does not drink alcohol or use drugs.  FH: Reviewed with patient, not pertinent to this visit.       Review of Systems   Constitutional: Negative for chills and fever.   Genitourinary: Positive for dysuria and urgency. Negative for hematuria.   All other systems reviewed and are negative.       Objective:   /78   Pulse 78   Temp 37 °C (98.6 °F) (Temporal)   Resp 20   Ht 1.753 m (5' 9\")   Wt 83.9 kg (185 lb)   SpO2 95%   BMI 27.32 kg/m²   Physical Exam   Constitutional: She " appears well-developed and well-nourished. No distress.   HENT:   Head: Normocephalic and atraumatic.   Right Ear: Hearing normal.   Left Ear: Hearing normal.   Eyes: Pupils are equal, round, and reactive to light.   Cardiovascular: Normal rate, regular rhythm and normal heart sounds.   Pulmonary/Chest: Effort normal and breath sounds normal.   Abdominal: There is no tenderness.   Genitourinary:   Genitourinary Comments: Patient deferred  exam   Musculoskeletal:   Normal movement in all 4 extremities   Neurological: She is alert. Coordination normal.   Skin: Skin is warm and dry.   Psychiatric: She has a normal mood and affect.   Nursing note and vitals reviewed.  NEG CVA tenderness      Assessment/Plan:   Assessment    1. Dysuria  - POCT Urinalysis  - ciprofloxacin (CIPRO) 500 MG Tab; Take 1 Tab by mouth every 12 hours for 7 days.  Dispense: 14 Tab; Refill: 0  Patient to follow up with Urology if symptoms persist despite treatment.   Differential diagnosis, natural history, supportive care, and indications for immediate follow-up discussed.   Patient given instructions and understanding of medications and treatment.    If not improving in 3-5 days, F/U with PCP or return to UC if symptoms worsen.    Patient agreeable to plan.      Please note that this dictation was created using voice recognition software. I have made every reasonable attempt to correct obvious errors, but I expect that there are errors of grammar and possibly content that I did not discover before finalizing the note.    Harjinder Marc PA-C

## 2019-08-07 DIAGNOSIS — R30.0 DYSURIA: ICD-10-CM

## 2019-08-09 LAB
BACTERIA UR CULT: NORMAL
SIGNIFICANT IND 70042: NORMAL
SITE SITE: NORMAL
SOURCE SOURCE: NORMAL

## 2019-10-02 NOTE — TELEPHONE ENCOUNTER
Have we ever prescribed this med? Yes.  If yes, what date? 04/17/19([facility])    Last OV: 12/04/18 with Janet Grove PA-C  Plan:  1-requested antibiotic for sinus infection, past history with current symptoms  2-spacer provided for symbicort use  3-consider TRELEGY for new year  4-Spiriva respimat for preferred delivery system, prescription sent    Next OV: No Pending appt.     DX: Chronic obstructive pulmonary disease, unspecified COPD type (HCC)     Medications:   Requested Prescriptions     Pending Prescriptions Disp Refills   • ADVAIR DISKUS 250-50 MCG/DOSE AEROSOL POWDER, BREATH ACTIVATED [Pharmacy Med Name: ADVAIR DISKUS 250/50MCG 60 AER]  4     Sig: INHALE 1 PUFF BY MOUTH EVERY 12 HOURS

## 2019-12-18 ENCOUNTER — OFFICE VISIT (OUTPATIENT)
Dept: URGENT CARE | Facility: CLINIC | Age: 74
End: 2019-12-18
Payer: MEDICARE

## 2019-12-18 VITALS
TEMPERATURE: 98.5 F | OXYGEN SATURATION: 95 % | SYSTOLIC BLOOD PRESSURE: 118 MMHG | WEIGHT: 185 LBS | RESPIRATION RATE: 20 BRPM | BODY MASS INDEX: 27.32 KG/M2 | HEART RATE: 72 BPM | DIASTOLIC BLOOD PRESSURE: 75 MMHG

## 2019-12-18 DIAGNOSIS — J22 LOWER RESP. TRACT INFECTION: ICD-10-CM

## 2019-12-18 DIAGNOSIS — Z87.09 HISTORY OF COPD: ICD-10-CM

## 2019-12-18 PROCEDURE — 99214 OFFICE O/P EST MOD 30 MIN: CPT | Performed by: NURSE PRACTITIONER

## 2019-12-18 RX ORDER — AZITHROMYCIN 250 MG/1
TABLET, FILM COATED ORAL
Qty: 6 TAB | Refills: 0 | Status: SHIPPED | OUTPATIENT
Start: 2019-12-18 | End: 2020-07-29

## 2019-12-18 RX ORDER — PREDNISONE 20 MG/1
40 TABLET ORAL DAILY
Qty: 10 TAB | Refills: 0 | Status: SHIPPED | OUTPATIENT
Start: 2019-12-18 | End: 2019-12-23

## 2019-12-18 ASSESSMENT — ENCOUNTER SYMPTOMS
SPUTUM PRODUCTION: 0
CONSTIPATION: 0
NAUSEA: 0
HEADACHES: 0
COUGH: 1
PALPITATIONS: 0
VOMITING: 0
ABDOMINAL PAIN: 0
MUSCULOSKELETAL NEGATIVE: 1
SHORTNESS OF BREATH: 0
DIARRHEA: 0
CHILLS: 0
BLURRED VISION: 0
DIZZINESS: 0
DOUBLE VISION: 0
WHEEZING: 1
STRIDOR: 0
SORE THROAT: 0
FEVER: 0

## 2019-12-18 ASSESSMENT — COPD QUESTIONNAIRES: COPD: 1

## 2019-12-18 NOTE — PROGRESS NOTES
Subjective:   Jillian Gottlieb is a 74 y.o. female who presents for Cough (Almosta week dry cough )        Cough   This is a new problem. The current episode started 1 to 4 weeks ago (Started over a week ago, but cough worsened over the past week). The problem has been gradually worsening. The problem occurs every few minutes. The cough is non-productive. Associated symptoms include nasal congestion, postnasal drip and wheezing. Pertinent negatives include no chest pain, chills, ear pain, fever, headaches, rash, sore throat or shortness of breath. Nothing aggravates the symptoms. She has tried a beta-agonist inhaler for the symptoms. The treatment provided mild relief. Her past medical history is significant for asthma and COPD.        Review of Systems   Constitutional: Negative for chills and fever.   HENT: Positive for congestion and postnasal drip. Negative for ear discharge, ear pain and sore throat.    Eyes: Negative for blurred vision and double vision.   Respiratory: Positive for cough and wheezing. Negative for sputum production, shortness of breath and stridor.    Cardiovascular: Negative for chest pain and palpitations.   Gastrointestinal: Negative for abdominal pain, constipation, diarrhea, nausea and vomiting.   Musculoskeletal: Negative.    Skin: Negative.  Negative for itching and rash.   Neurological: Negative for dizziness and headaches.   All other systems reviewed and are negative.    Patient's PMH, SocHx, SurgHx, FamHx, Drug allergies and medications reviewed.     Objective:   /75   Pulse 72   Temp 36.9 °C (98.5 °F)   Resp 20   Wt 83.9 kg (185 lb)   SpO2 95%   BMI 27.32 kg/m²   Physical Exam  Vitals signs reviewed.   Constitutional:       Appearance: She is well-developed.   HENT:      Head: Normocephalic.      Right Ear: Hearing, tympanic membrane and ear canal normal. No middle ear effusion. Tympanic membrane is not perforated or erythematous.      Left Ear: Hearing, tympanic  membrane and ear canal normal.  No middle ear effusion. Tympanic membrane is not perforated or erythematous.      Nose: Nose normal. No rhinorrhea.      Right Sinus: No maxillary sinus tenderness or frontal sinus tenderness.      Left Sinus: No maxillary sinus tenderness or frontal sinus tenderness.      Mouth/Throat:      Lips: Pink.      Mouth: Mucous membranes are moist.      Pharynx: Uvula midline. Oropharyngeal exudate present. No posterior oropharyngeal erythema or uvula swelling.      Tonsils: No tonsillar exudate.   Eyes:      General: Lids are normal.      Extraocular Movements: Extraocular movements intact.      Conjunctiva/sclera: Conjunctivae normal.      Pupils: Pupils are equal, round, and reactive to light.   Neck:      Musculoskeletal: Normal range of motion.      Thyroid: No thyromegaly.   Cardiovascular:      Rate and Rhythm: Normal rate and regular rhythm.      Heart sounds: Normal heart sounds.   Pulmonary:      Effort: Pulmonary effort is normal. No tachypnea, bradypnea, accessory muscle usage, prolonged expiration or respiratory distress.      Breath sounds: No stridor or decreased air movement. Examination of the left-upper field reveals wheezing. Wheezing present.   Lymphadenopathy:      Head:      Right side of head: No submandibular or tonsillar adenopathy.      Left side of head: No submandibular or tonsillar adenopathy.   Skin:     General: Skin is warm and dry.      Findings: No rash.   Neurological:      Mental Status: She is alert and oriented to person, place, and time.   Psychiatric:         Mood and Affect: Mood normal.         Speech: Speech normal.         Behavior: Behavior normal. Behavior is cooperative.         Thought Content: Thought content normal.         Judgment: Judgment normal.           Assessment/Plan:   Assessment    1. History of COPD  - predniSONE (DELTASONE) 20 MG Tab; Take 2 Tabs by mouth every day for 5 days.  Dispense: 10 Tab; Refill: 0    2. Lower resp. tract  infection  - predniSONE (DELTASONE) 20 MG Tab; Take 2 Tabs by mouth every day for 5 days.  Dispense: 10 Tab; Refill: 0  - azithromycin (ZITHROMAX) 250 MG Tab; Take two tabs po day one followed by one tab po day two through five with food  Dispense: 6 Tab; Refill: 0    Patient encouraged to increase clear liquid intake. Continue with albuterol nebulizer at home as prior.    Differential diagnosis, natural history, supportive care, and indications for immediate follow-up discussed.     **Please note that all invasive procedures during this visit were performed by myself and/or the Medical Assistant under the supervision of the PA or MD in office**

## 2020-01-27 ENCOUNTER — TELEPHONE (OUTPATIENT)
Dept: PULMONOLOGY | Facility: HOSPICE | Age: 75
End: 2020-01-27

## 2020-01-27 RX ORDER — SIMVASTATIN 20 MG
TABLET ORAL
Qty: 90 TAB | Refills: 3 | Status: SHIPPED | OUTPATIENT
Start: 2020-01-27 | End: 2021-01-14 | Stop reason: SDUPTHER

## 2020-01-27 NOTE — TELEPHONE ENCOUNTER
DOCUMENTATION OF PRIOR AUTH STATUS    1. Medication name and dose: Spiriva Handihaler 18 mcg    2. Name and Phone # of Prescription coverage company: [email or web address] [phone]    3. Date Prior Auth was submitted: 1/27/2020    4. What information was given to obtain insurance decision: Clinical notes    5. Prior Auth letter Approved or Denied: Approved through 1/26/2021    6. Pharmacy notified: Yes    7. Patient notified: Yes

## 2020-01-27 NOTE — TELEPHONE ENCOUNTER
MEDICATION PRIOR AUTHORIZATION NEEDED:    1. Name of Medication: Spiriva Handihaler 18 mcg    2. Requested By (Name of Pharmacy): [facility]     3. Is insurance on file current? yes    4. What is the name & phone number of the 3rd party payor? [employer] [phone]

## 2020-02-05 LAB
BUN SERPL-MCNC: 23 MG/DL (ref 8–27)
BUN/CREAT SERPL: 25 (ref 12–28)
CALCIUM SERPL-MCNC: 10.1 MG/DL (ref 8.7–10.3)
CHLORIDE SERPL-SCNC: 101 MMOL/L (ref 96–106)
CHOLEST SERPL-MCNC: 147 MG/DL (ref 100–199)
CO2 SERPL-SCNC: 23 MMOL/L (ref 20–29)
CREAT SERPL-MCNC: 0.93 MG/DL (ref 0.57–1)
GLUCOSE SERPL-MCNC: 137 MG/DL (ref 65–99)
HBA1C MFR BLD: 6.9 % (ref 4.8–5.6)
HDLC SERPL-MCNC: 54 MG/DL
LABORATORY COMMENT REPORT: NORMAL
LDLC SERPL CALC-MCNC: 66 MG/DL (ref 0–99)
POTASSIUM SERPL-SCNC: 4.5 MMOL/L (ref 3.5–5.2)
SODIUM SERPL-SCNC: 141 MMOL/L (ref 134–144)
TRIGL SERPL-MCNC: 133 MG/DL (ref 0–149)
TSH SERPL DL<=0.005 MIU/L-ACNC: 1.21 UIU/ML (ref 0.45–4.5)
VLDLC SERPL CALC-MCNC: 27 MG/DL (ref 5–40)

## 2020-02-10 ENCOUNTER — OFFICE VISIT (OUTPATIENT)
Dept: MEDICAL GROUP | Facility: MEDICAL CENTER | Age: 75
End: 2020-02-10
Payer: MEDICARE

## 2020-02-10 VITALS
BODY MASS INDEX: 27.4 KG/M2 | TEMPERATURE: 98.6 F | WEIGHT: 185 LBS | DIASTOLIC BLOOD PRESSURE: 70 MMHG | SYSTOLIC BLOOD PRESSURE: 130 MMHG | HEART RATE: 68 BPM | OXYGEN SATURATION: 97 % | HEIGHT: 69 IN

## 2020-02-10 DIAGNOSIS — K21.9 GASTROESOPHAGEAL REFLUX DISEASE WITHOUT ESOPHAGITIS: ICD-10-CM

## 2020-02-10 DIAGNOSIS — I10 ESSENTIAL HYPERTENSION: ICD-10-CM

## 2020-02-10 DIAGNOSIS — E78.5 HYPERLIPIDEMIA, UNSPECIFIED HYPERLIPIDEMIA TYPE: ICD-10-CM

## 2020-02-10 DIAGNOSIS — E03.9 HYPOTHYROIDISM, UNSPECIFIED TYPE: ICD-10-CM

## 2020-02-10 DIAGNOSIS — Z85.528 PERSONAL HISTORY OF RENAL CANCER: ICD-10-CM

## 2020-02-10 DIAGNOSIS — J45.20 MILD INTERMITTENT ASTHMA WITHOUT COMPLICATION: ICD-10-CM

## 2020-02-10 DIAGNOSIS — R73.01 IMPAIRED FASTING GLUCOSE: ICD-10-CM

## 2020-02-10 PROBLEM — M20.40 HAMMER TOE: Status: ACTIVE | Noted: 2019-09-04

## 2020-02-10 PROCEDURE — 99214 OFFICE O/P EST MOD 30 MIN: CPT | Performed by: INTERNAL MEDICINE

## 2020-02-10 ASSESSMENT — PATIENT HEALTH QUESTIONNAIRE - PHQ9: CLINICAL INTERPRETATION OF PHQ2 SCORE: 0

## 2020-02-10 NOTE — ASSESSMENT & PLAN NOTE
She has gastroesophageal reflux that she controls primarily with diet and she takes metoprolol.  She denies lightheadedness.

## 2020-02-10 NOTE — ASSESSMENT & PLAN NOTE
Hyperlipidemia is controlled primarily with diet and simvastatin and fenofibrate.  She likes to eat cookies.  Most recent cholesterol is 147, triglycerides 133, HDL 54, LDL 66.

## 2020-02-10 NOTE — ASSESSMENT & PLAN NOTE
Blood pressure is under good control with losartan and hydrochlorothiazide.  She denies significant lightheadedness.

## 2020-02-10 NOTE — ASSESSMENT & PLAN NOTE
Her asthma is under fairly good control and followed by pulmonary.  She uses her inhalers as directed.

## 2020-02-10 NOTE — ASSESSMENT & PLAN NOTE
Blood sugar again is a little elevated at 137 and glycohemoglobin is 6.9.  She continues metformin without difficulty.

## 2020-02-10 NOTE — PROGRESS NOTES
Subjective:     Chief Complaint   Patient presents with   • Follow-Up     6m      Jillian Gottlieb is a 74 y.o. female here today for follow-up of hyperlipidemia and hypothyroidism and asthma and hyperglycemia and hypertension and gastroesophageal reflux.    Hyperlipidemia  Hyperlipidemia is controlled primarily with diet and simvastatin and fenofibrate.  She likes to eat cookies.  Most recent cholesterol is 147, triglycerides 133, HDL 54, LDL 66.    Hypothyroidism  She has hypothyroidism for which she takes levothyroxine.  Clinically she is euthyroid.  TSH is normal at 1.21.    Personal history of renal cancer  History of renal cancer without evidence of recurrence.  Most recent GFR is 61.    Asthma  Her asthma is under fairly good control and followed by pulmonary.  She uses her inhalers as directed.    Impaired fasting glucose  Blood sugar again is a little elevated at 137 and glycohemoglobin is 6.9.  She continues metformin without difficulty.    Essential hypertension  Blood pressure is under good control with losartan and hydrochlorothiazide.  She denies significant lightheadedness.    GERD (gastroesophageal reflux disease)  She has gastroesophageal reflux that she controls primarily with diet and she takes metoprolol.  She denies lightheadedness.       Diagnoses of Essential hypertension, Hyperlipidemia, unspecified hyperlipidemia type, Impaired fasting glucose, Hypothyroidism, unspecified type, Personal history of renal cancer, Mild intermittent asthma without complication, and Gastroesophageal reflux disease without esophagitis were pertinent to this visit.    Allergies: Alcohol and Pcn [penicillins]  Current medicines (including changes today)  Current Outpatient Medications   Medication Sig Dispense Refill   • metFORMIN ER (GLUCOPHAGE XR) 500 MG TABLET SR 24 HR TAKE 1 TABLET BY MOUTH TWICE DAILY 180 Tab 3   • simvastatin (ZOCOR) 20 MG Tab TAKE 1 TABLET BY MOUTH ONCE DAILY IN THE EVENING 90 Tab 3   •  losartan-hydrochlorothiazide (HYZAAR) 100-25 MG per tablet TAKE 1 TABLET BY MOUTH ONCE DAILY IN THE MORNING 90 Tab 3   • raloxifene (EVISTA) 60 MG Tab TAKE 1 TABLET BY MOUTH ONCE DAILY IN THE MORNING 90 Tab 3   • ADVAIR DISKUS 250-50 MCG/DOSE AEROSOL POWDER, BREATH ACTIVATED INHALE 1 PUFF BY MOUTH EVERY 12 HOURS 1 Inhaler 4   • SPIRIVA HANDIHALER 18 MCG Cap INHALE 1 PUFF BY MOUTH ONCE DAILY 90 Cap 3   • ALBUTEROL SULFATE HFA INH      • SPIRIVA HANDIHALER 18 MCG Cap   3   • albuterol (ACCUNEB) 0.63 MG/3ML nebulizer solution 3 mL by Nebulization route every four hours as needed for Shortness of Breath. 90 mL 1   • albuterol (PROAIR HFA) 108 (90 Base) MCG/ACT Aero Soln inhalation aerosol Inhale 2 Puffs by mouth every four hours as needed for Shortness of Breath. 8.5 g 6   • fenofibrate (TRIGLIDE) 160 MG tablet TAKE 1 TABLET BY MOUTH ONCE DAILY 90 Tab 3   • metoprolol SR (TOPROL XL) 25 MG TABLET SR 24 HR TAKE 1 TABLET BY MOUTH ONCE DAILY 90 Tab 3   • fenofibrate (TRIGLIDE) 160 MG tablet fenofibrate 160 mg tablet     • levothyroxine (SYNTHROID) 88 MCG Tab levothyroxine 88 mcg tablet     • metoprolol SR (TOPROL XL) 25 MG TABLET SR 24 HR metoprolol succinate ER 25 mg tablet,extended release 24 hr     • Tiotropium Bromide Monohydrate (SPIRIVA RESPIMAT) 2.5 MCG/ACT Aero Soln Inhale 2 Inhalation by mouth every day. Two inhalations once daily.  Assemble and prime. 2 Inhaler 6   • multivitamin (THERAGRAN) Tab Take 1 Tab by mouth every day.     • azithromycin (ZITHROMAX) 250 MG Tab Take two tabs po day one followed by one tab po day two through five with food (Patient not taking: Reported on 2/10/2020) 6 Tab 0   • clobetasol (TEMOVATE) 0.05 % Ointment BID x 1 wk (Patient not taking: Reported on 2/10/2020) 30 g 1   • BOOSTRIX 5-2.5-18.5 Suspension      • vitamin E (VITAMIN E) 1000 UNIT Cap Take 1 Cap by mouth every day.     • vitamin D (CHOLECALCIFEROL) 1000 UNIT Tab Take 1,000 Units by mouth every day.     • aspirin EC (ECOTRIN)  "81 MG Tablet Delayed Response Take 81 mg by mouth every day.       No current facility-administered medications for this visit.        She  has a past medical history of Acid reflux, Arthritis, Asthma, Breath shortness, Bronchitis (08/04/14), Cancer ([facility]) (01/13), Chronic pain of left knee (8/22/2017), Chronic pain of right knee (7/1/2016), COPD (chronic obstructive pulmonary disease) ([facility]), COPD (chronic obstructive pulmonary disease) ([facility]) (6/30/2016), Diabetes ([facility]), Dyspnea, Emphysema of lung ([facility]), Heart burn, Hypertension, Impaired fasting glucose (8/24/2015), Indigestion, Mass of left lower leg (7/27/2016), Personal history of renal cancer (4/20/2015), Renal disorder, Renal disorder (2013), Skin tag (6/24/2019), Unspecified disorder of thyroid, and Unspecified hypothyroidism (4/20/2015). She also has no past medical history of Encounter for long-term (current) use of other medications.  Health Maintenance: She is due for the shingles vaccine but does not want to get that done today.  ROS    Patient denies significant change in strength, weight or appetite.  No significant lightheadedness or headaches.  No change in vision, hearing, or swallowing.  No new dyspnea, coughing, chest pain, or palpitations.  No indigestion, abdominal pain, or change in bowel habits.  No change in urinating.  No new ankle swelling.       Objective:     PE:  /70 (BP Location: Left arm, Patient Position: Sitting)   Pulse 68   Temp 37 °C (98.6 °F)   Ht 1.753 m (5' 9\")   Wt 83.9 kg (185 lb)   SpO2 97%   BMI 27.32 kg/m²    Neck is supple without significant lymphadenopathy or masses.  Lungs are clear with normal breath sounds without wheezes or rales .  Cardiovascular: peripheral circulation is satisfactory, heart sounds are unchanged and unremarkable.  Abdomen is soft, without masses or tenderness, with normal bowel sounds.  Extremities are without significant edema, cyanosis or deformity.      Assessment and Plan:   The " following treatment plan was discussed  1. Essential hypertension  Basic Metabolic Panel    No medication change.  We reviewed low-salt diet and weight loss.   2. Hyperlipidemia, unspecified hyperlipidemia type  Lipid Profile    Continue fenofibrate and simvastatin.  We again reviewed appropriate diet.   3. Impaired fasting glucose  HEMOGLOBIN A1C    Continue metformin.  She thinks she can work much more diligently at appropriate diet and weight loss and exercise.  We may need to add more medication.   4. Hypothyroidism, unspecified type      Continue levothyroxine and check TSH at least yearly.   5. Personal history of renal cancer      Follow-up periodically with Dr. Cintron.   6. Mild intermittent asthma without complication      Continue inhalers and follow-up with pulmonary.   7. Gastroesophageal reflux disease without esophagitis      We again reviewed appropriate conservative therapy.       Followup: Return in about 4 months (around 6/10/2020), or health  wellness, for Long.

## 2020-02-10 NOTE — ASSESSMENT & PLAN NOTE
She has hypothyroidism for which she takes levothyroxine.  Clinically she is euthyroid.  TSH is normal at 1.21.

## 2020-02-18 ENCOUNTER — TELEPHONE (OUTPATIENT)
Dept: MEDICAL GROUP | Facility: MEDICAL CENTER | Age: 75
End: 2020-02-18

## 2020-02-18 RX ORDER — HYDROCHLOROTHIAZIDE 25 MG/1
25 TABLET ORAL DAILY
Qty: 90 TAB | Refills: 3 | Status: SHIPPED | OUTPATIENT
Start: 2020-02-18 | End: 2021-08-04

## 2020-02-18 RX ORDER — LOSARTAN POTASSIUM 100 MG/1
100 TABLET ORAL DAILY
Qty: 90 TAB | Refills: 3 | Status: SHIPPED | OUTPATIENT
Start: 2020-02-18 | End: 2021-01-14 | Stop reason: SDUPTHER

## 2020-02-18 NOTE — TELEPHONE ENCOUNTER
[facility] pharmacy asking if you can please separate RX to Losartan 100MG 1 QD + HCTZ 25MG 1 QD, 90 Days? Combo is out of stock

## 2020-03-19 DIAGNOSIS — J44.9 CHRONIC OBSTRUCTIVE PULMONARY DISEASE, UNSPECIFIED COPD TYPE (HCC): ICD-10-CM

## 2020-03-19 NOTE — TELEPHONE ENCOUNTER
Have we ever prescribed this med? Yes.  If yes, what date? 10/02/2019    Last OV: 12/04/2018 - HARDIK HERNANDEZ    Next OV: none scheduled    DX: COPD    Medications: Advair

## 2020-04-06 ENCOUNTER — TELEPHONE (OUTPATIENT)
Dept: PULMONOLOGY | Facility: HOSPICE | Age: 75
End: 2020-04-06

## 2020-04-06 NOTE — TELEPHONE ENCOUNTER
Patient left  with contact information, no further information was left reason for call.   Call back number [phone]    I return the patient's called encouraging a call back to clarify reason for call.

## 2020-04-07 NOTE — TELEPHONE ENCOUNTER
Called patient back, her spiriva is too expensive for her. PT has not been seen since 2018, I did advise that she needed to be seen in office before we can prescribe anything. She understood and will call back when COVID has calmed down

## 2020-04-27 ENCOUNTER — OFFICE VISIT (OUTPATIENT)
Dept: PULMONOLOGY | Facility: HOSPICE | Age: 75
End: 2020-04-27
Payer: MEDICARE

## 2020-04-27 VITALS
WEIGHT: 180 LBS | RESPIRATION RATE: 16 BRPM | SYSTOLIC BLOOD PRESSURE: 134 MMHG | HEART RATE: 76 BPM | BODY MASS INDEX: 26.66 KG/M2 | HEIGHT: 69 IN | DIASTOLIC BLOOD PRESSURE: 88 MMHG | OXYGEN SATURATION: 93 %

## 2020-04-27 DIAGNOSIS — K21.00 REFLUX ESOPHAGITIS: ICD-10-CM

## 2020-04-27 DIAGNOSIS — J44.1 COPD EXACERBATION (HCC): ICD-10-CM

## 2020-04-27 DIAGNOSIS — J44.9 CHRONIC OBSTRUCTIVE PULMONARY DISEASE, UNSPECIFIED COPD TYPE (HCC): ICD-10-CM

## 2020-04-27 DIAGNOSIS — J45.30 MILD PERSISTENT ASTHMA WITHOUT COMPLICATION: ICD-10-CM

## 2020-04-27 DIAGNOSIS — J40 BRONCHITIS: ICD-10-CM

## 2020-04-27 PROCEDURE — 99214 OFFICE O/P EST MOD 30 MIN: CPT | Performed by: PHYSICIAN ASSISTANT

## 2020-04-27 RX ORDER — METHYLPREDNISOLONE 4 MG/1
TABLET ORAL
Qty: 21 TAB | Refills: 0 | Status: SHIPPED | OUTPATIENT
Start: 2020-04-27 | End: 2020-12-15 | Stop reason: SDUPTHER

## 2020-04-27 RX ORDER — ALBUTEROL SULFATE 90 UG/1
2 AEROSOL, METERED RESPIRATORY (INHALATION) EVERY 4 HOURS PRN
Qty: 1 INHALER | Refills: 6 | Status: SHIPPED | OUTPATIENT
Start: 2020-04-27 | End: 2020-12-28

## 2020-04-27 RX ORDER — AZITHROMYCIN 250 MG/1
TABLET, FILM COATED ORAL
Qty: 6 TAB | Refills: 0 | Status: SHIPPED | OUTPATIENT
Start: 2020-04-27 | End: 2020-07-29

## 2020-04-27 ASSESSMENT — ENCOUNTER SYMPTOMS
SHORTNESS OF BREATH: 1
SORE THROAT: 0
ORTHOPNEA: 0
DIZZINESS: 0
CHILLS: 0
WEIGHT LOSS: 0
TREMORS: 0
PALPITATIONS: 0
HEARTBURN: 1
INSOMNIA: 1
SINUS PAIN: 1
FEVER: 0
DIAPHORESIS: 0
SPUTUM PRODUCTION: 0
COUGH: 1
EYES NEGATIVE: 1

## 2020-04-27 NOTE — PATIENT INSTRUCTIONS
1-reflux precautions   -avoid known triggers:  Spicy food, citrus, alcohol, soda, chocolate  or other sweets    -head of bed elevated 20 degrees   -nothing to eat or drink besides water for three hours   -sleep on back or left side avoid the right  2-continue omeprazole daily earlier in day    Goal: control of heartburn, may be able to wean to Pepcid AC later  3-sample Trelegy, [facility] to check for cost  4-emergency pack provided of antibiotic and steroid   -indications for use of steroid=increased shortness of breath, coughing and  congestion, indications for use of antibiotic is fever or purulent secretions  5-follow up in 6 months with PFT   6-let us know if alternative maintenance therapy needed due to cost    *if Trelegy not covered will need Incruse rather than spiriva\

## 2020-04-27 NOTE — PROGRESS NOTES
CC: Heartburn    HPI:  Jillian Gottlieb is a 74 y.o. year old female here today for follow-up on asthma/COPD. Last seen in clinic 12/4/2018.  Patient is a never smoker.    Pertinent medical history includes reflux, bronchitis, recurrent sinusitis, right renal cancer, kidney stones for which she follows a special diet, type II diabetes, thyroid dysfunction.     Reviewed in clinic vitals including blood pressure 134/88 heart rate of 76, O2 sat of 93% on room air and BMI of 26.58 kg/m².    Reviewed home medication regimen including Spiriva Respimat, Advair, albuterol as needed and reports has taken Mucinex for years.  Patient would like to trial Trelegy if this is not cost prohibitive.    Reviewed most recent imaging including chest x-ray obtained 5/18/2019 demonstrating linear atelectasis left lingula, surgical changes involving right proximal humerus, normal heart size.    Bone density scan obtained 4/9/2019 demonstrated osteopenia spine and left femur.    Pulmonary function testing obtained 1/24/2017 demonstrated FEV1 of 1.66 L or 61% predicted, FVC of 2.54 L 70% predicted, FEV1/FVC ratio of 65, residual volume , total lung capacity 83% predicted, DLCO 128% predicted. Per pulmonologist interpretation moderately severe obstructive ventilatory defect, normal lung volumes and gas transfer, no significant bronchodilator response.  Recommend updated PFT.    Review of Systems   Constitutional: Negative for chills, diaphoresis, fever, malaise/fatigue and weight loss.   HENT: Positive for congestion (mild ) and sinus pain. Negative for hearing loss, nosebleeds, sore throat and tinnitus.    Eyes: Negative.    Respiratory: Positive for cough (now resolved ) and shortness of breath (mild). Negative for sputum production.    Cardiovascular: Negative for chest pain, palpitations, orthopnea and leg swelling.   Gastrointestinal: Positive for heartburn.        No dentures no difficulty swallowing    Skin: Negative.    Neurological:  "Negative for dizziness and tremors. Headaches: sinus.   Psychiatric/Behavioral: The patient has insomnia (mild, sleepy time tea occasional benadryl).        Past Medical History:   Diagnosis Date   • Acid reflux    • Arthritis     knees, hands, hips, neck-Osteo   • Asthma     inhalers daily   • Breath shortness     asthma related   • Bronchitis 08/04/14   • Cancer ([facility]) 01/13    R kidney   • Chronic pain of left knee 8/22/2017   • Chronic pain of right knee 7/1/2016   • COPD (chronic obstructive pulmonary disease) ([facility])    • COPD (chronic obstructive pulmonary disease) ([facility]) 6/30/2016   • Diabetes ([facility])     \"pre\"   • Dyspnea    • Emphysema of lung ([facility])    • Heart burn    • Hypertension    • Impaired fasting glucose 8/24/2015   • Indigestion    • Mass of left lower leg 7/27/2016   • Personal history of renal cancer 4/20/2015   • Renal disorder     stones   • Renal disorder 2013    cancer right kidney 7% removed   • Skin tag 6/24/2019   • Unspecified disorder of thyroid     benign tumor removal   • Unspecified hypothyroidism 4/20/2015       Past Surgical History:   Procedure Laterality Date   • KNEE ARTHROPLASTY TOTAL Left 12/18/2017    Procedure: KNEE ARTHROPLASTY TOTAL;  Surgeon: Ricky Castaneda M.D.;  Location: [facility];  Service: Orthopedics   • KNEE ARTHROPLASTY TOTAL Right 10/17/2016    Procedure: KNEE ARTHROPLASTY TOTAL;  Surgeon: Ricky Castaneda M.D.;  Location: [facility];  Service:    • RECOVERY  3/4/2016    Procedure: IR2-PERQ NEPHRO-URETERAL CATH RIGHT-DR. RIDDLE-Surgery to follow in [facility] ;  Surgeon: Ir-Rec[facility];  Location: SURGERY [facility];  Service:    • CYSTOSCOPY STENT REMOVAL Right 3/4/2016    Procedure: RIGID CYSTOSCOPY STENT REMOVAL;  Surgeon: Fredy Riddle M.D.;  Location: SURGERY [facility];  Service:    • URETEROSCOPY Right 3/4/2016    Procedure: URETEROSCOPY;  Surgeon: Fredy Riddle M.D.;  Location: [facility];  Service:    • " PERCUTANEOUS NEPHROSTOLITHOTOMY Right 3/4/2016    Procedure: PERCUTANEOUS NEPHROSTOLITHOTOMY FOR NEPHROLITHOTRIPSY;  Surgeon: Fredy Cintron M.D.;  Location: [facility];  Service:    • CYSTOSCOPY STENT PLACEMENT Right 2/12/2016    Procedure: CYSTOSCOPY STENT PLACEMENT;  Surgeon: Fredy Cintron M.D.;  Location: [facility];  Service:    • CYSTOSCOPY STENT PLACEMENT Right 6/26/2015    Procedure: CYSTOSCOPY STENT PLACEMENT RIGID POSSIBLE STENT;  Surgeon: Fredy Cintron M.D.;  Location: [facility];  Service:    • URETEROSCOPY N/A 6/26/2015    Procedure: URETEROSCOPY;  Surgeon: Fredy Cintron M.D.;  Location: [facility];  Service:    • LASERTRIPSY Right 6/26/2015    Procedure: LASERTRIPSY LITHO;  Surgeon: Fredy Cintron M.D.;  Location: [facility];  Service:    • URETEROSCOPY  8/20/2014    Performed by Fredy Cintron M.D. at [facility]   • LASERTRIPSY  8/20/2014    Performed by Fredy Cintron M.D. at [facility]   • CYSTOSCOPY  8/20/2014    Performed by Fredy Cintron M.D. at [facility]   • CYSTOSCOPY STENT PLACEMENT  1/31/2013    Performed by Fredy Cintron M.D. at [facility]   • URETEROSCOPY  1/31/2013    Performed by Fredy Cintron M.D. at [facility]   • NEPHRECTOMY PARTIAL  1/31/2013    Performed by Fredy Cintron M.D. at [facility]   • LASERTRIPSY  1/31/2013    Performed by Fredy Cintron M.D. at [facility]   • OTHER ORTHOPEDIC SURGERY  2006    right knee meniscus   • OTHER ORTHOPEDIC SURGERY  1996    ORIF right arm   • OTHER  1995    sinus surgery   • GYN SURGERY  1994    hysterectomy   • OTHER  1980    thyroid lumpectomy   • BREAST BIOPSY Left 1976    benign   • CUATE BY LAPAROSCOPY     • OTHER ORTHOPEDIC SURGERY      achilles tendon reattach    • TONSILLECTOMY         Family History   Problem Relation Age of Onset   • Kidney Disease Mother    • Colon Cancer Father   "      Social History     Socioeconomic History   • Marital status:      Spouse name: Not on file   • Number of children: Not on file   • Years of education: Not on file   • Highest education level: Not on file   Occupational History   • Not on file   Social Needs   • Financial resource strain: Not on file   • Food insecurity     Worry: Not on file     Inability: Not on file   • Transportation needs     Medical: Not on file     Non-medical: Not on file   Tobacco Use   • Smoking status: Never Smoker   • Smokeless tobacco: Never Used   Substance and Sexual Activity   • Alcohol use: No     Comment: allergic to it   • Drug use: No   • Sexual activity: Never     Partners: Male     Birth control/protection: Post-Menopausal   Lifestyle   • Physical activity     Days per week: Not on file     Minutes per session: Not on file   • Stress: Not on file   Relationships   • Social connections     Talks on phone: Not on file     Gets together: Not on file     Attends [employer] service: Not on file     Active member of club or organization: Not on file     Attends meetings of clubs or organizations: Not on file     Relationship status: Not on file   • Intimate partner violence     Fear of current or ex partner: Not on file     Emotionally abused: Not on file     Physically abused: Not on file     Forced sexual activity: Not on file   Other Topics Concern   • Not on file   Social History Narrative   • Not on file       Allergies as of 04/27/2020 - Reviewed 04/27/2020   Allergen Reaction Noted   • Alcohol Vomiting, Nausea, and Unspecified 01/29/2013   • Pcn [penicillins] Unspecified 05/09/2012        @Vital signs for this encounter:  Vitals:    04/27/20 1445   Height: 1.753 m (5' 9\")   Weight: 81.6 kg (180 lb)   Weight % change since last entry.: 0 %   BP: 134/88   Pulse: 76   BMI (Calculated): 26.58   Resp: 16       Current medications as of today   Current Outpatient Medications   Medication Sig Dispense Refill   • " Fluticasone-Umeclidin-Vilant (TRELEGY ELLIPTA) 100-62.5-25 MCG/INH AEROSOL POWDER, BREATH ACTIVATED Inhale 1 Puff by mouth every day. Rinse mouth after use 1 Each 11   • Fluticasone-Umeclidin-Vilant (TRELEGY ELLIPTA) 100-62.5-25 MCG/INH AEROSOL POWDER, BREATH ACTIVATED Inhale 1 Puff by mouth every day. Rinse mouth after use 1 Each 0   • ADVAIR DISKUS 250-50 MCG/DOSE AEROSOL POWDER, BREATH ACTIVATED INHALE 1 DOSE BY MOUTH EVERY 12 HOURS 1 Inhaler 0   • losartan (COZAAR) 100 MG Tab Take 1 Tab by mouth every day. 90 Tab 3   • hydroCHLOROthiazide (HYDRODIURIL) 25 MG Tab Take 1 Tab by mouth every day. 90 Tab 3   • levothyroxine (SYNTHROID) 88 MCG Tab TAKE 1 TABLET BY MOUTH ONCE DAILY IN THE MORNING ON AN EMPTY STOMACH 90 Tab 3   • metFORMIN ER (GLUCOPHAGE XR) 500 MG TABLET SR 24 HR TAKE 1 TABLET BY MOUTH TWICE DAILY 180 Tab 3   • simvastatin (ZOCOR) 20 MG Tab TAKE 1 TABLET BY MOUTH ONCE DAILY IN THE EVENING 90 Tab 3   • raloxifene (EVISTA) 60 MG Tab TAKE 1 TABLET BY MOUTH ONCE DAILY IN THE MORNING 90 Tab 3   • ALBUTEROL SULFATE HFA INH      • albuterol (ACCUNEB) 0.63 MG/3ML nebulizer solution 3 mL by Nebulization route every four hours as needed for Shortness of Breath. 90 mL 1   • albuterol (PROAIR HFA) 108 (90 Base) MCG/ACT Aero Soln inhalation aerosol Inhale 2 Puffs by mouth every four hours as needed for Shortness of Breath. 8.5 g 6   • fenofibrate (TRIGLIDE) 160 MG tablet fenofibrate 160 mg tablet     • metoprolol SR (TOPROL XL) 25 MG TABLET SR 24 HR metoprolol succinate ER 25 mg tablet,extended release 24 hr     • BOOSTRIX 5-2.5-18.5 Suspension      • Tiotropium Bromide Monohydrate (SPIRIVA RESPIMAT) 2.5 MCG/ACT Aero Soln Inhale 2 Inhalation by mouth every day. Two inhalations once daily.  Assemble and prime. 2 Inhaler 6   • vitamin E (VITAMIN E) 1000 UNIT Cap Take 1 Cap by mouth every day.     • multivitamin (THERAGRAN) Tab Take 1 Tab by mouth every day.     • vitamin D (CHOLECALCIFEROL) 1000 UNIT Tab Take 1,000  Units by mouth every day.     • aspirin EC (ECOTRIN) 81 MG Tablet Delayed Response Take 81 mg by mouth every day.     • metoprolol SR (TOPROL XL) 25 MG TABLET SR 24 HR TAKE 1 TABLET BY MOUTH ONCE DAILY 90 Tab 3   • fenofibrate (TRIGLIDE) 160 MG tablet TAKE 1 TABLET BY MOUTH ONCE DAILY 90 Tab 3   • azithromycin (ZITHROMAX) 250 MG Tab Take two tabs po day one followed by one tab po day two through five with food (Patient not taking: Reported on 2/10/2020) 6 Tab 0   • SPIRIVA HANDIHALER 18 MCG Cap INHALE 1 PUFF BY MOUTH ONCE DAILY 90 Cap 3   • SPIRIVA HANDIHALER 18 MCG Cap   3   • clobetasol (TEMOVATE) 0.05 % Ointment BID x 1 wk (Patient not taking: Reported on 2/10/2020) 30 g 1     No current facility-administered medications for this visit.          Physical Exam:   Gen:           Alert and oriented, No apparent distress. Mood and affect appropriate, normal interaction with provider.  Eyes:          sclere white, conjunctive moist.  Hearing:     Grossly intact.  Dentition:    Fair dentition.  Oropharynx:   Tongue normal, posterior pharynx without erythema or exudate.  Neck:        Supple, trachea midline, no masses.  Respiratory Effort: No intercostal retractions or use of accessory muscles.   Lung Auscultation:      Decreased bases; no rales, rhonchi or wheezing.  CV:            Regular rate and rhythm. No significant edema. No murmurs, rubs or gallops.  Digits, Nails, Ext: No clubbing, cyanosis, petechiae, or nodes.   Skin:        No rashes, lesions or ulcers noted on back or exposed skin surfaces.                     Assessment:  1. Mild persistent asthma without complication     2. Chronic obstructive pulmonary disease, unspecified COPD type (HCC)  Fluticasone-Umeclidin-Vilant (TRELEGY ELLIPTA) 100-62.5-25 MCG/INH AEROSOL POWDER, BREATH ACTIVATED    Fluticasone-Umeclidin-Vilant (TRELEGY ELLIPTA) 100-62.5-25 MCG/INH AEROSOL POWDER, BREATH ACTIVATED    PULMONARY FUNCTION TESTS -Test requested: Complete Pulmonary  Function Test    albuterol (VENTOLIN HFA) 108 (90 Base) MCG/ACT Aero Soln inhalation aerosol   3. Bronchitis   now resolved   4. Reflux esophagitis   reviewed reflux precautions, daily omeprazole trial   5. COPD exacerbation (HCC)  azithromycin (ZITHROMAX) 250 MG Tab    methylPREDNISolone (MEDROL DOSEPAK) 4 MG Tablet Therapy Pack       Immunizations:    Flu: 8/30/2019  Pneumovax 23: 8/7/2013  Prevnar 13: 10/3/2015    Plan:    Patient is a 74-year-old female history of asthma/COPD and a never smoker.  Is very active in her [employer].  Would like to trial Trelegy if affordable for her.  Spiriva is not covered by her insurance so we will need to switch her to Incruse if Trelegy is not affordable.  Sample today provided.  Infrequent albuterol inhaler use, has not been requiring her nebulizer.  She is having heartburn for which she occasionally is using omeprazole with reported benefit.  Reviewed reflux precautions.  Requested emergency pack of antibiotic and steroid, reviewed indications.  Recommend update PFT.      1-reflux precautions   -avoid known triggers:  Spicy food, citrus, alcohol, soda, chocolate or other sweets    -head of bed elevated 20 degrees   -nothing to eat or drink besides water for three hours   -sleep on back or left side, avoid the right, prefers right  2-continue omeprazole daily earlier in day   Goal: control of heartburn, may be able to wean to Pepcid AC later  3-sample Trelegy, [facility] to [facility] for cost  4-emergency pack provided of antibiotic and steroid  -indications for use of steroid=increased shortness of breath, coughing and congestion  -indications for use of antibiotic is fever or purulent secretions  5-follow up in 6 months with PFT   6-let us know if alternative maintenance therapy needed due to cost    *if Trelegy not covered will need Incruse rather than spiriva    This dictation was created using voice recognition software. The accuracy of the dictation is limited to  the abilities of the software. I expect there may be some errors of grammar and possibly content.

## 2020-04-28 ENCOUNTER — APPOINTMENT (OUTPATIENT)
Dept: PULMONOLOGY | Facility: HOSPICE | Age: 75
End: 2020-04-28
Payer: MEDICARE

## 2020-05-11 DIAGNOSIS — J44.9 CHRONIC OBSTRUCTIVE PULMONARY DISEASE, UNSPECIFIED COPD TYPE (HCC): ICD-10-CM

## 2020-07-29 ENCOUNTER — HOSPITAL ENCOUNTER (OUTPATIENT)
Facility: MEDICAL CENTER | Age: 75
End: 2020-07-29
Attending: NURSE PRACTITIONER
Payer: MEDICARE

## 2020-07-29 ENCOUNTER — OFFICE VISIT (OUTPATIENT)
Dept: URGENT CARE | Facility: CLINIC | Age: 75
End: 2020-07-29
Payer: MEDICARE

## 2020-07-29 VITALS
DIASTOLIC BLOOD PRESSURE: 84 MMHG | TEMPERATURE: 97.6 F | HEIGHT: 70 IN | BODY MASS INDEX: 25.48 KG/M2 | HEART RATE: 82 BPM | OXYGEN SATURATION: 96 % | WEIGHT: 178 LBS | SYSTOLIC BLOOD PRESSURE: 168 MMHG | RESPIRATION RATE: 16 BRPM

## 2020-07-29 DIAGNOSIS — N30.00 ACUTE CYSTITIS WITHOUT HEMATURIA: ICD-10-CM

## 2020-07-29 DIAGNOSIS — R03.0 ELEVATED BLOOD PRESSURE READING: ICD-10-CM

## 2020-07-29 LAB
APPEARANCE UR: CLEAR
BILIRUB UR STRIP-MCNC: NORMAL MG/DL
COLOR UR AUTO: NORMAL
GLUCOSE UR STRIP.AUTO-MCNC: 100 MG/DL
KETONES UR STRIP.AUTO-MCNC: NORMAL MG/DL
LEUKOCYTE ESTERASE UR QL STRIP.AUTO: 100
NITRITE UR QL STRIP.AUTO: NORMAL
PH UR STRIP.AUTO: 5 [PH] (ref 5–8)
PROT UR QL STRIP: NORMAL MG/DL
RBC UR QL AUTO: NORMAL
SP GR UR STRIP.AUTO: >=1.03
UROBILINOGEN UR STRIP-MCNC: 0.2 MG/DL

## 2020-07-29 PROCEDURE — 87086 URINE CULTURE/COLONY COUNT: CPT

## 2020-07-29 PROCEDURE — 81001 URINALYSIS AUTO W/SCOPE: CPT

## 2020-07-29 PROCEDURE — 81002 URINALYSIS NONAUTO W/O SCOPE: CPT | Performed by: NURSE PRACTITIONER

## 2020-07-29 PROCEDURE — 99214 OFFICE O/P EST MOD 30 MIN: CPT | Performed by: NURSE PRACTITIONER

## 2020-07-29 PROCEDURE — 87186 SC STD MICRODIL/AGAR DIL: CPT

## 2020-07-29 PROCEDURE — 87077 CULTURE AEROBIC IDENTIFY: CPT

## 2020-07-29 RX ORDER — CIPROFLOXACIN 500 MG/1
500 TABLET, FILM COATED ORAL EVERY 12 HOURS
Qty: 14 TAB | Refills: 0 | Status: SHIPPED | OUTPATIENT
Start: 2020-07-29 | End: 2020-08-05

## 2020-07-29 ASSESSMENT — ENCOUNTER SYMPTOMS
VOMITING: 0
FEVER: 0
FLANK PAIN: 0
ABDOMINAL PAIN: 0
DIZZINESS: 0

## 2020-07-30 LAB
APPEARANCE UR: CLEAR
BACTERIA #/AREA URNS HPF: ABNORMAL /HPF
BILIRUB UR QL STRIP.AUTO: NEGATIVE
COLOR UR: YELLOW
EPI CELLS #/AREA URNS HPF: NEGATIVE /HPF
GLUCOSE UR STRIP.AUTO-MCNC: NEGATIVE MG/DL
HYALINE CASTS #/AREA URNS LPF: ABNORMAL /LPF
KETONES UR STRIP.AUTO-MCNC: NEGATIVE MG/DL
LEUKOCYTE ESTERASE UR QL STRIP.AUTO: ABNORMAL
MICRO URNS: ABNORMAL
NITRITE UR QL STRIP.AUTO: POSITIVE
PH UR STRIP.AUTO: 5.5 [PH] (ref 5–8)
PROT UR QL STRIP: NEGATIVE MG/DL
RBC # URNS HPF: ABNORMAL /HPF
RBC UR QL AUTO: ABNORMAL
SP GR UR REFRACTOMETRY: 1.03
SP GR UR STRIP.AUTO: >=1.03
UROBILINOGEN UR STRIP.AUTO-MCNC: 0.2 MG/DL
WBC #/AREA URNS HPF: ABNORMAL /HPF
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
## 2020-08-07 ENCOUNTER — HOSPITAL ENCOUNTER (OUTPATIENT)
Dept: LAB | Facility: MEDICAL CENTER | Age: 75
End: 2020-08-07
Attending: INTERNAL MEDICINE
Payer: MEDICARE

## 2020-08-07 DIAGNOSIS — R73.01 IMPAIRED FASTING GLUCOSE: ICD-10-CM

## 2020-08-07 DIAGNOSIS — I10 ESSENTIAL HYPERTENSION: ICD-10-CM

## 2020-08-07 DIAGNOSIS — E78.5 HYPERLIPIDEMIA, UNSPECIFIED HYPERLIPIDEMIA TYPE: ICD-10-CM

## 2020-08-07 LAB
ANION GAP SERPL CALC-SCNC: 12 MMOL/L (ref 7–16)
BUN SERPL-MCNC: 32 MG/DL (ref 8–22)
CALCIUM SERPL-MCNC: 9.4 MG/DL (ref 8.5–10.5)
CHLORIDE SERPL-SCNC: 103 MMOL/L (ref 96–112)
CHOLEST SERPL-MCNC: 123 MG/DL (ref 100–199)
CO2 SERPL-SCNC: 25 MMOL/L (ref 20–33)
CREAT SERPL-MCNC: 0.73 MG/DL (ref 0.5–1.4)
EST. AVERAGE GLUCOSE BLD GHB EST-MCNC: 154 MG/DL
FASTING STATUS PATIENT QL REPORTED: NORMAL
GLUCOSE SERPL-MCNC: 136 MG/DL (ref 65–99)
HBA1C MFR BLD: 7 % (ref 0–5.6)
HDLC SERPL-MCNC: 49 MG/DL
LDLC SERPL CALC-MCNC: 47 MG/DL
POTASSIUM SERPL-SCNC: 4.1 MMOL/L (ref 3.6–5.5)
SODIUM SERPL-SCNC: 140 MMOL/L (ref 135–145)
TRIGL SERPL-MCNC: 135 MG/DL (ref 0–149)

## 2020-08-07 PROCEDURE — 36415 COLL VENOUS BLD VENIPUNCTURE: CPT | Mod: GA

## 2020-08-07 PROCEDURE — 80048 BASIC METABOLIC PNL TOTAL CA: CPT

## 2020-08-07 PROCEDURE — 80061 LIPID PANEL: CPT

## 2020-08-07 PROCEDURE — 83036 HEMOGLOBIN GLYCOSYLATED A1C: CPT | Mod: GA

## 2020-08-10 ENCOUNTER — HOSPITAL ENCOUNTER (OUTPATIENT)
Facility: MEDICAL CENTER | Age: 75
End: 2020-08-10
Attending: INTERNAL MEDICINE
Payer: MEDICARE

## 2020-08-10 ENCOUNTER — OFFICE VISIT (OUTPATIENT)
Dept: MEDICAL GROUP | Facility: MEDICAL CENTER | Age: 75
End: 2020-08-10
Payer: MEDICARE

## 2020-08-10 VITALS
DIASTOLIC BLOOD PRESSURE: 80 MMHG | SYSTOLIC BLOOD PRESSURE: 140 MMHG | HEIGHT: 70 IN | WEIGHT: 183 LBS | HEART RATE: 87 BPM | RESPIRATION RATE: 16 BRPM | TEMPERATURE: 98.7 F | OXYGEN SATURATION: 98 % | BODY MASS INDEX: 26.2 KG/M2

## 2020-08-10 DIAGNOSIS — K21.9 GASTROESOPHAGEAL REFLUX DISEASE WITHOUT ESOPHAGITIS: ICD-10-CM

## 2020-08-10 DIAGNOSIS — Z85.528 PERSONAL HISTORY OF RENAL CANCER: ICD-10-CM

## 2020-08-10 DIAGNOSIS — J45.20 MILD INTERMITTENT ASTHMA WITHOUT COMPLICATION: ICD-10-CM

## 2020-08-10 DIAGNOSIS — E78.5 HYPERLIPIDEMIA, UNSPECIFIED HYPERLIPIDEMIA TYPE: ICD-10-CM

## 2020-08-10 DIAGNOSIS — E03.9 HYPOTHYROIDISM, UNSPECIFIED TYPE: ICD-10-CM

## 2020-08-10 DIAGNOSIS — N20.0 CALCULUS OF KIDNEY: ICD-10-CM

## 2020-08-10 DIAGNOSIS — I10 ESSENTIAL HYPERTENSION: ICD-10-CM

## 2020-08-10 DIAGNOSIS — N81.10 BLADDER PROLAPSE, FEMALE, ACQUIRED: ICD-10-CM

## 2020-08-10 DIAGNOSIS — R73.01 IMPAIRED FASTING GLUCOSE: ICD-10-CM

## 2020-08-10 DIAGNOSIS — R30.0 DYSURIA: ICD-10-CM

## 2020-08-10 LAB
APPEARANCE UR: ABNORMAL
BACTERIA #/AREA URNS HPF: ABNORMAL /HPF
BILIRUB UR QL STRIP.AUTO: NEGATIVE
CAOX CRY #/AREA URNS HPF: ABNORMAL /HPF
COLOR UR: YELLOW
EPI CELLS #/AREA URNS HPF: ABNORMAL /HPF
GLUCOSE UR STRIP.AUTO-MCNC: NEGATIVE MG/DL
HYALINE CASTS #/AREA URNS LPF: ABNORMAL /LPF
KETONES UR STRIP.AUTO-MCNC: NEGATIVE MG/DL
LEUKOCYTE ESTERASE UR QL STRIP.AUTO: ABNORMAL
MICRO URNS: ABNORMAL
NITRITE UR QL STRIP.AUTO: POSITIVE
PH UR STRIP.AUTO: 5.5 [PH] (ref 5–8)
PROT UR QL STRIP: 30 MG/DL
RBC # URNS HPF: ABNORMAL /HPF
RBC UR QL AUTO: ABNORMAL
SP GR UR STRIP.AUTO: 1.02
UROBILINOGEN UR STRIP.AUTO-MCNC: 0.2 MG/DL
WBC #/AREA URNS HPF: ABNORMAL /HPF

## 2020-08-10 PROCEDURE — 81001 URINALYSIS AUTO W/SCOPE: CPT

## 2020-08-10 PROCEDURE — 99999 PR NO CHARGE: CPT | Performed by: INTERNAL MEDICINE

## 2020-08-10 PROCEDURE — 87086 URINE CULTURE/COLONY COUNT: CPT

## 2020-08-10 PROCEDURE — 87077 CULTURE AEROBIC IDENTIFY: CPT

## 2020-08-10 PROCEDURE — 87186 SC STD MICRODIL/AGAR DIL: CPT

## 2020-08-10 PROCEDURE — G0439 PPPS, SUBSEQ VISIT: HCPCS | Performed by: INTERNAL MEDICINE

## 2020-08-10 RX ORDER — INFLUENZA A VIRUS A/MICHIGAN/45/2015 X-275 (H1N1) ANTIGEN (FORMALDEHYDE INACTIVATED), INFLUENZA A VIRUS A/SINGAPORE/INFIMH-16-0019/2016 IVR-186 (H3N2) ANTIGEN (FORMALDEHYDE INACTIVATED), AND INFLUENZA B VIRUS B/MARYLAND/15/2016 BX-69A (A B/COLORADO/6/2017-LIKE VIRUS) ANTIGEN (FORMALDEHYDE INACTIVATED) 60; 60; 60 UG/.5ML; UG/.5ML; UG/.5ML
INJECTION, SUSPENSION INTRAMUSCULAR
COMMUNITY
End: 2021-04-28

## 2020-08-10 ASSESSMENT — ACTIVITIES OF DAILY LIVING (ADL): BATHING_REQUIRES_ASSISTANCE: 0

## 2020-08-10 ASSESSMENT — PATIENT HEALTH QUESTIONNAIRE - PHQ9: CLINICAL INTERPRETATION OF PHQ2 SCORE: 0

## 2020-08-10 ASSESSMENT — ENCOUNTER SYMPTOMS: GENERAL WELL-BEING: GOOD

## 2020-08-10 NOTE — PROGRESS NOTES
Chief Complaint   Patient presents with   • Annual Exam         HPI:  Jillian Gottlieb is a 74 y.o. here for Medicare Annual Wellness Visit     Patient Active Problem List    Diagnosis Date Noted   • Hammer toe 09/04/2019   • Skin tag 06/24/2019   • GERD (gastroesophageal reflux disease) 10/03/2016   • Mass of left lower leg 07/27/2016   • COPD (chronic obstructive pulmonary disease) (HCC) 06/30/2016   • Kidney stones 03/04/2016   • Essential hypertension 02/08/2016   • Impaired fasting glucose 08/24/2015   • Hyperlipidemia 04/20/2015   • Hypothyroidism 04/20/2015   • Personal history of renal cancer 04/20/2015   • Asthma 04/20/2015       Current Outpatient Medications   Medication Sig Dispense Refill   • ADVAIR DISKUS 250-50 MCG/DOSE AEROSOL POWDER, BREATH ACTIVATED INHALE 1 DOSE BY MOUTH EVERY 12 HOURS 3 Inhaler 0   • Fluticasone-Umeclidin-Vilant (TRELEGY ELLIPTA) 100-62.5-25 MCG/INH AEROSOL POWDER, BREATH ACTIVATED Inhale 1 Puff by mouth every day. Rinse mouth after use 1 Each 11   • Fluticasone-Umeclidin-Vilant (TRELEGY ELLIPTA) 100-62.5-25 MCG/INH AEROSOL POWDER, BREATH ACTIVATED Inhale 1 Puff by mouth every day. Rinse mouth after use 1 Each 0   • methylPREDNISolone (MEDROL DOSEPAK) 4 MG Tablet Therapy Pack Take as directed. 21 Tab 0   • albuterol (VENTOLIN HFA) 108 (90 Base) MCG/ACT Aero Soln inhalation aerosol Inhale 2 Puffs by mouth every four hours as needed for Shortness of Breath (Wheezing). 1 Inhaler 6   • losartan (COZAAR) 100 MG Tab Take 1 Tab by mouth every day. 90 Tab 3   • hydroCHLOROthiazide (HYDRODIURIL) 25 MG Tab Take 1 Tab by mouth every day. 90 Tab 3   • metoprolol SR (TOPROL XL) 25 MG TABLET SR 24 HR TAKE 1 TABLET BY MOUTH ONCE DAILY 90 Tab 3   • fenofibrate (TRIGLIDE) 160 MG tablet TAKE 1 TABLET BY MOUTH ONCE DAILY 90 Tab 3   • levothyroxine (SYNTHROID) 88 MCG Tab TAKE 1 TABLET BY MOUTH ONCE DAILY IN THE MORNING ON AN EMPTY STOMACH 90 Tab 3   • metFORMIN ER (GLUCOPHAGE XR) 500 MG TABLET SR 24  HR TAKE 1 TABLET BY MOUTH TWICE DAILY 180 Tab 3   • simvastatin (ZOCOR) 20 MG Tab TAKE 1 TABLET BY MOUTH ONCE DAILY IN THE EVENING 90 Tab 3   • raloxifene (EVISTA) 60 MG Tab TAKE 1 TABLET BY MOUTH ONCE DAILY IN THE MORNING 90 Tab 3   • SPIRIVA HANDIHALER 18 MCG Cap INHALE 1 PUFF BY MOUTH ONCE DAILY 90 Cap 3   • ALBUTEROL SULFATE HFA INH      • SPIRIVA HANDIHALER 18 MCG Cap   3   • albuterol (ACCUNEB) 0.63 MG/3ML nebulizer solution 3 mL by Nebulization route every four hours as needed for Shortness of Breath. 90 mL 1   • clobetasol (TEMOVATE) 0.05 % Ointment BID x 1 wk (Patient not taking: Reported on 2/10/2020) 30 g 1   • albuterol (PROAIR HFA) 108 (90 Base) MCG/ACT Aero Soln inhalation aerosol Inhale 2 Puffs by mouth every four hours as needed for Shortness of Breath. 8.5 g 6   • fenofibrate (TRIGLIDE) 160 MG tablet fenofibrate 160 mg tablet     • metoprolol SR (TOPROL XL) 25 MG TABLET SR 24 HR metoprolol succinate ER 25 mg tablet,extended release 24 hr     • BOOSTRIX 5-2.5-18.5 Suspension      • Tiotropium Bromide Monohydrate (SPIRIVA RESPIMAT) 2.5 MCG/ACT Aero Soln Inhale 2 Inhalation by mouth every day. Two inhalations once daily.  Assemble and prime. 2 Inhaler 6   • vitamin E (VITAMIN E) 1000 UNIT Cap Take 1 Cap by mouth every day.     • multivitamin (THERAGRAN) Tab Take 1 Tab by mouth every day.     • vitamin D (CHOLECALCIFEROL) 1000 UNIT Tab Take 1,000 Units by mouth every day.     • aspirin EC (ECOTRIN) 81 MG Tablet Delayed Response Take 81 mg by mouth every day.       No current facility-administered medications for this visit.             Current supplements as per medication list.       Allergies: Alcohol and Pcn [penicillins]    Current social contact/activities: friends, Mosque     She  reports that she has never smoked. She has never used smokeless tobacco. She reports that she does not drink alcohol or use drugs.  Counseling given: Not Answered      DPA/Advanced Directive:  Patient has Advanced  Directive on file.     ROS:    Gait: Uses no assistive device  Ostomy: No  Other tubes: No  Amputations: No  Chronic oxygen use: No  Last eye exam: uncertain  Wears hearing aids: No   : Reports urinary leakage during the last 6 months that has not interfered at all with their daily activities or sleep. She has had problems with bladder prolapse and recurrent urinary infections.  Currently she has some dysuria.    Screening:    Depression Screening    Little interest or pleasure in doing things?  0 - not at all  Feeling down, depressed , or hopeless? 0 - not at all  Patient Health Questionnaire Score: 0     If depressive symptoms identified deferred to follow up visit unless specifically addressed in assessment and plan.    Interpretation of PHQ-9 Total Score   Score Severity   1-4 No Depression   5-9 Mild Depression   10-14 Moderate Depression   15-19 Moderately Severe Depression   20-27 Severe Depression    Screening for Cognitive Impairment    Three Minute Recall (river, nation, finger) 3/3    Isaac clock face with all 12 numbers and set the hands to show 10 past 11.  Yes    Cognitive concerns identified deferred for follow up unless specifically addressed in assessment and plan.    Fall Risk Assessment    Has the patient had two or more falls in the last year or any fall with injury in the last year?  No    Safety Assessment    Throw rugs on floor.  Yes  Handrails on all stairs.  Yes  Good lighting in all hallways.  Yes  Difficulty hearing.  No  Patient counseled about all safety risks that were identified.    Functional Assessment ADLs    Are there any barriers preventing you from cooking for yourself or meeting nutritional needs?  No.    Are there any barriers preventing you from driving safely or obtaining transportation?  No.    Are there any barriers preventing you from using a telephone or calling for help?  No.    Are there any barriers preventing you from shopping?  No.    Are there any barriers  preventing you from taking care of your own finances?  No.    Are there any barriers preventing you from managing your medications?  No.    Are there any barriers preventing you from showering, bathing or dressing yourself?  No.    Are you currently engaging in any exercise or physical activity?  No.     What is your perception of your health?  Good.      Health Maintenance Summary                Annual Pulmonary Function Test / Spirometry Overdue 1/24/2018      Done 1/24/2017 AMB PULMONARY FUNCTION TEST/LAB     Patient has more history with this topic...    Annual Wellness Visit Overdue 2/7/2020      Done 2/6/2019      Patient has more history with this topic...    MAMMOGRAM Overdue 4/9/2020      Done 4/9/2019 MA-SCREENING MAMMO BILAT W/TOMOSYNTHESIS W/CAD     Patient has more history with this topic...    IMM INFLUENZA Next Due 9/1/2020      Done 8/30/2019 Imm Admin: Influenza Vaccine Adult HD     Patient has more history with this topic...    COLONOSCOPY Next Due 11/11/2020      Done 11/11/2015 AMB REFERRAL TO GI FOR COLONOSCOPY    BONE DENSITY Next Due 4/9/2024      Done 4/9/2019 DS-BONE DENSITY STUDY (DEXA)    IMM DTaP/Tdap/Td Vaccine Next Due 11/16/2028      Done 11/16/2018 Imm Admin: Tdap Vaccine     Patient has more history with this topic...          Patient Care Team:  Herve Conrad M.D. as PCP - General (Internal Medicine)  Fredy Cintron M.D. as Consulting Physician (Urology)  Ricky Castaneda M.D. as Consulting Physician (Orthopaedics)  Janet Grove P.A.-C. as Consulting Physician (Physician Assistant)        Social History     Tobacco Use   • Smoking status: Never Smoker   • Smokeless tobacco: Never Used   Substance Use Topics   • Alcohol use: No     Comment: allergic to it   • Drug use: No     Family History   Problem Relation Age of Onset   • Kidney Disease Mother    • Colon Cancer Father      She  has a past medical history of Acid reflux, Arthritis, Asthma, Breath shortness, Bronchitis  (08/04/14), Cancer (HCC) (01/13), Chronic pain of left knee (8/22/2017), Chronic pain of right knee (7/1/2016), COPD (chronic obstructive pulmonary disease) ([facility]), COPD (chronic obstructive pulmonary disease) ([facility]) (6/30/2016), Diabetes ([facility]), Dyspnea, Emphysema of lung ([facility]), Heart burn, Hypertension, Impaired fasting glucose (8/24/2015), Indigestion, Mass of left lower leg (7/27/2016), Personal history of renal cancer (4/20/2015), Renal disorder, Renal disorder (2013), Skin tag (6/24/2019), Unspecified disorder of thyroid, and Unspecified hypothyroidism (4/20/2015). She also has no past medical history of Encounter for long-term (current) use of other medications.   Past Surgical History:   Procedure Laterality Date   • KNEE ARTHROPLASTY TOTAL Left 12/18/2017    Procedure: KNEE ARTHROPLASTY TOTAL;  Surgeon: Ricky Castaneda M.D.;  Location: [facility];  Service: Orthopedics   • KNEE ARTHROPLASTY TOTAL Right 10/17/2016    Procedure: KNEE ARTHROPLASTY TOTAL;  Surgeon: Ricky Castaneda M.D.;  Location: [facility];  Service:    • RECOVERY  3/4/2016    Procedure: IR2-PERQ NEPHRO-URETERAL CATH RIGHT-DR. RIDDLE-Surgery to follow in [facility] ;  Surgeon: Ir-Recovery Surgery;  Location: [facility];  Service:    • CYSTOSCOPY STENT REMOVAL Right 3/4/2016    Procedure: RIGID CYSTOSCOPY STENT REMOVAL;  Surgeon: Fredy Riddle M.D.;  Location: [facility];  Service:    • URETEROSCOPY Right 3/4/2016    Procedure: URETEROSCOPY;  Surgeon: Fredy Riddle M.D.;  Location: [facility];  Service:    • PERCUTANEOUS NEPHROSTOLITHOTOMY Right 3/4/2016    Procedure: PERCUTANEOUS NEPHROSTOLITHOTOMY FOR NEPHROLITHOTRIPSY;  Surgeon: Fredy Riddle M.D.;  Location: [facility];  Service:    • CYSTOSCOPY STENT PLACEMENT Right 2/12/2016    Procedure: CYSTOSCOPY STENT PLACEMENT;  Surgeon: Fredy Riddle M.D.;  Location: [facility];  Service:    • CYSTOSCOPY STENT  "PLACEMENT Right 6/26/2015    Procedure: CYSTOSCOPY STENT PLACEMENT RIGID POSSIBLE STENT;  Surgeon: Fredy Cintron M.D.;  Location: [facility];  Service:    • URETEROSCOPY N/A 6/26/2015    Procedure: URETEROSCOPY;  Surgeon: Ferdy Cintron M.D.;  Location: [facility];  Service:    • LASERTRIPSY Right 6/26/2015    Procedure: LASERTRIPSY LITHO;  Surgeon: Fredy Cintron M.D.;  Location: [facility];  Service:    • URETEROSCOPY  8/20/2014    Performed by Fredy Cintron M.D. at [facility]   • LASERTRIPSY  8/20/2014    Performed by Fredy Cintron M.D. at [facility]   • CYSTOSCOPY  8/20/2014    Performed by Fredy Cintron M.D. at [facility]   • CYSTOSCOPY STENT PLACEMENT  1/31/2013    Performed by Fredy Cintron M.D. at [facility]   • URETEROSCOPY  1/31/2013    Performed by Fredy Cintron M.D. at [facility]   • NEPHRECTOMY PARTIAL  1/31/2013    Performed by Fredy Cintron M.D. at [facility]   • LASERTRIPSY  1/31/2013    Performed by Fredy Cintron M.D. at [facility]   • OTHER ORTHOPEDIC SURGERY  2006    right knee meniscus   • OTHER ORTHOPEDIC SURGERY  1996    ORIF right arm   • OTHER  1995    sinus surgery   • GYN SURGERY  1994    hysterectomy   • OTHER  1980    thyroid lumpectomy   • BREAST BIOPSY Left 1976    benign   • CUAET BY LAPAROSCOPY     • OTHER ORTHOPEDIC SURGERY      achilles tendon reattach    • TONSILLECTOMY         Exam:   /80 (BP Location: Left arm, Patient Position: Sitting, BP Cuff Size: Adult)   Pulse 87   Temp 37.1 °C (98.7 °F) (Temporal)   Resp 16   Ht 1.778 m (5' 10\")   Wt 83 kg (183 lb)   SpO2 98%  Body mass index is 26.26 kg/m².    Hearing excellent.    Dentition good  Alert, oriented in no acute distress.  Eye contact is good, speech goal directed, affect calm.  Neck is supple and without significant lymphadenopathy.  Lungs are clear without obvious rales or wheeze.  " Cardiovascular peripheral circulation is satisfactory.  Heart sounds are unremarkable.  Abdomen is soft and without obvious masses or tenderness.  There are normal bowel sounds.  Breast exam was not performed except that there was a small raised translucent plaque on the left breast that appears benign.  I did recommend that she let her dermatologist look at it.  Pelvic and rectal exams were not performed at this time.    Assessment and Plan. The following treatment and monitoring plan is recommended:    Hyperlipidemia  She has hyperlipidemia for which she is on fenofibrate and simvastatin.  Most recent cholesterol is 123, triglycerides 135, HDL 49, LDL 47.  We again reviewed appropriate diet and she will increase soluble fiber.  No medication change.    Hypothyroidism  She has a history of hypothyroidism for which she takes levothyroxine.  Clinically she is euthyroid.  We will check a TSH at least yearly.    Personal history of renal cancer  She has a history of renal cell cancer treated with partial nephrectomy, followed by Dr. Cintron, no evidence of recurrence.    Asthma  She has a history of asthma that is under fairly good control.  She is being switched from Advair and Spiriva to Trelegy.  No recent significant asthma attacks.    Impaired fasting glucose  Blood sugar remains elevated at 136.  Glycohemoglobin is a little higher at 7.0.  She continues taking metformin 500 mg twice a day but will increase that to 1000 mg in the morning and 500 in the afternoon.  We briefly reviewed appropriate diet.    Essential hypertension  Blood pressure remains borderline.  She continues losartan and hydrochlorothiazide.  She is not overly careful about avoiding salt.  She will follow a low-salt diet more carefully.    Kidney stones  She has a history of kidney stones, no recent flank pain or hematuria.  She is followed for this by Dr. Cintron.    GERD (gastroesophageal reflux disease)  Gastroesophageal reflux is primarily  controlled with diet.    Bladder prolapse, female, acquired  She is having problems with bladder prolapse For which she is followed by Dr. Cintron.  She has been having problems with recurrent urinary infections.  She is having some dysuria.  We will check a urinalysis and forward results to Dr. Cintron.          Services suggested: No services needed at this time  Health Care Screening: Age-appropriate preventive services recommended by USPTF and ACIP covered by Medicare were discussed today. Services ordered if indicated and agreed upon by the patient.  Referrals offered: Community-based lifestyle interventions to reduce health risks and promote self-management and wellness, fall prevention, nutrition, physical activity, tobacco-use cessation, weight loss, and mental health services as per orders if indicated.    Discussion today about general wellness and lifestyle habits:    · Prevent falls and reduce trip hazards; Cautioned about securing or removing rugs.  · Have a working fire alarm and carbon monoxide detector;   · Engage in regular physical activity and social activities     Follow-up: Return in about 4 months (around 12/10/2020).

## 2020-08-10 NOTE — ASSESSMENT & PLAN NOTE
Pt c/o headache, states pain 3/10. MD informed, received new orders.    She has a history of kidney stones, no recent flank pain or hematuria.  She is followed for this by Dr. Cintron.

## 2020-08-10 NOTE — ASSESSMENT & PLAN NOTE
Blood sugar remains elevated at 136.  Glycohemoglobin is a little higher at 7.0.  She continues taking metformin 500 mg twice a day but will increase that to 1000 mg in the morning and 500 in the afternoon.  We briefly reviewed appropriate diet.

## 2020-08-10 NOTE — ASSESSMENT & PLAN NOTE
She has hyperlipidemia for which she is on fenofibrate and simvastatin.  Most recent cholesterol is 123, triglycerides 135, HDL 49, LDL 47.  We again reviewed appropriate diet and she will increase soluble fiber.  No medication change.

## 2020-08-10 NOTE — ASSESSMENT & PLAN NOTE
She has a history of renal cell cancer treated with partial nephrectomy, followed by Dr. Cintron, no evidence of recurrence.

## 2020-08-10 NOTE — ASSESSMENT & PLAN NOTE
Blood pressure remains borderline.  She continues losartan and hydrochlorothiazide.  She is not overly careful about avoiding salt.  She will follow a low-salt diet more carefully.

## 2020-08-10 NOTE — ASSESSMENT & PLAN NOTE
She is having problems with bladder prolapse For which she is followed by Dr. Cintron.  She has been having problems with recurrent urinary infections.  She is having some dysuria.  We will check a urinalysis and forward results to Dr. Cintron.

## 2020-08-10 NOTE — ASSESSMENT & PLAN NOTE
She has a history of asthma that is under fairly good control.  She is being switched from Advair and Spiriva to Trelegy.  No recent significant asthma attacks.

## 2020-08-12 DIAGNOSIS — N39.0 URINARY TRACT INFECTION WITHOUT HEMATURIA, SITE UNSPECIFIED: ICD-10-CM

## 2020-08-23 ENCOUNTER — HOSPITAL ENCOUNTER (OUTPATIENT)
Facility: MEDICAL CENTER | Age: 75
End: 2020-08-23
Attending: PHYSICIAN ASSISTANT
Payer: MEDICARE

## 2020-08-23 ENCOUNTER — OFFICE VISIT (OUTPATIENT)
Dept: URGENT CARE | Facility: CLINIC | Age: 75
End: 2020-08-23
Payer: MEDICARE

## 2020-08-23 VITALS
HEIGHT: 70 IN | BODY MASS INDEX: 25.77 KG/M2 | TEMPERATURE: 98.5 F | OXYGEN SATURATION: 98 % | DIASTOLIC BLOOD PRESSURE: 80 MMHG | HEART RATE: 80 BPM | WEIGHT: 180 LBS | SYSTOLIC BLOOD PRESSURE: 138 MMHG | RESPIRATION RATE: 18 BRPM

## 2020-08-23 DIAGNOSIS — N30.00 ACUTE CYSTITIS WITHOUT HEMATURIA: ICD-10-CM

## 2020-08-23 DIAGNOSIS — Z22.322 COLONIZATION WITH MULTIDRUG-RESISTANT BACTERIA: ICD-10-CM

## 2020-08-23 LAB
APPEARANCE UR: NORMAL
BILIRUB UR STRIP-MCNC: NORMAL MG/DL
COLOR UR AUTO: YELLOW
GLUCOSE UR STRIP.AUTO-MCNC: 100 MG/DL
KETONES UR STRIP.AUTO-MCNC: NORMAL MG/DL
LEUKOCYTE ESTERASE UR QL STRIP.AUTO: NORMAL
NITRITE UR QL STRIP.AUTO: NORMAL
PH UR STRIP.AUTO: 6 [PH] (ref 5–8)
PROT UR QL STRIP: NORMAL MG/DL
RBC UR QL AUTO: NORMAL
SP GR UR STRIP.AUTO: 1.03
UROBILINOGEN UR STRIP-MCNC: 0.2 MG/DL

## 2020-08-23 PROCEDURE — 87086 URINE CULTURE/COLONY COUNT: CPT

## 2020-08-23 PROCEDURE — 87186 SC STD MICRODIL/AGAR DIL: CPT

## 2020-08-23 PROCEDURE — 99000 SPECIMEN HANDLING OFFICE-LAB: CPT | Performed by: PHYSICIAN ASSISTANT

## 2020-08-23 PROCEDURE — 99214 OFFICE O/P EST MOD 30 MIN: CPT | Performed by: PHYSICIAN ASSISTANT

## 2020-08-23 PROCEDURE — 81002 URINALYSIS NONAUTO W/O SCOPE: CPT | Performed by: PHYSICIAN ASSISTANT

## 2020-08-23 PROCEDURE — 87077 CULTURE AEROBIC IDENTIFY: CPT

## 2020-08-23 RX ORDER — CEFDINIR 300 MG/1
300 CAPSULE ORAL EVERY 12 HOURS
Qty: 10 CAP | Refills: 0 | Status: SHIPPED | OUTPATIENT
Start: 2020-08-23 | End: 2020-08-30

## 2020-08-23 ASSESSMENT — ENCOUNTER SYMPTOMS
FEVER: 0
DIARRHEA: 0
NAUSEA: 0
ABDOMINAL PAIN: 0
FLANK PAIN: 0
CHILLS: 0
VOMITING: 0

## 2020-08-23 NOTE — PROGRESS NOTES
"Subjective:   Jillian Gottlieb  is a 74 y.o. female who presents for Urinary Frequency (Since last night urinay frecuency and discomfort )    This is a new problem.  Patient presents to urgent care with sudden onset last evening of urgency and frequency with urination with slight bladder discomfort at the end of urination.  Patient has been struggling with ongoing recurrent UTIs with multidrug resistant bacteria.  She has seen her primary care provider.  She was seen in urgent care and was treated with Cipro.  She did not have any significant improvement.  She saw her primary care and was prescribed a different antibiotic.  Her urine culture has revealed multidrug resistance.  She did go to her dentist for some dental work and was prescribed Keflex and reports that this did seem to make an improvement.  However, her symptoms have returned since last evening.  She is a pending an appointment with infectious disease and also has been seen by urology but has not heard back from their office regarding additional plan.      Urinary Frequency  Pertinent negatives include no abdominal pain, chills, fever, nausea or vomiting.     Review of Systems   Constitutional: Negative for chills, fever and malaise/fatigue.   Gastrointestinal: Negative for abdominal pain, diarrhea, nausea and vomiting.   Genitourinary: Positive for dysuria, frequency and urgency. Negative for flank pain and hematuria.   All other systems reviewed and are negative.    Allergies   Allergen Reactions   • Alcohol Vomiting, Nausea and Unspecified     injested-facial numbness, n/v  RXN=50 years ago   • Pcn [Penicillins] Unspecified     Flushed; \"felt weird\"  Tolerates cephalosporins  RXN=age 30's     Reviewed past medical, surgical , social and family history.  Reviewed prescription and over-the-counter medications with patient and electronic health record today.     Objective:   /80   Pulse 80   Temp 36.9 °C (98.5 °F) (Temporal)   Resp 18   Ht " "1.778 m (5' 10\")   Wt 81.6 kg (180 lb)   SpO2 98%   BMI 25.83 kg/m²   Physical Exam  Vitals signs reviewed.   Constitutional:       General: She is not in acute distress.     Appearance: She is well-developed. She is not ill-appearing or toxic-appearing.   HENT:      Head: Normocephalic and atraumatic.      Right Ear: External ear normal.      Left Ear: External ear normal.      Nose: Nose normal.      Mouth/Throat:      Lips: Pink. No lesions.      Mouth: Mucous membranes are moist.      Pharynx: Oropharynx is clear. Uvula midline. No oropharyngeal exudate.   Eyes:      General: Lids are normal.      Extraocular Movements: Extraocular movements intact.      Conjunctiva/sclera: Conjunctivae normal.      Pupils: Pupils are equal, round, and reactive to light.   Neck:      Musculoskeletal: Normal range of motion and neck supple.   Cardiovascular:      Rate and Rhythm: Normal rate and regular rhythm.      Heart sounds: Normal heart sounds. No murmur. No friction rub. No gallop.    Pulmonary:      Effort: Pulmonary effort is normal. No respiratory distress.      Breath sounds: Normal breath sounds.   Abdominal:      General: Bowel sounds are normal. There is no distension.      Palpations: Abdomen is soft. There is no mass.      Tenderness: There is abdominal tenderness in the suprapubic area. There is no right CVA tenderness, left CVA tenderness, guarding or rebound.   Musculoskeletal: Normal range of motion.         General: No tenderness or deformity.   Lymphadenopathy:      Head:      Right side of head: No submental, submandibular or tonsillar adenopathy.      Left side of head: No submental, submandibular or tonsillar adenopathy.      Cervical: No cervical adenopathy.      Upper Body:      Right upper body: No supraclavicular adenopathy.      Left upper body: No supraclavicular adenopathy.   Skin:     General: Skin is warm and dry.      Findings: No rash.   Neurological:      Mental Status: She is alert and " oriented to person, place, and time.      Cranial Nerves: Cranial nerves are intact. No cranial nerve deficit.      Sensory: Sensation is intact. No sensory deficit.      Motor: Motor function is intact.      Coordination: Coordination is intact. Coordination normal.      Gait: Gait is intact.   Psychiatric:         Attention and Perception: Attention normal.         Mood and Affect: Mood and affect normal.         Speech: Speech normal.         Behavior: Behavior normal. Behavior is cooperative.         Thought Content: Thought content normal.         Judgment: Judgment normal.           Assessment/Plan:   1. Acute cystitis without hematuria  - cefdinir (OMNICEF) 300 MG Cap; Take 1 Cap by mouth every 12 hours for 7 days.  Dispense: 10 Cap; Refill: 0  - POCT Urinalysis  - URINE CULTURE(NEW); Future    2. Colonization with multidrug-resistant bacteria  - cefdinir (OMNICEF) 300 MG Cap; Take 1 Cap by mouth every 12 hours for 7 days.  Dispense: 10 Cap; Refill: 0  - POCT Urinalysis  - URINE CULTURE(NEW); Future    Patient is initially unable to give us enough urine to send for culture.  She will go shopping and return with a urine specimen to send for culture and sensitivities.  I would like to ensure that her sensitivities have not changed with all of the additional antibiotic use she has had in the past several weeks.  In the interim, we will go ahead and place her on cefdinir per her previous culture results.  Patient is encouraged to reach back out to urology next week and to keep appointment with infectious disease.    Upon entering exam room I ensured patient was wearing a mask.  This provider wore appropriate PPE throughout entire visit.  Patient wore mask entire visit except for a brief period while examining oropharynx.      Differential diagnosis, natural history, supportive care, and indications for immediate follow-up discussed.     Red flag warning symptoms and strict ER/follow-up precautions given.  The  patient demonstrated a good understanding and agreed with the treatment plan.  Please note that this note was created using voice recognition speech to text software. Every effort has been made to correct obvious errors.  However, I expect there are errors of grammar and possibly context that were not discovered prior to finalizing the note  SCOOTER Gutierrez PA-C

## 2020-08-24 DIAGNOSIS — N30.00 ACUTE CYSTITIS WITHOUT HEMATURIA: ICD-10-CM

## 2020-08-24 DIAGNOSIS — Z22.322 COLONIZATION WITH MULTIDRUG-RESISTANT BACTERIA: ICD-10-CM

## 2020-08-26 ENCOUNTER — TELEPHONE (OUTPATIENT)
Dept: URGENT CARE | Facility: CLINIC | Age: 75
End: 2020-08-26

## 2020-08-26 NOTE — TELEPHONE ENCOUNTER
Attempted to contact patient with urine culture results positive for UTI with E. coli sensitive to third-generation cephalosporin.  Recommendation to complete full course of medication.  Voicemail left.  If not improving, recommend reevaluation.  Callback number provided to call with additional questions.  SCOOTER Gutierrez PA-C

## 2020-09-17 ENCOUNTER — GYNECOLOGY VISIT (OUTPATIENT)
Dept: OBGYN | Facility: CLINIC | Age: 75
End: 2020-09-17
Payer: MEDICARE

## 2020-09-17 VITALS — BODY MASS INDEX: 26.26 KG/M2 | SYSTOLIC BLOOD PRESSURE: 147 MMHG | DIASTOLIC BLOOD PRESSURE: 78 MMHG | WEIGHT: 183 LBS

## 2020-09-17 DIAGNOSIS — N81.10 POP-Q STAGE 3 CYSTOCELE: ICD-10-CM

## 2020-09-17 DIAGNOSIS — N95.2 VAGINAL ATROPHY: ICD-10-CM

## 2020-09-17 DIAGNOSIS — N81.4 CYSTOCELE WITH PROLAPSE: ICD-10-CM

## 2020-09-17 PROCEDURE — 99204 OFFICE O/P NEW MOD 45 MIN: CPT | Performed by: OBSTETRICS & GYNECOLOGY

## 2020-09-17 RX ORDER — CONJUGATED ESTROGENS 0.62 MG/G
CREAM VAGINAL
Qty: 30 G | Refills: 1 | Status: SHIPPED | OUTPATIENT
Start: 2020-09-17 | End: 2022-07-06 | Stop reason: SDUPTHER

## 2020-09-17 NOTE — PROGRESS NOTES
"75 y.o.     Female presents as new patient for evaluation of bladder prolapse   Patient with apparent longstanding hx of progressively worsening drop in bladder , , now 26 years [post hysterectomy for Prolapse!!.   PAtient with frequent , UTI's , and last one in 2020 , with multi resistant E. Coli   Patient states that yrs ago she tried a pessary , but couldnt stand it and it was removed , before she left thr doctors office   Subjective:Pain:  Pain:  Pressure in pelvis , and nagging sensation when bladder protrudes past introitus   diarrhea :  No          Vaginal discharge: no discharge   Vaginal Bleeding: none  Dysmenorrhea:negative, Dyspareunia:negative - SO is 80's   Problems with contraception: not tested     LMP:  No LMP recorded. Patient has had a hysterectomy.  ROS:  Neurological:Denies, headaches, dizziness  Breast:{Denies breast tenderness, mass, discharge, changes in size or contour, or abnormal cyclic discomfort.  GastroIntestinalNegative for abdominal discomfort, blood in stools or black stools  Describes scar tissue inside for Renal cancer partial nephrectomy   Genito-urinary:{Negative for dysuria, frequency and incontinence  Menstrual: DENIES, bleeding after menopause  All other systems reviewed : and are Negative  PMH:  Past Medical History:   Diagnosis Date   • Acid reflux    • Arthritis     knees, hands, hips, neck-Osteo   • Asthma     inhalers daily   • Breath shortness     asthma related   • Bronchitis 14   • Cancer ([facility])     R kidney   • Chronic pain of left knee 2017   • Chronic pain of right knee 2016   • COPD (chronic obstructive pulmonary disease) ([facility])    • COPD (chronic obstructive pulmonary disease) ([facility]) 2016   • Diabetes ([facility])     \"pre\"   • Dyspnea    • Emphysema of lung ([facility])    • Heart burn    • Hypertension    • Impaired fasting glucose 2015   • Indigestion    • Mass of left lower leg 2016   • Personal history of renal cancer 2015   • " Renal disorder     stones   • Renal disorder 2013    cancer right kidney 7% removed   • Skin tag 6/24/2019   • Unspecified disorder of thyroid     benign tumor removal   • Unspecified hypothyroidism 4/20/2015     Past Surgical History:   Procedure Laterality Date   • KNEE ARTHROPLASTY TOTAL Left 12/18/2017    Procedure: KNEE ARTHROPLASTY TOTAL;  Surgeon: Ricky Castaneda M.D.;  Location: [facility];  Service: Orthopedics   • KNEE ARTHROPLASTY TOTAL Right 10/17/2016    Procedure: KNEE ARTHROPLASTY TOTAL;  Surgeon: Ricky Castaneda M.D.;  Location: [facility];  Service:    • RECOVERY  3/4/2016    Procedure: IR2-PERQ NEPHRO-URETERAL CATH RIGHT-DR. RIDDLE-Surgery to follow in [facility] ;  Surgeon: Ir-Recovery Surgery;  Location: [facility];  Service:    • CYSTOSCOPY STENT REMOVAL Right 3/4/2016    Procedure: RIGID CYSTOSCOPY STENT REMOVAL;  Surgeon: Fredy Riddle M.D.;  Location: [facility];  Service:    • URETEROSCOPY Right 3/4/2016    Procedure: URETEROSCOPY;  Surgeon: Fredy Riddle M.D.;  Location: [facility];  Service:    • PERCUTANEOUS NEPHROSTOLITHOTOMY Right 3/4/2016    Procedure: PERCUTANEOUS NEPHROSTOLITHOTOMY FOR NEPHROLITHOTRIPSY;  Surgeon: Fredy Riddle M.D.;  Location: [facility];  Service:    • CYSTOSCOPY STENT PLACEMENT Right 2/12/2016    Procedure: CYSTOSCOPY STENT PLACEMENT;  Surgeon: Fredy Riddle M.D.;  Location: [facility];  Service:    • CYSTOSCOPY STENT PLACEMENT Right 6/26/2015    Procedure: CYSTOSCOPY STENT PLACEMENT RIGID POSSIBLE STENT;  Surgeon: Fredy Riddle M.D.;  Location: [facility];  Service:    • URETEROSCOPY N/A 6/26/2015    Procedure: URETEROSCOPY;  Surgeon: Fredy Riddle M.D.;  Location: [facility];  Service:    • LASERTRIPSY Right 6/26/2015    Procedure: LASERTRIPSY LITHO;  Surgeon: Fredy Riddle M.D.;  Location: [facility];  Service:    • URETEROSCOPY   8/20/2014    Performed by Fredy Cintron M.D. at SURGERY [facility]   • LASERTRIPSY  8/20/2014    Performed by Fredy Cintron M.D. at SURGERY [facility]   • CYSTOSCOPY  8/20/2014    Performed by Fredy Cintron M.D. at [facility]   • CYSTOSCOPY STENT PLACEMENT  1/31/2013    Performed by Fredy Cintron M.D. at SURGERY [facility]   • URETEROSCOPY  1/31/2013    Performed by Fredy Cintron M.D. at [facility]   • NEPHRECTOMY PARTIAL  1/31/2013    Performed by Fredy Cintron M.D. at SURGERY [facility]   • LASERTRIPSY  1/31/2013    Performed by Fredy Cintron M.D. at SURGERY [facility]   • OTHER ORTHOPEDIC SURGERY  2006    right knee meniscus   • OTHER ORTHOPEDIC SURGERY  1996    ORIF right arm   • OTHER  1995    sinus surgery   • GYN SURGERY  1994    hysterectomy   • OTHER  1980    thyroid lumpectomy   • BREAST BIOPSY Left 1976    benign   • CUATE BY LAPAROSCOPY     • OTHER ORTHOPEDIC SURGERY      achilles tendon reattach    • TONSILLECTOMY       Prior Uterine Surgery  Family History   Problem Relation Age of Onset   • Kidney Disease Mother    • Colon Cancer Father      Social History     Socioeconomic History   • Marital status:      Spouse name: Not on file   • Number of children: Not on file   • Years of education: Not on file   • Highest education level: Not on file   Occupational History   • Not on file   Social Needs   • Financial resource strain: Not on file   • Food insecurity     Worry: Not on file     Inability: Not on file   • Transportation needs     Medical: Not on file     Non-medical: Not on file   Tobacco Use   • Smoking status: Never Smoker   • Smokeless tobacco: Never Used   Substance and Sexual Activity   • Alcohol use: No     Comment: allergic to it   • Drug use: No   • Sexual activity: Never     Partners: Male     Birth control/protection: Post-Menopausal   Lifestyle   • Physical activity     Days per week: Not on file     Minutes per session: Not on  file   • Stress: Not on file   Relationships   • Social connections     Talks on phone: Not on file     Gets together: Not on file     Attends Christianity service: Not on file     Active member of club or organization: Not on file     Attends meetings of clubs or organizations: Not on file     Relationship status: Not on file   • Intimate partner violence     Fear of current or ex partner: Not on file     Emotionally abused: Not on file     Physically abused: Not on file     Forced sexual activity: Not on file   Other Topics Concern   • Not on file   Social History Narrative   • Not on file       Current Outpatient Medications on File Prior to Visit   Medication Sig Dispense Refill   • Fluticasone-Umeclidin-Vilant (TRELEGY ELLIPTA) 100-62.5-25 MCG/INH AEROSOL POWDER, BREATH ACTIVATED Inhale 1 Puff by mouth every day. Rinse mouth after use 1 Each 11   • albuterol (VENTOLIN HFA) 108 (90 Base) MCG/ACT Aero Soln inhalation aerosol Inhale 2 Puffs by mouth every four hours as needed for Shortness of Breath (Wheezing). 1 Inhaler 6   • losartan (COZAAR) 100 MG Tab Take 1 Tab by mouth every day. 90 Tab 3   • levothyroxine (SYNTHROID) 88 MCG Tab TAKE 1 TABLET BY MOUTH ONCE DAILY IN THE MORNING ON AN EMPTY STOMACH 90 Tab 3   • metFORMIN ER (GLUCOPHAGE XR) 500 MG TABLET SR 24 HR TAKE 1 TABLET BY MOUTH TWICE DAILY 180 Tab 3   • simvastatin (ZOCOR) 20 MG Tab TAKE 1 TABLET BY MOUTH ONCE DAILY IN THE EVENING 90 Tab 3   • raloxifene (EVISTA) 60 MG Tab TAKE 1 TABLET BY MOUTH ONCE DAILY IN THE MORNING 90 Tab 3   • ALBUTEROL SULFATE HFA INH      • SPIRIVA HANDIHALER 18 MCG Cap   3   • albuterol (ACCUNEB) 0.63 MG/3ML nebulizer solution 3 mL by Nebulization route every four hours as needed for Shortness of Breath. 90 mL 1   • albuterol (PROAIR HFA) 108 (90 Base) MCG/ACT Aero Soln inhalation aerosol Inhale 2 Puffs by mouth every four hours as needed for Shortness of Breath. 8.5 g 6   • fenofibrate (TRIGLIDE) 160 MG tablet fenofibrate 160 mg  tablet     • BOOSTRIX 5-2.5-18.5 Suspension      • vitamin E (VITAMIN E) 1000 UNIT Cap Take 1 Cap by mouth every day.     • multivitamin (THERAGRAN) Tab Take 1 Tab by mouth every day.     • vitamin D (CHOLECALCIFEROL) 1000 UNIT Tab Take 1,000 Units by mouth every day.     • Influenza Vaccine High-Dose pf (FLUZONE HIGH-DOSE) 0.5 ML Suspension Prefilled Syringe injection Fluzone High-Dose 2019-20 (PF) 180 mcg/0.5 mL intramuscular syringe   PHARMACIST ADMINISTERED IMMUNIZATION ADMINISTERED AT TIME OF DISPENSING     • ADVAIR DISKUS 250-50 MCG/DOSE AEROSOL POWDER, BREATH ACTIVATED INHALE 1 DOSE BY MOUTH EVERY 12 HOURS (Patient not taking: Reported on 8/23/2020) 3 Inhaler 0   • Fluticasone-Umeclidin-Vilant (TRELEGY ELLIPTA) 100-62.5-25 MCG/INH AEROSOL POWDER, BREATH ACTIVATED Inhale 1 Puff by mouth every day. Rinse mouth after use 1 Each 0   • methylPREDNISolone (MEDROL DOSEPAK) 4 MG Tablet Therapy Pack Take as directed. (Patient not taking: Reported on 8/23/2020) 21 Tab 0   • hydroCHLOROthiazide (HYDRODIURIL) 25 MG Tab Take 1 Tab by mouth every day. (Patient not taking: Reported on 8/23/2020) 90 Tab 3   • metoprolol SR (TOPROL XL) 25 MG TABLET SR 24 HR TAKE 1 TABLET BY MOUTH ONCE DAILY 90 Tab 3   • fenofibrate (TRIGLIDE) 160 MG tablet TAKE 1 TABLET BY MOUTH ONCE DAILY 90 Tab 3   • SPIRIVA HANDIHALER 18 MCG Cap INHALE 1 PUFF BY MOUTH ONCE DAILY (Patient not taking: Reported on 8/23/2020) 90 Cap 3   • clobetasol (TEMOVATE) 0.05 % Ointment BID x 1 wk (Patient not taking: Reported on 2/10/2020) 30 g 1   • metoprolol SR (TOPROL XL) 25 MG TABLET SR 24 HR metoprolol succinate ER 25 mg tablet,extended release 24 hr     • Tiotropium Bromide Monohydrate (SPIRIVA RESPIMAT) 2.5 MCG/ACT Aero Soln Inhale 2 Inhalation by mouth every day. Two inhalations once daily.  Assemble and prime. (Patient not taking: Reported on 8/23/2020) 2 Inhaler 6   • aspirin EC (ECOTRIN) 81 MG Tablet Delayed Response Take 81 mg by mouth every day.       No  current facility-administered medications on file prior to visit.        Summary of Allergies:  Alcohol and Pcn [penicillins]  /78   Wt 83 kg (183 lb)   BMI 26.26 kg/m²   Exam:  Skin: Warm and dry with no lesions or rashes.  Lymph: no inguinal nodes  Neuro: Awake, alert and oriented x 3, Cranial nerves II-XII grossly intact  ENT:Normal  Eyes: corneas clear  BREAST: negative  Cor:  RRR No M  LUNGS:Normal breath sounds  Abdominal :   Abdomen soft, non-tender. BS normal. No masses or organomegaly  Genito-Urinary: cervix /uterus absent cystocele 3rd degree , with some vaginal vault prolapse as well  Negative cough with empty bladder ,  rectocele, perineal relaxation , vaginal atrophy, defect is anterior , no enterocele noted    Extremities:no cyanosis, clubbing or edema present    Psych: normal affect  LAB:  Lab:No results found for this or any previous visit (from the past 336 hour(s)).       Ass:  Pelvic organ prolapse : mostly cystocele 3rd degree to introitus , but vault is prolapsing as well , no support structures noted , perineum is as well attenuated   Spent 45 minutes minutes with the patient ; Face to Face, with >50% of this time spent in counseling and coordination of care, surrounding the above mentioned issues as well as:discuss options as patient is not desirous for pessary , and it would not really work , without causing erosion .   Patient is not sexually active , and Suggested Colpocleisis, with / without  Sling , based on CMG and cysto  P.  Suggest topical estrogen to start as without healthier tissue , any surgery would not be effective   Discussed low systemic absorption of topical Estrogen    0.5gm ( 1/4applicator) per vagina QHS x2weeks , then HS 2x/week   RTC 3-4 weeks , with clean urine , for CMG/ cystoscopy to assess need for Sling   Difficult to do vault suspension , without abdominal work secondary to mesh off market   Colpocleisis may be best long term solution      Premarin cream not  covered: sent in vagifem

## 2020-09-18 ENCOUNTER — TELEPHONE (OUTPATIENT)
Dept: OBGYN | Facility: CLINIC | Age: 75
End: 2020-09-18

## 2020-09-18 RX ORDER — ESTRADIOL 10 UG/1
10 INSERT VAGINAL
Qty: 8 TAB | Refills: 6 | Status: SHIPPED | OUTPATIENT
Start: 2020-09-21 | End: 2021-04-28

## 2020-09-18 NOTE — TELEPHONE ENCOUNTER
Pt LM on VM stating medication is not covered by insurance and cash pay is about $400.00  Spoke with pt and informed her I will send provider a message and call pt once I get a response. Pt understood and agreed to plan. Dr. Ingram notified     Per Dr. Ingram, he sent new Rx to pharmacy, vaginal tab, pt will use 2 times per week  1638 Called pt to informed her of above, unable to reach her. LMTCB    9/24/20 Pt LM on VM inquiring about Rx  Called pt back, informed her Dr. Ingram sent new Rx and instructions given. Pt understood and had no other questions   1556 Pt LM on VM stating she was at pharmacy and was informed they do not have an Rx on file for her.  1617 Called [facility] pharmacy, spoke with Zacarias, needed to clarify directions. Informed Zacarias pt is to use medication 2 x a week, per Dr. Ingram. They will fill Rx  1624 Spoke with pt and notified her

## 2020-09-29 ENCOUNTER — TELEPHONE (OUTPATIENT)
Dept: OBGYN | Facility: CLINIC | Age: 75
End: 2020-09-29

## 2020-09-29 NOTE — TELEPHONE ENCOUNTER
Pt LM on VM stating she had side effects to Estrogen Rx  Called pt back, states she tried Vagifem and experienced stomach pain, went to pharmacy and paid over $400.00 dls and filled Premarin. She took 1/8 and had some swelling and burning the first night but the second time she felt better and will staty on it, just wanted to let us know. Dr. Ingram notified

## 2020-10-27 ENCOUNTER — APPOINTMENT (OUTPATIENT)
Dept: SLEEP MEDICINE | Facility: MEDICAL CENTER | Age: 75
End: 2020-10-27
Attending: PHYSICIAN ASSISTANT
Payer: MEDICARE

## 2020-10-27 ENCOUNTER — APPOINTMENT (OUTPATIENT)
Dept: SLEEP MEDICINE | Facility: MEDICAL CENTER | Age: 75
End: 2020-10-27
Payer: MEDICARE

## 2020-11-09 ENCOUNTER — GYNECOLOGY VISIT (OUTPATIENT)
Dept: OBGYN | Facility: CLINIC | Age: 75
End: 2020-11-09
Payer: MEDICARE

## 2020-11-09 ENCOUNTER — APPOINTMENT (OUTPATIENT)
Dept: OBGYN | Facility: CLINIC | Age: 75
End: 2020-11-09
Payer: MEDICARE

## 2020-11-09 VITALS — WEIGHT: 179 LBS | DIASTOLIC BLOOD PRESSURE: 80 MMHG | SYSTOLIC BLOOD PRESSURE: 165 MMHG | BODY MASS INDEX: 25.68 KG/M2

## 2020-11-09 DIAGNOSIS — N81.10 POP-Q STAGE 3 CYSTOCELE: ICD-10-CM

## 2020-11-09 LAB
APPEARANCE UR: CLEAR
BILIRUB UR STRIP-MCNC: NORMAL MG/DL
COLOR UR AUTO: YELLOW
GLUCOSE UR STRIP.AUTO-MCNC: NEGATIVE MG/DL
KETONES UR STRIP.AUTO-MCNC: NEGATIVE MG/DL
LEUKOCYTE ESTERASE UR QL STRIP.AUTO: NORMAL
NITRITE UR QL STRIP.AUTO: POSITIVE
PH UR STRIP.AUTO: 5.5 [PH] (ref 5–8)
PROT UR QL STRIP: NEGATIVE MG/DL
RBC UR QL AUTO: NORMAL
SP GR UR STRIP.AUTO: 1.02
UROBILINOGEN UR STRIP-MCNC: NORMAL MG/DL

## 2020-11-09 PROCEDURE — 51725 SIMPLE CYSTOMETROGRAM: CPT | Performed by: OBSTETRICS & GYNECOLOGY

## 2020-11-09 PROCEDURE — 81002 URINALYSIS NONAUTO W/O SCOPE: CPT | Performed by: OBSTETRICS & GYNECOLOGY

## 2020-11-09 RX ORDER — NITROFURANTOIN 25; 75 MG/1; MG/1
100 CAPSULE ORAL 2 TIMES DAILY
Qty: 14 CAP | Refills: 0 | Status: SHIPPED | OUTPATIENT
Start: 2020-11-09 | End: 2021-04-28

## 2020-11-09 NOTE — PROGRESS NOTES
Cystoscopy / CMG : see procedure   Patient without evidence for SHARMILA/ OAB, and with colpocleisis , likely change in bladder location , not expected as you would see with vault suspension , thus Sling not primarily indicated. Patient again declines Pessary ,; although unlikely to be effective , with bulge /prolapse of bladder and vault , post hyst   Patient here with daughter , and all questions answered   P. Continue vaginal premarin 2x/week       Schedule surgery for 1/2021

## 2020-11-09 NOTE — PROCEDURES
Procedures                                                Office Cystometrics and cystoscopy     Stress score _3______  Urge Score  __2_____    Indications :   1. Stress Incontinence Rarely  2. Urge Incontinence  Denies   3. OAB  Negative  4. Pelvic Prolapse : Yes   5. Voiding Dysfunction  6. Pelvic / Bladder Pain  Yes   7. Recurrent UTI    Menopausal    Prior bladder/ Urethral Surgery_ Stents for renal cancer Rx   __Hysterectomy for prolapse __yes ________________________    Physical Exam :   Cystocele : grade __4_____  Rectocele : grade ____2___  Pelvic Organ Prolapse ____vault 3 , no enterocele ______  Vaginal Atrophy __yes______  Urethral mobility  > 30 'degree _____yes __________  Sensory -Anal wink _yes__________    Cystometrogram :   Measured Post void Residual ____cc  ( normal  < 150 cc )   First Sensation ________ cc            ( Normal   cc )  Normal urge __________cc             ( Normal  150-350 cc)  Strong urge ___________ cc            ( Normal 300-600 cc)  Max . Capacity _________ cc          ( Normal  300-650 cc)   Cough Test, _____negative______  , Sohail ____not done ______  Q Tip ____Yes ___________  Void Volume ______  Calculated  PVR __________     ( cPVR= Max cap-Void Volume )    Cystoscopic Evaluation ___Bladder =normal capacity , atrophic mucosa , but normal with significant inflammation ______________________________________  ____no SHARMILA. No OAB, anatomic changes suggest surgerical vaginiplasty needed for vault prolapse , no sling ______________________________________________________________  ____________________________________________________________________    Provider : __bethel _______________

## 2020-12-04 ENCOUNTER — GYNECOLOGY VISIT (OUTPATIENT)
Dept: OBGYN | Facility: CLINIC | Age: 75
End: 2020-12-04
Payer: MEDICARE

## 2020-12-04 VITALS — WEIGHT: 179.6 LBS | DIASTOLIC BLOOD PRESSURE: 90 MMHG | SYSTOLIC BLOOD PRESSURE: 127 MMHG | BODY MASS INDEX: 25.77 KG/M2

## 2020-12-04 DIAGNOSIS — N81.4 CYSTOCELE WITH PROLAPSE: ICD-10-CM

## 2020-12-04 DIAGNOSIS — N81.10 POP-Q STAGE 3 CYSTOCELE: ICD-10-CM

## 2020-12-04 NOTE — NON-PROVIDER
Pt here for pre-op visit  Pt states no new complaints. She states that the Premarin seems to be helping.  Good# [phone]  Pharmacy verified

## 2020-12-05 NOTE — PROGRESS NOTES
Pre Op for Colpocleisis 1/2021 . Patient pre med with weeks of topical estrogen . H & P done , exam done , vaginal tissues are moist and thickened .   Patient with prior Bladder studies , showing no evidence for SHARMILA .and has now further desires for Sex, but wants vaginal vault prolapse and cystocele fixed , so no longer feels bulge , etc   Questions answered   complications discussed   NPO after midnite 1/3/2021

## 2020-12-10 DIAGNOSIS — J40 BRONCHITIS: ICD-10-CM

## 2020-12-10 RX ORDER — ALBUTEROL SULFATE 90 UG/1
AEROSOL, METERED RESPIRATORY (INHALATION)
Qty: 1 EACH | Refills: 0 | Status: SHIPPED | OUTPATIENT
Start: 2020-12-10 | End: 2021-07-14 | Stop reason: SDUPTHER

## 2020-12-10 NOTE — TELEPHONE ENCOUNTER
Have we ever prescribed this med? Yes.  If yes, what date? 04/27/2020    Last OV: 04/27/2020 - HARDIK HERNANDEZ    Next OV: was due back 10/2020    DX: COPD    Medications: Albuterol

## 2020-12-15 ENCOUNTER — OFFICE VISIT (OUTPATIENT)
Dept: SLEEP MEDICINE | Facility: MEDICAL CENTER | Age: 75
End: 2020-12-15
Payer: MEDICARE

## 2020-12-15 VITALS
RESPIRATION RATE: 12 BRPM | SYSTOLIC BLOOD PRESSURE: 132 MMHG | WEIGHT: 175 LBS | DIASTOLIC BLOOD PRESSURE: 72 MMHG | HEIGHT: 72 IN | HEART RATE: 72 BPM | BODY MASS INDEX: 23.7 KG/M2 | OXYGEN SATURATION: 97 %

## 2020-12-15 DIAGNOSIS — J45.901 ASTHMA WITH ACUTE EXACERBATION, UNSPECIFIED ASTHMA SEVERITY, UNSPECIFIED WHETHER PERSISTENT: ICD-10-CM

## 2020-12-15 DIAGNOSIS — J45.30 MILD PERSISTENT ASTHMA WITHOUT COMPLICATION: ICD-10-CM

## 2020-12-15 DIAGNOSIS — K21.9 GASTROESOPHAGEAL REFLUX DISEASE, UNSPECIFIED WHETHER ESOPHAGITIS PRESENT: ICD-10-CM

## 2020-12-15 PROCEDURE — 99213 OFFICE O/P EST LOW 20 MIN: CPT | Performed by: PHYSICIAN ASSISTANT

## 2020-12-15 RX ORDER — METHYLPREDNISOLONE 4 MG/1
TABLET ORAL
Qty: 21 TAB | Refills: 0 | Status: SHIPPED | OUTPATIENT
Start: 2020-12-15 | End: 2020-12-28

## 2020-12-15 RX ORDER — ALBUTEROL SULFATE 0.63 MG/3ML
0.63 SOLUTION RESPIRATORY (INHALATION) EVERY 4 HOURS PRN
Qty: 90 ML | Refills: 1 | Status: SHIPPED | OUTPATIENT
Start: 2020-12-15 | End: 2022-10-24 | Stop reason: SDUPTHER

## 2020-12-15 RX ORDER — AZITHROMYCIN 250 MG/1
TABLET, FILM COATED ORAL
Qty: 6 TAB | Refills: 0 | Status: SHIPPED | OUTPATIENT
Start: 2020-12-15 | End: 2020-12-28

## 2020-12-15 ASSESSMENT — ENCOUNTER SYMPTOMS
DIZZINESS: 0
WEIGHT LOSS: 0
WHEEZING: 0
SINUS PAIN: 1
SHORTNESS OF BREATH: 1
CHILLS: 0
INSOMNIA: 1
SORE THROAT: 0
SPUTUM PRODUCTION: 0
COUGH: 0
HEADACHES: 0
HEARTBURN: 1
TREMORS: 0
ROS GI COMMENTS: NO DENTURES, NO DIFFICULTY SWALLOWING
ORTHOPNEA: 0
PALPITATIONS: 0
FEVER: 0

## 2020-12-15 NOTE — PATIENT INSTRUCTIONS
1-trial Trelegy increased dose  2-take prilosec for heartburn  3-do PFT at next visit  4-follow up in 6 months  5-reviewed covid 19 precautions including wear mask in public, frequent handwashing, social distancing, had flu shot

## 2020-12-15 NOTE — PROGRESS NOTES
CC    HPI:  Jillian Gottlieb is a 75 y.o. year old female here today for follow-up on increased shortness of breath, aggravated by mask.  History of mild persistent asthma.  Last seen in clinic 4/27/2020.  Patient is a never smoker.    Pertinent past medical history includes right kidney cancer in 2013, kidney stones for which she follows a specific diet, acid reflux, COPD, pending surgery for bladder prolapse, hypothyroidism, hypertension.     Reviewed in clinic vitals including blood pressure 132/72, heart rate is 72, O2 sat of 97% and BMI of 24.07 kg/m².    Reviewed home medication regimen including Trelegy 100 which she reports not working as well as Symbicort and Spiriva but acknowledges she was also taking Advair.  Denies using short acting bronchodilator.  Reports taking Mucinex on a regular basis.    Reviewed most recent imaging including chest x-ray obtained 5/18/2019 demonstrating linear atelectasis left lingula, surgical changes involving right proximal humerus, normal heart size.    Pulmonary function testing obtained 1/24/2017 demonstrated FEV1 of 1.66 L or 61% predicted, FVC of 2.54 L 70% predicted, FEV1/FVC ratio of 65, residual volume , total lung capacity 83% predicted, DLCO 128% predicted. Per pulmonologist interpretation moderately severe obstructive ventilatory defect, normal lung volumes and gas transfer, no significant bronchodilator response.  Recommend updated PFT.    Patient was scheduled for updated pulmonary function testing but experienced increase in respiratory symptoms 3 weeks ago and appropriately canceled testing.    Review of Systems   Constitutional: Negative for chills, fever, malaise/fatigue and weight loss.   HENT: Positive for sinus pain (occasional ). Negative for congestion, hearing loss, nosebleeds, sore throat and tinnitus.    Respiratory: Positive for shortness of breath (aggravated by mask). Negative for cough, sputum production and wheezing.    Cardiovascular: Negative  "for chest pain, palpitations, orthopnea and leg swelling.   Gastrointestinal: Positive for heartburn (takes tums).        No dentures, no difficulty swallowing    Neurological: Negative for dizziness, tremors and headaches.   Psychiatric/Behavioral: The patient has insomnia (mild falling asleep ).        Past Medical History:   Diagnosis Date   • Acid reflux    • Arthritis     knees, hands, hips, neck-Osteo   • Asthma     inhalers daily   • Breath shortness     asthma related   • Bronchitis 08/04/14   • Cancer ([facility]) 01/13    R kidney   • Chronic pain of left knee 8/22/2017   • Chronic pain of right knee 7/1/2016   • COPD (chronic obstructive pulmonary disease) ([facility])    • COPD (chronic obstructive pulmonary disease) ([facility]) 6/30/2016   • Diabetes ([facility])     \"pre\"   • Dyspnea    • Emphysema of lung ([facility])    • Heart burn    • Hypertension    • Impaired fasting glucose 8/24/2015   • Indigestion    • Mass of left lower leg 7/27/2016   • Personal history of renal cancer 4/20/2015   • Renal disorder     stones   • Renal disorder 2013    cancer right kidney 7% removed   • Skin tag 6/24/2019   • Unspecified disorder of thyroid     benign tumor removal   • Unspecified hypothyroidism 4/20/2015       Past Surgical History:   Procedure Laterality Date   • KNEE ARTHROPLASTY TOTAL Left 12/18/2017    Procedure: KNEE ARTHROPLASTY TOTAL;  Surgeon: Ricky Castaneda M.D.;  Location: [facility];  Service: Orthopedics   • KNEE ARTHROPLASTY TOTAL Right 10/17/2016    Procedure: KNEE ARTHROPLASTY TOTAL;  Surgeon: Ricky Castaneda M.D.;  Location: [facility];  Service:    • RECOVERY  3/4/2016    Procedure: IR2-PERQ NEPHRO-URETERAL CATH RIGHT-DR. RIDDLE-Surgery to follow in [facility] ;  Surgeon: Ir-Recovery Surgery;  Location: [facility];  Service:    • CYSTOSCOPY STENT REMOVAL Right 3/4/2016    Procedure: RIGID CYSTOSCOPY STENT REMOVAL;  Surgeon: Fredy Riddle M.D.;  Location: [facility];  " Service:    • URETEROSCOPY Right 3/4/2016    Procedure: URETEROSCOPY;  Surgeon: Fredy Cintron M.D.;  Location: [facility];  Service:    • PERCUTANEOUS NEPHROSTOLITHOTOMY Right 3/4/2016    Procedure: PERCUTANEOUS NEPHROSTOLITHOTOMY FOR NEPHROLITHOTRIPSY;  Surgeon: Fredy Cintron M.D.;  Location: [facility];  Service:    • CYSTOSCOPY STENT PLACEMENT Right 2/12/2016    Procedure: CYSTOSCOPY STENT PLACEMENT;  Surgeon: Fredy Cintron M.D.;  Location: [facility];  Service:    • CYSTOSCOPY STENT PLACEMENT Right 6/26/2015    Procedure: CYSTOSCOPY STENT PLACEMENT RIGID POSSIBLE STENT;  Surgeon: Fredy Cintron M.D.;  Location: [facility];  Service:    • URETEROSCOPY N/A 6/26/2015    Procedure: URETEROSCOPY;  Surgeon: Fredy Cintron M.D.;  Location: [facility];  Service:    • LASERTRIPSY Right 6/26/2015    Procedure: LASERTRIPSY LITHO;  Surgeon: Fredy Cintron M.D.;  Location: [facility];  Service:    • URETEROSCOPY  8/20/2014    Performed by Fredy Cintron M.D. at [facility]   • LASERTRIPSY  8/20/2014    Performed by Fredy Cintron M.D. at [facility]   • CYSTOSCOPY  8/20/2014    Performed by Fredy Cintron M.D. at [facility]   • CYSTOSCOPY STENT PLACEMENT  1/31/2013    Performed by Fredy Cintron M.D. at [facility]   • URETEROSCOPY  1/31/2013    Performed by Fredy Cintron M.D. at [facility]   • NEPHRECTOMY PARTIAL  1/31/2013    Performed by Fredy Cintron M.D. at [facility]   • LASERTRIPSY  1/31/2013    Performed by Fredy Cintron M.D. at [facility]   • OTHER ORTHOPEDIC SURGERY  2006    right knee meniscus   • OTHER ORTHOPEDIC SURGERY  1996    ORIF right arm   • OTHER  1995    sinus surgery   • GYN SURGERY  1994    hysterectomy   • OTHER  1980    thyroid lumpectomy   • BREAST BIOPSY Left 1976    benign   • CUATE BY LAPAROSCOPY     • OTHER ORTHOPEDIC SURGERY      achilles  "tendon reattach    • TONSILLECTOMY         Family History   Problem Relation Age of Onset   • Kidney Disease Mother    • Colon Cancer Father        Social History     Socioeconomic History   • Marital status:      Spouse name: Not on file   • Number of children: Not on file   • Years of education: Not on file   • Highest education level: Not on file   Occupational History   • Not on file   Social Needs   • Financial resource strain: Not on file   • Food insecurity     Worry: Not on file     Inability: Not on file   • Transportation needs     Medical: Not on file     Non-medical: Not on file   Tobacco Use   • Smoking status: Never Smoker   • Smokeless tobacco: Never Used   Substance and Sexual Activity   • Alcohol use: No     Comment: allergic to it   • Drug use: No   • Sexual activity: Never     Partners: Male     Birth control/protection: Post-Menopausal   Lifestyle   • Physical activity     Days per week: Not on file     Minutes per session: Not on file   • Stress: Not on file   Relationships   • Social connections     Talks on phone: Not on file     Gets together: Not on file     Attends Baptism service: Not on file     Active member of club or organization: Not on file     Attends meetings of clubs or organizations: Not on file     Relationship status: Not on file   • Intimate partner violence     Fear of current or ex partner: Not on file     Emotionally abused: Not on file     Physically abused: Not on file     Forced sexual activity: Not on file   Other Topics Concern   • Not on file   Social History Narrative   • Not on file       Allergies as of 12/15/2020 - Reviewed 12/15/2020   Allergen Reaction Noted   • Alcohol Vomiting, Nausea, and Unspecified 01/29/2013   • Pcn [penicillins] Unspecified 05/09/2012        @Vital signs for this encounter:  Vitals:    12/15/20 0951 12/15/20 0952   Height: 1.816 m (5' 11.5\")    Weight: 79.4 kg (175 lb)    Weight % change since last entry.: 0 %    BP: 132/72  "   Pulse: 72    BMI (Calculated): 24.07    Resp: 12    O2 sat % room air:  97 %       Current medications as of today   Current Outpatient Medications   Medication Sig Dispense Refill   • albuterol 108 (90 Base) MCG/ACT Aero Soln inhalation aerosol INHALE 2 PUFFS BY MOUTH EVERY 4 HOURS AS NEEDED FOR SHORTNESS OF BREATH 1 Each 0   • metFORMIN ER (GLUCOPHAGE XR) 500 MG TABLET SR 24 HR TAKE 1 TABLET BY MOUTH TWICE DAILY . APPOINTMENT REQUIRED FOR FUTURE REFILLS 180 Tab 3   • raloxifene (EVISTA) 60 MG Tab TAKE 1 TABLET BY MOUTH ONCE DAILY IN THE MORNING 90 Tab 3   • estrogens, conjugated (PREMARIN) 0.625 MG/GM Cream 0.5gm ( 1/4applicator) per vagina QHS x2weeks , then HS 2x/week 30 g 1   • Fluticasone-Umeclidin-Vilant (TRELEGY ELLIPTA) 100-62.5-25 MCG/INH AEROSOL POWDER, BREATH ACTIVATED Inhale 1 Puff by mouth every day. Rinse mouth after use 1 Each 0   • albuterol (VENTOLIN HFA) 108 (90 Base) MCG/ACT Aero Soln inhalation aerosol Inhale 2 Puffs by mouth every four hours as needed for Shortness of Breath (Wheezing). 1 Inhaler 6   • losartan (COZAAR) 100 MG Tab Take 1 Tab by mouth every day. 90 Tab 3   • metoprolol SR (TOPROL XL) 25 MG TABLET SR 24 HR TAKE 1 TABLET BY MOUTH ONCE DAILY 90 Tab 3   • fenofibrate (TRIGLIDE) 160 MG tablet TAKE 1 TABLET BY MOUTH ONCE DAILY 90 Tab 3   • levothyroxine (SYNTHROID) 88 MCG Tab TAKE 1 TABLET BY MOUTH ONCE DAILY IN THE MORNING ON AN EMPTY STOMACH 90 Tab 3   • simvastatin (ZOCOR) 20 MG Tab TAKE 1 TABLET BY MOUTH ONCE DAILY IN THE EVENING 90 Tab 3   • albuterol (ACCUNEB) 0.63 MG/3ML nebulizer solution 3 mL by Nebulization route every four hours as needed for Shortness of Breath. 90 mL 1   • vitamin E (VITAMIN E) 1000 UNIT Cap Take 1 Cap by mouth every day.     • multivitamin (THERAGRAN) Tab Take 1 Tab by mouth every day.     • vitamin D (CHOLECALCIFEROL) 1000 UNIT Tab Take 1,000 Units by mouth every day.     • NON SPECIFIED Indications: D Mannos     • nitrofurantoin (MACROBID) 100 MG Cap  Take 1 Cap by mouth 2 times a day. (Patient not taking: Reported on 12/4/2020) 14 Cap 0   • estradiol (VAGIFEM) 10 MCG Tab Insert 1 Tab in vagina every Monday and Thursday. Insert 1 tab/vagina hs 3x/week (Patient not taking: Reported on 11/9/2020) 8 Tab 6   • Influenza Vaccine High-Dose pf (FLUZONE HIGH-DOSE) 0.5 ML Suspension Prefilled Syringe injection Fluzone High-Dose 2019-20 (PF) 180 mcg/0.5 mL intramuscular syringe   PHARMACIST ADMINISTERED IMMUNIZATION ADMINISTERED AT TIME OF DISPENSING     • ADVAIR DISKUS 250-50 MCG/DOSE AEROSOL POWDER, BREATH ACTIVATED INHALE 1 DOSE BY MOUTH EVERY 12 HOURS (Patient not taking: Reported on 8/23/2020) 3 Inhaler 0   • methylPREDNISolone (MEDROL DOSEPAK) 4 MG Tablet Therapy Pack Take as directed. (Patient not taking: Reported on 8/23/2020) 21 Tab 0   • hydroCHLOROthiazide (HYDRODIURIL) 25 MG Tab Take 1 Tab by mouth every day. (Patient not taking: Reported on 12/4/2020) 90 Tab 3   • SPIRIVA HANDIHALER 18 MCG Cap INHALE 1 PUFF BY MOUTH ONCE DAILY (Patient not taking: Reported on 8/23/2020) 90 Cap 3   • SPIRIVA HANDIHALER 18 MCG Cap   3   • clobetasol (TEMOVATE) 0.05 % Ointment BID x 1 wk (Patient not taking: Reported on 2/10/2020) 30 g 1   • BOOSTRIX 5-2.5-18.5 Suspension      • Tiotropium Bromide Monohydrate (SPIRIVA RESPIMAT) 2.5 MCG/ACT Aero Soln Inhale 2 Inhalation by mouth every day. Two inhalations once daily.  Assemble and prime. (Patient not taking: Reported on 8/23/2020) 2 Inhaler 6   • aspirin EC (ECOTRIN) 81 MG Tablet Delayed Response Take 81 mg by mouth every day.       No current facility-administered medications for this visit.          Physical Exam:   Gen:           Alert and oriented, No apparent distress. Mood and affect appropriate, normal interaction with provider.  Eyes:          sclere white, conjunctive moist.  Hearing:     Grossly intact.  Dentition:    Good dentition.  Oropharynx:   Tongue normal, posterior pharynx without erythema or exudate.  Neck:         Supple, trachea midline, no masses.  Respiratory Effort: No intercostal retractions or use of accessory muscles.   Lung Auscultation:      Decreased; no rales, rhonchi or wheezing.  CV:            Regular rate and rhythm. No edema. No murmurs, rubs or gallops.  Digits, Nails, Ext: No clubbing, cyanosis, petechiae, or nodes.   Skin:        No rashes, lesions or ulcers noted on exposed skin surfaces.                     Assessment:  1. Asthma with acute exacerbation, unspecified asthma severity, unspecified whether persistent  albuterol (ACCUNEB) 0.63 MG/3ML nebulizer solution    methylPREDNISolone (MEDROL DOSEPAK) 4 MG Tablet Therapy Pack    azithromycin (ZITHROMAX) 250 MG Tab   2. Mild persistent asthma without complication  PULMONARY FUNCTION TESTS -Test requested: Complete Pulmonary Function Test    Fluticasone-Umeclidin-Vilant (TRELEGY ELLIPTA) 200-62.5-25 MCG/INH AEROSOL POWDER, BREATH ACTIVATED   3. Gastroesophageal reflux disease, unspecified whether esophagitis present         Immunizations:    Flu: 8/30/2020  Pneumovax 23: 8/7/2013  Prevnar 13: 10/3/2015    Plan:    75 y.o. year old female here today for follow-up on increased shortness of breath, aggravated by mask.  History of mild persistent asthma.  Last seen in clinic 4/27/2020.  Patient is a never smoker.    Pertinent past medical history includes right kidney cancer in 2013, kidney stones for which she follows a specific diet, acid reflux, COPD, pending surgery for bladder prolapse, hypothyroidism, hypertension.     Reviewed in clinic vitals including blood pressure 132/72, heart rate is 72, O2 sat of 97% and BMI of 24.07 kg/m².    Reviewed home medication regimen including Trelegy 100 which she reports not working as well as Symbicort and Spiriva but acknowledges she was also taking Advair.  Denies using short acting bronchodilator inhaler, occasional nebulizer use.  Reports taking Mucinex on a regular basis.  Reports increased reflux  symptoms.    Mild persistent asthma: Trial increased dosing Trelegy, assess for benefit.  Denies benefit with inhaler, requested refill for nebulizer use.  Sick pack provided.  Update PFT on follow-up.    Acid reflux: Resume taking Prilosec.    Reviewed most recent imaging including chest x-ray obtained 5/18/2019 demonstrating linear atelectasis left lingula, surgical changes involving right proximal humerus, normal heart size.    Pulmonary function testing obtained 1/24/2017 demonstrated FEV1 of 1.66 L or 61% predicted, FVC of 2.54 L 70% predicted, FEV1/FVC ratio of 65, residual volume , total lung capacity 83% predicted, DLCO 128% predicted. Per pulmonologist interpretation moderately severe obstructive ventilatory defect, normal lung volumes and gas transfer, no significant bronchodilator response.  Recommend updated PFT.    Patient was scheduled for updated pulmonary function testing but experienced increase in respiratory symptoms 3 weeks ago and appropriately canceled testing.  She used her sick pack on hand at that time.    Reviewed COVID-19 precautions including wearing mask in public, frequent handwashing, social distancing, has had flu shot.    Reviewed COVID-19 precautions including wearing mask in public, social distancing, frequent handwashing, has had flu shot.    Follow-up in 6 months with PFT.    This dictation was created using voice recognition software. The accuracy of the dictation is limited to the abilities of the software. I expect there may be some errors of grammar and possibly content.

## 2020-12-28 ENCOUNTER — OFFICE VISIT (OUTPATIENT)
Dept: MEDICAL GROUP | Facility: MEDICAL CENTER | Age: 75
End: 2020-12-28
Payer: MEDICARE

## 2020-12-28 VITALS
SYSTOLIC BLOOD PRESSURE: 128 MMHG | WEIGHT: 174 LBS | RESPIRATION RATE: 16 BRPM | HEIGHT: 72 IN | BODY MASS INDEX: 23.57 KG/M2 | HEART RATE: 74 BPM | OXYGEN SATURATION: 96 % | DIASTOLIC BLOOD PRESSURE: 70 MMHG | TEMPERATURE: 97.6 F

## 2020-12-28 DIAGNOSIS — K21.9 GASTROESOPHAGEAL REFLUX DISEASE, UNSPECIFIED WHETHER ESOPHAGITIS PRESENT: ICD-10-CM

## 2020-12-28 DIAGNOSIS — J45.20 MILD INTERMITTENT ASTHMA WITHOUT COMPLICATION: ICD-10-CM

## 2020-12-28 DIAGNOSIS — J44.9 CHRONIC OBSTRUCTIVE PULMONARY DISEASE, UNSPECIFIED COPD TYPE (HCC): ICD-10-CM

## 2020-12-28 DIAGNOSIS — E78.5 HYPERLIPIDEMIA, UNSPECIFIED HYPERLIPIDEMIA TYPE: ICD-10-CM

## 2020-12-28 DIAGNOSIS — I10 ESSENTIAL HYPERTENSION: ICD-10-CM

## 2020-12-28 DIAGNOSIS — Z85.528 PERSONAL HISTORY OF RENAL CANCER: ICD-10-CM

## 2020-12-28 DIAGNOSIS — N81.10 BLADDER PROLAPSE, FEMALE, ACQUIRED: ICD-10-CM

## 2020-12-28 DIAGNOSIS — E03.9 HYPOTHYROIDISM, UNSPECIFIED TYPE: ICD-10-CM

## 2020-12-28 DIAGNOSIS — R73.01 IMPAIRED FASTING GLUCOSE: ICD-10-CM

## 2020-12-28 PROCEDURE — 99214 OFFICE O/P EST MOD 30 MIN: CPT | Performed by: INTERNAL MEDICINE

## 2020-12-28 RX ORDER — METFORMIN HYDROCHLORIDE 500 MG/1
500 TABLET, EXTENDED RELEASE ORAL 3 TIMES DAILY
Qty: 270 TAB | Refills: 3 | Status: SHIPPED | OUTPATIENT
Start: 2020-12-28 | End: 2021-01-14 | Stop reason: SDUPTHER

## 2020-12-28 RX ORDER — NEOMYCIN POLYMYXIN B SULFATES AND DEXAMETHASONE 3.5; 10000; 1 MG/ML; [USP'U]/ML; MG/ML
SUSPENSION/ DROPS OPHTHALMIC
COMMUNITY
End: 2021-04-28

## 2020-12-28 NOTE — ASSESSMENT & PLAN NOTE
She has impaired fasting blood sugars.  She has not yet had lab drawn for today.  She tries to follow appropriate diet with some success.

## 2020-12-28 NOTE — ASSESSMENT & PLAN NOTE
She has problems with vaginal and bladder prolapse.  She will be getting surgery for that next week by Dr. Ingram.

## 2020-12-28 NOTE — ASSESSMENT & PLAN NOTE
She has chronic lung disease followed by pulmonary.  She reports pulmonary function recently is stable.  She just had a change in medications by pulmonary.

## 2020-12-28 NOTE — ASSESSMENT & PLAN NOTE
She is on fenofibrate and simvastatin for hyperlipidemia.  She has not yet had lab tests drawn for this visit.  She tries to follow appropriate diet with some success.

## 2020-12-28 NOTE — ASSESSMENT & PLAN NOTE
Hypertension is well controlled with losartan and hydrochlorothiazide.  She denies significant lightheadedness.

## 2020-12-28 NOTE — PROGRESS NOTES
Subjective:     Chief Complaint   Patient presents with   • Follow-Up     Jillian Gottlieb is a 75 y.o. female here today for Follow-up of her various health problems including impaired fasting glucose, hypothyroidism, hyperlipidemia, gastroesophageal reflux, hypertension, COPD, asthma, bladder and vaginal prolapse.    Personal history of renal cancer  Prior renal cell cancer with no evidence of recurrence, followed by Dr. Cintron.    Impaired fasting glucose  She has impaired fasting blood sugars.  She has not yet had lab drawn for today.  She tries to follow appropriate diet with some success.    Hypothyroidism  She has hypothyroidism.  She is on levothyroxine.  TSH is pending.  Clinically she is euthyroid.    Hyperlipidemia  She is on fenofibrate and simvastatin for hyperlipidemia.  She has not yet had lab tests drawn for this visit.  She tries to follow appropriate diet with some success.    GERD (gastroesophageal reflux disease)  She has gastroesophageal reflux.  She is on an H2 blocker which fairly well controlled symptoms.    Essential hypertension  Hypertension is well controlled with losartan and hydrochlorothiazide.  She denies significant lightheadedness.    COPD (chronic obstructive pulmonary disease) ([facility])  She has chronic lung disease followed by pulmonary.  She reports pulmonary function recently is stable.  She just had a change in medications by pulmonary.    Bladder prolapse, female, acquired  She has problems with vaginal and bladder prolapse.  She will be getting surgery for that next week by Dr. Ingram.    Asthma  She has asthma followed by pulmonary.  Recently symptoms have been stable.       Diagnoses of Mild intermittent asthma without complication, Bladder prolapse, female, acquired, Chronic obstructive pulmonary disease, unspecified COPD type (HCC), Essential hypertension, Gastroesophageal reflux disease, unspecified whether esophagitis present, Hyperlipidemia, unspecified hyperlipidemia type,  Hypothyroidism, unspecified type, Impaired fasting glucose, and Personal history of renal cancer were pertinent to this visit.    Allergies: Alcohol and Pcn [penicillins]  Current medicines (including changes today)  Current Outpatient Medications   Medication Sig Dispense Refill   • metFORMIN ER (GLUCOPHAGE XR) 500 MG TABLET SR 24 HR Take 1 Tab by mouth 3 times a day. 270 Tab 3   • Influenza Vaccine High-Dose pf 0.5 ML Suspension Prefilled Syringe injection Fluzone High-Dose 2019-20 (PF) 180 mcg/0.5 mL intramuscular syringe   PHARMACIST ADMINISTERED IMMUNIZATION ADMINISTERED AT TIME OF DISPENSING     • neomycin-polymyxin-dexamethasone (MAXITROL) 0.1 % ophthalmic suspension neomycin-polymyxin-dexameth 3.5 mg/mL-10,000 unit/mL-0.1% eye drops     • Fluticasone-Umeclidin-Vilant (TRELEGY ELLIPTA) 200-62.5-25 MCG/INH AEROSOL POWDER, BREATH ACTIVATED Inhale 1 Puff every day. 1 Each 11   • albuterol (ACCUNEB) 0.63 MG/3ML nebulizer solution Take 3 mL by nebulization every four hours as needed for Shortness of Breath. 90 mL 1   • albuterol 108 (90 Base) MCG/ACT Aero Soln inhalation aerosol INHALE 2 PUFFS BY MOUTH EVERY 4 HOURS AS NEEDED FOR SHORTNESS OF BREATH 1 Each 0   • NON SPECIFIED Indications: D Mannos     • nitrofurantoin (MACROBID) 100 MG Cap Take 1 Cap by mouth 2 times a day. (Patient not taking: Reported on 12/4/2020) 14 Cap 0   • raloxifene (EVISTA) 60 MG Tab TAKE 1 TABLET BY MOUTH ONCE DAILY IN THE MORNING 90 Tab 3   • estradiol (VAGIFEM) 10 MCG Tab Insert 1 Tab in vagina every Monday and Thursday. Insert 1 tab/vagina hs 3x/week (Patient not taking: Reported on 11/9/2020) 8 Tab 6   • estrogens, conjugated (PREMARIN) 0.625 MG/GM Cream 0.5gm ( 1/4applicator) per vagina QHS x2weeks , then HS 2x/week 30 g 1   • Influenza Vaccine High-Dose pf (FLUZONE HIGH-DOSE) 0.5 ML Suspension Prefilled Syringe injection Fluzone High-Dose 2019-20 (PF) 180 mcg/0.5 mL intramuscular syringe   PHARMACIST ADMINISTERED IMMUNIZATION  ADMINISTERED AT TIME OF DISPENSING     • Fluticasone-Umeclidin-Vilant (TRELEGY ELLIPTA) 100-62.5-25 MCG/INH AEROSOL POWDER, BREATH ACTIVATED Inhale 1 Puff by mouth every day. Rinse mouth after use 1 Each 0   • losartan (COZAAR) 100 MG Tab Take 1 Tab by mouth every day. 90 Tab 3   • hydroCHLOROthiazide (HYDRODIURIL) 25 MG Tab Take 1 Tab by mouth every day. (Patient not taking: Reported on 12/4/2020) 90 Tab 3   • metoprolol SR (TOPROL XL) 25 MG TABLET SR 24 HR TAKE 1 TABLET BY MOUTH ONCE DAILY 90 Tab 3   • fenofibrate (TRIGLIDE) 160 MG tablet TAKE 1 TABLET BY MOUTH ONCE DAILY 90 Tab 3   • levothyroxine (SYNTHROID) 88 MCG Tab TAKE 1 TABLET BY MOUTH ONCE DAILY IN THE MORNING ON AN EMPTY STOMACH 90 Tab 3   • simvastatin (ZOCOR) 20 MG Tab TAKE 1 TABLET BY MOUTH ONCE DAILY IN THE EVENING 90 Tab 3   • SPIRIVA HANDIHALER 18 MCG Cap   3   • clobetasol (TEMOVATE) 0.05 % Ointment BID x 1 wk (Patient not taking: Reported on 2/10/2020) 30 g 1   • BOOSTRIX 5-2.5-18.5 Suspension      • vitamin E (VITAMIN E) 1000 UNIT Cap Take 1 Cap by mouth every day.     • multivitamin (THERAGRAN) Tab Take 1 Tab by mouth every day.     • vitamin D (CHOLECALCIFEROL) 1000 UNIT Tab Take 1,000 Units by mouth every day.     • aspirin EC (ECOTRIN) 81 MG Tablet Delayed Response Take 81 mg by mouth every day.       No current facility-administered medications for this visit.        She  has a past medical history of Acid reflux, Arthritis, Asthma, Breath shortness, Bronchitis (08/04/14), Cancer ([facility]) (01/13), Chronic pain of left knee (8/22/2017), Chronic pain of right knee (7/1/2016), COPD (chronic obstructive pulmonary disease) ([facility]), COPD (chronic obstructive pulmonary disease) ([facility]) (6/30/2016), Diabetes ([facility]), Dyspnea, Emphysema of lung ([facility]), Heart burn, Hypertension, Impaired fasting glucose (8/24/2015), Indigestion, Mass of left lower leg (7/27/2016), Personal history of renal cancer (4/20/2015), Renal disorder, Renal disorder (2013), Skin tag  "(6/24/2019), Unspecified disorder of thyroid, and Unspecified hypothyroidism (4/20/2015). She also has no past medical history of Encounter for long-term (current) use of other medications.    ROS    Patient denies significant change in strength, weight or appetite.  No significant lightheadedness or headaches.  No change in vision, hearing, or swallowing.  No new dyspnea, coughing, chest pain, or palpitations.  She does have asthma as well as COPD that are both followed by pulmonary.  No indigestion, abdominal pain, or change in bowel habits.  She does have gastroesophageal reflux that is unchanged recently.  No change in urinating.  No new ankle swelling.       Objective:     PE:  /70   Pulse 74   Temp 36.4 °C (97.6 °F)   Resp 16   Ht 1.816 m (5' 11.5\")   Wt 78.9 kg (174 lb)   SpO2 96%   BMI 23.93 kg/m²    Neck is supple without significant lymphadenopathy or masses.  Lungs are clear with normal breath sounds without wheezes or rales .  Cardiovascular: peripheral circulation is satisfactory, heart sounds are unchanged and unremarkable.  Abdomen is soft, without masses or tenderness, with normal bowel sounds.  Extremities are without significant edema, cyanosis or deformity.      Assessment and Plan:   The following treatment plan was discussed  1. Mild intermittent asthma without complication      Continue close follow-up with pulmonary and continue medications as ordered.   2. Bladder prolapse, female, acquired      Surgical repair next week by Dr. Ingram.   3. Chronic obstructive pulmonary disease, unspecified COPD type (HCC)      Continue same medications, follow-up with pulmonary.   4. Essential hypertension      No medication change.  Follow low-salt diet.   5. Gastroesophageal reflux disease, unspecified whether esophagitis present      No medication change.  We briefly reviewed foods to avoid.   6. Hyperlipidemia, unspecified hyperlipidemia type      Continue fenofibrate and simvastatin.  We " reviewed appropriate diet.   7. Hypothyroidism, unspecified type      Continue levothyroxine, check TSH at least yearly.   8. Impaired fasting glucose      Avoid concentrated sweets especially on an empty stomach.  Continue Metformin.   9. Personal history of renal cancer      Follow-up periodically with Dr. Cintron.       Followup: Return in about 4 months (around 4/28/2021), or health  wellness, for Long.     Lab results are all pending at this time.  At this time however I do not see any significant medical contraindication to the contemplated surgery.  Patient has seen pulmonary within the last couple of weeks.

## 2020-12-29 ENCOUNTER — TELEPHONE (OUTPATIENT)
Dept: SLEEP MEDICINE | Facility: MEDICAL CENTER | Age: 75
End: 2020-12-29

## 2020-12-29 NOTE — TELEPHONE ENCOUNTER
Patient is having bladder surgery with Dr. Ingram. She wanted us to send notes to him regarding her Pulmonary issues, I let her know that we all share the same computer system and should have access to all her pulm records.

## 2020-12-31 ENCOUNTER — HOSPITAL ENCOUNTER (OUTPATIENT)
Dept: RADIOLOGY | Facility: MEDICAL CENTER | Age: 75
End: 2020-12-31
Attending: OBSTETRICS & GYNECOLOGY
Payer: MEDICARE

## 2020-12-31 ENCOUNTER — PRE-ADMISSION TESTING (OUTPATIENT)
Dept: ADMISSIONS | Facility: MEDICAL CENTER | Age: 75
End: 2020-12-31
Attending: OBSTETRICS & GYNECOLOGY
Payer: MEDICARE

## 2020-12-31 DIAGNOSIS — Z01.810 PRE-OPERATIVE CARDIOVASCULAR EXAMINATION: ICD-10-CM

## 2020-12-31 DIAGNOSIS — Z01.812 PRE-OPERATIVE LABORATORY EXAMINATION: ICD-10-CM

## 2020-12-31 DIAGNOSIS — Z01.811 PRE-OPERATIVE RESPIRATORY EXAMINATION: ICD-10-CM

## 2020-12-31 LAB
ANION GAP SERPL CALC-SCNC: 7 MMOL/L (ref 7–16)
BASOPHILS # BLD AUTO: 0.3 % (ref 0–1.8)
BASOPHILS # BLD: 0.02 K/UL (ref 0–0.12)
BUN SERPL-MCNC: 27 MG/DL (ref 8–22)
CALCIUM SERPL-MCNC: 10 MG/DL (ref 8.5–10.5)
CHLORIDE SERPL-SCNC: 103 MMOL/L (ref 96–112)
CO2 SERPL-SCNC: 25 MMOL/L (ref 20–33)
COVID ORDER STATUS COVID19: NORMAL
CREAT SERPL-MCNC: 0.98 MG/DL (ref 0.5–1.4)
EKG IMPRESSION: NORMAL
EOSINOPHIL # BLD AUTO: 0.1 K/UL (ref 0–0.51)
EOSINOPHIL NFR BLD: 1.3 % (ref 0–6.9)
ERYTHROCYTE [DISTWIDTH] IN BLOOD BY AUTOMATED COUNT: 43.8 FL (ref 35.9–50)
EST. AVERAGE GLUCOSE BLD GHB EST-MCNC: 146 MG/DL
GLUCOSE SERPL-MCNC: 115 MG/DL (ref 65–99)
HBA1C MFR BLD: 6.7 % (ref 0–5.6)
HCT VFR BLD AUTO: 48.9 % (ref 37–47)
HGB BLD-MCNC: 16.1 G/DL (ref 12–16)
IMM GRANULOCYTES # BLD AUTO: 0.04 K/UL (ref 0–0.11)
IMM GRANULOCYTES NFR BLD AUTO: 0.5 % (ref 0–0.9)
LYMPHOCYTES # BLD AUTO: 2.39 K/UL (ref 1–4.8)
LYMPHOCYTES NFR BLD: 31.1 % (ref 22–41)
MCH RBC QN AUTO: 31.3 PG (ref 27–33)
MCHC RBC AUTO-ENTMCNC: 32.9 G/DL (ref 33.6–35)
MCV RBC AUTO: 95.1 FL (ref 81.4–97.8)
MONOCYTES # BLD AUTO: 0.5 K/UL (ref 0–0.85)
MONOCYTES NFR BLD AUTO: 6.5 % (ref 0–13.4)
NEUTROPHILS # BLD AUTO: 4.64 K/UL (ref 2–7.15)
NEUTROPHILS NFR BLD: 60.3 % (ref 44–72)
NRBC # BLD AUTO: 0 K/UL
NRBC BLD-RTO: 0 /100 WBC
PLATELET # BLD AUTO: 322 K/UL (ref 164–446)
PMV BLD AUTO: 10.9 FL (ref 9–12.9)
POTASSIUM SERPL-SCNC: 4.2 MMOL/L (ref 3.6–5.5)
RBC # BLD AUTO: 5.14 M/UL (ref 4.2–5.4)
SARS-COV-2 RNA RESP QL NAA+PROBE: NOTDETECTED
SODIUM SERPL-SCNC: 135 MMOL/L (ref 135–145)
SPECIMEN SOURCE: NORMAL
WBC # BLD AUTO: 7.7 K/UL (ref 4.8–10.8)

## 2020-12-31 PROCEDURE — U0003 INFECTIOUS AGENT DETECTION BY NUCLEIC ACID (DNA OR RNA); SEVERE ACUTE RESPIRATORY SYNDROME CORONAVIRUS 2 (SARS-COV-2) (CORONAVIRUS DISEASE [COVID-19]), AMPLIFIED PROBE TECHNIQUE, MAKING USE OF HIGH THROUGHPUT TECHNOLOGIES AS DESCRIBED BY CMS-2020-01-R: HCPCS

## 2020-12-31 PROCEDURE — 36415 COLL VENOUS BLD VENIPUNCTURE: CPT

## 2020-12-31 PROCEDURE — 85025 COMPLETE CBC W/AUTO DIFF WBC: CPT

## 2020-12-31 PROCEDURE — 83036 HEMOGLOBIN GLYCOSYLATED A1C: CPT

## 2020-12-31 PROCEDURE — 80048 BASIC METABOLIC PNL TOTAL CA: CPT

## 2020-12-31 PROCEDURE — 71046 X-RAY EXAM CHEST 2 VIEWS: CPT

## 2020-12-31 PROCEDURE — 93010 ELECTROCARDIOGRAM REPORT: CPT | Performed by: INTERNAL MEDICINE

## 2020-12-31 PROCEDURE — 93005 ELECTROCARDIOGRAM TRACING: CPT

## 2020-12-31 ASSESSMENT — ENCOUNTER SYMPTOMS
CARDIOVASCULAR NEGATIVE: 1
CONSTITUTIONAL NEGATIVE: 1
EYES NEGATIVE: 1
GASTROINTESTINAL NEGATIVE: 1
NEUROLOGICAL NEGATIVE: 1
PSYCHIATRIC NEGATIVE: 1
RESPIRATORY NEGATIVE: 1

## 2021-01-01 NOTE — H&P
Obstetrics & Gynecology History & Physical Note    Date of Service  2020    Chief Complaint  Vaginal /bladder  Prolapse     History of Presenting Illness  Jillian Gottlieb is a 75 y.o.  , s/p hysterectomy 26 years ago for prolapse , now with worening vaginal bulge , pressure in pelvis . Patient with frequent  UTI's , but dnied SHARMILA. Patient who has had partial nephrectomy , and many cystoscopies, Stents , etc , had CMG/cystoscopy in current evaluation , which showed no anatomic SHARMILA. .. No LMP recorded. Patient has had a hysterectomy. Patient counseled for above issues , and exam showed 3rd degtree cystocele , rectocele and perineal relaxation . She is not sexually active and declines pessary or other non surgical remedy.  She is being admitted for [facility] Partial Colpocleisis      [facility]  care is at [address]  and is complicated by:  1. Bladder prolapse , without SHARMILA    Patient Active Problem List    Diagnosis Date Noted   • Bladder prolapse, female, acquired 08/10/2020   • Hammer toe 2019   • Skin tag 2019   • GERD (gastroesophageal reflux disease) 10/03/2016   • Mass of left lower leg 2016   • COPD (chronic obstructive pulmonary disease) (HCC) 2016   • Kidney stones 2016   • Essential hypertension 2016   • Impaired fasting glucose 2015   • Hyperlipidemia 2015   • Hypothyroidism 2015   • Personal history of renal cancer 2015   • Asthma 2015       Obstetric History  OB History    Para Term  AB Living   3 2     1 2   SAB TAB Ectopic Molar Multiple Live Births   1         2      # Outcome Date GA Lbr Lonnie/2nd Weight Sex Delivery Anes PTL Lv   3 Para      Vag-Spont   ALFREDO   2 Para      Vag-Spont   ALFREDO   1 SAB                Gynecologic History  Hysterectomy    Review of Systems  Review of Systems   Constitutional: Negative.    HENT: Negative.    Eyes: Negative.    Respiratory: Negative.    Cardiovascular: Negative.     Gastrointestinal: Negative.    Genitourinary: Negative.    Skin: Negative.    Neurological: Negative.    Endo/Heme/Allergies: Negative.    Psychiatric/Behavioral: Negative.        Medical History   has a past medical history of Acid reflux, Arthritis, Asthma, Breath shortness, Bronchitis (08/04/14), Cancer (HCC) (01/13), Chronic pain of left knee (8/22/2017), Chronic pain of right knee (7/1/2016), COPD (chronic obstructive pulmonary disease) ([facility]), COPD (chronic obstructive pulmonary disease) (HCC) (6/30/2016), Diabetes ([facility]), Dyspnea, Emphysema of lung ([facility]), Heart burn, Hypertension, Impaired fasting glucose (8/24/2015), Indigestion, Mass of left lower leg (7/27/2016), Personal history of renal cancer (4/20/2015), Renal disorder, Renal disorder (2013), Skin tag (6/24/2019), Unspecified disorder of thyroid, and Unspecified hypothyroidism (4/20/2015). She also has no past medical history of Encounter for long-term (current) use of other medications.    Surgical History   has a past surgical history that includes bartolo by laparoscopy; tonsillectomy; cystoscopy stent placement (1/31/2013); ureteroscopy (1/31/2013); nephrectomy partial (1/31/2013); lasertripsy (1/31/2013); gyn surgery (1994); ureteroscopy (8/20/2014); lasertripsy (8/20/2014); cystoscopy (8/20/2014); other (1980); other (1995); cystoscopy stent placement (Right, 6/26/2015); ureteroscopy (N/A, 6/26/2015); lasertripsy (Right, 6/26/2015); cystoscopy stent placement (Right, 2/12/2016); recovery (3/4/2016); cystoscopy stent removal (Right, 3/4/2016); ureteroscopy (Right, 3/4/2016); percutaneous nephrostolithotomy (Right, 3/4/2016); other orthopedic surgery; other orthopedic surgery (2006); other orthopedic surgery (1996); knee arthroplasty total (Right, 10/17/2016); knee arthroplasty total (Left, 12/18/2017); and breast biopsy (Left, 1976).     Family History  family history includes Colon Cancer in her father; Kidney Disease in her mother.     Social  "History   reports that she has never smoked. She has never used smokeless tobacco. She reports that she does not drink alcohol or use drugs.    Allergies  Allergies   Allergen Reactions   • Alcohol Vomiting, Nausea and Unspecified     injested-facial numbness, n/v  RXN=50 years ago   • Pcn [Penicillins] Unspecified     Flushed; \"felt weird\"  Tolerates cephalosporins  RXN=age 30's       Medications  Cannot display prior to admission medications because the patient has not been admitted in this contact.       Physical Exam  Vitals:    12/31/20 1033   Weight: 80 kg (176 lb 5.9 oz)   Height: 1.778 m (5' 10\")       General:   alert, cooperative, no distress   Skin:   normal   HEENT:  PERRLA, Sclera clear, anicteric and extraocular movements intact   Lungs:   CTA bilateral, clear to auscultation bilaterally   Heart:   regular rate and rhythm   Breasts:   deferred   Abdomen:  Abdomen soft, non-tender. BS normal. No masses,  No organomegaly   Pelvis: External genitalia: normal general appearance  Urinary system: 3rd degree Cystocele , rectocele and small degree of vault prolapse, perineal relaxation   Vaginal: atrophic mucosa and improved with vaginal estrogen pre op     Cervix: absent  Uterus: absent     Urinalysis:   Results for BABATUNDE FREITAS (MRN [id number]) as of 12/31/2020 18:23   Ref. Range 11/9/2020 16:01   POC Color Latest Ref Range: Negative  yellow   POC Appearance Latest Ref Range: Negative  clear   POC Specific Gravity Latest Ref Range: <1.005 - >1.030  1.020   POC Urine PH Latest Ref Range: 5.0 - 8.0  5.5   POC Glucose Latest Ref Range: Negative mg/dL negative   POC Ketones Latest Ref Range: Negative mg/dL negative   POC Protein Latest Ref Range: Negative mg/dL negative   POC Nitrites Latest Ref Range: Negative  positive   POC Leukocyte Esterase Latest Ref Range: Negative  trace   POC Blood Latest Ref Range: Negative  trace-intact       Imaging:  cystoscopy with atrophic mucosa , normal capacity "       Assessment:  75 y.o.  Unknown who presents with  Cystocele 3rd degree , rectocele , pelvic prolapse , post hysterectomy   Plan:  No new Assessment & Plan notes have been filed under this hospital service since the last note was generated.  Service: Obstetrics & Gynecology    1. Admit to [facility]   2. [place] Colpocleisis , perineorrhaphy     VTE prophylaxis: SCD's      Rafi Ingram M.D.

## 2021-01-02 ENCOUNTER — ANESTHESIA EVENT (OUTPATIENT)
Dept: SURGERY | Facility: MEDICAL CENTER | Age: 76
End: 2021-01-02
Payer: MEDICARE

## 2021-01-04 ENCOUNTER — ANESTHESIA (OUTPATIENT)
Dept: SURGERY | Facility: MEDICAL CENTER | Age: 76
End: 2021-01-04
Payer: MEDICARE

## 2021-01-04 ENCOUNTER — HOSPITAL ENCOUNTER (OUTPATIENT)
Facility: MEDICAL CENTER | Age: 76
End: 2021-01-04
Attending: OBSTETRICS & GYNECOLOGY | Admitting: OBSTETRICS & GYNECOLOGY
Payer: MEDICARE

## 2021-01-04 VITALS
DIASTOLIC BLOOD PRESSURE: 59 MMHG | HEART RATE: 80 BPM | RESPIRATION RATE: 16 BRPM | OXYGEN SATURATION: 93 % | TEMPERATURE: 97 F | BODY MASS INDEX: 25.22 KG/M2 | WEIGHT: 176.15 LBS | SYSTOLIC BLOOD PRESSURE: 113 MMHG | HEIGHT: 70 IN

## 2021-01-04 LAB — GLUCOSE BLD-MCNC: 119 MG/DL (ref 65–99)

## 2021-01-04 PROCEDURE — 700105 HCHG RX REV CODE 258: Performed by: OBSTETRICS & GYNECOLOGY

## 2021-01-04 PROCEDURE — 160028 HCHG SURGERY MINUTES - 1ST 30 MINS LEVEL 3: Performed by: OBSTETRICS & GYNECOLOGY

## 2021-01-04 PROCEDURE — 700101 HCHG RX REV CODE 250: Performed by: ANESTHESIOLOGY

## 2021-01-04 PROCEDURE — 57120 COLPOCLEISIS LE FORT TYPE: CPT | Mod: 80 | Performed by: OBSTETRICS & GYNECOLOGY

## 2021-01-04 PROCEDURE — 700111 HCHG RX REV CODE 636 W/ 250 OVERRIDE (IP): Performed by: ANESTHESIOLOGY

## 2021-01-04 PROCEDURE — 57250 REPAIR RECTUM & VAGINA: CPT | Performed by: OBSTETRICS & GYNECOLOGY

## 2021-01-04 PROCEDURE — 57250 REPAIR RECTUM & VAGINA: CPT | Mod: 80 | Performed by: OBSTETRICS & GYNECOLOGY

## 2021-01-04 PROCEDURE — 57120 COLPOCLEISIS LE FORT TYPE: CPT | Performed by: OBSTETRICS & GYNECOLOGY

## 2021-01-04 PROCEDURE — 160035 HCHG PACU - 1ST 60 MINS PHASE I: Performed by: OBSTETRICS & GYNECOLOGY

## 2021-01-04 PROCEDURE — 501838 HCHG SUTURE GENERAL: Performed by: OBSTETRICS & GYNECOLOGY

## 2021-01-04 PROCEDURE — 160047 HCHG PACU  - EA ADDL 30 MINS PHASE II: Performed by: OBSTETRICS & GYNECOLOGY

## 2021-01-04 PROCEDURE — 700101 HCHG RX REV CODE 250: Performed by: OBSTETRICS & GYNECOLOGY

## 2021-01-04 PROCEDURE — 160009 HCHG ANES TIME/MIN: Performed by: OBSTETRICS & GYNECOLOGY

## 2021-01-04 PROCEDURE — 160046 HCHG PACU - 1ST 60 MINS PHASE II: Performed by: OBSTETRICS & GYNECOLOGY

## 2021-01-04 PROCEDURE — 160039 HCHG SURGERY MINUTES - EA ADDL 1 MIN LEVEL 3: Performed by: OBSTETRICS & GYNECOLOGY

## 2021-01-04 PROCEDURE — A9270 NON-COVERED ITEM OR SERVICE: HCPCS | Performed by: OBSTETRICS & GYNECOLOGY

## 2021-01-04 PROCEDURE — 500892 HCHG PACK, PERI-GYN: Performed by: OBSTETRICS & GYNECOLOGY

## 2021-01-04 PROCEDURE — 82962 GLUCOSE BLOOD TEST: CPT

## 2021-01-04 PROCEDURE — 160048 HCHG OR STATISTICAL LEVEL 1-5: Performed by: OBSTETRICS & GYNECOLOGY

## 2021-01-04 PROCEDURE — 160025 RECOVERY II MINUTES (STATS): Performed by: OBSTETRICS & GYNECOLOGY

## 2021-01-04 PROCEDURE — 160002 HCHG RECOVERY MINUTES (STAT): Performed by: OBSTETRICS & GYNECOLOGY

## 2021-01-04 PROCEDURE — 500257: Performed by: OBSTETRICS & GYNECOLOGY

## 2021-01-04 RX ORDER — LIDOCAINE HYDROCHLORIDE 20 MG/ML
INJECTION, SOLUTION EPIDURAL; INFILTRATION; INTRACAUDAL; PERINEURAL PRN
Status: DISCONTINUED | OUTPATIENT
Start: 2021-01-04 | End: 2021-01-04 | Stop reason: SURG

## 2021-01-04 RX ORDER — LABETALOL HYDROCHLORIDE 5 MG/ML
5 INJECTION, SOLUTION INTRAVENOUS
Status: DISCONTINUED | OUTPATIENT
Start: 2021-01-04 | End: 2021-01-04 | Stop reason: HOSPADM

## 2021-01-04 RX ORDER — ONDANSETRON 2 MG/ML
4 INJECTION INTRAMUSCULAR; INTRAVENOUS
Status: DISCONTINUED | OUTPATIENT
Start: 2021-01-04 | End: 2021-01-04 | Stop reason: HOSPADM

## 2021-01-04 RX ORDER — CEFAZOLIN SODIUM 1 G/3ML
INJECTION, POWDER, FOR SOLUTION INTRAMUSCULAR; INTRAVENOUS PRN
Status: DISCONTINUED | OUTPATIENT
Start: 2021-01-04 | End: 2021-01-04 | Stop reason: SURG

## 2021-01-04 RX ORDER — DEXAMETHASONE SODIUM PHOSPHATE 4 MG/ML
INJECTION, SOLUTION INTRA-ARTICULAR; INTRALESIONAL; INTRAMUSCULAR; INTRAVENOUS; SOFT TISSUE PRN
Status: DISCONTINUED | OUTPATIENT
Start: 2021-01-04 | End: 2021-01-04 | Stop reason: SURG

## 2021-01-04 RX ORDER — MEPERIDINE HYDROCHLORIDE 25 MG/ML
6.25 INJECTION INTRAMUSCULAR; INTRAVENOUS; SUBCUTANEOUS
Status: DISCONTINUED | OUTPATIENT
Start: 2021-01-04 | End: 2021-01-04 | Stop reason: HOSPADM

## 2021-01-04 RX ORDER — IBUPROFEN 800 MG/1
800 TABLET ORAL EVERY 8 HOURS PRN
Qty: 30 TAB | Refills: 3 | Status: SHIPPED | OUTPATIENT
Start: 2021-01-04 | End: 2021-04-28

## 2021-01-04 RX ORDER — ACETAMINOPHEN 500 MG
1000 TABLET ORAL ONCE
Status: DISCONTINUED | OUTPATIENT
Start: 2021-01-04 | End: 2021-01-04 | Stop reason: HOSPADM

## 2021-01-04 RX ORDER — OXYCODONE HCL 5 MG/5 ML
5 SOLUTION, ORAL ORAL
Status: DISCONTINUED | OUTPATIENT
Start: 2021-01-04 | End: 2021-01-04 | Stop reason: HOSPADM

## 2021-01-04 RX ORDER — BUPIVACAINE HYDROCHLORIDE AND EPINEPHRINE 2.5; 5 MG/ML; UG/ML
INJECTION, SOLUTION EPIDURAL; INFILTRATION; INTRACAUDAL; PERINEURAL
Status: DISCONTINUED | OUTPATIENT
Start: 2021-01-04 | End: 2021-01-04 | Stop reason: HOSPADM

## 2021-01-04 RX ORDER — EPINEPHRINE 1 MG/ML(1)
AMPUL (ML) INJECTION
Status: DISCONTINUED
Start: 2021-01-04 | End: 2021-01-04 | Stop reason: HOSPADM

## 2021-01-04 RX ORDER — BENZOCAINE AND LEVOMENTHOL 200; 5 MG/G; MG/G
1 SPRAY TOPICAL 4 TIMES DAILY PRN
Qty: 1 EACH | Refills: 1 | Status: SHIPPED | OUTPATIENT
Start: 2021-01-04 | End: 2021-04-28

## 2021-01-04 RX ORDER — ACETAMINOPHEN 500 MG
500-1000 TABLET ORAL EVERY 6 HOURS PRN
COMMUNITY
End: 2022-04-27

## 2021-01-04 RX ORDER — METOPROLOL TARTRATE 1 MG/ML
1 INJECTION, SOLUTION INTRAVENOUS
Status: DISCONTINUED | OUTPATIENT
Start: 2021-01-04 | End: 2021-01-04 | Stop reason: HOSPADM

## 2021-01-04 RX ORDER — SODIUM CHLORIDE, SODIUM LACTATE, POTASSIUM CHLORIDE, CALCIUM CHLORIDE 600; 310; 30; 20 MG/100ML; MG/100ML; MG/100ML; MG/100ML
INJECTION, SOLUTION INTRAVENOUS CONTINUOUS
Status: DISCONTINUED | OUTPATIENT
Start: 2021-01-04 | End: 2021-01-04 | Stop reason: HOSPADM

## 2021-01-04 RX ORDER — DIPHENHYDRAMINE HYDROCHLORIDE 50 MG/ML
INJECTION INTRAMUSCULAR; INTRAVENOUS PRN
Status: DISCONTINUED | OUTPATIENT
Start: 2021-01-04 | End: 2021-01-04 | Stop reason: SURG

## 2021-01-04 RX ORDER — OXYCODONE HCL 5 MG/5 ML
10 SOLUTION, ORAL ORAL
Status: DISCONTINUED | OUTPATIENT
Start: 2021-01-04 | End: 2021-01-04 | Stop reason: HOSPADM

## 2021-01-04 RX ORDER — ONDANSETRON 2 MG/ML
INJECTION INTRAMUSCULAR; INTRAVENOUS PRN
Status: DISCONTINUED | OUTPATIENT
Start: 2021-01-04 | End: 2021-01-04 | Stop reason: SURG

## 2021-01-04 RX ORDER — DIPHENHYDRAMINE HYDROCHLORIDE 50 MG/ML
12.5 INJECTION INTRAMUSCULAR; INTRAVENOUS
Status: DISCONTINUED | OUTPATIENT
Start: 2021-01-04 | End: 2021-01-04 | Stop reason: HOSPADM

## 2021-01-04 RX ORDER — METOCLOPRAMIDE HYDROCHLORIDE 5 MG/ML
INJECTION INTRAMUSCULAR; INTRAVENOUS PRN
Status: DISCONTINUED | OUTPATIENT
Start: 2021-01-04 | End: 2021-01-04 | Stop reason: SURG

## 2021-01-04 RX ORDER — HALOPERIDOL 5 MG/ML
1 INJECTION INTRAMUSCULAR
Status: DISCONTINUED | OUTPATIENT
Start: 2021-01-04 | End: 2021-01-04 | Stop reason: HOSPADM

## 2021-01-04 RX ORDER — BUPIVACAINE HYDROCHLORIDE 2.5 MG/ML
INJECTION, SOLUTION EPIDURAL; INFILTRATION; INTRACAUDAL
Status: DISCONTINUED
Start: 2021-01-04 | End: 2021-01-04 | Stop reason: HOSPADM

## 2021-01-04 RX ADMIN — POVIDONE IODINE 15 ML: 100 SOLUTION TOPICAL at 06:41

## 2021-01-04 RX ADMIN — CEFAZOLIN 2 G: 330 INJECTION, POWDER, FOR SOLUTION INTRAMUSCULAR; INTRAVENOUS at 07:39

## 2021-01-04 RX ADMIN — FENTANYL CITRATE 100 MCG: 50 INJECTION, SOLUTION INTRAMUSCULAR; INTRAVENOUS at 08:41

## 2021-01-04 RX ADMIN — PROPOFOL 40 MG: 10 INJECTION, EMULSION INTRAVENOUS at 07:53

## 2021-01-04 RX ADMIN — DIPHENHYDRAMINE HYDROCHLORIDE 12.5 MG: 50 INJECTION, SOLUTION INTRAMUSCULAR; INTRAVENOUS at 07:41

## 2021-01-04 RX ADMIN — FENTANYL CITRATE 50 MCG: 50 INJECTION, SOLUTION INTRAMUSCULAR; INTRAVENOUS at 07:53

## 2021-01-04 RX ADMIN — FENTANYL CITRATE 50 MCG: 50 INJECTION, SOLUTION INTRAMUSCULAR; INTRAVENOUS at 07:39

## 2021-01-04 RX ADMIN — LIDOCAINE HYDROCHLORIDE 70 MG: 20 INJECTION, SOLUTION EPIDURAL; INFILTRATION; INTRACAUDAL at 07:39

## 2021-01-04 RX ADMIN — SODIUM CHLORIDE, POTASSIUM CHLORIDE, SODIUM LACTATE AND CALCIUM CHLORIDE: 600; 310; 30; 20 INJECTION, SOLUTION INTRAVENOUS at 06:42

## 2021-01-04 RX ADMIN — ONDANSETRON 4 MG: 2 INJECTION INTRAMUSCULAR; INTRAVENOUS at 08:57

## 2021-01-04 RX ADMIN — PROPOFOL 130 MG: 10 INJECTION, EMULSION INTRAVENOUS at 07:39

## 2021-01-04 RX ADMIN — DEXAMETHASONE SODIUM PHOSPHATE 8 MG: 4 INJECTION, SOLUTION INTRA-ARTICULAR; INTRALESIONAL; INTRAMUSCULAR; INTRAVENOUS; SOFT TISSUE at 07:41

## 2021-01-04 RX ADMIN — METOCLOPRAMIDE 10 MG: 5 INJECTION, SOLUTION INTRAMUSCULAR; INTRAVENOUS at 07:41

## 2021-01-04 RX ADMIN — EPHEDRINE SULFATE 10 MG: 50 INJECTION, SOLUTION INTRAVENOUS at 08:48

## 2021-01-04 RX ADMIN — SODIUM CHLORIDE, POTASSIUM CHLORIDE, SODIUM LACTATE AND CALCIUM CHLORIDE: 600; 310; 30; 20 INJECTION, SOLUTION INTRAVENOUS at 07:33

## 2021-01-04 ASSESSMENT — PAIN DESCRIPTION - PAIN TYPE
TYPE: SURGICAL PAIN

## 2021-01-04 ASSESSMENT — PAIN SCALES - GENERAL: PAIN_LEVEL: 0

## 2021-01-04 NOTE — ANESTHESIA PREPROCEDURE EVALUATION
74 yo with asthma, COPD that is stable, under good control. Pt has no known cardiac problems, but has been hypertensive (with tx) for over 40 years. This could account for the mild cardiomegaly on CXR. Allergy to PCN was over 40 years ago and was not anaphylaxis.  Pt seen by primary MD Chelsea) on Dec. 28th. Cleared for surgery.    Relevant Problems   PULMONARY   (+) Asthma   (+) COPD (chronic obstructive pulmonary disease) (HCC)      NEURO   (+) Personal history of renal cancer      CARDIAC   (+) Essential hypertension      GI   (+) GERD (gastroesophageal reflux disease)         (+) Kidney stones      ENDO   (+) Hypothyroidism       Physical Exam    Airway   Mallampati: II  TM distance: >3 FB  Neck ROM: full       Cardiovascular - normal exam  Rhythm: regular  Rate: normal  (-) murmur     Dental - normal exam           Pulmonary - normal exam  Breath sounds clear to auscultation     Abdominal    Neurological - normal exam                 Anesthesia Plan    ASA 2       Plan - general       Airway plan will be LMA        Induction: intravenous    Postoperative Plan: Postoperative administration of opioids is intended.    Pertinent diagnostic labs and testing reviewed    Informed Consent:    Anesthetic plan and risks discussed with patient.    Use of blood products discussed with: patient whom consented to blood products.

## 2021-01-04 NOTE — ANESTHESIA PROCEDURE NOTES
Airway    Date/Time: 1/4/2021 7:39 AM  Performed by: Tran Rajan M.D.  Authorized by: Tran Rajan M.D.     Location:  OR  Urgency:  Elective  Indications for Airway Management:  Anesthesia      Spontaneous Ventilation: absent    Sedation Level:  Deep  Preoxygenated: Yes    Mask Difficulty Assessment:  1 - vent by mask  Final Airway Type:  Supraglottic airway  Final Supraglottic Airway:  Standard LMA    SGA Size:  4  Number of Attempts at Approach:  1   Minimal mask vent til propofol onset

## 2021-01-04 NOTE — OR SURGEON
Immediate Post OP Note    PreOp Diagnosis: Pelvic Organ Prolapse                                3rd Degree Cysto-rectocele                                 Post hysterectomy     PostOp Diagnosis: : Pelvic Organ Prolapse                                3rd Degree Cysto-rectocele                                 Post hysterectomy     Procedure(s):  COLPOCLEISIS, LE FORT, PARTIAL - Wound Class: Clean Contaminated  Perineorrhaphy  Surgeon(s):  MIRNA Singh M.D.    Anesthesiologist/Type of Anesthesia:  Anesthesiologist: Tran Rajan M.D./General    Surgical Staff:  Circulator: Lorrie Mendez R.N.; Marycarmen Linn R.N.  Scrub Person: Celina Ríos    Specimens removed if any:  * No specimens in log *    Estimated Blood Loss: < 100 cc     Findings: 3rd degree , cystocele , rectocele , mild enterocele  Perineal relaxation / attenuation     Complications: none        1/4/2021 9:25 AM Rafi Ingram M.D.

## 2021-01-04 NOTE — ANESTHESIA POSTPROCEDURE EVALUATION
Patient: Jillian Gottlieb    Procedure Summary     Date: 01/04/21 Room / Location: [facility] ROOM 25 / SURGERY SAME DAY [facility]    Anesthesia Start: 0733 Anesthesia Stop: 0924    Procedure: COLPOCLEISIS, LE FORT, PARTIAL (Vagina ) Diagnosis: (POP-Q STAGE 2 CYSTOCELE)    Surgeons: Rafi Ingram M.D. Responsible Provider: Tran Rajan M.D.    Anesthesia Type: general ASA Status: 2          Final Anesthesia Type: general  Last vitals  BP   Blood Pressure : 113/59    Temp   36.1 °C (97 °F)    Pulse   Pulse: 80   Resp   16    SpO2   93 %      Anesthesia Post Evaluation    Patient location during evaluation: PACU  Patient participation: complete - patient participated  Level of consciousness: awake and alert  Pain score: 0    Airway patency: patent  Anesthetic complications: no  Cardiovascular status: hemodynamically stable  Respiratory status: acceptable  Hydration status: euvolemic    PONV: none           Nurse Pain Score: 0 (NPRS)

## 2021-01-04 NOTE — DISCHARGE INSTRUCTIONS
ACTIVITY: Rest and take it easy for the first 24 hours.  A responsible adult is recommended to remain with you during that time.  It is normal to feel sleepy.  We encourage you to not do anything that requires balance, judgment or coordination.    MILD FLU-LIKE SYMPTOMS ARE NORMAL. YOU MAY EXPERIENCE GENERALIZED MUSCLE ACHES, THROAT IRRITATION, HEADACHE AND/OR SOME NAUSEA.    FOR 24 HOURS DO NOT:  Drive, operate machinery or run household appliances.  Drink beer or alcoholic beverages.   Make important decisions or sign legal documents.    SPECIAL INSTRUCTIONS: Pelvic rest until authorized by doctor (no douches, sexual intercourse or tampons) until authorized by physician.    DIET: To avoid nausea, slowly advance diet as tolerated, avoiding spicy or greasy foods for the first day.  Add more substantial food to your diet according to your physician's instructions.  Babies can be fed formula or breast milk as soon as they are hungry.  INCREASE FLUIDS AND FIBER TO AVOID CONSTIPATION.    SURGICAL DRESSING/BATHING: Can shower tomorrow, Do not submerge in water (hot tub, pool, bath) until authorized by doctor.     FOLLOW-UP APPOINTMENT:  A follow-up appointment should be arranged with Dr. Ingram [phone]; call to schedule.    You should CALL YOUR PHYSICIAN if you develop:  Fever greater than 101 degrees F.  Pain not relieved by medication, or persistent nausea or vomiting.  Excessive bleeding (blood soaking through dressing) or unexpected drainage from the wound.  Extreme redness or swelling around the incision site, drainage of pus or foul smelling drainage.  Inability to urinate or empty your bladder within 8 hours.  Problems with breathing or chest pain.    You should call 911 if you develop problems with breathing or chest pain.  If you are unable to contact your doctor or surgical center, you should go to the nearest emergency room or urgent care center.  Physician's telephone #: Dr. Ingram [phone]    If  any questions arise, call your doctor.  If your doctor is not available, please feel free to call the Surgical Center at [phone]. The Contact Center is open Monday through Friday 7AM to 5PM and may speak to a nurse at [phone], or toll free at [phone].     A registered nurse may call you a few days after your surgery to see how you are doing after your procedure.    MEDICATIONS: Resume taking daily medication.  Take prescribed pain medication with food.  If no medication is prescribed, you may take non-aspirin pain medication if needed.  PAIN MEDICATION CAN BE VERY CONSTIPATING.  Take a stool softener or laxative such as senokot, pericolace, or milk of magnesia if needed.    Prescription given for Dermoplast and Ibuprofen.  Last pain medication given: Pt took extra strength tylenol this morning.      If your physician has prescribed pain medication that includes Acetaminophen (Tylenol), do not take additional Acetaminophen (Tylenol) while taking the prescribed medication.    Depression / Suicide Risk    As you are discharged from this [facility] facility, it is important to learn how to keep safe from harming yourself.    Recognize the warning signs:  · Abrupt changes in personality, positive or negative- including increase in energy   · Giving away possessions  · Change in eating patterns- significant weight changes-  positive or negative  · Change in sleeping patterns- unable to sleep or sleeping all the time   · Unwillingness or inability to communicate  · Depression  · Unusual sadness, discouragement and loneliness  · Talk of wanting to die  · Neglect of personal appearance   · Rebelliousness- reckless behavior  · Withdrawal from people/activities they love  · Confusion- inability to concentrate     If you or a loved one observes any of these behaviors or has concerns about self-harm, here's what you can do:  · Talk about it- your feelings and reasons for harming yourself  · Remove any  means that you might use to hurt yourself (examples: pills, rope, extension cords, firearm)  · Get professional help from the community (Mental Health, Substance Abuse, psychological counseling)  · Do not be alone:Call your Safe Contact- someone whom you trust who will be there for you.  · Call your local CRISIS HOTLINE 569-6693 or 090-255-4056  · Call your local Children's Mobile Crisis Response Team [place] (409) 977-9681 or www.[employer]  · Call the toll free National Suicide Prevention Hotlines   · National Suicide Prevention Lifeline 584-222-WJPV (7950)  · National Hope Line Network 800-SUICIDE (141-6744)

## 2021-01-04 NOTE — ANESTHESIA TIME REPORT
Anesthesia Start and Stop Event Times     Date Time Event    1/4/2021 0723 Ready for Procedure     0733 Anesthesia Start     0924 Anesthesia Stop        Responsible Staff  01/04/21    Name Role Begin End    Tran Rajan M.D. Anesth 0733 0924        Preop Diagnosis (Free Text):  Pre-op Diagnosis     POP-Q STAGE 2 CYSTOCELE        Preop Diagnosis (Codes):    Post op Diagnosis  Cystocele      Premium Reason  Non-Premium    Comments:

## 2021-01-04 NOTE — OR NURSING
0921 Pt to PACU from OR. Report from anesthesia and OR RN. On 8L mask O2, arouses on calling. Respirations even and unlabored. VSS. No bleeding to elena pad.     0938 Pt more awake at this time, titrated to RA, tolerating PO sips.     1003 No changes in assessment, meets phase II criteria.     1030 Declines pain/nausea.    1100 No urge to void, dressed w/o assistance, awaiting voiding trial.     1130 Ambulated to bathroom, steady gait, able to void w/o difficulty. Called  for  and reviewed instructions over the phone.

## 2021-01-05 NOTE — OP REPORT
DATE OF SERVICE:  01/04/2021     PREOPERATIVE DIAGNOSIS:  Pelvic organ prolapse status post hysterectomy,   third-degree cystocele, rectocele, symptomatic.     POSTOPERATIVE DIAGNOSIS:  Pelvic organ prolapse status post hysterectomy,   third-degree cystocele, rectocele, symptomatic.     PRINCIPAL PROCEDURES:  Colpotomy and colpocleisis partial LeFort with   perineorrhaphy.     SURGEON:  Rafi Ingram MD     ASSISTANT:  eJssika Kirkpatrick MD     ANESTHESIOLOGIST:  Tran Rajan MD     ANESTHESIA:  General LMA.     PRINCIPAL DESCRIPTION OF PROCEDURE:  The patient was prepped and draped in the   normal dorsal lithotomy position.  This patient had a third-degree cystocele   with vault prolapse with mild enterocele as previously noted, but secondary to   previous direction and instructions this patient who desires no further   sexual intercourse and secondary to medical issues requires minimal   anesthesia, the decision was made to perform a colpocleisis.  Therefore,   grasping the previous vaginal cuff showing a third-degree cystocele in the   vault prolapse with creating an anterior and posterior wall of the vaginal   vault.  This was insufflated with quarter strength Marcaine with epinephrine   to the point of the UVJ. There was a line with the marker.  It was created at   the 0.1 cm distal to the UVJ as well as 1 cm proximal to vaginal cuff. With   these markings there was initially a triangular shaped vaginal mucosa was   removed from the underlying pubocervical fascia.  This was done from the point   of the previous vaginal cuff up to the UVJ as well posteriorly from the   vaginal cuff down to the previously noted marked transverse line across the   redundant posterior vaginal mucosa.  This mucosa was taken posteriorly from   the underlying rectovaginal fascia.  This denuded the vagina as previously   noted and starting at the level of the cuff. Imbricating sutures were placed   and a proximal Corazon plication stitch  going anterior to the posterior crossing   over and going posterior to anterior.  This imbricated the initial portion of   the redundant cystocele.  Progressively this was done until the entire   redundancy was imbricated and there was just a flap of vaginal mucosa anterior   which was at the level of the UVJ and as well posteriorly as previously noted   just distal to the cuff.  A level of Corazon plication was affected at the UVJ   with 2-0 Vicryl suture ligature medially and laterally allowing some support   to the overlying UVJ and at this point, the vaginal mucosa was then closed   over the flaps anteriorly and posteriorly, creating a wall of vaginal mucosa 1   cm distal to the hymenal ring.  A perineorrhaphy was affected with a   triangular shape in the perineal body.  The skin was removed and subsequent to   this, the underlying introitus and hymenal ring was undermined and incised in   the midline, identifying the previous levator ani muscles, a levatorplasty   was affected with 0 Vicryl suture ligature going initially in the right   levator to the left, bringing them across in the midline and the deep   submucosal tissues were then closed with interrupted 2-0 Vicryl suture   ligature from the distal levator up to the floor of the vagina and the   perineal body.  This was sewn with a lateral to medial and medial to lateral   crown stitch and subsequent to this, the hymenal ring and redundancy of the   vagina was then closed with 2-0 Vicryl suture in a running fashion.  This was   dropped down into the submucosa and a classic episiotomy like closure was   affected with a subcuticular stitch of 2-0 Vicryl going from the most inferior   aspect of the perineal body up to the posterior fourchette and at this point,   there is great support to the introitus.  As previously noted, there is 1 cm   void prior to the vaginal mucosa wall of the colpocleisis.  There was no   channels necessary for drainage as there was  just a pocket in this area that   would spontaneously drain through the introitus.  The urethra was noted.  The   patient was able to void prior to discharge from the hospital suite.  Sponge   and needle counts were correct x3.     ESTIMATED BLOOD LOSS:  Less than 100 mL.        ______________________________  MD MEGAN BATES/V[facility]ES[facility] [place]    DD:  01/04/2021 13:13  DT:  01/04/2021 14:50    Job#:  [id number]

## 2021-01-11 DIAGNOSIS — Z23 NEED FOR VACCINATION: ICD-10-CM

## 2021-01-14 RX ORDER — LEVOTHYROXINE SODIUM 88 UG/1
88 TABLET ORAL
Qty: 90 TAB | Refills: 3 | Status: SHIPPED | OUTPATIENT
Start: 2021-01-14 | End: 2021-12-14

## 2021-01-14 RX ORDER — METOPROLOL SUCCINATE 25 MG/1
25 TABLET, EXTENDED RELEASE ORAL DAILY
Qty: 90 TAB | Refills: 3 | Status: SHIPPED | OUTPATIENT
Start: 2021-01-14 | End: 2021-12-14

## 2021-01-14 RX ORDER — LOSARTAN POTASSIUM 100 MG/1
100 TABLET ORAL DAILY
Qty: 90 TAB | Refills: 3 | Status: SHIPPED | OUTPATIENT
Start: 2021-01-14 | End: 2021-12-14

## 2021-01-14 RX ORDER — SIMVASTATIN 20 MG
TABLET ORAL
Qty: 90 TAB | Refills: 3 | Status: SHIPPED | OUTPATIENT
Start: 2021-01-14 | End: 2021-12-14

## 2021-01-14 RX ORDER — METFORMIN HYDROCHLORIDE 500 MG/1
TABLET, EXTENDED RELEASE ORAL
Qty: 270 TAB | Refills: 3 | Status: SHIPPED | OUTPATIENT
Start: 2021-01-14 | End: 2022-02-22

## 2021-01-14 RX ORDER — FENOFIBRATE 160 MG/1
160 TABLET ORAL DAILY
Qty: 90 TAB | Refills: 3 | Status: SHIPPED | OUTPATIENT
Start: 2021-01-14 | End: 2021-12-14

## 2021-01-17 ENCOUNTER — IMMUNIZATION (OUTPATIENT)
Dept: FAMILY PLANNING/WOMEN'S HEALTH CLINIC | Facility: IMMUNIZATION CENTER | Age: 76
End: 2021-01-17
Attending: INTERNAL MEDICINE
Payer: MEDICARE

## 2021-01-17 DIAGNOSIS — Z23 NEED FOR VACCINATION: ICD-10-CM

## 2021-01-17 DIAGNOSIS — Z23 ENCOUNTER FOR VACCINATION: Primary | ICD-10-CM

## 2021-01-17 PROCEDURE — 91301 MODERNA SARS-COV-2 VACCINE: CPT

## 2021-01-17 PROCEDURE — 0011A MODERNA SARS-COV-2 VACCINE: CPT

## 2021-02-09 ENCOUNTER — GYNECOLOGY VISIT (OUTPATIENT)
Dept: OBGYN | Facility: CLINIC | Age: 76
End: 2021-02-09
Payer: MEDICARE

## 2021-02-09 VITALS — WEIGHT: 179 LBS | SYSTOLIC BLOOD PRESSURE: 157 MMHG | BODY MASS INDEX: 25.68 KG/M2 | DIASTOLIC BLOOD PRESSURE: 86 MMHG

## 2021-02-09 DIAGNOSIS — Z09 POSTOP CHECK: ICD-10-CM

## 2021-02-09 DIAGNOSIS — N81.10 POP-Q STAGE 3 CYSTOCELE: ICD-10-CM

## 2021-02-09 PROCEDURE — 99024 POSTOP FOLLOW-UP VISIT: CPT | Performed by: OBSTETRICS & GYNECOLOGY

## 2021-02-09 RX ORDER — TRIAMCINOLONE ACETONIDE 5 MG/G
CREAM TOPICAL
Qty: 15 G | Refills: 1 | Status: SHIPPED | OUTPATIENT
Start: 2021-02-09 | End: 2021-04-28

## 2021-02-09 NOTE — PROGRESS NOTES
SUBJECTIVE:  Jillian Gottlieb presents to the clinic 5 weeks following Colpocleisis ,     Eating a regular diet without difficulty. Bowel movement are Normal.  The patient is not having any pain. Spotting. Patient Denies Incisional pain, drainage or redness    OBJECTIVE:  /86   Wt 81.2 kg (179 lb)   BMI 25.68 kg/m²   Current Outpatient Medications on File Prior to Visit   Medication Sig Dispense Refill   • simvastatin (ZOCOR) 20 MG Tab TAKE 1 TABLET BY MOUTH ONCE DAILY IN THE EVENING 90 Tab 3   • metoprolol SR (TOPROL XL) 25 MG TABLET SR 24 HR Take 1 Tab by mouth every day. 90 Tab 3   • metFORMIN ER (GLUCOPHAGE XR) 500 MG TABLET SR 24 HR Take 3 tabs daily 270 Tab 3   • losartan (COZAAR) 100 MG Tab Take 1 Tab by mouth every day. 90 Tab 3   • levothyroxine (SYNTHROID) 88 MCG Tab Take 1 Tab by mouth Every morning on an empty stomach. 90 Tab 3   • fenofibrate (TRIGLIDE) 160 MG tablet Take 1 Tab by mouth every day. 90 Tab 3   • acetaminophen (TYLENOL) 500 MG Tab Take 500-1,000 mg by mouth every 6 hours as needed.     • ibuprofen (MOTRIN) 800 MG Tab Take 1 Tab by mouth every 8 hours as needed for Moderate Pain. 30 Tab 3   • benzocaine-lanolin-aloe vera (DERMOPLAST) 20-0.5 % Aerosol Apply 1 Spray topically 4 times a day as needed. 1 Each 1   • Fluticasone-Umeclidin-Vilant (TRELEGY ELLIPTA) 200-62.5-25 MCG/INH AEROSOL POWDER, BREATH ACTIVATED Inhale 1 Puff every day. 1 Each 11   • albuterol (ACCUNEB) 0.63 MG/3ML nebulizer solution Take 3 mL by nebulization every four hours as needed for Shortness of Breath. 90 mL 1   • raloxifene (EVISTA) 60 MG Tab TAKE 1 TABLET BY MOUTH ONCE DAILY IN THE MORNING 90 Tab 3   • estrogens, conjugated (PREMARIN) 0.625 MG/GM Cream 0.5gm ( 1/4applicator) per vagina QHS x2weeks , then HS 2x/week 30 g 1   • hydroCHLOROthiazide (HYDRODIURIL) 25 MG Tab Take 1 Tab by mouth every day. 90 Tab 3   • vitamin E (VITAMIN E) 1000 UNIT Cap Take 1 Cap by mouth every day.     • multivitamin (THERAGRAN)  Tab Take 1 Tab by mouth every day.     • Influenza Vaccine High-Dose pf 0.5 ML Suspension Prefilled Syringe injection Fluzone High-Dose 2019-20 (PF) 180 mcg/0.5 mL intramuscular syringe   PHARMACIST ADMINISTERED IMMUNIZATION ADMINISTERED AT TIME OF DISPENSING     • neomycin-polymyxin-dexamethasone (MAXITROL) 0.1 % ophthalmic suspension neomycin-polymyxin-dexameth 3.5 mg/mL-10,000 unit/mL-0.1% eye drops     • albuterol 108 (90 Base) MCG/ACT Aero Soln inhalation aerosol INHALE 2 PUFFS BY MOUTH EVERY 4 HOURS AS NEEDED FOR SHORTNESS OF BREATH 1 Each 0   • NON SPECIFIED Indications: D Mannos     • nitrofurantoin (MACROBID) 100 MG Cap Take 1 Cap by mouth 2 times a day. (Patient not taking: Reported on 12/4/2020) 14 Cap 0   • estradiol (VAGIFEM) 10 MCG Tab Insert 1 Tab in vagina every Monday and Thursday. Insert 1 tab/vagina hs 3x/week (Patient not taking: Reported on 11/9/2020) 8 Tab 6   • Influenza Vaccine High-Dose pf (FLUZONE HIGH-DOSE) 0.5 ML Suspension Prefilled Syringe injection Fluzone High-Dose 2019-20 (PF) 180 mcg/0.5 mL intramuscular syringe   PHARMACIST ADMINISTERED IMMUNIZATION ADMINISTERED AT TIME OF DISPENSING     • Fluticasone-Umeclidin-Vilant (TRELEGY ELLIPTA) 100-62.5-25 MCG/INH AEROSOL POWDER, BREATH ACTIVATED Inhale 1 Puff by mouth every day. Rinse mouth after use (Patient not taking: Reported on 12/31/2020) 1 Each 0   • SPIRIVA HANDIHALER 18 MCG Cap   3   • clobetasol (TEMOVATE) 0.05 % Ointment BID x 1 wk (Patient not taking: Reported on 2/10/2020) 30 g 1   • BOOSTRIX 5-2.5-18.5 Suspension      • vitamin D (CHOLECALCIFEROL) 1000 UNIT Tab Take 1,000 Units by mouth every day.       No current facility-administered medications on file prior to visit.        Constitutional:  alert, no distress.  Abdomen:  soft, bowel sounds active, non-tender.  Incision:  healing well, no drainage, no erythema, no hernia, no seroma, no swelling, no dehiscence, incision well approximated.    IMPRESSION: Doing well  postoperatively.  Pt is to increase activities as tolerated.    Lab:   Recent Results (from the past 1008 hour(s))   Basic Metabolic Panel    Collection Time: 12/31/20 10:59 AM   Result Value Ref Range    Sodium 135 135 - 145 mmol/L    Potassium 4.2 3.6 - 5.5 mmol/L    Chloride 103 96 - 112 mmol/L    Co2 25 20 - 33 mmol/L    Glucose 115 (H) 65 - 99 mg/dL    Bun 27 (H) 8 - 22 mg/dL    Creatinine 0.98 0.50 - 1.40 mg/dL    Calcium 10.0 8.5 - 10.5 mg/dL    Anion Gap 7.0 7.0 - 16.0   CBC WITH DIFFERENTIAL    Collection Time: 12/31/20 10:59 AM   Result Value Ref Range    WBC 7.7 4.8 - 10.8 K/uL    RBC 5.14 4.20 - 5.40 M/uL    Hemoglobin 16.1 (H) 12.0 - 16.0 g/dL    Hematocrit 48.9 (H) 37.0 - 47.0 %    MCV 95.1 81.4 - 97.8 fL    MCH 31.3 27.0 - 33.0 pg    MCHC 32.9 (L) 33.6 - 35.0 g/dL    RDW 43.8 35.9 - 50.0 fL    Platelet Count 322 164 - 446 K/uL    MPV 10.9 9.0 - 12.9 fL    Neutrophils-Polys 60.30 44.00 - 72.00 %    Lymphocytes 31.10 22.00 - 41.00 %    Monocytes 6.50 0.00 - 13.40 %    Eosinophils 1.30 0.00 - 6.90 %    Basophils 0.30 0.00 - 1.80 %    Immature Granulocytes 0.50 0.00 - 0.90 %    Nucleated RBC 0.00 /100 WBC    Neutrophils (Absolute) 4.64 2.00 - 7.15 K/uL    Lymphs (Absolute) 2.39 1.00 - 4.80 K/uL    Monos (Absolute) 0.50 0.00 - 0.85 K/uL    Eos (Absolute) 0.10 0.00 - 0.51 K/uL    Baso (Absolute) 0.02 0.00 - 0.12 K/uL    Immature Granulocytes (abs) 0.04 0.00 - 0.11 K/uL    NRBC (Absolute) 0.00 K/uL   Hemoglobin A1c    Collection Time: 12/31/20 10:59 AM   Result Value Ref Range    Glycohemoglobin 6.7 (H) 0.0 - 5.6 %    Est Avg Glucose 146 mg/dL   COVID/SARS CoV-2    Collection Time: 12/31/20 10:59 AM    Specimen: Nasal; Respirate   Result Value Ref Range    COVID Order Status Received    ESTIMATED GFR    Collection Time: 12/31/20 10:59 AM   Result Value Ref Range    GFR If African American >60 >60 mL/min/1.73 m 2    GFR If Non African American 55 (A) >60 mL/min/1.73 m 2   SARS-CoV-2, PCR (In-House)     Collection Time: 20 10:59 AM   Result Value Ref Range    SARS-CoV-2 Source Nasal Swab     SARS-CoV-2 by PCR NotDetected    EKG    Collection Time: 20 11:24 AM   Result Value Ref Range    Report       [facility]    Test Date:  2020  Pt Name:    BABATUNDE FREITAS                  Department: [facility]  MRN:        [id number]                      Room:  Gender:     Female                       Technician: ANDREW  :        [id number]                   Requested By:JELLY VILLAFANA  Order #:    [id number]                    Reading MD: Fidencio Villavicencio MD    Measurements  Intervals                                Axis  Rate:       63                           P:          48  RI:         212                          QRS:        -38  QRSD:       108                          T:          9  QT:         456  QTc:        467    Interpretive Statements  SINUS RHYTHM  BORDERLINE AV CONDUCTION DELAY  PROBABLE LEFT ATRIAL ABNORMALITY  LATE PRECORDIAL R/S TRANSITION  LEFT VENTRICULAR HYPERTROPHY  Compared to ECG 2017 10:33:17  Left ventricular hypertrophy now present  Intraventricular conduction delay no longer present  Electronically Signed On 2020 16:23:02 PST by Fidencio teague MD     ACCU-CHEK GLUCOSE    Collection Time: 21  6:31 AM   Result Value Ref Range    Glucose - Accu-Ck 119 (H) 65 - 99 mg/dL       PLAN:  Continue any current medications.  (See Med List for details.)  Return to clinic  : in 3 month(s).

## 2021-02-14 ENCOUNTER — IMMUNIZATION (OUTPATIENT)
Dept: FAMILY PLANNING/WOMEN'S HEALTH CLINIC | Facility: IMMUNIZATION CENTER | Age: 76
End: 2021-02-14
Attending: INTERNAL MEDICINE
Payer: MEDICARE

## 2021-02-14 DIAGNOSIS — Z23 ENCOUNTER FOR VACCINATION: Primary | ICD-10-CM

## 2021-02-14 PROCEDURE — 0012A MODERNA SARS-COV-2 VACCINE: CPT

## 2021-02-14 PROCEDURE — 91301 MODERNA SARS-COV-2 VACCINE: CPT

## 2021-04-26 ENCOUNTER — HOSPITAL ENCOUNTER (OUTPATIENT)
Dept: LAB | Facility: MEDICAL CENTER | Age: 76
End: 2021-04-26
Attending: INTERNAL MEDICINE
Payer: MEDICARE

## 2021-04-26 ENCOUNTER — TELEPHONE (OUTPATIENT)
Dept: MEDICAL GROUP | Facility: MEDICAL CENTER | Age: 76
End: 2021-04-26

## 2021-04-26 DIAGNOSIS — R73.01 IMPAIRED FASTING GLUCOSE: ICD-10-CM

## 2021-04-26 DIAGNOSIS — E78.5 HYPERLIPIDEMIA, UNSPECIFIED HYPERLIPIDEMIA TYPE: ICD-10-CM

## 2021-04-26 DIAGNOSIS — I10 ESSENTIAL HYPERTENSION: ICD-10-CM

## 2021-04-26 DIAGNOSIS — E03.9 HYPOTHYROIDISM, UNSPECIFIED TYPE: ICD-10-CM

## 2021-04-26 LAB
ANION GAP SERPL CALC-SCNC: 8 MMOL/L (ref 7–16)
BUN SERPL-MCNC: 24 MG/DL (ref 8–22)
CALCIUM SERPL-MCNC: 9.5 MG/DL (ref 8.5–10.5)
CHLORIDE SERPL-SCNC: 101 MMOL/L (ref 96–112)
CHOLEST SERPL-MCNC: 151 MG/DL (ref 100–199)
CO2 SERPL-SCNC: 27 MMOL/L (ref 20–33)
CREAT SERPL-MCNC: 0.78 MG/DL (ref 0.5–1.4)
CREAT UR-MCNC: 93.66 MG/DL
EST. AVERAGE GLUCOSE BLD GHB EST-MCNC: 140 MG/DL
FASTING STATUS PATIENT QL REPORTED: NORMAL
GLUCOSE SERPL-MCNC: 109 MG/DL (ref 65–99)
HBA1C MFR BLD: 6.5 % (ref 4–5.6)
HDLC SERPL-MCNC: 50 MG/DL
LDLC SERPL CALC-MCNC: 72 MG/DL
MICROALBUMIN UR-MCNC: 2 MG/DL
MICROALBUMIN/CREAT UR: 21 MG/G (ref 0–30)
POTASSIUM SERPL-SCNC: 4 MMOL/L (ref 3.6–5.5)
SODIUM SERPL-SCNC: 136 MMOL/L (ref 135–145)
TRIGL SERPL-MCNC: 146 MG/DL (ref 0–149)
TSH SERPL DL<=0.005 MIU/L-ACNC: 1.26 UIU/ML (ref 0.38–5.33)

## 2021-04-26 PROCEDURE — 80061 LIPID PANEL: CPT

## 2021-04-26 PROCEDURE — 83036 HEMOGLOBIN GLYCOSYLATED A1C: CPT

## 2021-04-26 PROCEDURE — 82570 ASSAY OF URINE CREATININE: CPT

## 2021-04-26 PROCEDURE — 84443 ASSAY THYROID STIM HORMONE: CPT

## 2021-04-26 PROCEDURE — 80048 BASIC METABOLIC PNL TOTAL CA: CPT

## 2021-04-26 PROCEDURE — 82043 UR ALBUMIN QUANTITATIVE: CPT

## 2021-04-26 PROCEDURE — 36415 COLL VENOUS BLD VENIPUNCTURE: CPT

## 2021-04-28 ENCOUNTER — OFFICE VISIT (OUTPATIENT)
Dept: MEDICAL GROUP | Facility: MEDICAL CENTER | Age: 76
End: 2021-04-28
Payer: MEDICARE

## 2021-04-28 VITALS
SYSTOLIC BLOOD PRESSURE: 142 MMHG | RESPIRATION RATE: 18 BRPM | WEIGHT: 180 LBS | HEIGHT: 70 IN | BODY MASS INDEX: 25.77 KG/M2 | DIASTOLIC BLOOD PRESSURE: 86 MMHG | HEART RATE: 74 BPM | OXYGEN SATURATION: 98 % | TEMPERATURE: 96.6 F

## 2021-04-28 DIAGNOSIS — I10 ESSENTIAL HYPERTENSION: ICD-10-CM

## 2021-04-28 DIAGNOSIS — E78.5 HYPERLIPIDEMIA, UNSPECIFIED HYPERLIPIDEMIA TYPE: ICD-10-CM

## 2021-04-28 DIAGNOSIS — Z85.528 PERSONAL HISTORY OF RENAL CANCER: ICD-10-CM

## 2021-04-28 DIAGNOSIS — R73.01 IMPAIRED FASTING GLUCOSE: ICD-10-CM

## 2021-04-28 DIAGNOSIS — J45.20 MILD INTERMITTENT ASTHMA WITHOUT COMPLICATION: ICD-10-CM

## 2021-04-28 DIAGNOSIS — K21.9 GASTROESOPHAGEAL REFLUX DISEASE, UNSPECIFIED WHETHER ESOPHAGITIS PRESENT: ICD-10-CM

## 2021-04-28 DIAGNOSIS — E03.9 HYPOTHYROIDISM, UNSPECIFIED TYPE: ICD-10-CM

## 2021-04-28 DIAGNOSIS — N81.10 BLADDER PROLAPSE, FEMALE, ACQUIRED: ICD-10-CM

## 2021-04-28 PROCEDURE — 99214 OFFICE O/P EST MOD 30 MIN: CPT | Performed by: INTERNAL MEDICINE

## 2021-04-28 ASSESSMENT — PATIENT HEALTH QUESTIONNAIRE - PHQ9: CLINICAL INTERPRETATION OF PHQ2 SCORE: 0

## 2021-04-28 ASSESSMENT — ACTIVITIES OF DAILY LIVING (ADL): BATHING_REQUIRES_ASSISTANCE: 0

## 2021-04-28 ASSESSMENT — PAIN SCALES - GENERAL: PAINLEVEL: NO PAIN

## 2021-04-28 ASSESSMENT — ENCOUNTER SYMPTOMS: GENERAL WELL-BEING: GOOD

## 2021-04-28 NOTE — ASSESSMENT & PLAN NOTE
She has had problems with bladder prolapse that was treated by Dr. Ingram with colpocleisis.  She is quite pleased with the result.

## 2021-04-28 NOTE — ASSESSMENT & PLAN NOTE
She has hypothyroidism for which she is on levothyroxine.  Clinically she is euthyroid.  TSH is normal at 1.26.  We will check TSH levels at least yearly.

## 2021-04-28 NOTE — ASSESSMENT & PLAN NOTE
She has hyperlipidemia for which she is on simvastatin and fenofibrate.  Most recent cholesterol is 151, triglycerides 146, HDL 50, LDL 72.  We briefly again reviewed appropriate diet.

## 2021-04-28 NOTE — ASSESSMENT & PLAN NOTE
Prior renal cancer with no evidence of recurrence after resection.  This is followed by Dr. Cintron.

## 2021-04-28 NOTE — ASSESSMENT & PLAN NOTE
She has hypertension for which she is on losartan and hydrochlorothiazide.  She denies lightheadedness or headaches.  She is not particularly careful about low-salt diet.

## 2021-04-28 NOTE — ASSESSMENT & PLAN NOTE
She does take Metformin for elevated blood sugar.  Most recent glucose is 109 and glycohemoglobin is 6.5.  Microalbumin to creatinine ratio is satisfactory at 21.

## 2021-04-28 NOTE — PROGRESS NOTES
Chief Complaint   Patient presents with   • Annual Wellness Visit         HPI:  Jillian is a 75 y.o. here for Medicare Annual Wellness Visit        Patient Active Problem List    Diagnosis Date Noted   • Bladder prolapse, female, acquired 08/10/2020   • Hammer toe 09/04/2019   • Skin tag 06/24/2019   • GERD (gastroesophageal reflux disease) 10/03/2016   • Mass of left lower leg 07/27/2016   • COPD (chronic obstructive pulmonary disease) (HCC) 06/30/2016   • Kidney stones 03/04/2016   • Essential hypertension 02/08/2016   • Impaired fasting glucose 08/24/2015   • Hyperlipidemia 04/20/2015   • Hypothyroidism 04/20/2015   • Personal history of renal cancer 04/20/2015   • Asthma 04/20/2015       Current Outpatient Medications   Medication Sig Dispense Refill   • simvastatin (ZOCOR) 20 MG Tab TAKE 1 TABLET BY MOUTH ONCE DAILY IN THE EVENING 90 Tab 3   • metoprolol SR (TOPROL XL) 25 MG TABLET SR 24 HR Take 1 Tab by mouth every day. 90 Tab 3   • metFORMIN ER (GLUCOPHAGE XR) 500 MG TABLET SR 24 HR Take 3 tabs daily 270 Tab 3   • losartan (COZAAR) 100 MG Tab Take 1 Tab by mouth every day. 90 Tab 3   • levothyroxine (SYNTHROID) 88 MCG Tab Take 1 Tab by mouth Every morning on an empty stomach. 90 Tab 3   • fenofibrate (TRIGLIDE) 160 MG tablet Take 1 Tab by mouth every day. 90 Tab 3   • Fluticasone-Umeclidin-Vilant (TRELEGY ELLIPTA) 200-62.5-25 MCG/INH AEROSOL POWDER, BREATH ACTIVATED Inhale 1 Puff every day. 1 Each 11   • albuterol (ACCUNEB) 0.63 MG/3ML nebulizer solution Take 3 mL by nebulization every four hours as needed for Shortness of Breath. 90 mL 1   • albuterol 108 (90 Base) MCG/ACT Aero Soln inhalation aerosol INHALE 2 PUFFS BY MOUTH EVERY 4 HOURS AS NEEDED FOR SHORTNESS OF BREATH 1 Each 0   • NON SPECIFIED Indications: D Frantzos     • raloxifene (EVISTA) 60 MG Tab TAKE 1 TABLET BY MOUTH ONCE DAILY IN THE MORNING 90 Tab 3   • estrogens, conjugated (PREMARIN) 0.625 MG/GM Cream 0.5gm ( 1/4applicator) per vagina QHS  x2weeks , then HS 2x/week 30 g 1   • hydroCHLOROthiazide (HYDRODIURIL) 25 MG Tab Take 1 Tab by mouth every day. 90 Tab 3   • clobetasol (TEMOVATE) 0.05 % Ointment BID x 1 wk 30 g 1   • vitamin E (VITAMIN E) 1000 UNIT Cap Take 1 Cap by mouth every day.     • multivitamin (THERAGRAN) Tab Take 1 Tab by mouth every day.     • triamcinolone acetonide (KENALOG) 0.5 % Cream Use as directed  BID PRN itch (Patient not taking: Reported on 4/28/2021) 15 g 1   • acetaminophen (TYLENOL) 500 MG Tab Take 500-1,000 mg by mouth every 6 hours as needed.     • ibuprofen (MOTRIN) 800 MG Tab Take 1 Tab by mouth every 8 hours as needed for Moderate Pain. (Patient not taking: Reported on 4/28/2021) 30 Tab 3   • benzocaine-lanolin-aloe vera (DERMOPLAST) 20-0.5 % Aerosol Apply 1 Spray topically 4 times a day as needed. (Patient not taking: Reported on 4/28/2021) 1 Each 1   • Influenza Vaccine High-Dose pf 0.5 ML Suspension Prefilled Syringe injection Fluzone High-Dose 2019-20 (PF) 180 mcg/0.5 mL intramuscular syringe   PHARMACIST ADMINISTERED IMMUNIZATION ADMINISTERED AT TIME OF DISPENSING     • neomycin-polymyxin-dexamethasone (MAXITROL) 0.1 % ophthalmic suspension neomycin-polymyxin-dexameth 3.5 mg/mL-10,000 unit/mL-0.1% eye drops     • nitrofurantoin (MACROBID) 100 MG Cap Take 1 Cap by mouth 2 times a day. (Patient not taking: Reported on 12/4/2020) 14 Cap 0   • estradiol (VAGIFEM) 10 MCG Tab Insert 1 Tab in vagina every Monday and Thursday. Insert 1 tab/vagina hs 3x/week (Patient not taking: Reported on 11/9/2020) 8 Tab 6   • Influenza Vaccine High-Dose pf (FLUZONE HIGH-DOSE) 0.5 ML Suspension Prefilled Syringe injection Fluzone High-Dose 2019-20 (PF) 180 mcg/0.5 mL intramuscular syringe   PHARMACIST ADMINISTERED IMMUNIZATION ADMINISTERED AT TIME OF DISPENSING     • SPIRIVA HANDIHALER 18 MCG Cap   3   • BOOSTRIX 5-2.5-18.5 Suspension      • vitamin D (CHOLECALCIFEROL) 1000 UNIT Tab Take 1,000 Units by mouth every day.       No current  facility-administered medications for this visit.        Patient is taking medications as noted in medication list.  Current supplements as per medication list.     Allergies: Alcohol and Pcn [penicillins]    Current social contact/activities: friends, exercise    Is patient current with immunizations? Yes.    She  reports that she has never smoked. She has never used smokeless tobacco. She reports that she does not drink alcohol and does not use drugs.  Counseling given: Not Answered        DPA/Advanced directive: Patient has Advanced Directive on file.     ROS:    Gait: Uses no assistive device   Ostomy: No   Other tubes: No   Amputations: No   Chronic oxygen use No   Last eye exam 03/2021  Wears hearing aids: No   : Denies any urinary leakage during the last 6 months      Screening:        Depression Screening    Little interest or pleasure in doing things?  0 - not at all  Feeling down, depressed, or hopeless? 0 - not at all  Patient Health Questionnaire Score: 0    If depressive symptoms identified deferred to follow up visit unless specifically addressed in assessment and plan.    Interpretation of PHQ-9 Total Score   Score Severity   1-4 No Depression   5-9 Mild Depression   10-14 Moderate Depression   15-19 Moderately Severe Depression   20-27 Severe Depression    Screening for Cognitive Impairment    Three Minute Recall (captain, garden, picture)  3/3    Isaac clock face with all 12 numbers and set the hands to show 5 past 8.  Yes    If cognitive concerns identified, deferred for follow up unless specifically addressed in assessment and plan.    Fall Risk Assessment    Has the patient had two or more falls in the last year or any fall with injury in the last year?  No  If fall risk identified, deferred for follow up unless specifically addressed in assessment and plan.    Safety Assessment    Throw rugs on floor.  No  Handrails on all stairs.  Yes  Good lighting in all hallways.  Yes  Difficulty hearing.   No  Patient counseled about all safety risks that were identified.    Functional Assessment ADLs    Are there any barriers preventing you from cooking for yourself or meeting nutritional needs?  No.    Are there any barriers preventing you from driving safely or obtaining transportation?  No.    Are there any barriers preventing you from using a telephone or calling for help?  No.    Are there any barriers preventing you from shopping?  No.    Are there any barriers preventing you from taking care of your own finances?  No.    Are there any barriers preventing you from managing your medications?  No.    Are there any barriers preventing you from showering, bathing or dressing yourself?  No.    Are you currently engaging in any exercise or physical activity?  Yes.     What is your perception of your health?  Good.    Health Maintenance Summary                Annual Pulmonary Function Test / Spirometry Overdue 1/24/2018      Done 1/24/2017 AMB PULMONARY FUNCTION TEST/LAB     Patient has more history with this topic...    MAMMOGRAM Overdue 4/9/2020      Done 4/9/2019 MA-SCREENING MAMMO BILAT W/TOMOSYNTHESIS W/CAD     Patient has more history with this topic...    COLONOSCOPY Overdue 11/11/2020      Done 11/11/2015 AMB REFERRAL TO GI FOR COLONOSCOPY    Annual Wellness Visit Next Due 4/29/2022      Done 4/28/2021      Patient has more history with this topic...    BONE DENSITY Next Due 4/9/2024      Done 4/9/2019 DS-BONE DENSITY STUDY (DEXA)    IMM DTaP/Tdap/Td Vaccine Next Due 11/16/2028      Done 11/16/2018 Imm Admin: Tdap Vaccine     Patient has more history with this topic...          Patient Care Team:  Herve Conrad M.D. as PCP - General (Internal Medicine)  Fredy Cintron M.D. as Consulting Physician (Urology)  Ricky Castaneda M.D. as Consulting Physician (Orthopaedics)  Janet Grove P.A.-C. as Consulting Physician (Physician Assistant)  Rafi Ingram M.D. as Consulting Physician (OB/Gyn)    Social History      Tobacco Use   • Smoking status: Never Smoker   • Smokeless tobacco: Never Used   Substance Use Topics   • Alcohol use: No     Comment: allergic to it   • Drug use: No     Family History   Problem Relation Age of Onset   • Kidney Disease Mother    • Colon Cancer Father      She  has a past medical history of Acid reflux, Arthritis, Asthma, Breath shortness, Bronchitis (08/04/14), Cancer (HCC) (01/13), Chronic pain of left knee (8/22/2017), Chronic pain of right knee (7/1/2016), COPD (chronic obstructive pulmonary disease) ([facility]), COPD (chronic obstructive pulmonary disease) ([facility]) (6/30/2016), Diabetes ([facility]), Dyspnea, Emphysema of lung ([facility]), Heart burn, Hypertension, Impaired fasting glucose (8/24/2015), Indigestion, Mass of left lower leg (7/27/2016), Personal history of renal cancer (4/20/2015), Renal disorder, Renal disorder (2013), Skin tag (6/24/2019), Unspecified disorder of thyroid, and Unspecified hypothyroidism (4/20/2015). She also has no past medical history of Encounter for long-term (current) use of other medications.   Past Surgical History:   Procedure Laterality Date   • PB CLOSURE OF VAGINA  1/4/2021    Procedure: COLPOCLEISIS, LE FORT, PARTIAL;  Surgeon: Rafi Ingram M.D.;  Location: SURGERY SAME DAY [facility];  Service: Gynecology   • KNEE ARTHROPLASTY TOTAL Left 12/18/2017    Procedure: KNEE ARTHROPLASTY TOTAL;  Surgeon: Ricky Castaneda M.D.;  Location: SURGERY [facility];  Service: Orthopedics   • KNEE ARTHROPLASTY TOTAL Right 10/17/2016    Procedure: KNEE ARTHROPLASTY TOTAL;  Surgeon: Ricky Castaneda M.D.;  Location: SURGERY [facility];  Service:    • RECOVERY  3/4/2016    Procedure: IR2-PERQ NEPHRO-URETERAL CATH RIGHT-DR. RIDDLE-Surgery to follow in [facility] ;  Surgeon: Ir-Recovery Surgery;  Location: [facility];  Service:    • CYSTOSCOPY STENT REMOVAL Right 3/4/2016    Procedure: RIGID CYSTOSCOPY STENT REMOVAL;  Surgeon: Fredy Riddle M.D.;  Location: [facility]  T[facility] ORS;  Service:    • URETEROSCOPY Right 3/4/2016    Procedure: URETEROSCOPY;  Surgeon: Fredy Cintron M.D.;  Location: SURGERY [facility];  Service:    • PERCUTANEOUS NEPHROSTOLITHOTOMY Right 3/4/2016    Procedure: PERCUTANEOUS NEPHROSTOLITHOTOMY FOR NEPHROLITHOTRIPSY;  Surgeon: Fredy Cintron M.D.;  Location: SURGERY [facility];  Service:    • CYSTOSCOPY STENT PLACEMENT Right 2/12/2016    Procedure: CYSTOSCOPY STENT PLACEMENT;  Surgeon: Fredy Cintron M.D.;  Location: [facility];  Service:    • CYSTOSCOPY STENT PLACEMENT Right 6/26/2015    Procedure: CYSTOSCOPY STENT PLACEMENT RIGID POSSIBLE STENT;  Surgeon: Fredy Cintron M.D.;  Location: [facility];  Service:    • URETEROSCOPY N/A 6/26/2015    Procedure: URETEROSCOPY;  Surgeon: Fredy Cintron M.D.;  Location: [facility];  Service:    • LASERTRIPSY Right 6/26/2015    Procedure: LASERTRIPSY LITHO;  Surgeon: Fredy Cintron M.D.;  Location: [facility];  Service:    • URETEROSCOPY  8/20/2014    Performed by Fredy Cintron M.D. at [facility]   • LASERTRIPSY  8/20/2014    Performed by Fredy Cintrno M.D. at [facility]   • CYSTOSCOPY  8/20/2014    Performed by Fredy Cintron M.D. at [facility]   • CYSTOSCOPY STENT PLACEMENT  1/31/2013    Performed by Fredy Cintron M.D. at [facility]   • URETEROSCOPY  1/31/2013    Performed by Fredy Cintron M.D. at [facility]   • NEPHRECTOMY PARTIAL  1/31/2013    Performed by Fredy Cintron M.D. at [facility]   • LASERTRIPSY  1/31/2013    Performed by Fredy Cintron M.D. at [facility]   • OTHER ORTHOPEDIC SURGERY  2006    right knee meniscus   • OTHER ORTHOPEDIC SURGERY  1996    ORIF right arm   • OTHER  1995    sinus surgery   • GYN SURGERY  1994    hysterectomy   • OTHER  1980    thyroid lumpectomy   • BREAST BIOPSY Left 1976    benign   • CUATE BY LAPAROSCOPY     • OTHER ORTHOPEDIC SURGERY    "   achilles tendon reattach    • TONSILLECTOMY             Exam:     /86 (BP Location: Left arm, Patient Position: Sitting, BP Cuff Size: Adult)   Pulse 74   Temp 35.9 °C (96.6 °F) (Temporal)   Resp 18   Ht 1.778 m (5' 10\")   Wt 81.6 kg (180 lb)   SpO2 98%  Body mass index is 25.83 kg/m².    Hearing excellent.    Dentition good  Alert, oriented in no acute distress.  Eye contact is good, speech goal directed, affect calm.   Neck is supple and without significant lymphadenopathy.  Lungs are clear without obvious rales or wheeze.  Cardiovascular peripheral circulation is satisfactory.  Heart soundsNeck is supple and without significant lymphadenopathy.  Abdomen is soft without masses or tenderness.  There are normal bowel sounds.  Breast, pelvic, and rectal exams were not performed.  Extremities were without significant edema, cyanosis, or deformity.  Brief neurologic exam was unremarkable with essentially normal sensation, strength, gait, and cerebellar functions.       Assessment and Plan. The following treatment and monitoring plan is recommended:    Hyperlipidemia  She has hyperlipidemia for which she is on simvastatin and fenofibrate.  Most recent cholesterol is 151, triglycerides 146, HDL 50, LDL 72.  We briefly again reviewed appropriate diet.    Hypothyroidism  She has hypothyroidism for which she is on levothyroxine.  Clinically she is euthyroid.  TSH is normal at 1.26.  We will check TSH levels at least yearly.    Personal history of renal cancer  Prior renal cancer with no evidence of recurrence after resection.  This is followed by Dr. Cintron.    Asthma  Asthma bothers her only occasionally.  She has had no significant recent exacerbation.    Impaired fasting glucose  She does take Metformin for elevated blood sugar.  Most recent glucose is 109 and glycohemoglobin is 6.5.  Microalbumin to creatinine ratio is satisfactory at 21.    Essential hypertension  She has hypertension for which she is on " losartan and hydrochlorothiazide.  She denies lightheadedness or headaches.  She is not particularly careful about low-salt diet.    GERD (gastroesophageal reflux disease)  She has intermittent gastroesophageal reflux that is relatively mild.    Bladder prolapse, female, acquired  She has had problems with bladder prolapse that was treated by Dr. Ingram with colpocleisis.  She is quite pleased with the result.          Services suggested: No services needed at this time  Health Care Screening recommendations as per orders if indicated.  Referrals offered: PT/OT/Nutrition counseling/Behavioral Health/Smoking cessation as per orders if indicated.    Discussion today about general wellness and lifestyle habits:    · Prevent falls and reduce trip hazards; Cautioned about securing or removing rugs.  · Have a working fire alarm and carbon monoxide detector;   · Engage in regular physical activity and social activities       Follow-up: Return in about 6 months (around 10/28/2021).

## 2021-04-29 ENCOUNTER — GYNECOLOGY VISIT (OUTPATIENT)
Dept: OBGYN | Facility: CLINIC | Age: 76
End: 2021-04-29
Payer: MEDICARE

## 2021-04-29 VITALS — SYSTOLIC BLOOD PRESSURE: 165 MMHG | DIASTOLIC BLOOD PRESSURE: 101 MMHG | BODY MASS INDEX: 25.4 KG/M2 | WEIGHT: 177 LBS

## 2021-04-29 DIAGNOSIS — N95.2 VAGINAL ATROPHY: ICD-10-CM

## 2021-04-29 PROCEDURE — 99213 OFFICE O/P EST LOW 20 MIN: CPT | Performed by: OBSTETRICS & GYNECOLOGY

## 2021-04-29 RX ORDER — CONJUGATED ESTROGENS 0.62 MG/G
CREAM VAGINAL
Qty: 30 G | Refills: 2 | Status: SHIPPED | OUTPATIENT
Start: 2021-04-29 | End: 2021-05-06

## 2021-04-29 NOTE — PROGRESS NOTES
SUBJECTIVE:  Jillian Gottlieb presents to the clinic 12 weeks following Colpoclesis     Eating a regular diet without difficulty. Bowel movement are Normal.  The patient is not having any pain. Spotting. Patient Denies Incisional pain, drainage or redness    OBJECTIVE:  BP (!) 165/101 (BP Location: Right arm, Patient Position: Sitting)   Wt 80.3 kg (177 lb)   BMI 25.40 kg/m²   Current Outpatient Medications on File Prior to Visit   Medication Sig Dispense Refill   • simvastatin (ZOCOR) 20 MG Tab TAKE 1 TABLET BY MOUTH ONCE DAILY IN THE EVENING 90 Tab 3   • metoprolol SR (TOPROL XL) 25 MG TABLET SR 24 HR Take 1 Tab by mouth every day. 90 Tab 3   • metFORMIN ER (GLUCOPHAGE XR) 500 MG TABLET SR 24 HR Take 3 tabs daily 270 Tab 3   • losartan (COZAAR) 100 MG Tab Take 1 Tab by mouth every day. 90 Tab 3   • levothyroxine (SYNTHROID) 88 MCG Tab Take 1 Tab by mouth Every morning on an empty stomach. 90 Tab 3   • fenofibrate (TRIGLIDE) 160 MG tablet Take 1 Tab by mouth every day. 90 Tab 3   • acetaminophen (TYLENOL) 500 MG Tab Take 500-1,000 mg by mouth every 6 hours as needed.     • Fluticasone-Umeclidin-Vilant (TRELEGY ELLIPTA) 200-62.5-25 MCG/INH AEROSOL POWDER, BREATH ACTIVATED Inhale 1 Puff every day. 1 Each 11   • albuterol (ACCUNEB) 0.63 MG/3ML nebulizer solution Take 3 mL by nebulization every four hours as needed for Shortness of Breath. 90 mL 1   • albuterol 108 (90 Base) MCG/ACT Aero Soln inhalation aerosol INHALE 2 PUFFS BY MOUTH EVERY 4 HOURS AS NEEDED FOR SHORTNESS OF BREATH 1 Each 0   • NON SPECIFIED Indications: D Frantzos     • raloxifene (EVISTA) 60 MG Tab TAKE 1 TABLET BY MOUTH ONCE DAILY IN THE MORNING 90 Tab 3   • estrogens, conjugated (PREMARIN) 0.625 MG/GM Cream 0.5gm ( 1/4applicator) per vagina QHS x2weeks , then HS 2x/week 30 g 1   • hydroCHLOROthiazide (HYDRODIURIL) 25 MG Tab Take 1 Tab by mouth every day. 90 Tab 3   • clobetasol (TEMOVATE) 0.05 % Ointment BID x 1 wk 30 g 1   • vitamin E (VITAMIN E)  1000 UNIT Cap Take 1 Cap by mouth every day.     • multivitamin (THERAGRAN) Tab Take 1 Tab by mouth every day.       No current facility-administered medications on file prior to visit.       Constitutional:  alert, no distress.  Abdomen:  soft, bowel sounds active, non-tender.  Incision:  healing well, no drainage, no erythema, no hernia, no seroma, no swelling, no dehiscence, incision well approximated.  Vagina mucosa moist , closed at 1 cm proximal to introitus   IMPRESSION: Doing well postoperatively.  Pt is to increase activities as tolerated.    Lab:   Recent Results (from the past 1008 hour(s))   TSH    Collection Time: 04/26/21 10:00 AM   Result Value Ref Range    TSH 1.260 0.380 - 5.330 uIU/mL   MICROALBUMIN CREAT RATIO URINE    Collection Time: 04/26/21 10:00 AM   Result Value Ref Range    Creatinine, Urine 93.66 mg/dL    Microalbumin, Urine Random 2.0 mg/dL    Micro Alb Creat Ratio 21 0 - 30 mg/g   Lipid Profile    Collection Time: 04/26/21 10:00 AM   Result Value Ref Range    Cholesterol,Tot 151 100 - 199 mg/dL    Triglycerides 146 0 - 149 mg/dL    HDL 50 >=40 mg/dL    LDL 72 <100 mg/dL   HEMOGLOBIN A1C    Collection Time: 04/26/21 10:00 AM   Result Value Ref Range    Glycohemoglobin 6.5 (H) 4.0 - 5.6 %    Est Avg Glucose 140 mg/dL   Basic Metabolic Panel    Collection Time: 04/26/21 10:00 AM   Result Value Ref Range    Sodium 136 135 - 145 mmol/L    Potassium 4.0 3.6 - 5.5 mmol/L    Chloride 101 96 - 112 mmol/L    Co2 27 20 - 33 mmol/L    Glucose 109 (H) 65 - 99 mg/dL    Bun 24 (H) 8 - 22 mg/dL    Creatinine 0.78 0.50 - 1.40 mg/dL    Calcium 9.5 8.5 - 10.5 mg/dL    Anion Gap 8.0 7.0 - 16.0   FASTING STATUS    Collection Time: 04/26/21 10:00 AM   Result Value Ref Range    Fasting Status Fasting    ESTIMATED GFR    Collection Time: 04/26/21 10:00 AM   Result Value Ref Range    GFR If African American >60 >60 mL/min/1.73 m 2    GFR If Non African American >60 >60 mL/min/1.73 m 2       PLAN:  Continue any  current medications.  (See Med List for details.)  Return to clinic  : in 1 year(s).

## 2021-04-29 NOTE — PROGRESS NOTES
Patient here for a GYN follow up.   Last seen on 2/9/21  c/o  pharmacy verified.  Patient phone #: [phone]

## 2021-05-06 RX ORDER — ESTRADIOL 10 UG/1
INSERT VAGINAL
Qty: 8 TABLET | Refills: 11 | Status: SHIPPED | OUTPATIENT
Start: 2021-05-06 | End: 2021-08-04

## 2021-06-25 ENCOUNTER — APPOINTMENT (OUTPATIENT)
Dept: SLEEP MEDICINE | Facility: MEDICAL CENTER | Age: 76
End: 2021-06-25
Payer: MEDICARE

## 2021-07-09 ENCOUNTER — NON-PROVIDER VISIT (OUTPATIENT)
Dept: SLEEP MEDICINE | Facility: MEDICAL CENTER | Age: 76
End: 2021-07-09
Attending: PHYSICIAN ASSISTANT
Payer: MEDICARE

## 2021-07-09 VITALS — BODY MASS INDEX: 25.77 KG/M2 | HEIGHT: 70 IN | WEIGHT: 180 LBS

## 2021-07-09 DIAGNOSIS — J45.30 MILD PERSISTENT ASTHMA WITHOUT COMPLICATION: ICD-10-CM

## 2021-07-09 PROCEDURE — 94729 DIFFUSING CAPACITY: CPT | Performed by: INTERNAL MEDICINE

## 2021-07-09 PROCEDURE — 94010 BREATHING CAPACITY TEST: CPT | Performed by: INTERNAL MEDICINE

## 2021-07-09 PROCEDURE — 94726 PLETHYSMOGRAPHY LUNG VOLUMES: CPT | Performed by: INTERNAL MEDICINE

## 2021-07-09 ASSESSMENT — PULMONARY FUNCTION TESTS
FVC_PERCENT_PREDICTED: 67
FEV1/FVC: 63
FEV1_PERCENT_PREDICTED: 56
FVC_PREDICTED: 3.19
FVC: 2.16
FEV1: 1.37
FEV1/FVC_PERCENT_PREDICTED: 82
FVC_LLN: 2.66
FEV1/FVC_PERCENT_LLN: 64
FEV1/FVC_PREDICTED: 77
FEV1/FVC_PERCENT_PREDICTED: 76
FEV1_LLN: 2.02
FEV1/FVC_PERCENT_PREDICTED: 84
FEV1/FVC: 63
FEV1_PREDICTED: 2.42

## 2021-07-09 NOTE — PROCEDURES
Technician: Isabelle Carpenter RRT, CPFT  Good patient effort & cooperation. DUE TO NO SPACERS, NO POST FVC. The results of this test meet the ATS/ERS standards for acceptability & reproducibility.  Test was performed on the [employer] Body Plethysmograph-Elite DX system.  Predicted equations for Spirometry are GLI-2012, ITS for lung volumes, and GLI-2017 for DLCO.  The DLCO was uncorrected for Hgb.      Interpretation;   Baseline spirometry shows airflow obstruction with FEV1/FVC ratio 63 and FEV1 of 1.37 L or 56% predicted.  FVC is also mildly reduced at 67% predicted.  Bronchodilator testing was not performed.  Total lung capacity is reduced at 4.4 L or 75% predicted.  Fusion capacity is within normal limits at 116% predicted.  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  Correlate clinically and with imaging.

## 2021-07-14 ENCOUNTER — OFFICE VISIT (OUTPATIENT)
Dept: SLEEP MEDICINE | Facility: MEDICAL CENTER | Age: 76
End: 2021-07-14
Payer: MEDICARE

## 2021-07-14 VITALS
HEART RATE: 75 BPM | WEIGHT: 180 LBS | RESPIRATION RATE: 16 BRPM | DIASTOLIC BLOOD PRESSURE: 90 MMHG | SYSTOLIC BLOOD PRESSURE: 140 MMHG | BODY MASS INDEX: 25.77 KG/M2 | OXYGEN SATURATION: 94 % | HEIGHT: 70 IN

## 2021-07-14 DIAGNOSIS — J45.30 MILD PERSISTENT ASTHMA WITHOUT COMPLICATION: ICD-10-CM

## 2021-07-14 PROCEDURE — 99213 OFFICE O/P EST LOW 20 MIN: CPT | Performed by: PHYSICIAN ASSISTANT

## 2021-07-14 RX ORDER — ALBUTEROL SULFATE 90 UG/1
2 AEROSOL, METERED RESPIRATORY (INHALATION) EVERY 4 HOURS PRN
Qty: 1 EACH | Refills: 0 | Status: SHIPPED | OUTPATIENT
Start: 2021-07-14 | End: 2021-08-09

## 2021-07-14 ASSESSMENT — ENCOUNTER SYMPTOMS
HEADACHES: 0
CHILLS: 0
SORE THROAT: 0
TREMORS: 0
HEARTBURN: 1
WEIGHT LOSS: 0
SPUTUM PRODUCTION: 0
WHEEZING: 0
DIZZINESS: 0
FEVER: 0
COUGH: 1
PALPITATIONS: 0
INSOMNIA: 1
SHORTNESS OF BREATH: 0
ROS GI COMMENTS: NO DENTURES, NO DIFFICULTY SWALLOWING
ORTHOPNEA: 0
SINUS PAIN: 1

## 2021-07-14 NOTE — PATIENT INSTRUCTIONS
1-doing well on Trelegy 200 mcg  2-reviewed PFT results  3-had covid vaccine  4-follow up in 6 months

## 2021-07-14 NOTE — PROGRESS NOTES
CC: Follow-up asthma    HPI:  Jillian Gottlieb is a 75 y.o. year old female here today for follow-up on asthma.  Patient does have a reported history of COPD however she is a never smoker.  Last seen in clinic 12/15/2020.    Pertinent past medical history includes right kidney cancer 2013, kidney stones for which she reports being on a special diet, bladder prolapse, acid reflux, hypothyroidism and hypertension.    Reviewed in clinic vitals including /90, HR 75, O2 sat: 94% on room air, BMI of 25.83 kg/m².    Reviewed home medication regimen including albuterol nebulizer and inhaler, Trelegy.  Patient is requesting sick pack to have on hand.  She reports since being on the Trelegy 200 dosing she has not required her albuterol.    Reviewed most recent imaging including chest x-ray obtained 12/31/20 demonstrated mild cardiomegaly, mild degenerative changes spine.    Reviewed pulmonary function testing obtained 07/09/2021 demonstrating FEV1 of 1.27 L or 66% predicted, FVC of 2.16 L or 67% predicted, FEV1/FVC ratio of 63, residual volume 83% predicted, TLC 75% predicted, DLCO 116% predicted.  Per pulmonologist interpretation mixed restrictive obstructive defect with preserved DLCO.      Review of Systems   Constitutional: Negative for chills, fever, malaise/fatigue and weight loss.   HENT: Positive for hearing loss and sinus pain (occasional, resolves with sinus rinse). Negative for congestion, nosebleeds, sore throat and tinnitus.    Respiratory: Positive for cough ( mild ). Negative for sputum production, shortness of breath and wheezing.    Cardiovascular: Negative for chest pain, palpitations, orthopnea and leg swelling.   Gastrointestinal: Positive for heartburn (occasional ).        No dentures, no difficulty swallowing    Neurological: Negative for dizziness, tremors and headaches.   Psychiatric/Behavioral: The patient has insomnia (occasional sleepy time tea or benadryl\).        Past Medical History:  "  Diagnosis Date   • Acid reflux    • Arthritis     knees, hands, hips, neck-Osteo   • Asthma     inhalers daily   • Breath shortness     asthma related   • Bronchitis 08/04/14   • Cancer ([facility]) 01/13    R kidney   • Chronic pain of left knee 8/22/2017   • Chronic pain of right knee 7/1/2016   • COPD (chronic obstructive pulmonary disease) ([facility])    • COPD (chronic obstructive pulmonary disease) ([facility]) 6/30/2016   • Diabetes ([facility])     \"pre\"   • Dyspnea    • Emphysema of lung ([facility])    • Heart burn    • Hypertension    • Impaired fasting glucose 8/24/2015   • Indigestion    • Mass of left lower leg 7/27/2016   • Personal history of renal cancer 4/20/2015   • Renal disorder     stones   • Renal disorder 2013    cancer right kidney 7% removed   • Skin tag 6/24/2019   • Unspecified disorder of thyroid     benign tumor removal   • Unspecified hypothyroidism 4/20/2015       Past Surgical History:   Procedure Laterality Date   • PB CLOSURE OF VAGINA  1/4/2021    Procedure: COLPOCLEISIS, LE FORT, PARTIAL;  Surgeon: Rafi Ingram M.D.;  Location: SURGERY SAME DAY [facility];  Service: Gynecology   • KNEE ARTHROPLASTY TOTAL Left 12/18/2017    Procedure: KNEE ARTHROPLASTY TOTAL;  Surgeon: Ricky Castaneda M.D.;  Location: SURGERY [facility];  Service: Orthopedics   • KNEE ARTHROPLASTY TOTAL Right 10/17/2016    Procedure: KNEE ARTHROPLASTY TOTAL;  Surgeon: Ricky Castaneda M.D.;  Location: [facility];  Service:    • RECOVERY  3/4/2016    Procedure: IR2-PERQ NEPHRO-URETERAL CATH RIGHT-DR. RIDDLE-Surgery to follow in [facility] ;  Surgeon: Ir-Recovery Surgery;  Location: SURGERY [facility];  Service:    • CYSTOSCOPY STENT REMOVAL Right 3/4/2016    Procedure: RIGID CYSTOSCOPY STENT REMOVAL;  Surgeon: Fredy Riddle M.D.;  Location: [facility];  Service:    • URETEROSCOPY Right 3/4/2016    Procedure: URETEROSCOPY;  Surgeon: Fredy Riddle M.D.;  Location: [facility];  Service:    • " PERCUTANEOUS NEPHROSTOLITHOTOMY Right 3/4/2016    Procedure: PERCUTANEOUS NEPHROSTOLITHOTOMY FOR NEPHROLITHOTRIPSY;  Surgeon: Fredy Cintron M.D.;  Location: [facility];  Service:    • CYSTOSCOPY STENT PLACEMENT Right 2/12/2016    Procedure: CYSTOSCOPY STENT PLACEMENT;  Surgeon: Fredy Cintron M.D.;  Location: [facility];  Service:    • CYSTOSCOPY STENT PLACEMENT Right 6/26/2015    Procedure: CYSTOSCOPY STENT PLACEMENT RIGID POSSIBLE STENT;  Surgeon: Fredy Cintron M.D.;  Location: [facility];  Service:    • URETEROSCOPY N/A 6/26/2015    Procedure: URETEROSCOPY;  Surgeon: Fredy Cintron M.D.;  Location: [facility];  Service:    • LASERTRIPSY Right 6/26/2015    Procedure: LASERTRIPSY LITHO;  Surgeon: Fredy Cintron M.D.;  Location: [facility];  Service:    • URETEROSCOPY  8/20/2014    Performed by Fredy Cintron M.D. at [facility]   • LASERTRIPSY  8/20/2014    Performed by Fredy Cintron M.D. at [facility]   • CYSTOSCOPY  8/20/2014    Performed by Fredy Cintron M.D. at [facility]   • CYSTOSCOPY STENT PLACEMENT  1/31/2013    Performed by Fredy Cintron M.D. at [facility]   • URETEROSCOPY  1/31/2013    Performed by Fredy Cintron M.D. at [facility]   • NEPHRECTOMY PARTIAL  1/31/2013    Performed by Fredy Cintron M.D. at [facility]   • LASERTRIPSY  1/31/2013    Performed by Fredy Cintron M.D. at [facility]   • OTHER ORTHOPEDIC SURGERY  2006    right knee meniscus   • OTHER ORTHOPEDIC SURGERY  1996    ORIF right arm   • OTHER  1995    sinus surgery   • GYN SURGERY  1994    hysterectomy   • OTHER  1980    thyroid lumpectomy   • BREAST BIOPSY Left 1976    benign   • CUATE BY LAPAROSCOPY     • OTHER ORTHOPEDIC SURGERY      achilles tendon reattach    • TONSILLECTOMY         Family History   Problem Relation Age of Onset   • Kidney Disease Mother    • Colon Cancer Father   "      Social History     Socioeconomic History   • Marital status:      Spouse name: Not on file   • Number of children: Not on file   • Years of education: Not on file   • Highest education level: Not on file   Occupational History   • Not on file   Tobacco Use   • Smoking status: Never Smoker   • Smokeless tobacco: Never Used   Vaping Use   • Vaping Use: Never used   Substance and Sexual Activity   • Alcohol use: No     Comment: allergic to it   • Drug use: No   • Sexual activity: Never     Partners: Male     Birth control/protection: Post-Menopausal   Other Topics Concern   • Not on file   Social History Narrative   • Not on file     Social Determinants of Health     Financial Resource Strain:    • Difficulty of Paying Living Expenses:    Food Insecurity:    • Worried About Running Out of Food in the Last Year:    • Ran Out of Food in the Last Year:    Transportation Needs:    • Lack of Transportation (Medical):    • Lack of Transportation (Non-Medical):    Physical Activity:    • Days of Exercise per Week:    • Minutes of Exercise per Session:    Stress:    • Feeling of Stress :    Social Connections:    • Frequency of Communication with Friends and Family:    • Frequency of Social Gatherings with Friends and Family:    • Attends [employer] Services:    • Active Member of Clubs or Organizations:    • Attends Club or Organization Meetings:    • Marital Status:    Intimate Partner Violence:    • Fear of Current or Ex-Partner:    • Emotionally Abused:    • Physically Abused:    • Sexually Abused:        Allergies as of 07/14/2021 - Reviewed 07/14/2021   Allergen Reaction Noted   • Alcohol Vomiting, Nausea, and Unspecified 01/29/2013   • Pcn [penicillins] Unspecified 05/09/2012        @Vital signs for this encounter:  Vitals:    07/14/21 1356   Height: 1.778 m (5' 10\")   Weight: 81.6 kg (180 lb)   Weight % change since last entry.: 0 %   BP: 140/90   Pulse: 75   BMI (Calculated): 25.83   Resp: 16       Current " medications as of today   Current Outpatient Medications   Medication Sig Dispense Refill   • estradiol (YUVAFEM) 10 MCG Tab Please specify directions, refills and quantity 8 tablet 11   • simvastatin (ZOCOR) 20 MG Tab TAKE 1 TABLET BY MOUTH ONCE DAILY IN THE EVENING 90 Tab 3   • metoprolol SR (TOPROL XL) 25 MG TABLET SR 24 HR Take 1 Tab by mouth every day. 90 Tab 3   • metFORMIN ER (GLUCOPHAGE XR) 500 MG TABLET SR 24 HR Take 3 tabs daily 270 Tab 3   • losartan (COZAAR) 100 MG Tab Take 1 Tab by mouth every day. 90 Tab 3   • levothyroxine (SYNTHROID) 88 MCG Tab Take 1 Tab by mouth Every morning on an empty stomach. 90 Tab 3   • fenofibrate (TRIGLIDE) 160 MG tablet Take 1 Tab by mouth every day. 90 Tab 3   • acetaminophen (TYLENOL) 500 MG Tab Take 500-1,000 mg by mouth every 6 hours as needed.     • Fluticasone-Umeclidin-Vilant (TRELEGY ELLIPTA) 200-62.5-25 MCG/INH AEROSOL POWDER, BREATH ACTIVATED Inhale 1 Puff every day. 1 Each 11   • albuterol (ACCUNEB) 0.63 MG/3ML nebulizer solution Take 3 mL by nebulization every four hours as needed for Shortness of Breath. 90 mL 1   • albuterol 108 (90 Base) MCG/ACT Aero Soln inhalation aerosol INHALE 2 PUFFS BY MOUTH EVERY 4 HOURS AS NEEDED FOR SHORTNESS OF BREATH 1 Each 0   • NON SPECIFIED Indications: D Hunter     • raloxifene (EVISTA) 60 MG Tab TAKE 1 TABLET BY MOUTH ONCE DAILY IN THE MORNING 90 Tab 3   • estrogens, conjugated (PREMARIN) 0.625 MG/GM Cream 0.5gm ( 1/4applicator) per vagina QHS x2weeks , then HS 2x/week 30 g 1   • hydroCHLOROthiazide (HYDRODIURIL) 25 MG Tab Take 1 Tab by mouth every day. 90 Tab 3   • clobetasol (TEMOVATE) 0.05 % Ointment BID x 1 wk 30 g 1   • vitamin E (VITAMIN E) 1000 UNIT Cap Take 1 Cap by mouth every day.     • multivitamin (THERAGRAN) Tab Take 1 Tab by mouth every day.       No current facility-administered medications for this visit.         Physical Exam:   Gen:           Alert and oriented, No apparent distress. Mood and affect  appropriate, normal interaction with provider.  Eyes:          sclere white, conjunctive moist.  Hearing:     Grossly intact.  Dentition:    Fair dentition.  Oropharynx:   Tongue normal, posterior pharynx without erythema or exudate.  Neck:        Supple, trachea midline, no masses.  Respiratory Effort: No intercostal retractions or use of accessory muscles.   Lung Auscultation:      Clear to auscultation bilaterally; no rales, rhonchi or wheezing.  CV:            Regular rate and rhythm. No edema. No murmurs, rubs or gallops.  Digits, Nails, Ext: No clubbing, cyanosis, petechiae, or nodes.   Skin:        No rashes, lesions or ulcers noted on back or exposed skin    surfaces.                     Assessment:  1. Mild persistent asthma without complication  Fluticasone-Umeclidin-Vilant (TRELEGY ELLIPTA) 200-62.5-25 MCG/INH AEROSOL POWDER, BREATH ACTIVATED    albuterol 108 (90 Base) MCG/ACT Aero Soln inhalation aerosol       Immunizations:    Flu: 08/30/2020  Pneumovax 23: 08/07/2013  Prevnar 13: 10/03/2015  Moderna SARS-CoV-2 vaccine: 02/14/2021, 01/17/2020      Plan:    75 y.o. year old female here today for follow-up on asthma.  Patient does have a reported history of COPD however she is a never smoker.  Last seen in clinic 12/15/2020.    Pertinent past medical history includes right kidney cancer 2013, kidney stones for which she reports being on a special diet, bladder prolapse, acid reflux, hypothyroidism and hypertension.    Reviewed in clinic vitals including /90, HR 75, O2 sat: 94% on room air, BMI of 25.83 kg/m².    Reviewed home medication regimen including albuterol nebulizer and inhaler, Trelegy.  Patient is requesting sick pack to have on hand.  She reports since being on the Trelegy 200 dosing she has not required her albuterol.    Mild persistent asthma: Reports doing well on current regimen.  Reviewed updated pulmonary function testing.  Not requiring rescue inhaler or nebulizer use.    Had Covid  vaccine.  Follow-up in 6 months.      This dictation was created using voice recognition software. The accuracy of the dictation is limited to the abilities of the software. I expect there may be some errors of grammar and possibly content.

## 2021-08-04 ENCOUNTER — OFFICE VISIT (OUTPATIENT)
Dept: MEDICAL GROUP | Facility: LAB | Age: 76
End: 2021-08-04
Payer: MEDICARE

## 2021-08-04 VITALS
RESPIRATION RATE: 12 BRPM | HEIGHT: 70 IN | BODY MASS INDEX: 25.48 KG/M2 | TEMPERATURE: 96.9 F | WEIGHT: 178 LBS | HEART RATE: 84 BPM | OXYGEN SATURATION: 95 % | SYSTOLIC BLOOD PRESSURE: 134 MMHG | DIASTOLIC BLOOD PRESSURE: 74 MMHG

## 2021-08-04 DIAGNOSIS — I10 ESSENTIAL HYPERTENSION: ICD-10-CM

## 2021-08-04 DIAGNOSIS — E78.5 HYPERLIPIDEMIA, UNSPECIFIED HYPERLIPIDEMIA TYPE: ICD-10-CM

## 2021-08-04 DIAGNOSIS — R73.01 IMPAIRED FASTING GLUCOSE: ICD-10-CM

## 2021-08-04 PROBLEM — L91.8 SKIN TAG: Status: RESOLVED | Noted: 2019-06-24 | Resolved: 2021-08-04

## 2021-08-04 PROCEDURE — 99214 OFFICE O/P EST MOD 30 MIN: CPT | Performed by: FAMILY MEDICINE

## 2021-08-04 NOTE — PROGRESS NOTES
Jillian Gottlieb is a 75 y.o. female here to establish care. No acute concerns.    Medical history significant for prediabetes, hyperlipidemia, COPD, hypothyroidism.  Although his overall pain stable, follows with pulmonology and Trelegy is worked well for her.  She also has history of renal cancer that was treated with partial nephrectomy.  Continues to stay active with swimming classes, going to the gym.  Helps take care of grandkids.    Appt next week for consult for repeat colonoscopy. Dad  of colon CA.    Current medicines (including changes today)  Current Outpatient Medications   Medication Sig Dispense Refill   • Fluticasone-Umeclidin-Vilant (TRELEGY ELLIPTA) 200-62.5-25 MCG/INH AEROSOL POWDER, BREATH ACTIVATED Inhale 1 Puff every day. 1 Each 11   • albuterol 108 (90 Base) MCG/ACT Aero Soln inhalation aerosol Inhale 2 Puffs every four hours as needed. FOR SHORTNESS OF BREATH 1 Each 0   • simvastatin (ZOCOR) 20 MG Tab TAKE 1 TABLET BY MOUTH ONCE DAILY IN THE EVENING 90 Tab 3   • metoprolol SR (TOPROL XL) 25 MG TABLET SR 24 HR Take 1 Tab by mouth every day. 90 Tab 3   • metFORMIN ER (GLUCOPHAGE XR) 500 MG TABLET SR 24 HR Take 3 tabs daily 270 Tab 3   • losartan (COZAAR) 100 MG Tab Take 1 Tab by mouth every day. 90 Tab 3   • levothyroxine (SYNTHROID) 88 MCG Tab Take 1 Tab by mouth Every morning on an empty stomach. 90 Tab 3   • fenofibrate (TRIGLIDE) 160 MG tablet Take 1 Tab by mouth every day. 90 Tab 3   • acetaminophen (TYLENOL) 500 MG Tab Take 500-1,000 mg by mouth every 6 hours as needed.     • albuterol (ACCUNEB) 0.63 MG/3ML nebulizer solution Take 3 mL by nebulization every four hours as needed for Shortness of Breath. 90 mL 1   • raloxifene (EVISTA) 60 MG Tab TAKE 1 TABLET BY MOUTH ONCE DAILY IN THE MORNING 90 Tab 3   • estrogens, conjugated (PREMARIN) 0.625 MG/GM Cream 0.5gm ( 1/4applicator) per vagina QHS x2weeks , then HS 2x/week 30 g 1   • multivitamin (THERAGRAN) Tab Take 1 Tab by mouth every  day.     • estradiol (YUVAFEM) 10 MCG Tab Please specify directions, refills and quantity 8 tablet 11   • NON SPECIFIED Indications: D Mannos     • hydroCHLOROthiazide (HYDRODIURIL) 25 MG Tab Take 1 Tab by mouth every day. 90 Tab 3   • clobetasol (TEMOVATE) 0.05 % Ointment BID x 1 wk 30 g 1   • vitamin E (VITAMIN E) 1000 UNIT Cap Take 1 Cap by mouth every day.       No current facility-administered medications for this visit.     She  has a past medical history of Acid reflux, Arthritis, Asthma, Breath shortness, Bronchitis (08/04/14), Cancer ([facility]) (01/13), Chronic pain of left knee (8/22/2017), Chronic pain of right knee (7/1/2016), COPD (chronic obstructive pulmonary disease) ([facility]), COPD (chronic obstructive pulmonary disease) ([facility]) (6/30/2016), Diabetes ([facility]), Dyspnea, Emphysema of lung ([facility]), Heart burn, Hypertension, Impaired fasting glucose (8/24/2015), Indigestion, Mass of left lower leg (7/27/2016), Personal history of renal cancer (4/20/2015), Renal disorder, Renal disorder (2013), Skin tag (6/24/2019), Unspecified disorder of thyroid, and Unspecified hypothyroidism (4/20/2015). She also has no past medical history of Encounter for long-term (current) use of other medications.  She  has a past surgical history that includes bartolo by laparoscopy; tonsillectomy; cystoscopy stent placement (1/31/2013); ureteroscopy (1/31/2013); nephrectomy partial (1/31/2013); lasertripsy (1/31/2013); gyn surgery (1994); ureteroscopy (8/20/2014); lasertripsy (8/20/2014); cystoscopy (8/20/2014); other (1980); other (1995); cystoscopy stent placement (Right, 6/26/2015); ureteroscopy (N/A, 6/26/2015); lasertripsy (Right, 6/26/2015); cystoscopy stent placement (Right, 2/12/2016); recovery (3/4/2016); cystoscopy stent removal (Right, 3/4/2016); ureteroscopy (Right, 3/4/2016); percutaneous nephrostolithotomy (Right, 3/4/2016); other orthopedic surgery; other orthopedic surgery (2006); other orthopedic surgery (1996); knee  "arthroplasty total (Right, 10/17/2016); knee arthroplasty total (Left, 2017); breast biopsy (Left, ); and pr closure of vagina (2021).  Social History     Tobacco Use   • Smoking status: Never Smoker   • Smokeless tobacco: Never Used   Vaping Use   • Vaping Use: Never used   Substance Use Topics   • Alcohol use: No     Comment: allergic to it   • Drug use: No     Social History     Social History Narrative   • Not on file     Family History   Problem Relation Age of Onset   • Kidney Disease Mother    • Colon Cancer Father      Family Status   Relation Name Status   • Mo 85  at age 85        renal failure   • Fa 72  at age 72        colon cancer   • Bro 78  at age 78       ROS  See HPI     Objective:     Physical Exam:  /74 (BP Location: Right arm, Patient Position: Sitting, BP Cuff Size: Adult)   Pulse 84   Temp 36.1 °C (96.9 °F)   Resp 12   Ht 1.778 m (5' 10\")   Wt 80.7 kg (178 lb)   SpO2 95%  Body mass index is 25.54 kg/m².  Constitutional: Alert. Well appearing. No distress.  Skin: Warm, dry, good turgor, no visible rashes.  Eye: Equal, round and reactive to light, conjunctiva clear, lids normal.  Neck: Trachea midline, no masses, no thyromegaly.   Respiratory: Normal effort. Lungs are clear to auscultation bilaterally.  Cardiovascular: Regular rate and rhythm. Normal S1/S2. No murmurs, rubs or gallops. ly.  Neuro: Moves all four extremities. No facial droop.  Psych: Answers questions appropriately. Normal affect and mood.    Assessment and Plan:     1. Essential hypertension  Blood pressure well controlled, continue losartan.  - Basic Metabolic Panel; Future    2. Hyperlipidemia, unspecified hyperlipidemia type  Continue simvastatin, rechecking lipids.  - Lipid Profile; Future    3. Impaired fasting glucose  On Metformin, rechecking A1c.  Discussed continued diet and exercise modifications.  - HEMOGLOBIN A1C; Future    Follow up: Return in about 3 months (around " 11/4/2021).         PLEASE NOTE: This note was created using voice recognition software.

## 2021-08-05 NOTE — ASSESSMENT & PLAN NOTE
Blood sugar again is little high at 114 with glycohemoglobin 6.4. She continues taking metformin without difficulty. She denies low blood sugar reactions.   Doxepin Counseling:  Patient advised that the medication is sedating and not to drive a car after taking this medication. Patient informed of potential adverse effects including but not limited to dry mouth, urinary retention, and blurry vision.  The patient verbalized understanding of the proper use and possible adverse effects of doxepin.  All of the patient's questions and concerns were addressed.

## 2021-08-09 DIAGNOSIS — J45.30 MILD PERSISTENT ASTHMA WITHOUT COMPLICATION: ICD-10-CM

## 2021-08-09 RX ORDER — ALBUTEROL SULFATE 90 UG/1
2 AEROSOL, METERED RESPIRATORY (INHALATION) EVERY 4 HOURS PRN
Qty: 6.7 G | Refills: 10 | Status: SHIPPED | OUTPATIENT
Start: 2021-08-09 | End: 2022-10-24 | Stop reason: SDUPTHER

## 2021-08-09 NOTE — TELEPHONE ENCOUNTER
Have we ever prescribed this med? Yes.  If yes, what date? 07/14/21    Last OV: 07/14/21 with Janet Grove PA-C    Next OV: No Pending appt.     DX: Mild persistent asthma without complication     Medications:   Requested Prescriptions     Pending Prescriptions Disp Refills   • albuterol 108 (90 Base) MCG/ACT Aero Soln inhalation aerosol [Pharmacy Med Name: ALBUTEROL HFA (PROVENTIL) INH] 1 Each 0     Sig: Inhale 2 Puffs every four hours as needed. FOR SHORTNESS OF BREATH

## 2021-10-19 RX ORDER — RALOXIFENE HYDROCHLORIDE 60 MG/1
TABLET, FILM COATED ORAL
Qty: 90 TABLET | Refills: 2 | Status: SHIPPED | OUTPATIENT
Start: 2021-10-19 | End: 2022-07-12

## 2021-11-24 ENCOUNTER — OFFICE VISIT (OUTPATIENT)
Dept: URGENT CARE | Facility: CLINIC | Age: 76
End: 2021-11-24
Payer: MEDICARE

## 2021-11-24 ENCOUNTER — HOSPITAL ENCOUNTER (OUTPATIENT)
Facility: MEDICAL CENTER | Age: 76
End: 2021-11-24
Attending: NURSE PRACTITIONER
Payer: MEDICARE

## 2021-11-24 VITALS
RESPIRATION RATE: 18 BRPM | HEIGHT: 70 IN | TEMPERATURE: 97.1 F | HEART RATE: 85 BPM | OXYGEN SATURATION: 96 % | DIASTOLIC BLOOD PRESSURE: 88 MMHG | WEIGHT: 178 LBS | BODY MASS INDEX: 25.48 KG/M2 | SYSTOLIC BLOOD PRESSURE: 138 MMHG

## 2021-11-24 DIAGNOSIS — R30.0 DYSURIA: ICD-10-CM

## 2021-11-24 LAB
APPEARANCE UR: NORMAL
BILIRUB UR STRIP-MCNC: NORMAL MG/DL
COLOR UR AUTO: YELLOW
GLUCOSE UR STRIP.AUTO-MCNC: NORMAL MG/DL
KETONES UR STRIP.AUTO-MCNC: NORMAL MG/DL
LEUKOCYTE ESTERASE UR QL STRIP.AUTO: NORMAL
NITRITE UR QL STRIP.AUTO: NORMAL
PH UR STRIP.AUTO: 6 [PH] (ref 5–8)
PROT UR QL STRIP: NORMAL MG/DL
RBC UR QL AUTO: NORMAL
SP GR UR STRIP.AUTO: 1.03
UROBILINOGEN UR STRIP-MCNC: 0.2 MG/DL

## 2021-11-24 PROCEDURE — 81002 URINALYSIS NONAUTO W/O SCOPE: CPT | Performed by: NURSE PRACTITIONER

## 2021-11-24 PROCEDURE — 87086 URINE CULTURE/COLONY COUNT: CPT

## 2021-11-24 PROCEDURE — 87077 CULTURE AEROBIC IDENTIFY: CPT

## 2021-11-24 PROCEDURE — 99213 OFFICE O/P EST LOW 20 MIN: CPT | Performed by: NURSE PRACTITIONER

## 2021-11-24 PROCEDURE — 87186 SC STD MICRODIL/AGAR DIL: CPT

## 2021-11-24 RX ORDER — SULFAMETHOXAZOLE AND TRIMETHOPRIM 800; 160 MG/1; MG/1
1 TABLET ORAL 2 TIMES DAILY
Qty: 10 TABLET | Refills: 0 | Status: SHIPPED | OUTPATIENT
Start: 2021-11-24 | End: 2021-11-29

## 2021-11-24 ASSESSMENT — ENCOUNTER SYMPTOMS: FLANK PAIN: 0

## 2021-11-25 NOTE — PROGRESS NOTES
"Chad Gottlieb is a 76 y.o. female who presents with Dysuria (x2-3 days, urine odor ) and Low Back Pain (x1 week)    Past Medical History:   Diagnosis Date   • Acid reflux    • Arthritis     knees, hands, hips, neck-Osteo   • Asthma     inhalers daily   • Breath shortness     asthma related   • Bronchitis 08/04/14   • Cancer ([facility]) 01/13    R kidney   • Chronic pain of left knee 8/22/2017   • Chronic pain of right knee 7/1/2016   • COPD (chronic obstructive pulmonary disease) ([facility])    • COPD (chronic obstructive pulmonary disease) ([facility]) 6/30/2016   • Diabetes ([facility])     \"pre\"   • Dyspnea    • Emphysema of lung ([facility])    • Heart burn    • Hypertension    • Impaired fasting glucose 8/24/2015   • Indigestion    • Mass of left lower leg 7/27/2016   • Personal history of renal cancer 4/20/2015   • Renal disorder     stones   • Renal disorder 2013    cancer right kidney 7% removed   • Skin tag 6/24/2019   • Unspecified disorder of thyroid     benign tumor removal   • Unspecified hypothyroidism 4/20/2015     Social History     Socioeconomic History   • Marital status:      Spouse name: Not on file   • Number of children: Not on file   • Years of education: Not on file   • Highest education level: Not on file   Occupational History   • Not on file   Tobacco Use   • Smoking status: Never Smoker   • Smokeless tobacco: Never Used   Vaping Use   • Vaping Use: Never used   Substance and Sexual Activity   • Alcohol use: No     Comment: allergic to it   • Drug use: No   • Sexual activity: Never     Partners: Male     Birth control/protection: Post-Menopausal   Other Topics Concern   • Not on file   Social History Narrative   • Not on file     Social Determinants of Health     Financial Resource Strain:    • Difficulty of Paying Living Expenses: Not on file   Food Insecurity:    • Worried About Running Out of Food in the Last Year: Not on file   • Ran Out of Food in the Last Year: Not on file   Transportation " Needs:    • Lack of Transportation (Medical): Not on file   • Lack of Transportation (Non-Medical): Not on file   Physical Activity:    • Days of Exercise per Week: Not on file   • Minutes of Exercise per Session: Not on file   Stress:    • Feeling of Stress : Not on file   Social Connections:    • Frequency of Communication with Friends and Family: Not on file   • Frequency of Social Gatherings with Friends and Family: Not on file   • Attends Alevism Services: Not on file   • Active Member of Clubs or Organizations: Not on file   • Attends Club or Organization Meetings: Not on file   • Marital Status: Not on file   Intimate Partner Violence:    • Fear of Current or Ex-Partner: Not on file   • Emotionally Abused: Not on file   • Physically Abused: Not on file   • Sexually Abused: Not on file   Housing Stability:    • Unable to Pay for Housing in the Last Year: Not on file   • Number of Places Lived in the Last Year: Not on file   • Unstable Housing in the Last Year: Not on file     Family History   Problem Relation Age of Onset   • Kidney Disease Mother    • Colon Cancer Father        Allergies: Alcohol and Pcn [penicillins]    Patient is a 76-year-old female who presents today with complaint of dysuria, urgency, and frequency.  Symptoms started over the last week.  No flank pain, no fever, aches, or chills.          Dysuria   This is a new problem. The current episode started in the past 7 days. The problem occurs every urination. The problem has been unchanged. The quality of the pain is described as aching and burning. The pain is moderate. There has been no fever. Associated symptoms include frequency and urgency. Pertinent negatives include no flank pain or hematuria. She has tried nothing for the symptoms. The treatment provided no relief.       Review of Systems   Genitourinary: Positive for dysuria, frequency and urgency. Negative for flank pain and hematuria.   All other systems reviewed and are  "negative.             Objective     /88   Pulse 85   Temp 36.2 °C (97.1 °F) (Temporal)   Resp 18   Ht 1.778 m (5' 10\")   Wt 80.7 kg (178 lb)   SpO2 96%   Breastfeeding No   BMI 25.54 kg/m²      Physical Exam  Vitals reviewed.   Constitutional:       Appearance: Normal appearance.   HENT:      Head: Normocephalic.      Right Ear: Tympanic membrane, ear canal and external ear normal.      Left Ear: Tympanic membrane, ear canal and external ear normal.      Nose: Nose normal.      Mouth/Throat:      Mouth: Mucous membranes are moist.   Eyes:      Extraocular Movements: Extraocular movements intact.      Conjunctiva/sclera: Conjunctivae normal.      Pupils: Pupils are equal, round, and reactive to light.   Cardiovascular:      Rate and Rhythm: Normal rate and regular rhythm.      Heart sounds: Normal heart sounds.   Pulmonary:      Effort: Pulmonary effort is normal.      Breath sounds: Normal breath sounds.   Musculoskeletal:         General: Normal range of motion.      Cervical back: Normal range of motion and neck supple.   Skin:     General: Skin is warm and dry.      Capillary Refill: Capillary refill takes less than 2 seconds.   Neurological:      Mental Status: She is alert and oriented to person, place, and time.   Psychiatric:         Mood and Affect: Mood normal.         Behavior: Behavior normal.         Thought Content: Thought content normal.         Judgment: Judgment normal.             UA: positive leukocytes, positive nitrates                Assessment & Plan      dysuria  Cystitis    Bactrim DS  Push fluids  ER precautions: developing flank pain, fever >100.5, n/v, flu like symptoms      There are no diagnoses linked to this encounter.              "

## 2021-12-13 DIAGNOSIS — J45.30 MILD PERSISTENT ASTHMA WITHOUT COMPLICATION: ICD-10-CM

## 2021-12-14 RX ORDER — FENOFIBRATE 160 MG/1
TABLET ORAL
Qty: 90 TABLET | Refills: 3 | Status: SHIPPED | OUTPATIENT
Start: 2021-12-14 | End: 2023-02-21 | Stop reason: SDUPTHER

## 2021-12-14 RX ORDER — METOPROLOL SUCCINATE 25 MG/1
TABLET, EXTENDED RELEASE ORAL
Qty: 90 TABLET | Refills: 3 | Status: SHIPPED | OUTPATIENT
Start: 2021-12-14 | End: 2023-02-21 | Stop reason: SDUPTHER

## 2021-12-14 RX ORDER — LEVOTHYROXINE SODIUM 88 UG/1
TABLET ORAL
Qty: 90 TABLET | Refills: 3 | Status: SHIPPED | OUTPATIENT
Start: 2021-12-14 | End: 2023-02-06 | Stop reason: SDUPTHER

## 2021-12-14 RX ORDER — LOSARTAN POTASSIUM 100 MG/1
TABLET ORAL
Qty: 90 TABLET | Refills: 3 | Status: SHIPPED | OUTPATIENT
Start: 2021-12-14 | End: 2023-03-16 | Stop reason: SDUPTHER

## 2021-12-14 RX ORDER — SIMVASTATIN 20 MG
TABLET ORAL
Qty: 90 TABLET | Refills: 3 | Status: SHIPPED | OUTPATIENT
Start: 2021-12-14 | End: 2023-01-23 | Stop reason: SDUPTHER

## 2021-12-14 NOTE — TELEPHONE ENCOUNTER
Have we ever prescribed this med? Yes.  If yes, what date? 07/14/21    Last OV: 07/14/21 with Janet Grove PA-C    Next OV: No Pending appt.     DX: Mild persistent asthma without complication (J45.30)    Medications:   Requested Prescriptions     Pending Prescriptions Disp Refills   • TRELEGY ELLIPTA 200-62.5-25 MCG/INH AEROSOL POWDER, BREATH ACTIVATED [Pharmacy Med Name: TRELEGY ELLIPTA 200-62.5-25] 60 Each 1     Sig: INHALE 1 PUFF ONCE DAILY

## 2022-01-05 ENCOUNTER — TELEMEDICINE (OUTPATIENT)
Dept: MEDICAL GROUP | Facility: LAB | Age: 77
End: 2022-01-05
Payer: MEDICARE

## 2022-01-05 VITALS
SYSTOLIC BLOOD PRESSURE: 165 MMHG | WEIGHT: 178 LBS | BODY MASS INDEX: 25.48 KG/M2 | HEIGHT: 70 IN | DIASTOLIC BLOOD PRESSURE: 90 MMHG

## 2022-01-05 DIAGNOSIS — J01.00 ACUTE NON-RECURRENT MAXILLARY SINUSITIS: ICD-10-CM

## 2022-01-05 PROCEDURE — 99213 OFFICE O/P EST LOW 20 MIN: CPT | Mod: 95 | Performed by: PHYSICIAN ASSISTANT

## 2022-01-05 RX ORDER — AZITHROMYCIN 250 MG/1
TABLET, FILM COATED ORAL
Qty: 6 TABLET | Refills: 0 | Status: SHIPPED | OUTPATIENT
Start: 2022-01-05 | End: 2022-04-27

## 2022-01-05 RX ORDER — METHYLPREDNISOLONE 4 MG/1
TABLET ORAL
Qty: 21 TABLET | Refills: 0 | Status: SHIPPED | OUTPATIENT
Start: 2022-01-05 | End: 2022-09-18

## 2022-01-05 NOTE — PROGRESS NOTES
Virtual Visit: Established Patient   This visit was conducted via Zoom using secure and encrypted videoconferencing technology.   The patient was in a private location in the state of [place].    The patient's identity was confirmed and verbal consent was obtained for this virtual visit.    Subjective:   CC:   Chief Complaint   Patient presents with   • Sinusitis     x2 week ago    • Headache     x2 week ago      Jillian Gottlieb is a 76 y.o. female presenting for evaluation and management of:    Sinus symtpoms x 2 weeks.  Patient describes frontal and maxillary sinus pressure, thick, green sinus drainage.  She has been taking Mucinex, Advil and saline with minimal relief of symptoms.  Patient notes that she has a history of sinus infections 1-2 times a year.  Patient had a negative Covid test at onset of symptoms.  Denies additional sick contacts    Denies fever/chills.  Denies shortness of breath.  Denies chest pain. denies nausea/vomiting/diarrhea.  Denies cough.  Denies sore throat    ROS   Negative except as indicated above    Current medicines (including changes today)  Current Outpatient Medications   Medication Sig Dispense Refill   • azithromycin (ZITHROMAX) 250 MG Tab 2 tablets on day 1, followed by 1 tablet once daily for 4 days 6 Tablet 0   • methylPREDNISolone (MEDROL DOSEPAK) 4 MG Tablet Therapy Pack As directed on the packaging label. 21 Tablet 0   • TRELEGY ELLIPTA 200-62.5-25 MCG/INH AEROSOL POWDER, BREATH ACTIVATED INHALE 1 PUFF ONCE DAILY 60 Each 1   • metoprolol SR (TOPROL XL) 25 MG TABLET SR 24 HR TAKE 1 TABLET BY MOUTH EVERY DAY 90 Tablet 3   • losartan (COZAAR) 100 MG Tab TAKE 1 TABLET BY MOUTH EVERY DAY 90 Tablet 3   • fenofibrate (TRIGLIDE) 160 MG tablet TAKE 1 TABLET BY MOUTH EVERY DAY 90 Tablet 3   • levothyroxine (SYNTHROID) 88 MCG Tab TAKE 1 TABLET BY MOUTH EVERY MORNING ON AN EMPTY STOMACH 90 Tablet 3   • simvastatin (ZOCOR) 20 MG Tab TAKE 1 TABLET BY MOUTH EVERY EVENING 90 Tablet 3   •  "raloxifene (EVISTA) 60 MG Tab TAKE 1 TABLET BY MOUTH ONCE DAILY IN THE MORNING 90 Tablet 2   • albuterol 108 (90 Base) MCG/ACT Aero Soln inhalation aerosol Inhale 2 Puffs every four hours as needed. FOR SHORTNESS OF BREATH 6.7 g 10   • metFORMIN ER (GLUCOPHAGE XR) 500 MG TABLET SR 24 HR Take 3 tabs daily 270 Tab 3   • acetaminophen (TYLENOL) 500 MG Tab Take 500-1,000 mg by mouth every 6 hours as needed.     • albuterol (ACCUNEB) 0.63 MG/3ML nebulizer solution Take 3 mL by nebulization every four hours as needed for Shortness of Breath. 90 mL 1   • estrogens, conjugated (PREMARIN) 0.625 MG/GM Cream 0.5gm ( 1/4applicator) per vagina QHS x2weeks , then HS 2x/week 30 g 1   • multivitamin (THERAGRAN) Tab Take 1 Tab by mouth every day.       No current facility-administered medications for this visit.       Patient Active Problem List    Diagnosis Date Noted   • Bladder prolapse, female, acquired 08/10/2020   • Hammer toe 09/04/2019   • GERD (gastroesophageal reflux disease) 10/03/2016   • COPD (chronic obstructive pulmonary disease) ([facility]) 06/30/2016   • Kidney stones 03/04/2016   • Essential hypertension 02/08/2016   • Impaired fasting glucose 08/24/2015   • Hyperlipidemia 04/20/2015   • Hypothyroidism 04/20/2015   • Personal history of renal cancer 04/20/2015        Objective:   BP (!) 165/90   Ht 1.778 m (5' 10\")   Wt 80.7 kg (178 lb)   BMI 25.54 kg/m²     Physical Exam:  Constitutional: Alert, no distress, well-groomed.  Skin: No rashes in visible areas.  Eye: Round. Conjunctiva clear, lids normal. No icterus.   ENMT: Lips pink without lesions, good dentition, moist mucous membranes. Phonation normal.  Neck: No masses, no thyromegaly. Moves freely without pain.  Respiratory: Unlabored respiratory effort, no cough or audible wheeze  Psych: Alert and oriented x3, normal affect and mood.     Assessment and Plan:   The following treatment plan was discussed:     1. Acute non-recurrent maxillary sinusitis  Acute " condition, stable  Medrol dose pack taper  z-pack 250mg 2 tablets day 1, 1 tablet qd x 4 days  Continue Mucinex, Advil, saline  Discussed red flag symptoms and indications for return    Follow-up: Return if symptoms worsen or fail to improve.

## 2022-02-15 DIAGNOSIS — J45.30 MILD PERSISTENT ASTHMA WITHOUT COMPLICATION: ICD-10-CM

## 2022-02-16 NOTE — TELEPHONE ENCOUNTER
Have we ever prescribed this med? Yes.  If yes, what date? 12/14/21    Last OV: 07/14/21 with Janet Grove PA-C    Next OV: No Pending appt.     DX: Mild persistent asthma without complication (J45.30)    Medications:   Requested Prescriptions     Pending Prescriptions Disp Refills   • TRELEGY ELLIPTA 200-62.5-25 MCG/INH AEROSOL POWDER, BREATH ACTIVATED [Pharmacy Med Name: TRELEGY ELLIPTA 200-62.5-25] 60 Each 1     Sig: INHALE 1 PUFF BY MOUTH ONCE DAILY

## 2022-02-22 RX ORDER — METFORMIN HYDROCHLORIDE 500 MG/1
TABLET, EXTENDED RELEASE ORAL
Qty: 270 TABLET | Refills: 3 | Status: SHIPPED | OUTPATIENT
Start: 2022-02-22 | End: 2023-01-13 | Stop reason: SDUPTHER

## 2022-02-22 NOTE — TELEPHONE ENCOUNTER
Received request via: Pharmacy    Was the patient seen in the last year in this department? Yes  LOV 01/05/2022 - Telemedicine  Does the patient have an active prescription (recently filled or refills available) for medication(s) requested? No

## 2022-04-12 ENCOUNTER — HOSPITAL ENCOUNTER (OUTPATIENT)
Dept: LAB | Facility: MEDICAL CENTER | Age: 77
End: 2022-04-12
Attending: FAMILY MEDICINE
Payer: MEDICARE

## 2022-04-12 DIAGNOSIS — I10 ESSENTIAL HYPERTENSION: ICD-10-CM

## 2022-04-12 DIAGNOSIS — E78.5 HYPERLIPIDEMIA, UNSPECIFIED HYPERLIPIDEMIA TYPE: ICD-10-CM

## 2022-04-12 DIAGNOSIS — R73.01 IMPAIRED FASTING GLUCOSE: ICD-10-CM

## 2022-04-12 LAB
ANION GAP SERPL CALC-SCNC: 13 MMOL/L (ref 7–16)
BUN SERPL-MCNC: 26 MG/DL (ref 8–22)
CALCIUM SERPL-MCNC: 9.8 MG/DL (ref 8.5–10.5)
CHLORIDE SERPL-SCNC: 103 MMOL/L (ref 96–112)
CHOLEST SERPL-MCNC: 157 MG/DL (ref 100–199)
CO2 SERPL-SCNC: 25 MMOL/L (ref 20–33)
CREAT SERPL-MCNC: 0.69 MG/DL (ref 0.5–1.4)
EST. AVERAGE GLUCOSE BLD GHB EST-MCNC: 148 MG/DL
FASTING STATUS PATIENT QL REPORTED: NORMAL
GFR SERPLBLD CREATININE-BSD FMLA CKD-EPI: 90 ML/MIN/1.73 M 2
GLUCOSE SERPL-MCNC: 113 MG/DL (ref 65–99)
HBA1C MFR BLD: 6.8 % (ref 4–5.6)
HDLC SERPL-MCNC: 51 MG/DL
LDLC SERPL CALC-MCNC: 74 MG/DL
POTASSIUM SERPL-SCNC: 4.5 MMOL/L (ref 3.6–5.5)
SODIUM SERPL-SCNC: 141 MMOL/L (ref 135–145)
TRIGL SERPL-MCNC: 161 MG/DL (ref 0–149)

## 2022-04-12 PROCEDURE — 80061 LIPID PANEL: CPT

## 2022-04-12 PROCEDURE — 83036 HEMOGLOBIN GLYCOSYLATED A1C: CPT

## 2022-04-12 PROCEDURE — 36415 COLL VENOUS BLD VENIPUNCTURE: CPT

## 2022-04-12 PROCEDURE — 80048 BASIC METABOLIC PNL TOTAL CA: CPT

## 2022-04-18 ENCOUNTER — PATIENT MESSAGE (OUTPATIENT)
Dept: SLEEP MEDICINE | Facility: MEDICAL CENTER | Age: 77
End: 2022-04-18
Payer: MEDICARE

## 2022-04-18 DIAGNOSIS — J44.9 CHRONIC OBSTRUCTIVE PULMONARY DISEASE, UNSPECIFIED COPD TYPE (HCC): ICD-10-CM

## 2022-04-18 DIAGNOSIS — J45.30 MILD PERSISTENT ASTHMA WITHOUT COMPLICATION: ICD-10-CM

## 2022-04-18 NOTE — TELEPHONE ENCOUNTER
Received request via: Patient's pulmonary is not answering. Pt leaving [facility] needs a refill    Was the patient seen in the last year in this department? Yes  8/4/21  Does the patient have an active prescription (recently filled or refills available) for medication(s) requested? No

## 2022-04-18 NOTE — TELEPHONE ENCOUNTER
From: Jillian Gottlieb  To: Physician Assistant Janet Grove  Sent: 4/18/2022 9:50 AM PDT  Subject: Trelegy 200    I am leaving for [facility] in two hours and need you to authorize my refill for my inhaler asap, please so I can go in my trip! A request has been sent by [facility] days ago on [facility]  Please call me at [phone]

## 2022-04-27 ENCOUNTER — OFFICE VISIT (OUTPATIENT)
Dept: MEDICAL GROUP | Facility: LAB | Age: 77
End: 2022-04-27
Payer: MEDICARE

## 2022-04-27 VITALS
DIASTOLIC BLOOD PRESSURE: 90 MMHG | WEIGHT: 179.2 LBS | HEIGHT: 70 IN | HEART RATE: 68 BPM | BODY MASS INDEX: 25.65 KG/M2 | SYSTOLIC BLOOD PRESSURE: 150 MMHG | OXYGEN SATURATION: 95 % | TEMPERATURE: 96.3 F

## 2022-04-27 DIAGNOSIS — E03.9 HYPOTHYROIDISM, UNSPECIFIED TYPE: ICD-10-CM

## 2022-04-27 DIAGNOSIS — E11.9 TYPE 2 DIABETES MELLITUS WITHOUT COMPLICATION, WITHOUT LONG-TERM CURRENT USE OF INSULIN (HCC): ICD-10-CM

## 2022-04-27 DIAGNOSIS — I10 ESSENTIAL HYPERTENSION: ICD-10-CM

## 2022-04-27 PROCEDURE — 99214 OFFICE O/P EST MOD 30 MIN: CPT | Performed by: FAMILY MEDICINE

## 2022-04-27 RX ORDER — AMLODIPINE BESYLATE 5 MG/1
5 TABLET ORAL DAILY
Qty: 90 TABLET | Refills: 3 | Status: SHIPPED | OUTPATIENT
Start: 2022-04-27 | End: 2022-05-27

## 2022-04-27 ASSESSMENT — PATIENT HEALTH QUESTIONNAIRE - PHQ9: CLINICAL INTERPRETATION OF PHQ2 SCORE: 0

## 2022-04-27 NOTE — PROGRESS NOTES
"Subjective:     CC: Follow-up    HPI:   Jillian presents today to follow-up on diabetes and blood pressure.  Recent A1c 6.8%.  She takes metformin 1500 mg daily.  Has been working on less carbs and sugar in diet.    Currently on losartan 100 mg and metoprolol 25 mg XR for blood pressure.    Medications, past medical history, allergies, and social history have been reviewed and updated.      Objective:       Exam:  /90 (BP Location: Left arm, Patient Position: Sitting, BP Cuff Size: Adult)   Pulse 68   Temp (!) 35.7 °C (96.3 °F) (Temporal)   Ht 1.778 m (5' 10\")   Wt 81.3 kg (179 lb 3.2 oz)   SpO2 95%   BMI 25.71 kg/m²  Body mass index is 25.71 kg/m².    Constitutional: Alert. Well appearing. No distress.  Skin: Warm, dry, good turgor, no visible rashes.  Eye: Equal, round and reactive to light, conjunctiva clear, lids normal.  Respiratory: Normal effort. Lungs are clear to auscultation bilaterally.  Cardiovascular: Regular rate and rhythm. Normal S1/S2. No murmurs, rubs or gallops.   Neuro: Moves all four extremities. No facial droop.  Psych: Answers questions appropriately. Normal affect and mood.      Assessment & Plan:     76 y.o. female with the following -     1. Type 2 diabetes mellitus without complication, without long-term current use of insulin ([facility])  Well-controlled, A1c 6.8%.  Continue metformin.  Continue with diet modifications.  Recheck A1c in 3 to 6 months.  - MICROALBUMIN CREAT RATIO URINE; Future    2. Essential hypertension  Blood pressure above goal.  150/90 today and elevated at prior visit.  Continue losartan 100 mg daily.  Do not want to increase metoprolol dose due to low normal pulse.  We will add amlodipine.  Follow-up 1 month.  - amLODIPine (NORVASC) 5 MG Tab; Take 1 Tablet by mouth every day.  Dispense: 90 Tablet; Refill: 3    3. Hypothyroidism, unspecified type  Recheck TSH, continue levothyroxine 88 mcg daily.  - TSH WITH REFLEX TO FT4; Future         Please note that this " note was created using voice recognition software.

## 2022-05-18 ENCOUNTER — HOSPITAL ENCOUNTER (OUTPATIENT)
Dept: LAB | Facility: MEDICAL CENTER | Age: 77
End: 2022-05-18
Attending: FAMILY MEDICINE
Payer: MEDICARE

## 2022-05-18 DIAGNOSIS — E03.9 HYPOTHYROIDISM, UNSPECIFIED TYPE: ICD-10-CM

## 2022-05-18 DIAGNOSIS — E11.9 TYPE 2 DIABETES MELLITUS WITHOUT COMPLICATION, WITHOUT LONG-TERM CURRENT USE OF INSULIN (HCC): ICD-10-CM

## 2022-05-18 LAB
CREAT UR-MCNC: 107.75 MG/DL
MICROALBUMIN UR-MCNC: 3 MG/DL
MICROALBUMIN/CREAT UR: 28 MG/G (ref 0–30)
TSH SERPL DL<=0.005 MIU/L-ACNC: 1.35 UIU/ML (ref 0.38–5.33)

## 2022-05-18 PROCEDURE — 82570 ASSAY OF URINE CREATININE: CPT

## 2022-05-18 PROCEDURE — 84443 ASSAY THYROID STIM HORMONE: CPT

## 2022-05-18 PROCEDURE — 36415 COLL VENOUS BLD VENIPUNCTURE: CPT

## 2022-05-18 PROCEDURE — 82043 UR ALBUMIN QUANTITATIVE: CPT

## 2022-05-27 ENCOUNTER — OFFICE VISIT (OUTPATIENT)
Dept: MEDICAL GROUP | Facility: LAB | Age: 77
End: 2022-05-27
Payer: MEDICARE

## 2022-05-27 VITALS
TEMPERATURE: 97.2 F | BODY MASS INDEX: 24.91 KG/M2 | OXYGEN SATURATION: 95 % | HEIGHT: 70 IN | DIASTOLIC BLOOD PRESSURE: 70 MMHG | WEIGHT: 174 LBS | HEART RATE: 67 BPM | RESPIRATION RATE: 12 BRPM | SYSTOLIC BLOOD PRESSURE: 144 MMHG

## 2022-05-27 DIAGNOSIS — E11.9 TYPE 2 DIABETES MELLITUS WITHOUT COMPLICATION, WITHOUT LONG-TERM CURRENT USE OF INSULIN (HCC): ICD-10-CM
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
## 2022-05-27 DIAGNOSIS — I10 ESSENTIAL HYPERTENSION: ICD-10-CM

## 2022-05-27 PROCEDURE — 99214 OFFICE O/P EST MOD 30 MIN: CPT | Performed by: FAMILY MEDICINE

## 2022-05-27 RX ORDER — AMLODIPINE BESYLATE 10 MG/1
10 TABLET ORAL DAILY
Qty: 90 TABLET | Refills: 3 | Status: SHIPPED | OUTPATIENT
Start: 2022-05-27 | End: 2023-03-01 | Stop reason: SDUPTHER

## 2022-05-27 RX ORDER — TRIAMCINOLONE ACETONIDE 5 MG/G
CREAM TOPICAL
Qty: 15 G | Refills: 1 | Status: SHIPPED | OUTPATIENT
Start: 2022-05-27 | End: 2022-11-01

## 2022-05-27 NOTE — PROGRESS NOTES
"Subjective:     CC: BP follow up    HPI:   Jillian presents today with:    Blood pressure  Amlodipine added at last visit due to uncontrolled hypertension, also continues on metoprolol 25 mg and losartan 100 mg daily.  BP at home 145/78    Insect bites  Tends to get these in the spring and they are itchy, needs refill of triamcinolone.    Medications, past medical history, allergies, and social history have been reviewed and updated.      Objective:       Exam:  BP (!) 144/70 (BP Location: Right arm, Patient Position: Sitting, BP Cuff Size: Adult)   Pulse 67   Temp 36.2 °C (97.2 °F)   Resp 12   Ht 1.778 m (5' 10\")   Wt 78.9 kg (174 lb)   SpO2 95%   BMI 24.97 kg/m²  Body mass index is 24.97 kg/m².    Constitutional: Alert. Well appearing. No distress.  Skin: Warm, dry, good turgor, no visible rashes.  Eye: Equal, round and reactive to light, conjunctiva clear, lids normal.  ENMT: Moist mucous membranes. Normal dentition.  Respiratory: Normal effort.  Monofilament testing with a 10 gram force: sensation intact: intact bilaterally  Visual Inspection: Feet without maceration, ulcers, fissures.  Pedal pulses: intact bilaterally  Neuro: Moves all four extremities. No facial droop.  Psych: Answers questions appropriately. Normal affect and mood.      Assessment & Plan:     76 y.o. female with the following -     1. Essential hypertension  Above goal, increase amlodipine to 10 mg daily, continue losartan 100 mg and Toprol 25 mg daily.  - amLODIPine (NORVASC) 10 MG Tab; Take 1 Tablet by mouth every day.  Dispense: 90 Tablet; Refill: 3    2. Insect bite, unspecified site, initial encounter  Continue triamcinolone cream as needed for itching.  - triamcinolone acetonide (KENALOG) 0.5 % Cream; Apple to affected area twice daily  Dispense: 15 g; Refill: 1    3. Type 2 diabetes mellitus without complication, without long-term current use of insulin ([facility])  A1c 6.8% last month.  Follow-up 2 months.  - Diabetic Monofilament LE " Exam    Please note that this note was created using voice recognition software.

## 2022-05-27 NOTE — LETTER
[employer] [facility]  Reinaldo Dubose M.D.  [id number]  [place] [address]  Fax: [phone]   Authorization for Release/Disclosure of   Protected Health Information   Name: JILLIAN FREITAS : 1945 SSN: [id number]   Address: [address] [place] [address] Phone:    [phone] (home)    I authorize the entity listed below to release/disclose the PHI below to:   [facility]/Reinaldo Dubose M.D. and Reinaldo Dubose M.D.   Provider or Entity Name:  [facility] [place]   Address   City, State, Zip   Phone:      Fax:     Reason for request: continuity of care   Information to be released:    [  ] LAST COLONOSCOPY,  including any PATH REPORT and follow-up  [  ] LAST FIT/COLOGUARD RESULT [  ] LAST DEXA  [  ] LAST MAMMOGRAM  [  ] LAST PAP  [  ] LAST LABS [  ] RETINA EXAM REPORT  [  ] IMMUNIZATION RECORDS  [x  ] Release all info      [  ] Check here and initial the line next to each item to release ALL health information INCLUDING  _____ Care and treatment for drug and / or alcohol abuse  _____ HIV testing, infection status, or AIDS  _____ Genetic Testing    DATES OF SERVICE OR TIME PERIOD TO BE DISCLOSED: _____________  I understand and acknowledge that:  * This Authorization may be revoked at any time by you in writing, except if your health information has already been used or disclosed.  * Your health information that will be used or disclosed as a result of you signing this authorization could be re-disclosed by the recipient. If this occurs, your re-disclosed health information may no longer be protected by State or Federal laws.  * You may refuse to sign this Authorization. Your refusal will not affect your ability to obtain treatment.  * This Authorization becomes effective upon signing and will  on (date) __________.      If no date is indicated, this Authorization will  one (1) year from the signature date.    Name: Jillian Freitas    Signature:   Date:      5/27/2022       PLEASE FAX REQUESTED RECORDS BACK TO: [phone]

## 2022-07-05 DIAGNOSIS — J45.30 MILD PERSISTENT ASTHMA WITHOUT COMPLICATION: ICD-10-CM

## 2022-07-05 NOTE — TELEPHONE ENCOUNTER
Received request via: Pharmacy    Was the patient seen in the last year in this department? Yes    Does the patient have an active prescription (recently filled or refills available) for medication(s) requested? No  
Attending

## 2022-07-06 RX ORDER — CONJUGATED ESTROGENS 0.62 MG/G
CREAM VAGINAL
Qty: 30 G | Refills: 0 | Status: SHIPPED | OUTPATIENT
Start: 2022-07-06 | End: 2022-07-07

## 2022-07-06 NOTE — TELEPHONE ENCOUNTER
Received request via: Patient has [facility] on 7/27 she does not see Dr Ingram any longer Dr. Means said he would take over prescriptions      Was the patient seen in the last year in this department? Yes  5/27/22  Does the patient have an active prescription (recently filled or refills available) for medication(s) requested? No

## 2022-07-07 RX ORDER — ESTRADIOL 0.1 MG/G
1 CREAM VAGINAL DAILY
Qty: 42.5 G | Refills: 4 | Status: SHIPPED | OUTPATIENT
Start: 2022-07-07 | End: 2023-01-13 | Stop reason: SDUPTHER

## 2022-07-12 RX ORDER — RALOXIFENE HYDROCHLORIDE 60 MG/1
TABLET, FILM COATED ORAL
Qty: 90 TABLET | Refills: 2 | Status: SHIPPED | OUTPATIENT
Start: 2022-07-12 | End: 2023-01-13 | Stop reason: SDUPTHER

## 2022-07-27 ENCOUNTER — OFFICE VISIT (OUTPATIENT)
Dept: MEDICAL GROUP | Facility: LAB | Age: 77
End: 2022-07-27
Payer: MEDICARE

## 2022-07-27 VITALS
BODY MASS INDEX: 24.79 KG/M2 | HEART RATE: 70 BPM | HEIGHT: 70 IN | WEIGHT: 173.2 LBS | OXYGEN SATURATION: 95 % | RESPIRATION RATE: 14 BRPM | DIASTOLIC BLOOD PRESSURE: 60 MMHG | SYSTOLIC BLOOD PRESSURE: 134 MMHG | TEMPERATURE: 97 F

## 2022-07-27 DIAGNOSIS — E11.9 TYPE 2 DIABETES MELLITUS WITHOUT COMPLICATION, WITHOUT LONG-TERM CURRENT USE OF INSULIN (HCC): ICD-10-CM

## 2022-07-27 DIAGNOSIS — I10 ESSENTIAL HYPERTENSION: ICD-10-CM

## 2022-07-27 LAB
HBA1C MFR BLD: 6.2 % (ref 0–5.6)
INT CON NEG: ABNORMAL
INT CON POS: ABNORMAL

## 2022-07-27 PROCEDURE — 99214 OFFICE O/P EST MOD 30 MIN: CPT | Performed by: FAMILY MEDICINE

## 2022-07-27 PROCEDURE — 83036 HEMOGLOBIN GLYCOSYLATED A1C: CPT | Performed by: FAMILY MEDICINE

## 2022-07-27 RX ORDER — SODIUM PHOSPHATE,MONO-DIBASIC 19G-7G/118
500 ENEMA (ML) RECTAL
COMMUNITY

## 2022-07-27 NOTE — PROGRESS NOTES
"Subjective:     CC: Diabetes, HTN follow up    HPI:   Jillian presents today for diabetes and hypertension.  A1c today 6.2%.  She is on metformin 1500 mg daily.  Screenings up-to-date, reports she recently had eye exam done.    Already been increased at last visit due to uncontrolled hypertension, this has improved.  She reports home values similar to BP today.    Medications, past medical history, allergies, and social history have been reviewed and updated.      Objective:       Exam:  /60 (BP Location: Right arm, Patient Position: Sitting, BP Cuff Size: Adult)   Pulse 70   Temp 36.1 °C (97 °F)   Resp 14   Ht 1.778 m (5' 10\")   Wt 78.6 kg (173 lb 3.2 oz)   SpO2 95%   BMI 24.85 kg/m²  Body mass index is 24.85 kg/m².    Constitutional: Alert. Well appearing. No distress.  Skin: Warm, dry, good turgor, no visible rashes.  Eye: Equal, round and reactive to light, conjunctiva clear, lids normal.  Respiratory: Normal effort  Neuro: Moves all four extremities. No facial droop.  Psych: Answers questions appropriately. Normal affect and mood.      Assessment & Plan:     76 y.o. female with the following -     1. Type 2 diabetes mellitus without complication, without long-term current use of insulin ([facility])  A1c 6.2%.  Continue metformin.  Screenings up-to-date, requesting records for eye exam.  - POCT Hemoglobin A1C    2. Essential hypertension  At/near goal.  Continue losartan, amlodipine, Toprol.      Please note that this note was created using voice recognition software.      "

## 2022-09-18 ENCOUNTER — OFFICE VISIT (OUTPATIENT)
Dept: URGENT CARE | Facility: CLINIC | Age: 77
End: 2022-09-18
Payer: MEDICARE

## 2022-09-18 VITALS
SYSTOLIC BLOOD PRESSURE: 136 MMHG | BODY MASS INDEX: 25.14 KG/M2 | RESPIRATION RATE: 20 BRPM | HEIGHT: 70 IN | WEIGHT: 175.6 LBS | TEMPERATURE: 98.4 F | DIASTOLIC BLOOD PRESSURE: 78 MMHG | HEART RATE: 80 BPM | OXYGEN SATURATION: 95 %

## 2022-09-18 DIAGNOSIS — J44.1 COPD EXACERBATION (HCC): ICD-10-CM

## 2022-09-18 DIAGNOSIS — J01.90 ACUTE NON-RECURRENT SINUSITIS, UNSPECIFIED LOCATION: ICD-10-CM

## 2022-09-18 DIAGNOSIS — J06.9 URI WITH COUGH AND CONGESTION: ICD-10-CM

## 2022-09-18 DIAGNOSIS — J34.89 SINUS PRESSURE: ICD-10-CM

## 2022-09-18 PROCEDURE — 99214 OFFICE O/P EST MOD 30 MIN: CPT | Performed by: PHYSICIAN ASSISTANT

## 2022-09-18 RX ORDER — METHYLPREDNISOLONE 4 MG/1
TABLET ORAL
Qty: 21 TABLET | Refills: 0 | Status: SHIPPED | OUTPATIENT
Start: 2022-09-18 | End: 2022-11-15

## 2022-09-18 RX ORDER — DOXYCYCLINE HYCLATE 100 MG
100 TABLET ORAL 2 TIMES DAILY
Qty: 14 TABLET | Refills: 0 | Status: SHIPPED | OUTPATIENT
Start: 2022-09-18 | End: 2022-09-25

## 2022-09-18 ASSESSMENT — ENCOUNTER SYMPTOMS
EYE DISCHARGE: 0
MYALGIAS: 0
EYE REDNESS: 0
SINUS PAIN: 1
VOMITING: 0
SORE THROAT: 0
NAUSEA: 0
DIARRHEA: 0
COUGH: 1
SINUS PRESSURE: 1
SHORTNESS OF BREATH: 1
HEADACHES: 1
FEVER: 0

## 2022-09-18 NOTE — PROGRESS NOTES
"Subjective     Jillian Gottlieb is a 77 y.o. female who presents with Sinusitis (\"X 6 days, sinus pressure, green mucus.\")          This is a new problem.  The patient presents to clinic secondary to a possible sinus infection.    Sinusitis  This is a new problem. Episode onset: x1 week ago. The problem is unchanged. There has been no fever. Associated symptoms include congestion, coughing (The patient reports an associated cough.), headaches, shortness of breath (The patient reports intermittent shortness of breath.  The patient states she has been using her inhaler and nebulizer for her current symptoms.  The patient states her shortness of breath has improved with improvement of the environmental smoke.) and sinus pressure. Pertinent negatives include no sore throat. Past treatments include saline sprays (and OTC Mucinex.  The patient has also been using her inhaler and nebulizer as needed for her asthma and COPD symptoms.).     The patient reports no recent sick contacts.  No known exposure to COVID-19.  The patient is fully vaccinated against COVID-19.    The patient's past medical history is significant for asthma and COPD.    PMH:  has a past medical history of Acid reflux, Arthritis, Asthma, Breath shortness, Bronchitis (08/04/14), Cancer ([facility]) (01/13), Chronic pain of left knee (8/22/2017), Chronic pain of right knee (7/1/2016), COPD (chronic obstructive pulmonary disease) ([facility]), COPD (chronic obstructive pulmonary disease) ([facility]) (6/30/2016), Diabetes ([facility]), Dyspnea, Emphysema of lung ([facility]), Heart burn, Hypertension, Impaired fasting glucose (8/24/2015), Indigestion, Mass of left lower leg (7/27/2016), Personal history of renal cancer (4/20/2015), Renal disorder, Renal disorder (2013), Skin tag (6/24/2019), Unspecified disorder of thyroid, and Unspecified hypothyroidism (4/20/2015).    She has no past medical history of Encounter for long-term (current) use of other medications.  MEDS:   Current Outpatient " Medications:     glucosamine Sulfate 500 MG Cap, Take 500 mg by mouth 3 times a day with meals., Disp: , Rfl:     raloxifene (EVISTA) 60 MG Tab, TAKE 1 TABLET BY MOUTH EVERY MORNING, Disp: 90 Tablet, Rfl: 2    estradiol (ESTRACE) 0.1 MG/GM vaginal cream, Insert 1 g into the vagina every day. Week one, then 3 x weekly., Disp: 42.5 g, Rfl: 4    TRELEGY ELLIPTA 200-62.5-25 MCG/INH AEROSOL POWDER, BREATH ACTIVATED, INHALE 1 PUFF BY MOUTH EVERY DAY, Disp: 60 Each, Rfl: 1    amLODIPine (NORVASC) 10 MG Tab, Take 1 Tablet by mouth every day., Disp: 90 Tablet, Rfl: 3    triamcinolone acetonide (KENALOG) 0.5 % Cream, Apple to affected area twice daily, Disp: 15 g, Rfl: 1    metFORMIN ER (GLUCOPHAGE XR) 500 MG TABLET SR 24 HR, TAKE 3 TABLETS BY MOUTH EVERY DAY, Disp: 270 Tablet, Rfl: 3    methylPREDNISolone (MEDROL DOSEPAK) 4 MG Tablet Therapy Pack, As directed on the packaging label., Disp: 21 Tablet, Rfl: 0    metoprolol SR (TOPROL XL) 25 MG TABLET SR 24 HR, TAKE 1 TABLET BY MOUTH EVERY DAY, Disp: 90 Tablet, Rfl: 3    losartan (COZAAR) 100 MG Tab, TAKE 1 TABLET BY MOUTH EVERY DAY, Disp: 90 Tablet, Rfl: 3    fenofibrate (TRIGLIDE) 160 MG tablet, TAKE 1 TABLET BY MOUTH EVERY DAY, Disp: 90 Tablet, Rfl: 3    levothyroxine (SYNTHROID) 88 MCG Tab, TAKE 1 TABLET BY MOUTH EVERY MORNING ON AN EMPTY STOMACH, Disp: 90 Tablet, Rfl: 3    simvastatin (ZOCOR) 20 MG Tab, TAKE 1 TABLET BY MOUTH EVERY EVENING, Disp: 90 Tablet, Rfl: 3    albuterol 108 (90 Base) MCG/ACT Aero Soln inhalation aerosol, Inhale 2 Puffs every four hours as needed. FOR SHORTNESS OF BREATH, Disp: 6.7 g, Rfl: 10    albuterol (ACCUNEB) 0.63 MG/3ML nebulizer solution, Take 3 mL by nebulization every four hours as needed for Shortness of Breath., Disp: 90 mL, Rfl: 1    multivitamin (THERAGRAN) Tab, Take 1 Tab by mouth every day., Disp: , Rfl:   ALLERGIES:   Allergies   Allergen Reactions    Alcohol Vomiting, Nausea and Unspecified     injested-facial numbness, n/v  RXN=50  "years ago    Pcn [Penicillins] Unspecified     Flushed; \"felt weird\"  Tolerates cephalosporins  RXN=age 30's     SURGHX:   Past Surgical History:   Procedure Laterality Date    [place] CLOSURE OF VAGINA  1/4/2021    Procedure: COLPOCLEISIS, LE FORT, PARTIAL;  Surgeon: Rafi Ingram M.D.;  Location: SURGERY SAME DAY [facility];  Service: Gynecology    KNEE ARTHROPLASTY TOTAL Left 12/18/2017    Procedure: KNEE ARTHROPLASTY TOTAL;  Surgeon: Ricky Castaneda M.D.;  Location: SURGERY [facility];  Service: Orthopedics    KNEE ARTHROPLASTY TOTAL Right 10/17/2016    Procedure: KNEE ARTHROPLASTY TOTAL;  Surgeon: Ricky Castaneda M.D.;  Location: SURGERY [facility];  Service:     RECOVERY  3/4/2016    Procedure: IR2-PERQ NEPHRO-URETERAL CATH RIGHT-DR. RIDDLE-Surgery to follow in [facility] ;  Surgeon: Ir-Recovery Surgery;  Location: SURGERY [facility];  Service:     CYSTOSCOPY STENT REMOVAL Right 3/4/2016    Procedure: RIGID CYSTOSCOPY STENT REMOVAL;  Surgeon: Fredy Riddle M.D.;  Location: [facility];  Service:     URETEROSCOPY Right 3/4/2016    Procedure: URETEROSCOPY;  Surgeon: Fredy Riddle M.D.;  Location: [facility];  Service:     PERCUTANEOUS NEPHROSTOLITHOTOMY Right 3/4/2016    Procedure: PERCUTANEOUS NEPHROSTOLITHOTOMY FOR NEPHROLITHOTRIPSY;  Surgeon: Fredy Riddle M.D.;  Location: [facility];  Service:     CYSTOSCOPY STENT PLACEMENT Right 2/12/2016    Procedure: CYSTOSCOPY STENT PLACEMENT;  Surgeon: Fredy Riddle M.D.;  Location: [facility];  Service:     CYSTOSCOPY STENT PLACEMENT Right 6/26/2015    Procedure: CYSTOSCOPY STENT PLACEMENT RIGID POSSIBLE STENT;  Surgeon: Fredy Riddle M.D.;  Location: [facility];  Service:     URETEROSCOPY N/A 6/26/2015    Procedure: URETEROSCOPY;  Surgeon: Fredy Riddle M.D.;  Location: [facility];  Service:     LASERTRIPSY Right 6/26/2015    Procedure: LASERTRIPSY LITHO;  Surgeon: Fredy Riddle M.D.; "  Location: [facility];  Service:     URETEROSCOPY  8/20/2014    Performed by Fredy Cintron M.D. at SURGERY [facility]    LASERTRIPSY  8/20/2014    Performed by Fredy Cintron M.D. at SURGERY [facility]    CYSTOSCOPY  8/20/2014    Performed by Fredy Cintron M.D. at SURGERY [facility]    CYSTOSCOPY STENT PLACEMENT  1/31/2013    Performed by Fredy Cintron M.D. at SURGERY [facility]    URETEROSCOPY  1/31/2013    Performed by Fredy Cintron M.D. at SURGERY [facility]    NEPHRECTOMY PARTIAL  1/31/2013    Performed by Fredy Cintron M.D. at SURGERY [facility]    LASERTRIPSY  1/31/2013    Performed by Fredy Cintron M.D. at SURGERY [facility]    OTHER ORTHOPEDIC SURGERY  2006    right knee meniscus    OTHER ORTHOPEDIC SURGERY  1996    ORIF right arm    OTHER  1995    sinus surgery    GYN SURGERY  1994    hysterectomy    OTHER  1980    thyroid lumpectomy    BREAST BIOPSY Left 1976    benign    CUATE BY LAPAROSCOPY      OTHER ORTHOPEDIC SURGERY      achilles tendon reattach     TONSILLECTOMY       SOCHX:  reports that she has never smoked. She has never used smokeless tobacco. She reports that she does not drink alcohol and does not use drugs.  FH: Family history was reviewed, no pertinent findings to report    Review of Systems   Constitutional:  Negative for fever.   HENT:  Positive for congestion, sinus pressure and sinus pain. Negative for sore throat.    Eyes:  Negative for discharge and redness.   Respiratory:  Positive for cough (The patient reports an associated cough.) and shortness of breath (The patient reports intermittent shortness of breath.  The patient states she has been using her inhaler and nebulizer for her current symptoms.  The patient states her shortness of breath has improved with improvement of the environmental smoke.).    Cardiovascular:  Negative for chest pain and leg swelling.   Gastrointestinal:  Negative for diarrhea, nausea and vomiting.  "  Musculoskeletal:  Negative for myalgias.   Neurological:  Positive for headaches.            Objective     /78 (BP Location: Left arm, Patient Position: Sitting, BP Cuff Size: Adult)   Pulse 80   Temp 36.9 °C (98.4 °F) (Temporal)   Resp 20   Ht 1.778 m (5' 10\")   Wt 79.7 kg (175 lb 9.6 oz)   SpO2 95%   BMI 25.20 kg/m²      Physical Exam  Constitutional:       General: She is not in acute distress.     Appearance: Normal appearance. She is not ill-appearing.   HENT:      Head: Normocephalic and atraumatic.      Right Ear: Tympanic membrane, ear canal and external ear normal.      Left Ear: Tympanic membrane, ear canal and external ear normal.      Nose: Nose normal.      Mouth/Throat:      Mouth: Mucous membranes are moist.      Pharynx: Oropharynx is clear. No posterior oropharyngeal erythema.   Eyes:      Extraocular Movements: Extraocular movements intact.      Conjunctiva/sclera: Conjunctivae normal.   Cardiovascular:      Rate and Rhythm: Normal rate and regular rhythm.      Heart sounds: Normal heart sounds.   Pulmonary:      Effort: Pulmonary effort is normal. No respiratory distress.      Breath sounds: Normal breath sounds. No wheezing or rhonchi.   Musculoskeletal:         General: Normal range of motion.      Cervical back: Normal range of motion and neck supple.   Skin:     General: Skin is warm and dry.   Neurological:      Mental Status: She is alert and oriented to person, place, and time.             Progress:  The patient declined COVID-19 testing today in clinic.             Assessment & Plan          1. URI with cough and congestion    2. Sinus pressure    3. Acute non-recurrent sinusitis, unspecified location  - doxycycline (VIBRAMYCIN) 100 MG Tab; Take 1 Tablet by mouth 2 times a day for 7 days.  Dispense: 14 Tablet; Refill: 0    4. COPD exacerbation (HCC)  - doxycycline (VIBRAMYCIN) 100 MG Tab; Take 1 Tablet by mouth 2 times a day for 7 days.  Dispense: 14 Tablet; Refill: 0  - " methylPREDNISolone (MEDROL DOSEPAK) 4 MG Tablet Therapy Pack; Follow schedule on package instructions.  Dispense: 21 Tablet; Refill: 0    The patient's presenting symptoms and physical exam findings are consistent with a URI with associated cough and congestion.  The patient is also experiencing sinus pressure.  The patient's past medical history is significant for asthma and COPD.  The patient's current symptoms are likely exacerbating her known COPD.  The patient's physical exam today in clinic was normal.  The patient's lungs were clear to auscultation without wheezing or rhonchi, and her pulse ox was within normal limits.  The patient is nontoxic and appears in no acute distress.  The patient's vital signs are stable and within normal limits.  She is afebrile today in clinic.  Will prescribe the patient doxycycline and a Medrol Dosepak for her current symptoms.  This will cover for the patient's acute COPD exacerbation, as well as a presumed sinusitis.  Advised the patient to monitor for worsening signs and/or symptoms.  Recommend OTC medications and supportive care for symptomatic management.  Recommend patient follow-up with her PCP as needed.  Discussed strict return precautions with the patient, and she verbalized understanding.    Differential diagnoses, supportive care, and indications for immediate follow-up discussed with patient.   Instructed to return to clinic or nearest emergency department for any change in condition, further concerns, or worsening of symptoms.    OTC Tylenol or Motrin for fever/discomfort.  OTC cough/cold medication for symptomatic relief  OTC Supportive Care for Congestion - saline nasal spray or neti pot  Drink plenty of fluids  Follow-up with PCP  Return to clinic or go to the ED if symptoms worsen or fail to improve, or if the patient should develop worsening/increasing cough, congestion, ear pain, sore throat, shortness of breath, wheezing, chest pain, fever/chills, and/or any  concerning symptoms.    Discussed plan with the patient, and she agrees to the above.     I personally reviewed prior external notes and test results pertinent to today's visit.  I have independently reviewed and interpreted all diagnostics ordered during this urgent care visit.     Please note that this dictation was created using voice recognition software. I have made every reasonable attempt to correct obvious errors, but I expect that there may be errors of grammar and possibly content that I did not discover before finalizing the note.     This note was electronically signed by Astrid Betancourt PA-C

## 2022-10-20 NOTE — PROGRESS NOTES
Telemedicine Video Visit: Established Patient   This visit was conducted via Zoom using secure and encrypted videoconferencing technology. The patient was in a private location in the state [facility]. The patient's identity was confirmed and verbal consent was obtained for this virtual visit. Given the importance of social distancing and other strategies recommended to reduce the risk of COVID-19 transmission, I am providing medical care to this patient via audio/video visit in place of an in person visit at the request of the patient. Verbal consent to telehealth, risks, benefits, and consequences were discussed. Patient retains the right to withdraw at any time. All existing confidentiality protections apply. The patient has access to all transmitted medical information. No dissemination of any patient images or information to other entities without further written consent.    Place of Service: Home    Subjective:     Date of Visit: 10/24/2022     Chief Complaint:  Chief Complaint   Patient presents with    Follow-Up     Last seen 7/14/21 LALA Grove P.A.-C     HPI:   Jillian Gottlieb is a very pleasant 77 y.o. year old female never smoker, with a PMHx of right kidney cancer 2013, kidney stones for which she reports being on a special diet, bladder prolapse, acid reflux, hypothyroidism and hypertension who presented to the Pulmonary Clinic for a regular follow up. Last seen in the office on 7/14/2021 with RAFAEL Esteban.    Patient is followed by the pulmonary clinic for asthma.  PFTs in 2021 show a FVC of 2.16 L or 67%, FEV1 1.37 L or 56%, FEV1/FVC 63%, RV 83%, TLC 75%, DLCO 116% predicted.  Patient currently on Trelegy 200 and has not needed to use albuterol nebulizer/inhaler.  Symptomatically she denies any significant  shortness of breath, cough, sputum production, or wheezing.  She also states that she is lost 10 pounds through the keto diet.     Exacerbations this year:  0    Current medication regimen:  "Trelegy 200 and albuterol nebulizer and inhaler    MMRC Grade: 0- Breathless only during strenuous exercise    Past Medical History:   Diagnosis Date    Acid reflux     Arthritis     knees, hands, hips, neck-Osteo    Asthma     inhalers daily    Breath shortness     asthma related    Bronchitis 08/04/14    Cancer ([facility]) 01/13    R kidney    Chronic pain of left knee 8/22/2017    Chronic pain of right knee 7/1/2016    COPD (chronic obstructive pulmonary disease) ([facility])     COPD (chronic obstructive pulmonary disease) ([facility]) 6/30/2016    Diabetes ([facility])     \"pre\"    Dyspnea     Emphysema of lung ([facility])     Heart burn     Hypertension     Impaired fasting glucose 8/24/2015    Indigestion     Mass of left lower leg 7/27/2016    Personal history of renal cancer 4/20/2015    Renal disorder     stones    Renal disorder 2013    cancer right kidney 7% removed    Skin tag 6/24/2019    Unspecified disorder of thyroid     benign tumor removal    Unspecified hypothyroidism 4/20/2015     Past Surgical History:   Procedure Laterality Date    [place] CLOSURE OF VAGINA  1/4/2021    Procedure: COLPOCLEISIS, LE FORT, PARTIAL;  Surgeon: Rafi Ingram M.D.;  Location: SURGERY SAME DAY [facility];  Service: Gynecology    KNEE ARTHROPLASTY TOTAL Left 12/18/2017    Procedure: KNEE ARTHROPLASTY TOTAL;  Surgeon: Ricky Castaneda M.D.;  Location: SURGERY [facility];  Service: Orthopedics    KNEE ARTHROPLASTY TOTAL Right 10/17/2016    Procedure: KNEE ARTHROPLASTY TOTAL;  Surgeon: Ricky Castaneda M.D.;  Location: SURGERY [facility];  Service:     RECOVERY  3/4/2016    Procedure: IR2-PERQ NEPHRO-URETERAL CATH RIGHT-DR. RIDDLE-Surgery to follow in [facility] ;  Surgeon: Ir-Recovery Surgery;  Location: SURGERY [facility];  Service:     CYSTOSCOPY STENT REMOVAL Right 3/4/2016    Procedure: RIGID CYSTOSCOPY STENT REMOVAL;  Surgeon: Fredy Riddle M.D.;  Location: SURGERY [facility];  Service:     URETEROSCOPY Right 3/4/2016    Procedure: " URETEROSCOPY;  Surgeon: Fredy Cintron M.D.;  Location: SURGERY [facility];  Service:     PERCUTANEOUS NEPHROSTOLITHOTOMY Right 3/4/2016    Procedure: PERCUTANEOUS NEPHROSTOLITHOTOMY FOR NEPHROLITHOTRIPSY;  Surgeon: Fredy Cintron M.D.;  Location: SURGERY [facility];  Service:     CYSTOSCOPY STENT PLACEMENT Right 2/12/2016    Procedure: CYSTOSCOPY STENT PLACEMENT;  Surgeon: Fredy Cintron M.D.;  Location: SURGERY [facility];  Service:     CYSTOSCOPY STENT PLACEMENT Right 6/26/2015    Procedure: CYSTOSCOPY STENT PLACEMENT RIGID POSSIBLE STENT;  Surgeon: Fredy Cintron M.D.;  Location: SURGERY [facility];  Service:     URETEROSCOPY N/A 6/26/2015    Procedure: URETEROSCOPY;  Surgeon: Fredy Cintron M.D.;  Location: SURGERY [facility];  Service:     LASERTRIPSY Right 6/26/2015    Procedure: LASERTRIPSY LITHO;  Surgeon: Fredy Cintron M.D.;  Location: SURGERY [facility];  Service:     URETEROSCOPY  8/20/2014    Performed by Fredy Cintron M.D. at SURGERY [facility]    LASERTRIPSY  8/20/2014    Performed by Fredy Cintron M.D. at SURGERY [facility]    CYSTOSCOPY  8/20/2014    Performed by Fredy Cintron M.D. at [facility]    CYSTOSCOPY STENT PLACEMENT  1/31/2013    Performed by Fredy Cintron M.D. at SURGERY [facility]    URETEROSCOPY  1/31/2013    Performed by Fredy Cintron M.D. at [facility]    NEPHRECTOMY PARTIAL  1/31/2013    Performed by Fredy Cintron M.D. at [facility]    LASERTRIPSY  1/31/2013    Performed by Fredy Cintron M.D. at SURGERY [facility]    OTHER ORTHOPEDIC SURGERY  2006    right knee meniscus    OTHER ORTHOPEDIC SURGERY  1996    ORIF right arm    OTHER  1995    sinus surgery    GYN SURGERY  1994    hysterectomy    OTHER  1980    thyroid lumpectomy    BREAST BIOPSY Left 1976    benign    CUATE BY LAPAROSCOPY      OTHER ORTHOPEDIC SURGERY      achilles tendon reattach     TONSILLECTOMY       Social History     Socioeconomic  History    Marital status:      Spouse name: Not on file    Number of children: Not on file    Years of education: Not on file    Highest education level: Not on file   Occupational History    Not on file   Tobacco Use    Smoking status: Never    Smokeless tobacco: Never   Vaping Use    Vaping Use: Never used   Substance and Sexual Activity    Alcohol use: No     Comment: allergic to it    Drug use: No    Sexual activity: Never     Partners: Male     Birth control/protection: Post-Menopausal   Other Topics Concern    Not on file   Social History Narrative    Not on file     Social Determinants of Health     Financial Resource Strain: Not on file   Food Insecurity: Not on file   Transportation Needs: Not on file   Physical Activity: Not on file   Stress: Not on file   Social Connections: Not on file   Intimate Partner Violence: Not on file   Housing Stability: Not on file        Family History   Problem Relation Age of Onset    Kidney Disease Mother     Colon Cancer Father      Current Outpatient Medications on File Prior to Visit   Medication Sig Dispense Refill    methylPREDNISolone (MEDROL DOSEPAK) 4 MG Tablet Therapy Pack Follow schedule on package instructions. 21 Tablet 0    glucosamine Sulfate 500 MG Cap Take 500 mg by mouth 3 times a day with meals.      raloxifene (EVISTA) 60 MG Tab TAKE 1 TABLET BY MOUTH EVERY MORNING 90 Tablet 2    estradiol (ESTRACE) 0.1 MG/GM vaginal cream Insert 1 g into the vagina every day. Week one, then 3 x weekly. 42.5 g 4    amLODIPine (NORVASC) 10 MG Tab Take 1 Tablet by mouth every day. 90 Tablet 3    triamcinolone acetonide (KENALOG) 0.5 % Cream Apple to affected area twice daily 15 g 1    metFORMIN ER (GLUCOPHAGE XR) 500 MG TABLET SR 24 HR TAKE 3 TABLETS BY MOUTH EVERY  Tablet 3    metoprolol SR (TOPROL XL) 25 MG TABLET SR 24 HR TAKE 1 TABLET BY MOUTH EVERY DAY 90 Tablet 3    losartan (COZAAR) 100 MG Tab TAKE 1 TABLET BY MOUTH EVERY DAY 90 Tablet 3    fenofibrate  "(TRIGLIDE) 160 MG tablet TAKE 1 TABLET BY MOUTH EVERY DAY 90 Tablet 3    levothyroxine (SYNTHROID) 88 MCG Tab TAKE 1 TABLET BY MOUTH EVERY MORNING ON AN EMPTY STOMACH 90 Tablet 3    simvastatin (ZOCOR) 20 MG Tab TAKE 1 TABLET BY MOUTH EVERY EVENING 90 Tablet 3    multivitamin (THERAGRAN) Tab Take 1 Tab by mouth every day.       No current facility-administered medications on file prior to visit.     Allergies: Alcohol and Pcn [penicillins]    ROS:   Review of Systems   Constitutional:  Negative for chills, diaphoresis, fever and malaise/fatigue.   HENT:  Negative for congestion and sinus pain.    Respiratory:  Negative for cough, hemoptysis, sputum production, shortness of breath and wheezing.    Cardiovascular:  Negative for chest pain, palpitations and leg swelling.   Gastrointestinal:  Negative for diarrhea, heartburn, nausea and vomiting.   Musculoskeletal:  Negative for falls and myalgias.   Neurological:  Negative for dizziness, weakness and headaches.     Vitals:  Resp 16   Ht 1.791 m (5' 10.5\")   Wt 78 kg (172 lb)     PHYSICAL EXAM: Limited due to telemedicine visit   Constitutional: Alert, no distress, well-groomed.  Skin: No rashes in visible areas.  Eye: Round. Conjunctiva clear, lids normal. No icterus.   ENMT: Lips pink without lesions, good dentition, moist mucous membranes. Phonation normal.  Neck: No masses, no thyromegaly. Moves freely without pain.  CV: Pulse as reported by patient  Respiratory: Unlabored respiratory effort, no cough or audible wheeze  Psych: Alert and oriented x3, normal affect and mood.     Laboratory Data:  PFTs (Date: 7/9/2021)-      Impression:  Baseline spirometry shows airflow obstruction with FEV1/FVC ratio 63 and FEV1 of 1.37 L or 56% predicted.  FVC is also mildly reduced at 67% predicted.  Bronchodilator testing was not performed.  Total lung capacity is reduced at 4.4 L or 75% predicted.  Fusion capacity is within normal limits at 116% predicted.  Pulmonary function " testing shows mixed restrictive obstructive defect with preserved DLCO.  Correlate clinically and with imaging.    CXR: (Date: 12/31/2020)-  Impression:  The cardiomediastinal silhouette is enlarged. Atherosclerotic calcification is seen.  No focal consolidation, pleural effusion or pneumothorax is identified.  Costophrenic angles are clear.  Postsurgical changes are seen of the proximal right humerus.   Mild degenerative changes are seen in the spine.      Assessment and Plan:    Problem List Items Addressed This Visit       Mild persistent asthma without complication     PFTs in 2021 show a FVC of 2.16 L or 67%, FEV1 1.37 L or 56%, FEV1/FVC 63%, RV 83%, TLC 75%, DLCO 116% predicted.  Patient currently on Trelegy 200 and has not needed to use albuterol nebulizer/inhaler.  Symptomatically she denies any significant  shortness of breath, cough, sputum production, or wheezing.   -- Continue Trelegy 200.  Consider decreasing down to 100 if symptoms are stable at next appointment.  -- Albuterol as needed  -- Encourage patient to remain active, increasing exercise as tolerated  -- Encourage patient to remain fully vaccinated against COVID, influenza, pneumococcal.  Patient is due for COVID booster and pneumococcal vaccine.  Unfortunately she is not in the clinic today and is virtual so we could not administer these vaccines today.  I did advise the patient to follow-up with her PCP as well as outside pharmacies to receive vaccines.         Relevant Medications    albuterol (ACCUNEB) 0.63 MG/3ML nebulizer solution    albuterol 108 (90 Base) MCG/ACT Aero Soln inhalation aerosol    Fluticasone-Umeclidin-Vilant (TRELEGY ELLIPTA) 200-62.5-25 MCG/ACT AEROSOL POWDER, BREATH ACTIVATED       Diagnostic studies have been reviewed with the patient.    Return in about 1 year (around 10/24/2023), or if symptoms worsen or fail to improve.     This note was generated using voice recognition software which has a chance of producing  errors of grammar and possibly content.  I have made every reasonable attempt to find and correct any obvious errors, but it should be expected that some may not be found prior to finalization of this note.    Time spent in record review prior to patient arrival, reviewing results, and in face-to-face encounter totaled 21 min.  __________  ROJELIO Preston  Pulmonary Medicine  [facility]

## 2022-10-24 ENCOUNTER — TELEMEDICINE (OUTPATIENT)
Dept: SLEEP MEDICINE | Facility: MEDICAL CENTER | Age: 77
End: 2022-10-24
Payer: MEDICARE

## 2022-10-24 VITALS — HEIGHT: 71 IN | WEIGHT: 172 LBS | BODY MASS INDEX: 24.08 KG/M2 | RESPIRATION RATE: 16 BRPM

## 2022-10-24 DIAGNOSIS — J45.30 MILD PERSISTENT ASTHMA WITHOUT COMPLICATION: ICD-10-CM

## 2022-10-24 PROCEDURE — 99213 OFFICE O/P EST LOW 20 MIN: CPT | Mod: 95

## 2022-10-24 RX ORDER — ALBUTEROL SULFATE 90 UG/1
2 AEROSOL, METERED RESPIRATORY (INHALATION) EVERY 4 HOURS PRN
Qty: 1 EACH | Refills: 11 | Status: SHIPPED | OUTPATIENT
Start: 2022-10-24

## 2022-10-24 RX ORDER — ALBUTEROL SULFATE 0.63 MG/3ML
0.63 SOLUTION RESPIRATORY (INHALATION) EVERY 4 HOURS PRN
Qty: 90 ML | Refills: 1 | Status: SHIPPED | OUTPATIENT
Start: 2022-10-24 | End: 2022-11-10

## 2022-10-24 RX ORDER — FLUTICASONE FUROATE, UMECLIDINIUM BROMIDE AND VILANTEROL TRIFENATATE 200; 62.5; 25 UG/1; UG/1; UG/1
1 POWDER RESPIRATORY (INHALATION) DAILY
Qty: 3 EACH | Refills: 3 | Status: SHIPPED | OUTPATIENT
Start: 2022-10-24 | End: 2023-05-11

## 2022-10-24 ASSESSMENT — ENCOUNTER SYMPTOMS
SINUS PAIN: 0
VOMITING: 0
WHEEZING: 0
FALLS: 0
DIAPHORESIS: 0
PALPITATIONS: 0
COUGH: 0
DIZZINESS: 0
WEAKNESS: 0
DIARRHEA: 0
SPUTUM PRODUCTION: 0
CHILLS: 0
SHORTNESS OF BREATH: 0
MYALGIAS: 0
FEVER: 0
HEARTBURN: 0
HEMOPTYSIS: 0
NAUSEA: 0
HEADACHES: 0

## 2022-10-24 NOTE — ASSESSMENT & PLAN NOTE
PFTs in 2021 show a FVC of 2.16 L or 67%, FEV1 1.37 L or 56%, FEV1/FVC 63%, RV 83%, TLC 75%, DLCO 116% predicted.  Patient currently on Trelegy 200 and has not needed to use albuterol nebulizer/inhaler.  Symptomatically she denies any significant  shortness of breath, cough, sputum production, or wheezing.   -- Continue Trelegy 200.  Consider decreasing down to 100 if symptoms are stable at next appointment.  -- Albuterol as needed  -- Encourage patient to remain active, increasing exercise as tolerated  -- Encourage patient to remain fully vaccinated against COVID, influenza, pneumococcal.  Patient is due for COVID booster and pneumococcal vaccine.  Unfortunately she is not in the clinic today and is virtual so we could not administer these vaccines today.  I did advise the patient to follow-up with her PCP as well as outside pharmacies to receive vaccines.
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
## 2022-11-01 RX ORDER — TRIAMCINOLONE ACETONIDE 5 MG/G
CREAM TOPICAL
Qty: 15 G | Refills: 1 | Status: SHIPPED | OUTPATIENT
Start: 2022-11-01 | End: 2024-01-23

## 2022-11-01 NOTE — TELEPHONE ENCOUNTER
Received request via: Pharmacy    Was the patient seen in the last year in this department? Yes  LOV 07/27/2022  Does the patient have an active prescription (recently filled or refills available) for medication(s) requested? No

## 2022-11-09 ENCOUNTER — PATIENT MESSAGE (OUTPATIENT)
Dept: HEALTH INFORMATION MANAGEMENT | Facility: OTHER | Age: 77
End: 2022-11-09

## 2022-11-15 ENCOUNTER — OFFICE VISIT (OUTPATIENT)
Dept: MEDICAL GROUP | Facility: LAB | Age: 77
End: 2022-11-15
Payer: MEDICARE

## 2022-11-15 VITALS
BODY MASS INDEX: 23.91 KG/M2 | HEIGHT: 71 IN | TEMPERATURE: 97.4 F | HEART RATE: 74 BPM | SYSTOLIC BLOOD PRESSURE: 154 MMHG | RESPIRATION RATE: 12 BRPM | DIASTOLIC BLOOD PRESSURE: 64 MMHG | WEIGHT: 170.8 LBS | OXYGEN SATURATION: 95 %

## 2022-11-15 DIAGNOSIS — E11.9 TYPE 2 DIABETES MELLITUS WITHOUT COMPLICATION, WITHOUT LONG-TERM CURRENT USE OF INSULIN (HCC): ICD-10-CM

## 2022-11-15 DIAGNOSIS — I10 ESSENTIAL HYPERTENSION: ICD-10-CM

## 2022-11-15 LAB
HBA1C MFR BLD: 6 % (ref 0–5.6)
INT CON NEG: ABNORMAL
INT CON POS: ABNORMAL

## 2022-11-15 PROCEDURE — 83036 HEMOGLOBIN GLYCOSYLATED A1C: CPT | Performed by: FAMILY MEDICINE

## 2022-11-15 PROCEDURE — 99214 OFFICE O/P EST MOD 30 MIN: CPT | Performed by: FAMILY MEDICINE

## 2022-11-15 RX ORDER — HYDROCHLOROTHIAZIDE 25 MG/1
25 TABLET ORAL DAILY
Qty: 90 TABLET | Refills: 1 | Status: SHIPPED | OUTPATIENT
Start: 2022-11-15 | End: 2023-01-30 | Stop reason: SDUPTHER

## 2022-11-15 RX ORDER — BIOTIN 10 MG
TABLET ORAL
COMMUNITY

## 2022-11-15 NOTE — PROGRESS NOTES
"Subjective:     CC: Diabetes, hypertension    HPI:   Jillian presents today to follow-up on diabetes and hypertension.  A1c today is 6.0%.  She is on metformin 1500 mg daily.    Blood pressure has been elevated at recent visits.  She is not checking at home.  She does take amlodipine 10 mg, losartan 100mg and metoprolol 25 mg daily.    Medications, past medical history, allergies, and social history have been reviewed and updated.      Objective:       Exam:  BP (!) 154/64 (BP Location: Left arm, Patient Position: Sitting, BP Cuff Size: Adult)   Pulse 74   Temp 36.3 °C (97.4 °F)   Resp 12   Ht 1.791 m (5' 10.5\")   Wt 77.5 kg (170 lb 12.8 oz)   SpO2 95%   BMI 24.16 kg/m²  Body mass index is 24.16 kg/m².    Constitutional: Alert. Well appearing. No distress.  Skin: Warm, dry, good turgor, no visible rashes.  Eye: Equal, round and reactive to light, conjunctiva clear, lids normal.  ENMT: Moist mucous membranes. Normal dentition.  Respiratory: Normal effort.   Psych: Answers questions appropriately. Normal affect and mood.    Assessment & Plan:     77 y.o. female with the following -     1. Type 2 diabetes mellitus without complication, without long-term current use of insulin ([facility])  A1c at goal at 6.0%.  Continue metformin 1500 mg daily.  Screenings up-to-date.  - POCT Hemoglobin A1C    2. Essential hypertension  Above goal.  Add hydrochlorothiazide 25 mg daily, check BMP in 3 to 4 weeks.  Continue losartan, amlodipine, metoprolol.  Follow-up 1 month.  - hydroCHLOROthiazide (HYDRODIURIL) 25 MG Tab; Take 1 Tablet by mouth every day.  Dispense: 90 Tablet; Refill: 1  - Basic Metabolic Panel; Future      Please note that this note was created using voice recognition software.      "

## 2022-12-13 ENCOUNTER — HOSPITAL ENCOUNTER (OUTPATIENT)
Dept: LAB | Facility: MEDICAL CENTER | Age: 77
End: 2022-12-13
Attending: UROLOGY
Payer: MEDICARE

## 2022-12-13 DIAGNOSIS — I10 ESSENTIAL HYPERTENSION: ICD-10-CM

## 2022-12-13 LAB
ANION GAP SERPL CALC-SCNC: 10 MMOL/L (ref 7–16)
BUN SERPL-MCNC: 25 MG/DL (ref 8–22)
CALCIUM SERPL-MCNC: 9.7 MG/DL (ref 8.5–10.5)
CHLORIDE SERPL-SCNC: 103 MMOL/L (ref 96–112)
CO2 SERPL-SCNC: 27 MMOL/L (ref 20–33)
CREAT SERPL-MCNC: 0.74 MG/DL (ref 0.5–1.4)
FASTING STATUS PATIENT QL REPORTED: NORMAL
GFR SERPLBLD CREATININE-BSD FMLA CKD-EPI: 83 ML/MIN/1.73 M 2
GLUCOSE SERPL-MCNC: 127 MG/DL (ref 65–99)
POTASSIUM SERPL-SCNC: 4.1 MMOL/L (ref 3.6–5.5)
SODIUM SERPL-SCNC: 140 MMOL/L (ref 135–145)

## 2022-12-13 PROCEDURE — 80048 BASIC METABOLIC PNL TOTAL CA: CPT

## 2022-12-13 PROCEDURE — 36415 COLL VENOUS BLD VENIPUNCTURE: CPT

## 2022-12-15 ENCOUNTER — OFFICE VISIT (OUTPATIENT)
Dept: MEDICAL GROUP | Facility: LAB | Age: 77
End: 2022-12-15
Payer: MEDICARE

## 2022-12-15 VITALS
HEART RATE: 70 BPM | TEMPERATURE: 97.4 F | HEIGHT: 71 IN | DIASTOLIC BLOOD PRESSURE: 78 MMHG | RESPIRATION RATE: 12 BRPM | OXYGEN SATURATION: 94 % | BODY MASS INDEX: 22.43 KG/M2 | SYSTOLIC BLOOD PRESSURE: 128 MMHG | WEIGHT: 160.2 LBS

## 2022-12-15 DIAGNOSIS — J44.9 CHRONIC OBSTRUCTIVE PULMONARY DISEASE, UNSPECIFIED COPD TYPE (HCC): ICD-10-CM

## 2022-12-15 DIAGNOSIS — I10 ESSENTIAL HYPERTENSION: ICD-10-CM

## 2022-12-15 DIAGNOSIS — J45.30 MILD PERSISTENT ASTHMA WITHOUT COMPLICATION: ICD-10-CM

## 2022-12-15 PROCEDURE — 99214 OFFICE O/P EST MOD 30 MIN: CPT | Performed by: FAMILY MEDICINE

## 2022-12-15 RX ORDER — ALBUTEROL SULFATE 2.5 MG/3ML
2.5 SOLUTION RESPIRATORY (INHALATION) EVERY 4 HOURS PRN
Qty: 30 EACH | Refills: 1 | Status: SHIPPED | OUTPATIENT
Start: 2022-12-15 | End: 2023-01-13 | Stop reason: SDUPTHER

## 2022-12-15 NOTE — PROGRESS NOTES
"Subjective:     CC: HTN, asthma/COPD    HPI:   Jillian presents today to follow-up on hypertension and discuss recent asthma/COPD flare that has improved.    Hydrochlorothiazide added at last visit for hypertension.  She is also on amlodipine 10 mg, losartan 100 mg, and metoprolol 25 mg daily.  Not checking blood pressure at home.  Initially elevated today but does decrease to 120s-130s over 70s-80s.    Had flare of breathing symptoms a few weeks ago with viral upper respiratory infection.  Was using albuterol nebulizer and inhaler frequently.  Now doing much better with less use.  No continued trouble breathing or cough.    Medications, past medical history, allergies, and social history have been reviewed and updated.      Objective:       Exam:  /78 (BP Location: Left arm, Patient Position: Sitting, BP Cuff Size: Adult)   Pulse 70   Temp 36.3 °C (97.4 °F)   Resp 12   Ht 1.791 m (5' 10.51\")   Wt 72.7 kg (160 lb 3.2 oz)   SpO2 94%   BMI 22.65 kg/m²  Body mass index is 22.65 kg/m².    Constitutional: Alert. Well appearing. No distress.  Skin: Warm, dry, good turgor, no visible rashes.  Eye: Equal, round and reactive to light, conjunctiva clear, lids normal.  Respiratory: Normal effort. Lungs are clear to auscultation bilaterally.  Cardiovascular: Regular rate and rhythm. Normal S1/S2. No murmurs, rubs or gallops.   Neuro: Moves all four extremities. No facial droop.  Psych: Answers questions appropriately. Normal affect and mood.      Assessment & Plan:     77 y.o. female with the following -     1. Essential hypertension  BP at goal today.  Continue hydrochlorothiazide, losartan, metoprolol, amlodipine.  Recent BMP normal.     2. Mild persistent asthma without complication  3. Chronic obstructive pulmonary disease, unspecified COPD type (HCC)  Recent flare secondary to viral upper respiratory infection.  Now doing much better.  Lungs clear on exam today, she is decreasing albuterol use.  Does need " refill of nebulizers.  Continues to follow with pulmonology.  - albuterol (PROVENTIL) 2.5mg/3ml Nebu Soln solution for nebulization; Take 3 mL by nebulization every four hours as needed for Shortness of Breath.  Dispense: 30 Each; Refill: 1         Please note that this note was created using voice recognition software.

## 2023-01-12 ENCOUNTER — PATIENT MESSAGE (OUTPATIENT)
Dept: MEDICAL GROUP | Facility: LAB | Age: 78
End: 2023-01-12
Payer: MEDICARE

## 2023-01-13 ENCOUNTER — PATIENT MESSAGE (OUTPATIENT)
Dept: MEDICAL GROUP | Facility: LAB | Age: 78
End: 2023-01-13
Payer: MEDICARE

## 2023-01-13 DIAGNOSIS — J45.30 MILD PERSISTENT ASTHMA WITHOUT COMPLICATION: ICD-10-CM

## 2023-01-13 RX ORDER — METFORMIN HYDROCHLORIDE 500 MG/1
TABLET, EXTENDED RELEASE ORAL
Qty: 270 TABLET | Refills: 3 | Status: SHIPPED | OUTPATIENT
Start: 2023-01-13 | End: 2024-01-22

## 2023-01-13 RX ORDER — ESTRADIOL 0.1 MG/G
1 CREAM VAGINAL DAILY
Qty: 42.5 G | Refills: 4 | Status: SHIPPED | OUTPATIENT
Start: 2023-01-13 | End: 2024-03-05

## 2023-01-13 RX ORDER — RALOXIFENE HYDROCHLORIDE 60 MG/1
60 TABLET, FILM COATED ORAL EVERY MORNING
Qty: 90 TABLET | Refills: 2 | Status: SHIPPED | OUTPATIENT
Start: 2023-01-13 | End: 2023-10-17

## 2023-01-13 RX ORDER — ALBUTEROL SULFATE 2.5 MG/3ML
2.5 SOLUTION RESPIRATORY (INHALATION) EVERY 4 HOURS PRN
Qty: 30 EACH | Refills: 1 | Status: SHIPPED | OUTPATIENT
Start: 2023-01-13

## 2023-01-13 NOTE — TELEPHONE ENCOUNTER
Received request via: Pharmacy    Was the patient seen in the last year in this department? Yes  12/15/22  Does the patient have an active prescription (recently filled or refills available) for medication(s) requested? No    Does the patient have skilled nursing Plus and need 100 day supply (blood pressure, diabetes and cholesterol meds only)? Medication is not for cholesterol, blood pressure or diabetes

## 2023-01-19 ENCOUNTER — HOSPITAL ENCOUNTER (OUTPATIENT)
Facility: MEDICAL CENTER | Age: 78
End: 2023-01-19
Payer: MEDICARE

## 2023-01-19 ENCOUNTER — OFFICE VISIT (OUTPATIENT)
Dept: URGENT CARE | Facility: CLINIC | Age: 78
End: 2023-01-19
Payer: MEDICARE

## 2023-01-19 VITALS
TEMPERATURE: 99.1 F | HEIGHT: 70 IN | BODY MASS INDEX: 22.9 KG/M2 | SYSTOLIC BLOOD PRESSURE: 104 MMHG | DIASTOLIC BLOOD PRESSURE: 78 MMHG | OXYGEN SATURATION: 98 % | HEART RATE: 65 BPM | WEIGHT: 160 LBS | RESPIRATION RATE: 18 BRPM

## 2023-01-19 DIAGNOSIS — R39.9 UTI SYMPTOMS: ICD-10-CM

## 2023-01-19 LAB
APPEARANCE UR: NORMAL
BILIRUB UR STRIP-MCNC: NORMAL MG/DL
COLOR UR AUTO: YELLOW
GLUCOSE UR STRIP.AUTO-MCNC: NORMAL MG/DL
KETONES UR STRIP.AUTO-MCNC: NORMAL MG/DL
LEUKOCYTE ESTERASE UR QL STRIP.AUTO: NORMAL
NITRITE UR QL STRIP.AUTO: NORMAL
PH UR STRIP.AUTO: 5.5 [PH] (ref 5–8)
PROT UR QL STRIP: NORMAL MG/DL
RBC UR QL AUTO: NORMAL
SP GR UR STRIP.AUTO: 1.02
UROBILINOGEN UR STRIP-MCNC: 0.2 MG/DL

## 2023-01-19 PROCEDURE — 99213 OFFICE O/P EST LOW 20 MIN: CPT

## 2023-01-19 PROCEDURE — 81002 URINALYSIS NONAUTO W/O SCOPE: CPT

## 2023-01-19 PROCEDURE — 87186 SC STD MICRODIL/AGAR DIL: CPT

## 2023-01-19 PROCEDURE — 87077 CULTURE AEROBIC IDENTIFY: CPT

## 2023-01-19 PROCEDURE — 87086 URINE CULTURE/COLONY COUNT: CPT

## 2023-01-19 RX ORDER — SULFAMETHOXAZOLE AND TRIMETHOPRIM 800; 160 MG/1; MG/1
1 TABLET ORAL 2 TIMES DAILY
Qty: 14 TABLET | Refills: 0 | Status: SHIPPED | OUTPATIENT
Start: 2023-01-19 | End: 2023-01-26

## 2023-01-19 NOTE — PROGRESS NOTES
Subjective:   Jillian Gottlieb is a 77 y.o. female who presents for Dysuria (X2 days, pressure with urination, hx of kidney stone ) and Back Pain (X2 days, mainly on the right side )      HPI:    Patient presents to urgent care with concerns of UTI. She has had two days of dysuria, urinary frequency, bladder pressure, right-sided flank discomfort. She denies hematuria, urinary retention. She reports history of renal cancer 10 years ago. She states she has an ultrasound performed in November and she had a small kidney stone in her bladder which did not require intervention. She denies colicky, severe pain. Denies vaginal discharge. Denies fever, chills, night sweats, nausea, vomiting, diarrhea.     ROS As above in HPI    Medications:    Current Outpatient Medications on File Prior to Visit   Medication Sig Dispense Refill    metFORMIN ER (GLUCOPHAGE XR) 500 MG TABLET SR 24 HR TAKE 3 TABLETS BY MOUTH EVERY  Tablet 3    estradiol (ESTRACE) 0.1 MG/GM vaginal cream Insert 1 g into the vagina every day. Week one, then 3 x weekly. 42.5 g 4    raloxifene (EVISTA) 60 MG Tab Take 1 Tablet by mouth every morning. 90 Tablet 2    albuterol (PROVENTIL) 2.5mg/3ml Nebu Soln solution for nebulization Take 3 mL by nebulization every four hours as needed for Shortness of Breath. 30 Each 1    Biotin 10 MG Tab Take  by mouth.      hydroCHLOROthiazide (HYDRODIURIL) 25 MG Tab Take 1 Tablet by mouth every day. 90 Tablet 1    triamcinolone acetonide (KENALOG) 0.5 % Cream APPLE TO AFFECTED AREA TWICE DAILY 15 g 1    albuterol 108 (90 Base) MCG/ACT Aero Soln inhalation aerosol Inhale 2 Puffs every four hours as needed for Shortness of Breath. FOR SHORTNESS OF BREATH 1 Each 11    Fluticasone-Umeclidin-Vilant (TRELEGY ELLIPTA) 200-62.5-25 MCG/ACT AEROSOL POWDER, BREATH ACTIVATED Inhale 1 Puff every day. 3 Each 3    glucosamine Sulfate 500 MG Cap Take 500 mg by mouth 3 times a day with meals.      amLODIPine (NORVASC) 10 MG Tab Take 1  Tablet by mouth every day. 90 Tablet 3    metoprolol SR (TOPROL XL) 25 MG TABLET SR 24 HR TAKE 1 TABLET BY MOUTH EVERY DAY 90 Tablet 3    losartan (COZAAR) 100 MG Tab TAKE 1 TABLET BY MOUTH EVERY DAY 90 Tablet 3    fenofibrate (TRIGLIDE) 160 MG tablet TAKE 1 TABLET BY MOUTH EVERY DAY 90 Tablet 3    levothyroxine (SYNTHROID) 88 MCG Tab TAKE 1 TABLET BY MOUTH EVERY MORNING ON AN EMPTY STOMACH 90 Tablet 3    simvastatin (ZOCOR) 20 MG Tab TAKE 1 TABLET BY MOUTH EVERY EVENING 90 Tablet 3    multivitamin (THERAGRAN) Tab Take 1 Tab by mouth every day.       No current facility-administered medications on file prior to visit.        Allergies:   Alcohol and Pcn [penicillins]    Problem List:   Patient Active Problem List   Diagnosis    Hyperlipidemia    Hypothyroidism    Personal history of renal cancer    Impaired fasting glucose    Essential hypertension    Kidney stones    COPD (chronic obstructive pulmonary disease) (HCC)    GERD (gastroesophageal reflux disease)    Hammer toe    Bladder prolapse, female, acquired    Mild persistent asthma without complication        Surgical History:  Past Surgical History:   Procedure Laterality Date    MN CLOSURE OF VAGINA  1/4/2021    Procedure: COLPOCLEISIS, LE FORT, PARTIAL;  Surgeon: Rafi Ingram M.D.;  Location: SURGERY SAME DAY [facility];  Service: Gynecology    KNEE ARTHROPLASTY TOTAL Left 12/18/2017    Procedure: KNEE ARTHROPLASTY TOTAL;  Surgeon: Ricky Castaneda M.D.;  Location: SURGERY [facility];  Service: Orthopedics    KNEE ARTHROPLASTY TOTAL Right 10/17/2016    Procedure: KNEE ARTHROPLASTY TOTAL;  Surgeon: Ricky Castaneda M.D.;  Location: SURGERY [facility];  Service:     RECOVERY  3/4/2016    Procedure: IR2-PERQ NEPHRO-URETERAL CATH RIGHT-DR. RIDDLE-Surgery to follow in [facility] ;  Surgeon: Ir-Recovery Surgery;  Location: SURGERY [facility];  Service:     CYSTOSCOPY STENT REMOVAL Right 3/4/2016    Procedure: RIGID CYSTOSCOPY STENT REMOVAL;  Surgeon:  Fredy Cintron M.D.;  Location: SURGERY [facility];  Service:     URETEROSCOPY Right 3/4/2016    Procedure: URETEROSCOPY;  Surgeon: Fredy Cintron M.D.;  Location: SURGERY [facility];  Service:     PERCUTANEOUS NEPHROSTOLITHOTOMY Right 3/4/2016    Procedure: PERCUTANEOUS NEPHROSTOLITHOTOMY FOR NEPHROLITHOTRIPSY;  Surgeon: Fredy Cintron M.D.;  Location: SURGERY [facility];  Service:     CYSTOSCOPY STENT PLACEMENT Right 2/12/2016    Procedure: CYSTOSCOPY STENT PLACEMENT;  Surgeon: Fredy Cintron M.D.;  Location: SURGERY [facility];  Service:     CYSTOSCOPY STENT PLACEMENT Right 6/26/2015    Procedure: CYSTOSCOPY STENT PLACEMENT RIGID POSSIBLE STENT;  Surgeon: Fredy Cintron M.D.;  Location: SURGERY [facility];  Service:     URETEROSCOPY N/A 6/26/2015    Procedure: URETEROSCOPY;  Surgeon: Fredy Cintron M.D.;  Location: SURGERY [facility];  Service:     LASERTRIPSY Right 6/26/2015    Procedure: LASERTRIPSY LITHO;  Surgeon: Fredy Cintron M.D.;  Location: SURGERY [facility];  Service:     URETEROSCOPY  8/20/2014    Performed by Fredy Cintron M.D. at SURGERY [facility]    LASERTRIPSY  8/20/2014    Performed by Fredy Cintron M.D. at SURGERY [facility]    CYSTOSCOPY  8/20/2014    Performed by Fredy Cintron M.D. at SURGERY [facility]    CYSTOSCOPY STENT PLACEMENT  1/31/2013    Performed by Fredy Cintron M.D. at SURGERY [facility]    URETEROSCOPY  1/31/2013    Performed by Fredy Cintron M.D. at SURGERY [facility]    NEPHRECTOMY PARTIAL  1/31/2013    Performed by Fredy Cintron M.D. at SURGERY [facility]    LASERTRIPSY  1/31/2013    Performed by Fredy Cintron M.D. at SURGERY [facility]    OTHER ORTHOPEDIC SURGERY  2006    right knee meniscus    OTHER ORTHOPEDIC SURGERY  1996    ORIF right arm    OTHER  1995    sinus surgery    GYN SURGERY  1994    hysterectomy    OTHER  1980    thyroid lumpectomy    BREAST BIOPSY Left 1976    benign    CUATE BY LAPAROSCOPY       "OTHER ORTHOPEDIC SURGERY      achilles tendon reattach     TONSILLECTOMY         Past Social Hx:   Social History     Tobacco Use    Smoking status: Never    Smokeless tobacco: Never   Vaping Use    Vaping Use: Never used   Substance Use Topics    Alcohol use: No     Comment: allergic to it    Drug use: No          Problem list, medications, and allergies reviewed by myself today in Epic.     Objective:     /78   Pulse 65   Temp 37.3 °C (99.1 °F) (Temporal)   Resp 18   Ht 1.778 m (5' 10\")   Wt 72.6 kg (160 lb)   SpO2 98%   BMI 22.96 kg/m²     Physical Exam  Vitals and nursing note reviewed.   Constitutional:       Appearance: Normal appearance.   HENT:      Head: Normocephalic and atraumatic.   Eyes:      Conjunctiva/sclera: Conjunctivae normal.      Pupils: Pupils are equal, round, and reactive to light.   Cardiovascular:      Rate and Rhythm: Normal rate and regular rhythm.      Heart sounds: Normal heart sounds.   Pulmonary:      Effort: Pulmonary effort is normal.      Breath sounds: Normal breath sounds.   Abdominal:      General: Bowel sounds are normal. There is no distension.      Palpations: Abdomen is soft.      Tenderness: There is no abdominal tenderness. There is no right CVA tenderness or left CVA tenderness.   Genitourinary:     Vagina: No vaginal discharge.   Neurological:      Mental Status: She is alert and oriented to person, place, and time.       Assessment/Plan:       Results for orders placed or performed in visit on 01/19/23   POCT Urinalysis   Result Value Ref Range    POC Color Yellow Negative    POC Appearance Slightly Cloudy Negative    POC Leukocyte Esterase Small Negative    POC Nitrites Pos Negative    POC Urobiligen 0.2 Negative (0.2) mg/dL    POC Protein Neg Negative mg/dL    POC Urine PH 5.5 5.0 - 8.0    POC Blood Neg Negative    POC Specific Gravity 1.025 <1.005 - >1.030    POC Ketones Neg Negative mg/dL    POC Bilirubin Neg Negative mg/dL    POC Glucose Neg Negative " mg/dL       Diagnosis and associated orders:   1. UTI symptoms  - sulfamethoxazole-trimethoprim (BACTRIM DS) 800-160 MG tablet; Take 1 Tablet by mouth 2 times a day for 7 days.  Dispense: 14 Tablet; Refill: 0  - POCT Urinalysis  - URINE CULTURE(NEW); Future        Comments/MDM:     UA is positive for nitrites and leukocytes, will send urine out for culture to confirm.  Supportive measures encouraged: Rest, increased oral hydration, Tylenol as needed per package instructions.  Patient declined additional imaging today.   Patient is encouraged to follow-up with her urologist and PCP         Pt is clinically stable at today's acute urgent care visit.  No acute distress noted. Appropriate for outpatient management at this time.       Discussed DDx, management options (risks,benefits, and alternatives to planned treatment), natural progression and supportive care.  Expressed understanding and the treatment plan was agreed upon. Questions were encouraged and answered   Return to urgent care prn if new or worsening sx or if there is no improvement in condition prn.    Educated in Red flags and indications to immediately call 911 or present to the Emergency Department.   Advised the patient to follow-up with the primary care physician for recheck, reevaluation, and consideration of further management.    Please note that this dictation was created using voice recognition software. I have made a reasonable attempt to correct obvious errors, but I expect that there are errors of grammar and possibly content that I did not discover before finalizing the note.    This note was electronically signed by Elodia Taveras DNP

## 2023-01-23 RX ORDER — SIMVASTATIN 20 MG
TABLET ORAL
Qty: 90 TABLET | Refills: 3 | Status: SHIPPED | OUTPATIENT
Start: 2023-01-23 | End: 2024-01-22

## 2023-01-23 NOTE — TELEPHONE ENCOUNTER
Received request via: Patient    Was the patient seen in the last year in this department? Yes  12/15/22  Does the patient have an active prescription (recently filled or refills available) for medication(s) requested? No    Does the patient have alf Plus and need 100 day supply (blood pressure, diabetes and cholesterol meds only)? Patient does not have SCP

## 2023-01-30 DIAGNOSIS — I10 ESSENTIAL HYPERTENSION: ICD-10-CM

## 2023-01-30 RX ORDER — HYDROCHLOROTHIAZIDE 25 MG/1
25 TABLET ORAL DAILY
Qty: 90 TABLET | Refills: 3 | Status: SHIPPED | OUTPATIENT
Start: 2023-01-30 | End: 2023-11-06

## 2023-01-30 NOTE — TELEPHONE ENCOUNTER
Received request via: Patient    Was the patient seen in the last year in this department? Yes  12/15/22  Does the patient have an active prescription (recently filled or refills available) for medication(s) requested? No    Does the patient have intermediate Plus and need 100 day supply (blood pressure, diabetes and cholesterol meds only)? Patient does not have SCP

## 2023-02-06 NOTE — TELEPHONE ENCOUNTER
Received request via: Patient    Was the patient seen in the last year in this department? Yes  12/15/22  Does the patient have an active prescription (recently filled or refills available) for medication(s) requested? No    Does the patient have FDC Plus and need 100 day supply (blood pressure, diabetes and cholesterol meds only)? Medication is not for cholesterol, blood pressure or diabetes

## 2023-02-07 RX ORDER — LEVOTHYROXINE SODIUM 88 UG/1
88 TABLET ORAL
Qty: 90 TABLET | Refills: 3 | Status: SHIPPED | OUTPATIENT
Start: 2023-02-07 | End: 2024-02-21

## 2023-02-21 RX ORDER — METOPROLOL SUCCINATE 25 MG/1
25 TABLET, EXTENDED RELEASE ORAL
Qty: 90 TABLET | Refills: 3 | Status: SHIPPED | OUTPATIENT
Start: 2023-02-21 | End: 2024-01-22

## 2023-02-21 RX ORDER — FENOFIBRATE 160 MG/1
160 TABLET ORAL
Qty: 90 TABLET | Refills: 3 | Status: SHIPPED | OUTPATIENT
Start: 2023-02-21 | End: 2024-02-21

## 2023-02-21 NOTE — TELEPHONE ENCOUNTER
Received request via: Patient    Was the patient seen in the last year in this department? Yes  12/15/22  Does the patient have an active prescription (recently filled or refills available) for medication(s) requested? No    Does the patient have half-way Plus and need 100 day supply (blood pressure, diabetes and cholesterol meds only)? Medication is not for cholesterol, blood pressure or diabetes

## 2023-03-01 DIAGNOSIS — I10 ESSENTIAL HYPERTENSION: ICD-10-CM

## 2023-03-01 RX ORDER — AMLODIPINE BESYLATE 10 MG/1
10 TABLET ORAL DAILY
Qty: 90 TABLET | Refills: 3 | Status: SHIPPED | OUTPATIENT
Start: 2023-03-01 | End: 2024-01-31

## 2023-03-01 NOTE — TELEPHONE ENCOUNTER
Received request via: Pharmacy    Was the patient seen in the last year in this department? Yes  12/15/22  Does the patient have an active prescription (recently filled or refills available) for medication(s) requested? No    Does the patient have detention Plus and need 100 day supply (blood pressure, diabetes and cholesterol meds only)? Patient does not have SCP

## 2023-03-13 ENCOUNTER — HOSPITAL ENCOUNTER (OUTPATIENT)
Dept: LAB | Facility: MEDICAL CENTER | Age: 78
End: 2023-03-13
Attending: FAMILY MEDICINE
Payer: MEDICARE

## 2023-03-13 ENCOUNTER — TELEPHONE (OUTPATIENT)
Dept: MEDICAL GROUP | Facility: LAB | Age: 78
End: 2023-03-13
Payer: MEDICARE

## 2023-03-13 NOTE — TELEPHONE ENCOUNTER
1. Caller Name: Jillian Gottlieb.                        Call Back Number: h        How would the patient prefer to be contacted with a response: Phone call do NOT leave a detailed message    Patient called and LVM on CMA phone, Was at [facility] lab this AM,  lab orders requesting extend. No orders found in chart.     Please advise if appropriate to reorder lab work or disregard, thank you.

## 2023-03-16 ENCOUNTER — OFFICE VISIT (OUTPATIENT)
Dept: MEDICAL GROUP | Facility: LAB | Age: 78
End: 2023-03-16
Payer: MEDICARE

## 2023-03-16 VITALS
TEMPERATURE: 97.2 F | RESPIRATION RATE: 12 BRPM | DIASTOLIC BLOOD PRESSURE: 74 MMHG | OXYGEN SATURATION: 97 % | HEIGHT: 70 IN | BODY MASS INDEX: 24.11 KG/M2 | WEIGHT: 168.4 LBS | HEART RATE: 72 BPM | SYSTOLIC BLOOD PRESSURE: 126 MMHG

## 2023-03-16 DIAGNOSIS — J44.9 CHRONIC OBSTRUCTIVE PULMONARY DISEASE, UNSPECIFIED COPD TYPE (HCC): ICD-10-CM

## 2023-03-16 DIAGNOSIS — E11.9 TYPE 2 DIABETES MELLITUS WITHOUT COMPLICATION, WITHOUT LONG-TERM CURRENT USE OF INSULIN (HCC): ICD-10-CM

## 2023-03-16 DIAGNOSIS — I10 ESSENTIAL HYPERTENSION: ICD-10-CM

## 2023-03-16 DIAGNOSIS — E78.5 HYPERLIPIDEMIA, UNSPECIFIED HYPERLIPIDEMIA TYPE: ICD-10-CM

## 2023-03-16 DIAGNOSIS — E03.9 HYPOTHYROIDISM, UNSPECIFIED TYPE: ICD-10-CM

## 2023-03-16 DIAGNOSIS — Z85.528 PERSONAL HISTORY OF RENAL CANCER: ICD-10-CM

## 2023-03-16 LAB
HBA1C MFR BLD: 6.4 % (ref ?–5.8)
POCT INT CON NEG: NEGATIVE
POCT INT CON POS: POSITIVE

## 2023-03-16 PROCEDURE — 99214 OFFICE O/P EST MOD 30 MIN: CPT | Performed by: FAMILY MEDICINE

## 2023-03-16 PROCEDURE — 83036 HEMOGLOBIN GLYCOSYLATED A1C: CPT | Performed by: FAMILY MEDICINE

## 2023-03-16 RX ORDER — LOSARTAN POTASSIUM 100 MG/1
100 TABLET ORAL
Qty: 90 TABLET | Refills: 3 | Status: SHIPPED | OUTPATIENT
Start: 2023-03-16 | End: 2024-03-19

## 2023-03-16 NOTE — PROGRESS NOTES
"Subjective:     CC: Follow up    HPI:   Jillian is a 77-year-old female with a history of type 2 diabetes and COPD who presents today to follow-up on chronic conditions.    A1c today 6.4%.  She continues on metformin 1500 mg daily.  COPD stable with Trelegy.  No current shortness of breath.  Follows with urology yearly for history of renal cancer.  Repeat MRI due later this year.    Medications, past medical history, allergies, and social history have been reviewed and updated.      Objective:       Exam:  /74 (BP Location: Left arm, Patient Position: Sitting, BP Cuff Size: Adult)   Pulse 72   Temp 36.2 °C (97.2 °F)   Resp 12   Ht 1.778 m (5' 10\")   Wt 76.4 kg (168 lb 6.4 oz)   SpO2 97%   BMI 24.16 kg/m²  Body mass index is 24.16 kg/m².    Constitutional: Alert. Well appearing. No distress.  Skin: Warm, dry, good turgor, no visible rashes.  Eye: Equal, round and reactive to light, conjunctiva clear, lids normal.  Respiratory: Normal effort. Lungs are clear to auscultation bilaterally.  Cardiovascular: Regular rate and rhythm. Normal S1/S2. No murmurs, rubs or gallops.   Neuro: Moves all four extremities. No facial droop.  Psych: Answers questions appropriately. Normal affect and mood.    Assessment & Plan:     77 y.o. female with the following -     1. Type 2 diabetes mellitus without complication, without long-term current use of insulin (HCC)  A1c 6.4%.  Continue metformin.  Screenings up-to-date.  - POCT Hemoglobin A1C    2. Chronic obstructive pulmonary disease, unspecified COPD type (HCC)  Stable, continue Trelegy.  - CBC WITH DIFFERENTIAL; Future  - Comp Metabolic Panel; Future    3. Hyperlipidemia, unspecified hyperlipidemia type  Continue simvastatin and fenofibrate  - CBC WITH DIFFERENTIAL; Future  - Comp Metabolic Panel; Future  - Lipid Profile; Future    4. Hypothyroidism, unspecified type  Stable on synthroid 88 mcg  - TSH WITH REFLEX TO FT4; Future    5. Personal history of renal " cancer  Follows with urology yearly, Repeat MRI due later this year.    6. Essential hypertension  BP at goal, continue losartan, amlodipine, metoprolol, hydrochlorothiazide.  - losartan (COZAAR) 100 MG Tab; Take 1 Tablet by mouth every day.  Dispense: 90 Tablet; Refill: 3      Please note that this note was created using voice recognition software.

## 2023-03-24 ENCOUNTER — OFFICE VISIT (OUTPATIENT)
Dept: URGENT CARE | Facility: CLINIC | Age: 78
End: 2023-03-24
Payer: MEDICARE

## 2023-03-24 VITALS
DIASTOLIC BLOOD PRESSURE: 62 MMHG | HEIGHT: 70 IN | SYSTOLIC BLOOD PRESSURE: 130 MMHG | WEIGHT: 168 LBS | BODY MASS INDEX: 24.05 KG/M2 | RESPIRATION RATE: 16 BRPM | OXYGEN SATURATION: 98 % | HEART RATE: 68 BPM | TEMPERATURE: 99.2 F

## 2023-03-24 DIAGNOSIS — J06.9 VIRAL URI: ICD-10-CM

## 2023-03-24 PROCEDURE — 99213 OFFICE O/P EST LOW 20 MIN: CPT | Performed by: REGISTERED NURSE

## 2023-03-24 ASSESSMENT — ENCOUNTER SYMPTOMS
NECK PAIN: 0
PHOTOPHOBIA: 0
COUGH: 0
HEADACHES: 1
EYE DISCHARGE: 0
CHILLS: 0
DIZZINESS: 0
SHORTNESS OF BREATH: 0
BLURRED VISION: 0
SPUTUM PRODUCTION: 0
DOUBLE VISION: 0
SORE THROAT: 0
MYALGIAS: 0
SINUS PAIN: 1
EYE PAIN: 0
FEVER: 0

## 2023-03-24 NOTE — PROGRESS NOTES
"Chief Complaint   Patient presents with    Sinus Problem     X 5 days, headache, eye pain, temple pain, history of sinus infections and COPD.     HPI:   Patient is a 77 y.o. female who is presenting for day 5 of nasal congestion, whitish-yellow nasal drainage, bilateral headache.  Has been using Mucinex which helps with symptoms.  States she is feeling better today than yesterday but would like to get antibiotics because it always makes her feel better.  Does have a history of sinus infections but none recently, she denies recent antibiotics.  No high fevers, vision changes, gait impairment or balance issues.  Immunizations current    Patient Active Problem List    Diagnosis Date Noted    Mild persistent asthma without complication 10/24/2022    Bladder prolapse, female, acquired 08/10/2020    Hammer toe 09/04/2019    GERD (gastroesophageal reflux disease) 10/03/2016    COPD (chronic obstructive pulmonary disease) ([facility]) 06/30/2016    Kidney stones 03/04/2016    Type 2 diabetes mellitus without complication, without long-term current use of insulin ([facility]) 02/13/2016    Essential hypertension 02/08/2016    Impaired fasting glucose 08/24/2015    Hyperlipidemia 04/20/2015    Hypothyroidism 04/20/2015    Personal history of renal cancer 04/20/2015       Allergies   Allergen Reactions    Alcohol Vomiting, Nausea and Unspecified     injested-facial numbness, n/v  RXN=50 years ago    Pcn [Penicillins] Unspecified     Flushed; \"felt weird\"  Tolerates cephalosporins  RXN=age 30's        Current Outpatient Medications Ordered in Epic   Medication Sig Dispense Refill    losartan (COZAAR) 100 MG Tab Take 1 Tablet by mouth every day. 90 Tablet 3    amLODIPine (NORVASC) 10 MG Tab Take 1 Tablet by mouth every day. 90 Tablet 3    metoprolol SR (TOPROL XL) 25 MG TABLET SR 24 HR Take 1 Tablet by mouth every day. 90 Tablet 3    fenofibrate (TRIGLIDE) 160 MG tablet Take 1 Tablet by mouth every day. 90 Tablet 3    levothyroxine " (SYNTHROID) 88 MCG Tab Take 1 Tablet by mouth every morning on an empty stomach. 90 Tablet 3    hydroCHLOROthiazide (HYDRODIURIL) 25 MG Tab Take 1 Tablet by mouth every day. 90 Tablet 3    simvastatin (ZOCOR) 20 MG Tab TAKE 1 TABLET BY MOUTH EVERY EVENING 90 Tablet 3    metFORMIN ER (GLUCOPHAGE XR) 500 MG TABLET SR 24 HR TAKE 3 TABLETS BY MOUTH EVERY  Tablet 3    estradiol (ESTRACE) 0.1 MG/GM vaginal cream Insert 1 g into the vagina every day. Week one, then 3 x weekly. 42.5 g 4    raloxifene (EVISTA) 60 MG Tab Take 1 Tablet by mouth every morning. 90 Tablet 2    albuterol (PROVENTIL) 2.5mg/3ml Nebu Soln solution for nebulization Take 3 mL by nebulization every four hours as needed for Shortness of Breath. 30 Each 1    Biotin 10 MG Tab Take  by mouth.      triamcinolone acetonide (KENALOG) 0.5 % Cream APPLE TO AFFECTED AREA TWICE DAILY 15 g 1    albuterol 108 (90 Base) MCG/ACT Aero Soln inhalation aerosol Inhale 2 Puffs every four hours as needed for Shortness of Breath. FOR SHORTNESS OF BREATH 1 Each 11    Fluticasone-Umeclidin-Vilant (TRELEGY ELLIPTA) 200-62.5-25 MCG/ACT AEROSOL POWDER, BREATH ACTIVATED Inhale 1 Puff every day. 3 Each 3    glucosamine Sulfate 500 MG Cap Take 500 mg by mouth 3 times a day with meals.      multivitamin (THERAGRAN) Tab Take 1 Tab by mouth every day.       No current [facility]-ordered facility-administered medications on file.     Pertinent history: none    Social History     Tobacco Use    Smoking status: Never    Smokeless tobacco: Never   Vaping Use    Vaping Use: Never used   Substance Use Topics    Alcohol use: No     Comment: allergic to it    Drug use: No     Review of Systems   Constitutional:  Negative for chills, fever and malaise/fatigue.   HENT:  Positive for congestion and sinus pain. Negative for ear discharge, ear pain and sore throat.    Eyes:  Negative for blurred vision, double vision, photophobia, pain and discharge.   Respiratory:  Negative for cough, sputum  production and shortness of breath.    Cardiovascular:  Negative for chest pain and leg swelling.   Musculoskeletal:  Negative for myalgias and neck pain.   Neurological:  Positive for headaches. Negative for dizziness.      Vitals:    03/24/23 1143   BP: 130/62   Pulse: 68   Resp: 16   Temp: 37.3 °C (99.2 °F)   SpO2: 98%       Physical Exam  Vitals and nursing note reviewed.   Constitutional:       General: She is not in acute distress.     Appearance: She is not ill-appearing, toxic-appearing or diaphoretic.   HENT:      Head: Normocephalic.      Right Ear: Tympanic membrane normal.      Left Ear: Tympanic membrane normal.      Nose: Congestion present.      Right Sinus: No maxillary sinus tenderness or frontal sinus tenderness.      Left Sinus: No maxillary sinus tenderness or frontal sinus tenderness.      Mouth/Throat:      Mouth: Mucous membranes are moist.      Pharynx: Oropharynx is clear.   Eyes:      General:         Right eye: No discharge.         Left eye: No discharge.      Extraocular Movements: Extraocular movements intact.      Conjunctiva/sclera: Conjunctivae normal.      Pupils: Pupils are equal, round, and reactive to light.   Cardiovascular:      Rate and Rhythm: Normal rate and regular rhythm.      Pulses: Normal pulses.      Heart sounds: Normal heart sounds.   Pulmonary:      Effort: Pulmonary effort is normal.      Breath sounds: Normal breath sounds.   Musculoskeletal:         General: Normal range of motion.      Cervical back: Normal range of motion and neck supple. No rigidity.   Skin:     General: Skin is warm and dry.      Capillary Refill: Capillary refill takes less than 2 seconds.   Neurological:      Mental Status: She is alert.      Cranial Nerves: No cranial nerve deficit.     Assessment/Plan:  1. Viral URI        Vital signs are stable, nontoxic appearance, no red flag signs or symptoms, she does state that she is improving today but just would like to get better faster.  Exam  is unremarkable.  Symptoms consistent with a viral URI.  She is using Mucinex with relief.  Had discussion about symptoms and viral illness and that she is improving without antibiotics.  Recommend she continue with Mucinex may also add in Flonase, adequate hydration, warm steam showers, sinus rinses.  Monitor symptoms.    Return to clinic or go to ED if symptoms worsen or persist. Indications for ED discussed at length. Patient/Parent/Guardian voices understanding. Follow-up with your primary care provider in 3-5 days. Red flag symptoms discussed. All side effects of medication discussed including allergic response, GI upset, tendon injury, rash, sedation etc.    Please note that this dictation was created using voice recognition software. I have made every reasonable attempt to correct obvious errors, but I expect that there are errors of grammar and possibly content that I did not discover before finalizing the note.

## 2023-03-31 ENCOUNTER — HOSPITAL ENCOUNTER (OUTPATIENT)
Facility: MEDICAL CENTER | Age: 78
End: 2023-03-31
Attending: PHYSICIAN ASSISTANT
Payer: MEDICARE

## 2023-03-31 ENCOUNTER — OFFICE VISIT (OUTPATIENT)
Dept: URGENT CARE | Facility: CLINIC | Age: 78
End: 2023-03-31
Payer: MEDICARE

## 2023-03-31 VITALS
DIASTOLIC BLOOD PRESSURE: 80 MMHG | HEART RATE: 86 BPM | SYSTOLIC BLOOD PRESSURE: 148 MMHG | WEIGHT: 168 LBS | HEIGHT: 70 IN | TEMPERATURE: 97.1 F | OXYGEN SATURATION: 96 % | BODY MASS INDEX: 24.05 KG/M2 | RESPIRATION RATE: 14 BRPM

## 2023-03-31 DIAGNOSIS — N30.00 ACUTE CYSTITIS WITHOUT HEMATURIA: ICD-10-CM

## 2023-03-31 LAB
APPEARANCE UR: CLEAR
BILIRUB UR STRIP-MCNC: NEGATIVE MG/DL
COLOR UR AUTO: YELLOW
GLUCOSE UR STRIP.AUTO-MCNC: NEGATIVE MG/DL
KETONES UR STRIP.AUTO-MCNC: NEGATIVE MG/DL
LEUKOCYTE ESTERASE UR QL STRIP.AUTO: NORMAL
NITRITE UR QL STRIP.AUTO: POSITIVE
PH UR STRIP.AUTO: 5.5 [PH] (ref 5–8)
PROT UR QL STRIP: NEGATIVE MG/DL
RBC UR QL AUTO: NEGATIVE
SP GR UR STRIP.AUTO: 1.02
UROBILINOGEN UR STRIP-MCNC: 0.2 MG/DL

## 2023-03-31 PROCEDURE — 87077 CULTURE AEROBIC IDENTIFY: CPT

## 2023-03-31 PROCEDURE — 87186 SC STD MICRODIL/AGAR DIL: CPT

## 2023-03-31 PROCEDURE — 87086 URINE CULTURE/COLONY COUNT: CPT

## 2023-03-31 PROCEDURE — 99213 OFFICE O/P EST LOW 20 MIN: CPT | Performed by: PHYSICIAN ASSISTANT

## 2023-03-31 PROCEDURE — 81002 URINALYSIS NONAUTO W/O SCOPE: CPT | Performed by: PHYSICIAN ASSISTANT

## 2023-03-31 RX ORDER — NITROFURANTOIN 25; 75 MG/1; MG/1
100 CAPSULE ORAL EVERY 12 HOURS
Qty: 10 CAPSULE | Refills: 0 | Status: SHIPPED | OUTPATIENT
Start: 2023-03-31 | End: 2023-04-05

## 2023-03-31 ASSESSMENT — ENCOUNTER SYMPTOMS
VOMITING: 0
ABDOMINAL PAIN: 1
FLANK PAIN: 0
DIZZINESS: 0
FEVER: 0
BACK PAIN: 1
NAUSEA: 0
CHILLS: 0

## 2023-03-31 NOTE — PROGRESS NOTES
Subjective     Jillian Gottlieb is a 77 y.o. female who presents with UTI (X 2 days, cloudy urine, odor, lower back pain. History of kidney stones and kidney cancer.)      UTI  This is a new problem. The current episode started in the past 7 days (started 2-3 days agp). The problem has been unchanged. Associated symptoms include abdominal pain and urinary symptoms (coudy urine, urgency/frequency). Pertinent negatives include no chills, fever, nausea or vomiting.     Review of Systems   Constitutional:  Negative for chills, fever and malaise/fatigue.   Gastrointestinal:  Positive for abdominal pain. Negative for nausea and vomiting.   Genitourinary:  Positive for frequency and urgency. Negative for dysuria, flank pain and hematuria.   Musculoskeletal:  Positive for back pain.   Neurological:  Negative for dizziness.             PMH:  has a past medical history of Acid reflux, Arthritis, Asthma, Breath shortness, Bronchitis (08/04/14), Cancer ([facility]) (01/13), Chronic pain of left knee (8/22/2017), Chronic pain of right knee (7/1/2016), COPD (chronic obstructive pulmonary disease) ([facility]), COPD (chronic obstructive pulmonary disease) ([facility]) (6/30/2016), Diabetes ([facility]), Dyspnea, Emphysema of lung ([facility]), Heart burn, Hypertension, Impaired fasting glucose (8/24/2015), Indigestion, Mass of left lower leg (7/27/2016), Personal history of renal cancer (4/20/2015), Renal disorder, Renal disorder (2013), Skin tag (6/24/2019), Unspecified disorder of thyroid, and Unspecified hypothyroidism (4/20/2015).    She has no past medical history of Encounter for long-term (current) use of other medications.  MEDS:   Current Outpatient Medications:     nitrofurantoin (MACROBID) 100 MG Cap, Take 1 Capsule by mouth every 12 hours for 5 days., Disp: 10 Capsule, Rfl: 0    losartan (COZAAR) 100 MG Tab, Take 1 Tablet by mouth every day., Disp: 90 Tablet, Rfl: 3    amLODIPine (NORVASC) 10 MG Tab, Take 1 Tablet by mouth every day., Disp: 90 Tablet,  "Rfl: 3    metoprolol SR (TOPROL XL) 25 MG TABLET SR 24 HR, Take 1 Tablet by mouth every day., Disp: 90 Tablet, Rfl: 3    fenofibrate (TRIGLIDE) 160 MG tablet, Take 1 Tablet by mouth every day., Disp: 90 Tablet, Rfl: 3    levothyroxine (SYNTHROID) 88 MCG Tab, Take 1 Tablet by mouth every morning on an empty stomach., Disp: 90 Tablet, Rfl: 3    hydroCHLOROthiazide (HYDRODIURIL) 25 MG Tab, Take 1 Tablet by mouth every day., Disp: 90 Tablet, Rfl: 3    simvastatin (ZOCOR) 20 MG Tab, TAKE 1 TABLET BY MOUTH EVERY EVENING, Disp: 90 Tablet, Rfl: 3    metFORMIN ER (GLUCOPHAGE XR) 500 MG TABLET SR 24 HR, TAKE 3 TABLETS BY MOUTH EVERY DAY, Disp: 270 Tablet, Rfl: 3    estradiol (ESTRACE) 0.1 MG/GM vaginal cream, Insert 1 g into the vagina every day. Week one, then 3 x weekly., Disp: 42.5 g, Rfl: 4    raloxifene (EVISTA) 60 MG Tab, Take 1 Tablet by mouth every morning., Disp: 90 Tablet, Rfl: 2    albuterol (PROVENTIL) 2.5mg/3ml Nebu Soln solution for nebulization, Take 3 mL by nebulization every four hours as needed for Shortness of Breath., Disp: 30 Each, Rfl: 1    Biotin 10 MG Tab, Take  by mouth., Disp: , Rfl:     triamcinolone acetonide (KENALOG) 0.5 % Cream, APPLE TO AFFECTED AREA TWICE DAILY, Disp: 15 g, Rfl: 1    albuterol 108 (90 Base) MCG/ACT Aero Soln inhalation aerosol, Inhale 2 Puffs every four hours as needed for Shortness of Breath. FOR SHORTNESS OF BREATH, Disp: 1 Each, Rfl: 11    Fluticasone-Umeclidin-Vilant (TRELEGY ELLIPTA) 200-62.5-25 MCG/ACT AEROSOL POWDER, BREATH ACTIVATED, Inhale 1 Puff every day., Disp: 3 Each, Rfl: 3    glucosamine Sulfate 500 MG Cap, Take 500 mg by mouth 3 times a day with meals., Disp: , Rfl:     multivitamin (THERAGRAN) Tab, Take 1 Tab by mouth every day., Disp: , Rfl:   ALLERGIES:   Allergies   Allergen Reactions    Alcohol Vomiting, Nausea and Unspecified     injested-facial numbness, n/v  RXN=50 years ago    Pcn [Penicillins] Unspecified     Flushed; \"felt weird\"  Tolerates " cephalosporins  RXN=age 30's     SURGHX:   Past Surgical History:   Procedure Laterality Date    WA CLOSURE OF VAGINA  1/4/2021    Procedure: COLPOCLEISIS, LE FORT, PARTIAL;  Surgeon: Rafi Ingram M.D.;  Location: SURGERY SAME DAY [facility];  Service: Gynecology    KNEE ARTHROPLASTY TOTAL Left 12/18/2017    Procedure: KNEE ARTHROPLASTY TOTAL;  Surgeon: Ricky Castaneda M.D.;  Location: SURGERY [facility];  Service: Orthopedics    KNEE ARTHROPLASTY TOTAL Right 10/17/2016    Procedure: KNEE ARTHROPLASTY TOTAL;  Surgeon: Ricky Castaneda M.D.;  Location: SURGERY [facility];  Service:     RECOVERY  3/4/2016    Procedure: IR2-PERQ NEPHRO-URETERAL CATH RIGHT-DR. RIDDLE-Surgery to follow in [facility] ;  Surgeon: Ir-Recovery Surgery;  Location: [facility];  Service:     CYSTOSCOPY STENT REMOVAL Right 3/4/2016    Procedure: RIGID CYSTOSCOPY STENT REMOVAL;  Surgeon: Fredy Riddle M.D.;  Location: [facility];  Service:     URETEROSCOPY Right 3/4/2016    Procedure: URETEROSCOPY;  Surgeon: Fredy Riddle M.D.;  Location: [facility];  Service:     PERCUTANEOUS NEPHROSTOLITHOTOMY Right 3/4/2016    Procedure: PERCUTANEOUS NEPHROSTOLITHOTOMY FOR NEPHROLITHOTRIPSY;  Surgeon: Fredy Riddle M.D.;  Location: [facility];  Service:     CYSTOSCOPY STENT PLACEMENT Right 2/12/2016    Procedure: CYSTOSCOPY STENT PLACEMENT;  Surgeon: Fredy Riddle M.D.;  Location: [facility];  Service:     CYSTOSCOPY STENT PLACEMENT Right 6/26/2015    Procedure: CYSTOSCOPY STENT PLACEMENT RIGID POSSIBLE STENT;  Surgeon: Fredy Riddle M.D.;  Location: [facility];  Service:     URETEROSCOPY N/A 6/26/2015    Procedure: URETEROSCOPY;  Surgeon: Fredy Riddle M.D.;  Location: [facility];  Service:     LASERTRIPSY Right 6/26/2015    Procedure: LASERTRIPSY LITHO;  Surgeon: Fredy Riddle M.D.;  Location: [facility];  Service:     URETEROSCOPY  8/20/2014     "Performed by Fredy Cintron M.D. at SURGERY [facility]    LASERTRIPSY  8/20/2014    Performed by Fredy Cintron M.D. at SURGERY [facility]    CYSTOSCOPY  8/20/2014    Performed by Fredy Cintron M.D. at SURGERY [facility]    CYSTOSCOPY STENT PLACEMENT  1/31/2013    Performed by Fredy Cintron M.D. at SURGERY [facility]    URETEROSCOPY  1/31/2013    Performed by Fredy Cintron M.D. at SURGERY [facility]    NEPHRECTOMY PARTIAL  1/31/2013    Performed by Fredy Cintron M.D. at SURGERY [facility]    LASERTRIPSY  1/31/2013    Performed by Fredy Cintron M.D. at SURGERY [facility]    OTHER ORTHOPEDIC SURGERY  2006    right knee meniscus    OTHER ORTHOPEDIC SURGERY  1996    ORIF right arm    OTHER  1995    sinus surgery    GYN SURGERY  1994    hysterectomy    OTHER  1980    thyroid lumpectomy    BREAST BIOPSY Left 1976    benign    CUATE BY LAPAROSCOPY      OTHER ORTHOPEDIC SURGERY      achilles tendon reattach     TONSILLECTOMY       SOCHX:  reports that she has never smoked. She has never used smokeless tobacco. She reports that she does not drink alcohol and does not use drugs.  FH: Family history was reviewed, no pertinent findings to report      Objective     BP (!) 148/80   Pulse 86   Temp 36.2 °C (97.1 °F) (Temporal)   Resp 14   Ht 1.778 m (5' 10\")   Wt 76.2 kg (168 lb)   SpO2 96%   BMI 24.11 kg/m²      Physical Exam  Constitutional:       Appearance: Normal appearance. She is well-developed.   HENT:      Head: Normocephalic and atraumatic.      Right Ear: External ear normal.      Left Ear: External ear normal.   Eyes:      Conjunctiva/sclera: Conjunctivae normal.      Pupils: Pupils are equal, round, and reactive to light.   Cardiovascular:      Rate and Rhythm: Normal rate and regular rhythm.      Heart sounds: No murmur heard.  Pulmonary:      Effort: Pulmonary effort is normal.      Breath sounds: Normal breath sounds.   Abdominal:      Palpations: Abdomen is soft.      " Tenderness: There is no abdominal tenderness. There is no right CVA tenderness or left CVA tenderness.   Skin:     General: Skin is warm and dry.      Capillary Refill: Capillary refill takes less than 2 seconds.   Neurological:      Mental Status: She is alert and oriented to person, place, and time.   Psychiatric:         Behavior: Behavior normal.         Judgment: Judgment normal.         POCT Urinalysis  Lab Results   Component Value Date/Time    POCCOLOR Yellow 03/31/2023 03:46 PM    POCAPPEAR Clear 03/31/2023 03:46 PM    POCLEUKEST Small 03/31/2023 03:46 PM    POCNITRITE Positive 03/31/2023 03:46 PM    POCUROBILIGE 0.2 03/31/2023 03:46 PM    POCPROTEIN Negative 03/31/2023 03:46 PM    POCURPH 5.5 03/31/2023 03:46 PM    POCBLOOD Negative 03/31/2023 03:46 PM    POCSPGRV 1.025 03/31/2023 03:46 PM    POCKETONES Negative 03/31/2023 03:46 PM    POCBILIRUBIN Negative 03/31/2023 03:46 PM    POCGLUCUA Negative 03/31/2023 03:46 PM            Assessment & Plan     1. Acute cystitis without hematuria  - POCT Urinalysis  - Urine Culture; Future  - nitrofurantoin (MACROBID) 100 MG Cap; Take 1 Capsule by mouth every 12 hours for 5 days.  Dispense: 10 Capsule; Refill: 0  Urinalysis shows small leuks and positive nitrates.  We will initiate treatment with Macrobid as patient reports allergy to penicillin and did not tolerate Bactrim last time.  Urine culture also being sent out to further evaluate.  We will notify her if any changes need to be made to her treatment plan.          Differential Diagnosis, natural history, and supportive care discussed. Return to the Urgent Care or follow up with your PCP if symptoms fail to resolve, or for any new or worsening symptoms. Emergency room precautions discussed. Patient and/or family appears understanding of information.

## 2023-03-31 NOTE — PATIENT INSTRUCTIONS
Urinary Tract Infection, Adult  A urinary tract infection (UTI) is an infection of any part of the urinary tract. The urinary tract includes:  The kidneys.  The ureters.  The bladder.  The urethra.  These organs make, store, and get rid of pee (urine) in the body.  What are the causes?  This is caused by germs (bacteria) in your genital area. These germs grow and cause swelling (inflammation) of your urinary tract.  What increases the risk?  You are more likely to develop this condition if:  You have a small, thin tube (catheter) to drain pee.  You cannot control when you pee or poop (incontinence).  You are female, and:  You use these methods to prevent pregnancy:  A medicine that kills sperm (spermicide).  A device that blocks sperm (diaphragm).  You have low levels of a female hormone (estrogen).  You are pregnant.  You have genes that add to your risk.  You are sexually active.  You take antibiotic medicines.  You have trouble peeing because of:  A prostate that is bigger than normal, if you are male.  A blockage in the part of your body that drains pee from the bladder (urethra).  A kidney stone.  A nerve condition that affects your bladder (neurogenic bladder).  Not getting enough to drink.  Not peeing often enough.  You have other conditions, such as:  Diabetes.  A weak disease-fighting system (immune system).  Sickle cell disease.  Gout.  Injury of the spine.  What are the signs or symptoms?  Symptoms of this condition include:  Needing to pee right away (urgently).  Peeing often.  Peeing small amounts often.  Pain or burning when peeing.  Blood in the pee.  Pee that smells bad or not like normal.  Trouble peeing.  Pee that is cloudy.  Fluid coming from the vagina, if you are female.  Pain in the belly or lower back.  Other symptoms include:  Throwing up (vomiting).  No urge to eat.  Feeling mixed up (confused).  Being tired and grouchy (irritable).  A fever.  Watery poop (diarrhea).  How is this  treated?  This condition may be treated with:  Antibiotic medicine.  Other medicines.  Drinking enough water.  Follow these instructions at home:    Medicines  Take over-the-counter and prescription medicines only as told by your doctor.  If you were prescribed an antibiotic medicine, take it as told by your doctor. Do not stop taking it even if you start to feel better.  General instructions  Make sure you:  Pee until your bladder is empty.  Do not hold pee for a long time.  Empty your bladder after sex.  Wipe from front to back after pooping if you are a female. Use each tissue one time when you wipe.  Drink enough fluid to keep your pee pale yellow.  Keep all follow-up visits as told by your doctor. This is important.  Contact a doctor if:  You do not get better after 1-2 days.  Your symptoms go away and then come back.  Get help right away if:  You have very bad back pain.  You have very bad pain in your lower belly.  You have a fever.  You are sick to your stomach (nauseous).  You are throwing up.  Summary  A urinary tract infection (UTI) is an infection of any part of the urinary tract.  This condition is caused by germs in your genital area.  There are many risk factors for a UTI. These include having a small, thin tube to drain pee and not being able to control when you pee or poop.  Treatment includes antibiotic medicines for germs.  Drink enough fluid to keep your pee pale yellow.  This information is not intended to replace advice given to you by your health care provider. Make sure you discuss any questions you have with your health care provider.  Document Released: 06/05/2009 Document Revised: 12/05/2019 Document Reviewed: 06/27/2019  [employer] Patient Education © 2020 [employer]

## 2023-04-27 ENCOUNTER — TELEPHONE (OUTPATIENT)
Dept: HEALTH INFORMATION MANAGEMENT | Facility: OTHER | Age: 78
End: 2023-04-27

## 2023-05-10 DIAGNOSIS — J45.30 MILD PERSISTENT ASTHMA WITHOUT COMPLICATION: ICD-10-CM

## 2023-05-11 RX ORDER — FLUTICASONE FUROATE, UMECLIDINIUM BROMIDE AND VILANTEROL TRIFENATATE 200; 62.5; 25 UG/1; UG/1; UG/1
POWDER RESPIRATORY (INHALATION)
Qty: 180 EACH | Refills: 0 | Status: SHIPPED | OUTPATIENT
Start: 2023-05-11 | End: 2023-07-31

## 2023-05-11 NOTE — TELEPHONE ENCOUNTER
Have we ever prescribed this med? Yes.  If yes, what date? 9/27/2022    Last OV: 10/24/2022 - Grace SERRATO     Next OV: NONE    DX: Mild persistent asthma without complication [J45.30]    Medications: TRELEGY ELLIPTA 200-62.5-25 MCG/INH AEROSOL POWDER, BREATH ACTIVATED

## 2023-05-24 NOTE — ASSESSMENT & PLAN NOTE
Impression: Dry eye syndrome of bilateral lacrimal glands: H04.123. Plan: Discussed dry eye diagnosis in detail. Recommend Systane Complete QID OU. She has a history of hypothyroidism for which she takes levothyroxine.  Clinically she is euthyroid.  We will check a TSH at least yearly.

## 2023-05-25 ENCOUNTER — PATIENT MESSAGE (OUTPATIENT)
Dept: MEDICAL GROUP | Facility: LAB | Age: 78
End: 2023-05-25
Payer: MEDICARE

## 2023-05-25 DIAGNOSIS — H91.93 DECREASED HEARING OF BOTH EARS: ICD-10-CM

## 2023-07-31 DIAGNOSIS — J45.30 MILD PERSISTENT ASTHMA WITHOUT COMPLICATION: ICD-10-CM

## 2023-07-31 RX ORDER — FLUTICASONE FUROATE, UMECLIDINIUM BROMIDE AND VILANTEROL TRIFENATATE 200; 62.5; 25 UG/1; UG/1; UG/1
1 POWDER RESPIRATORY (INHALATION) DAILY
Qty: 180 EACH | Refills: 0 | Status: SHIPPED | OUTPATIENT
Start: 2023-07-31 | End: 2023-11-13

## 2023-07-31 NOTE — TELEPHONE ENCOUNTER
Have we ever prescribed this med? Yes.  If yes, what date? 5/11/2023    Last OV: 10/24/2022 - Grace SERRATO     Next OV: NONE     DX: Mild persistent asthma without complication [J45.30]    Medications: TRELEGY ELLIPTA 200-62.5-25 MCG/ACT AEROSOL POWDER, BREATH ACTIVATED

## 2023-08-23 ENCOUNTER — TELEPHONE (OUTPATIENT)
Dept: MEDICAL GROUP | Facility: LAB | Age: 78
End: 2023-08-23

## 2023-08-23 ENCOUNTER — TELEMEDICINE (OUTPATIENT)
Dept: MEDICAL GROUP | Facility: LAB | Age: 78
End: 2023-08-23
Payer: MEDICARE

## 2023-08-23 VITALS — WEIGHT: 162 LBS | HEIGHT: 70 IN | BODY MASS INDEX: 23.19 KG/M2 | OXYGEN SATURATION: 90 %

## 2023-08-23 DIAGNOSIS — J44.1 CHRONIC OBSTRUCTIVE PULMONARY DISEASE WITH ACUTE EXACERBATION (HCC): ICD-10-CM

## 2023-08-23 PROCEDURE — 99214 OFFICE O/P EST MOD 30 MIN: CPT | Mod: 95 | Performed by: FAMILY MEDICINE

## 2023-08-23 RX ORDER — AMLODIPINE BESYLATE 10 MG/1
1 TABLET ORAL
COMMUNITY
End: 2023-08-23

## 2023-08-23 RX ORDER — FENOFIBRATE 160 MG/1
1 TABLET ORAL
COMMUNITY
End: 2023-08-23

## 2023-08-23 RX ORDER — DOXYCYCLINE 100 MG/1
100 CAPSULE ORAL 2 TIMES DAILY
Qty: 10 CAPSULE | Refills: 0 | Status: SHIPPED | OUTPATIENT
Start: 2023-08-23 | End: 2023-08-28

## 2023-08-23 RX ORDER — CIPROFLOXACIN 500 MG/1
1 TABLET, FILM COATED ORAL 2 TIMES DAILY
COMMUNITY
End: 2023-08-23

## 2023-08-23 RX ORDER — CONJUGATED ESTROGENS 0.62 MG/G
CREAM VAGINAL
COMMUNITY
End: 2023-10-10

## 2023-08-23 RX ORDER — METFORMIN HYDROCHLORIDE 500 MG/1
3 TABLET, EXTENDED RELEASE ORAL
COMMUNITY
End: 2023-08-23

## 2023-08-23 RX ORDER — LOSARTAN POTASSIUM AND HYDROCHLOROTHIAZIDE 25; 100 MG/1; MG/1
TABLET ORAL
COMMUNITY
End: 2023-08-23

## 2023-08-23 RX ORDER — TIOTROPIUM BROMIDE 18 UG/1
CAPSULE ORAL; RESPIRATORY (INHALATION)
COMMUNITY
End: 2023-08-23

## 2023-08-23 RX ORDER — LEVOTHYROXINE SODIUM 88 UG/1
1 TABLET ORAL
COMMUNITY
End: 2023-08-23

## 2023-08-23 RX ORDER — CLOBETASOL PROPIONATE 0.5 MG/G
OINTMENT TOPICAL
COMMUNITY
End: 2023-09-04

## 2023-08-23 RX ORDER — METOPROLOL SUCCINATE 25 MG/1
1 TABLET, EXTENDED RELEASE ORAL
COMMUNITY
End: 2023-08-23

## 2023-08-23 RX ORDER — CLINDAMYCIN HYDROCHLORIDE 300 MG/1
CAPSULE ORAL
COMMUNITY
End: 2023-08-23

## 2023-08-23 RX ORDER — PREDNISONE 20 MG/1
40 TABLET ORAL DAILY
Qty: 10 TABLET | Refills: 0 | Status: SHIPPED | OUTPATIENT
Start: 2023-08-23 | End: 2023-08-28

## 2023-08-23 RX ORDER — SIMVASTATIN 20 MG
1 TABLET ORAL EVERY EVENING
COMMUNITY
End: 2023-08-23

## 2023-08-23 RX ORDER — HYDROCHLOROTHIAZIDE 25 MG/1
1 TABLET ORAL
COMMUNITY
End: 2023-08-23

## 2023-08-23 RX ORDER — DOXYCYCLINE HYCLATE 100 MG
TABLET ORAL
COMMUNITY
End: 2023-08-23

## 2023-08-23 RX ORDER — LOSARTAN POTASSIUM 100 MG/1
1 TABLET ORAL
COMMUNITY
End: 2023-08-23

## 2023-08-23 RX ORDER — AZITHROMYCIN 250 MG/1
TABLET, FILM COATED ORAL
COMMUNITY
End: 2023-08-23

## 2023-08-23 RX ORDER — RALOXIFENE HYDROCHLORIDE 60 MG/1
1 TABLET, FILM COATED ORAL EVERY MORNING
COMMUNITY
End: 2023-08-23

## 2023-08-23 ASSESSMENT — PATIENT HEALTH QUESTIONNAIRE - PHQ9: CLINICAL INTERPRETATION OF PHQ2 SCORE: 0

## 2023-08-23 NOTE — TELEPHONE ENCOUNTER
Citlalli said pt had eye exam with Ana Esparza. I faxed letter for records to [facility].    9/7/23:I checked to see if records were faxed and was told they were faxed to [phone]. I asked if she could fax again to [phone].   9/12/23: I checked to see where the fax was sent. I was told it was faxed to [phone] the last time and she will fax to both numbers now.   9/14/23: I called to check on records again. They will call me back   9/21/23: lvm to see if I could have records faxed to us [phone], and call back [phone]    Records in chart 9/21/23/

## 2023-08-23 NOTE — PROGRESS NOTES
Virtual Visit: Established Patient   This visit was conducted via Zoom using secure and encrypted videoconferencing technology.   The patient was in their home in the state [facility].    The patient's identity was confirmed and verbal consent was obtained for this virtual visit.    Subjective:   CC:   Chief Complaint   Patient presents with    Sinusitis     Bronchitis, cough x 14 days      Jillian Gottlieb is a 77 y.o. female presenting with concern for possible sinusitis or bronchitis.  Has been over 14 days with continued sinus congestion, trouble breathing and wheezing.  Does have a history of COPD and asthma.  No fevers.  Reports upper airway congestion also coughing up mucus.  Using nebulizer frequency with some improved symptoms.  She also continues on Trelegy daily.   with similar symptoms and has had a negative COVID test.    ROS   See HPI    Current medicines (including changes today)  Current Outpatient Medications   Medication Sig Dispense Refill    clobetasol (TEMOVATE) 0.05 % Ointment       estrogens, conjugated (PREMARIN) 0.625 MG/GM Cream USE 0.5GM ( 1/4APPLICATOR) PER VAGINA AT BED X2WEEKS , THEN AT BEDTIME 2X/WEEK      TRELEGY ELLIPTA 200-62.5-25 MCG/ACT AEROSOL POWDER, BREATH ACTIVATED INHALE 1 PUFF ONCE DAILY. 180 Each 0    losartan (COZAAR) 100 MG Tab Take 1 Tablet by mouth every day. 90 Tablet 3    amLODIPine (NORVASC) 10 MG Tab Take 1 Tablet by mouth every day. 90 Tablet 3    metoprolol SR (TOPROL XL) 25 MG TABLET SR 24 HR Take 1 Tablet by mouth every day. 90 Tablet 3    fenofibrate (TRIGLIDE) 160 MG tablet Take 1 Tablet by mouth every day. 90 Tablet 3    levothyroxine (SYNTHROID) 88 MCG Tab Take 1 Tablet by mouth every morning on an empty stomach. 90 Tablet 3    hydroCHLOROthiazide (HYDRODIURIL) 25 MG Tab Take 1 Tablet by mouth every day. 90 Tablet 3    simvastatin (ZOCOR) 20 MG Tab TAKE 1 TABLET BY MOUTH EVERY EVENING 90 Tablet 3    metFORMIN ER (GLUCOPHAGE XR) 500 MG TABLET SR 24 HR  "TAKE 3 TABLETS BY MOUTH EVERY  Tablet 3    estradiol (ESTRACE) 0.1 MG/GM vaginal cream Insert 1 g into the vagina every day. Week one, then 3 x weekly. 42.5 g 4    raloxifene (EVISTA) 60 MG Tab Take 1 Tablet by mouth every morning. 90 Tablet 2    albuterol (PROVENTIL) 2.5mg/3ml Nebu Soln solution for nebulization Take 3 mL by nebulization every four hours as needed for Shortness of Breath. 30 Each 1    Biotin 10 MG Tab Take  by mouth.      triamcinolone acetonide (KENALOG) 0.5 % Cream APPLE TO AFFECTED AREA TWICE DAILY 15 g 1    albuterol 108 (90 Base) MCG/ACT Aero Soln inhalation aerosol Inhale 2 Puffs every four hours as needed for Shortness of Breath. FOR SHORTNESS OF BREATH 1 Each 11    glucosamine Sulfate 500 MG Cap Take 500 mg by mouth 3 times a day with meals.      multivitamin (THERAGRAN) Tab Take 1 Tab by mouth every day.      Influenza Vaccine High-Dose pf 0.5 ML Suspension Prefilled Syringe injection PHARMACIST ADMINISTERED IMMUNIZATION ADMINISTERED AT TIME OF DISPENSING (Patient not taking: Reported on 8/23/2023)       No current facility-administered medications for this visit.       Patient Active Problem List    Diagnosis Date Noted    Mild persistent asthma without complication 10/24/2022    Bladder prolapse, female, acquired 08/10/2020    Hammer toe 09/04/2019    GERD (gastroesophageal reflux disease) 10/03/2016    COPD (chronic obstructive pulmonary disease) ([facility]) 06/30/2016    Kidney stones 03/04/2016    Type 2 diabetes mellitus without complication, without long-term current use of insulin ([facility]) 02/13/2016    Essential hypertension 02/08/2016    Impaired fasting glucose 08/24/2015    Hyperlipidemia 04/20/2015    Hypothyroidism 04/20/2015    Personal history of renal cancer 04/20/2015        Objective:   Ht 1.778 m (5' 10\") Comment: Verbal  Wt 73.5 kg (162 lb) Comment: Verbal  SpO2 90% Comment: Verbal, before taking nebulizer  BMI 23.24 kg/m²     Physical Exam:  Constitutional: Alert, no " distress, well-groomed.  Skin: No rashes in visible areas.  Eye: Round. Conjunctiva clear, lids normal. No icterus.   ENMT: Lips pink without lesions, good dentition, moist mucous membranes. Phonation normal.  Neck: No masses, no thyromegaly. Moves freely without pain.  Respiratory: Unlabored respiratory effort, no cough or audible wheeze  Psych: Alert and oriented x3, normal affect and mood.     Assessment and Plan:   The following treatment plan was discussed:     1. Chronic obstructive pulmonary disease with acute exacerbation (HCC)  77F with history of asthma and COPD and presents with 14 days of continued congestion, productive cough, trouble breathing.  Will cover for COPD exacerbation and possible acute sinusitis with doxycycline and prednisone. Discussed reasons to seek care including new or worsening symptoms.  - doxycycline (MONODOX) 100 MG capsule; Take 1 Capsule by mouth 2 times a day for 5 days.  Dispense: 10 Capsule; Refill: 0  - predniSONE (DELTASONE) 20 MG Tab; Take 2 Tablets by mouth every day for 5 days.  Dispense: 10 Tablet; Refill: 0    Other orders  - clobetasol (TEMOVATE) 0.05 % Ointment  - estrogens, conjugated (PREMARIN) 0.625 MG/GM Cream; USE 0.5GM ( 1/4APPLICATOR) PER VAGINA AT BED X2WEEKS , THEN AT BEDTIME 2X/WEEK  - Influenza Vaccine High-Dose pf 0.5 ML Suspension Prefilled Syringe injection; PHARMACIST ADMINISTERED IMMUNIZATION ADMINISTERED AT TIME OF DISPENSING (Patient not taking: Reported on 8/23/2023)    Follow-up: Return if symptoms worsen or fail to improve.

## 2023-09-04 ENCOUNTER — OFFICE VISIT (OUTPATIENT)
Dept: URGENT CARE | Facility: CLINIC | Age: 78
End: 2023-09-04
Payer: MEDICARE

## 2023-09-04 VITALS
WEIGHT: 174.8 LBS | DIASTOLIC BLOOD PRESSURE: 64 MMHG | RESPIRATION RATE: 16 BRPM | OXYGEN SATURATION: 97 % | SYSTOLIC BLOOD PRESSURE: 124 MMHG | TEMPERATURE: 98.1 F | HEART RATE: 66 BPM | HEIGHT: 70 IN | BODY MASS INDEX: 25.03 KG/M2

## 2023-09-04 DIAGNOSIS — R10.13 DYSPEPSIA: Primary | ICD-10-CM

## 2023-09-04 PROCEDURE — 3074F SYST BP LT 130 MM HG: CPT

## 2023-09-04 PROCEDURE — 3078F DIAST BP <80 MM HG: CPT

## 2023-09-04 PROCEDURE — 99213 OFFICE O/P EST LOW 20 MIN: CPT

## 2023-09-04 RX ORDER — OMEPRAZOLE 40 MG/1
40 CAPSULE, DELAYED RELEASE ORAL DAILY
Qty: 60 CAPSULE | Refills: 0 | Status: SHIPPED | OUTPATIENT
Start: 2023-09-04 | End: 2023-11-03

## 2023-09-04 NOTE — PROGRESS NOTES
"Subjective:   Jillian Gottlieb is a 78 y.o. female who presents for Breathing Problem (\"I have had a sinus infection for 4 weeks, I have taken a full course of prednisone, and doxycyline. My breathing is still not better , and seems to get worse in the afternoons.\" )      HPI:Jillian comes in today c/o not breathing well in the afternoon after she gets up from her nap. She has to take her albuterol MDI, and feels some burning in her epigastric region. Onset was approx 4 days ago, when she finished a course of doxy and prednisone Patient describes symptoms as intermittent. Aggravating factors include lying down. Relieving factors include getting up, taking her albuterol. Treatments tried at home include  prilosec 20 mg OTC PRN which does give some relief. They describe their symptoms as mild. She has an appointment with PCP on 09/19/2023.       Review of Systems   Constitutional:  Negative for chills, fever, malaise/fatigue and weight loss.   HENT:  Negative for congestion, ear pain, sinus pain and sore throat.    Eyes:  Negative for blurred vision, double vision, pain, discharge and redness.   Respiratory:  Negative for cough, shortness of breath, wheezing and stridor.    Cardiovascular:  Negative for chest pain, palpitations and leg swelling.   Gastrointestinal:  Negative for abdominal pain, diarrhea, heartburn, nausea and vomiting.   Genitourinary:  Negative for dysuria, frequency and urgency.   Musculoskeletal:  Negative for joint pain and myalgias.   Skin:  Negative for rash.   Neurological:  Negative for dizziness, sensory change, speech change, weakness and headaches.   Endo/Heme/Allergies:  Does not bruise/bleed easily.   Psychiatric/Behavioral:  Negative for depression. The patient is not nervous/anxious.        Medications: albuterol Aers  albuterol Nebu  amLODIPine Tabs  Biotin Tabs  clobetasol Oint  estradiol  fenofibrate  glucosamine Sulfate Caps  hydroCHLOROthiazide Tabs  Influenza Vaccine High-Dose pf " [place]  levothyroxine Tabs  losartan Tabs  metFORMIN ER Tb24  metoprolol SR Tb24  multivitamin Tabs  Premarin Crea  raloxifene Tabs  simvastatin Tabs  Trelegy Ellipta Aepb  triamcinolone acetonide Crea    Allergies: Alcohol and Pcn [penicillins]    Problem List: does not have any pertinent problems on file.    Surgical History:  Past Surgical History:   Procedure Laterality Date    ID CLOSURE OF VAGINA  1/4/2021    Procedure: COLPOCLEISIS, LE FORT, PARTIAL;  Surgeon: Rafi Ingram M.D.;  Location: SURGERY SAME DAY [facility];  Service: Gynecology    KNEE ARTHROPLASTY TOTAL Left 12/18/2017    Procedure: KNEE ARTHROPLASTY TOTAL;  Surgeon: Ricky Castaneda M.D.;  Location: SURGERY [facility];  Service: Orthopedics    KNEE ARTHROPLASTY TOTAL Right 10/17/2016    Procedure: KNEE ARTHROPLASTY TOTAL;  Surgeon: Ricky Castaneda M.D.;  Location: SURGERY [facility];  Service:     RECOVERY  3/4/2016    Procedure: IR2-PERQ NEPHRO-URETERAL CATH RIGHT-DR. RIDDLE-Surgery to follow in [facility] ;  Surgeon: Ir-Recovery Surgery;  Location: SURGERY [facility];  Service:     CYSTOSCOPY STENT REMOVAL Right 3/4/2016    Procedure: RIGID CYSTOSCOPY STENT REMOVAL;  Surgeon: Fredy Riddle M.D.;  Location: [facility];  Service:     URETEROSCOPY Right 3/4/2016    Procedure: URETEROSCOPY;  Surgeon: Fredy Riddle M.D.;  Location: [facility];  Service:     PERCUTANEOUS NEPHROSTOLITHOTOMY Right 3/4/2016    Procedure: PERCUTANEOUS NEPHROSTOLITHOTOMY FOR NEPHROLITHOTRIPSY;  Surgeon: Fredy Riddle M.D.;  Location: [facility];  Service:     CYSTOSCOPY STENT PLACEMENT Right 2/12/2016    Procedure: CYSTOSCOPY STENT PLACEMENT;  Surgeon: Fredy Riddle M.D.;  Location: [facility];  Service:     CYSTOSCOPY STENT PLACEMENT Right 6/26/2015    Procedure: CYSTOSCOPY STENT PLACEMENT RIGID POSSIBLE STENT;  Surgeon: Fredy Riddle M.D.;  Location: [facility];  Service:     URETEROSCOPY N/A  "6/26/2015    Procedure: URETEROSCOPY;  Surgeon: Fredy Cintron M.D.;  Location: SURGERY [facility];  Service:     LASERTRIPSY Right 6/26/2015    Procedure: LASERTRIPSY LITHO;  Surgeon: Fredy Cintron M.D.;  Location: SURGERY [facility];  Service:     URETEROSCOPY  8/20/2014    Performed by Fredy Cintron M.D. at SURGERY [facility]    LASERTRIPSY  8/20/2014    Performed by Fredy Cintron M.D. at SURGERY [facility]    CYSTOSCOPY  8/20/2014    Performed by Fredy Cintron M.D. at SURGERY [facility]    CYSTOSCOPY STENT PLACEMENT  1/31/2013    Performed by Fredy Cintron M.D. at SURGERY [facility]    URETEROSCOPY  1/31/2013    Performed by Fredy Cintron M.D. at SURGERY [facility]    NEPHRECTOMY PARTIAL  1/31/2013    Performed by Fredy Cintron M.D. at SURGERY [facility]    LASERTRIPSY  1/31/2013    Performed by Fredy Cintron M.D. at SURGERY [facility]    OTHER ORTHOPEDIC SURGERY  2006    right knee meniscus    OTHER ORTHOPEDIC SURGERY  1996    ORIF right arm    OTHER  1995    sinus surgery    GYN SURGERY  1994    hysterectomy    OTHER  1980    thyroid lumpectomy    BREAST BIOPSY Left 1976    benign    CUATE BY LAPAROSCOPY      OTHER ORTHOPEDIC SURGERY      achilles tendon reattach     TONSILLECTOMY         Past Social Hx:   reports that she has never smoked. She has never used smokeless tobacco. She reports that she does not drink alcohol and does not use drugs.     Past Family Hx:   family history includes Colon Cancer in her father; Kidney Disease in her mother.     Problem list, medications, and allergies reviewed by myself today in Epic.     Objective:     /64 (BP Location: Left arm, Patient Position: Sitting, BP Cuff Size: Adult)   Pulse 66   Temp 36.7 °C (98.1 °F) (Temporal)   Resp 16   Ht 1.778 m (5' 10\")   Wt 79.3 kg (174 lb 12.8 oz)   SpO2 97%   BMI 25.08 kg/m²     During this visit, appropriate PPE was worn, and hand hygiene was performed.    Physical " Exam  Vitals reviewed.   Constitutional:       General: She is not in acute distress.     Appearance: Normal appearance. She is not ill-appearing or toxic-appearing.   HENT:      Head: Normocephalic and atraumatic.      Right Ear: External ear normal.      Left Ear: External ear normal.      Nose: No congestion or rhinorrhea.      Mouth/Throat:      Pharynx: Oropharynx is clear.   Eyes:      General:         Right eye: No discharge.         Left eye: No discharge.      Conjunctiva/sclera: Conjunctivae normal.      Pupils: Pupils are equal, round, and reactive to light.   Cardiovascular:      Rate and Rhythm: Normal rate and regular rhythm.      Heart sounds: Normal heart sounds. No murmur heard.     No friction rub. No gallop.   Pulmonary:      Breath sounds: Normal breath sounds. No wheezing, rhonchi or rales.   Abdominal:      General: Bowel sounds are normal. There is no distension.      Palpations: Abdomen is soft. There is no mass.      Tenderness: There is no abdominal tenderness. There is no right CVA tenderness, left CVA tenderness, guarding or rebound.      Hernia: No hernia is present.   Musculoskeletal:         General: Normal range of motion.      Cervical back: Normal range of motion. No rigidity.      Right lower leg: No edema.      Left lower leg: No edema.   Lymphadenopathy:      Cervical: No cervical adenopathy.   Skin:     General: Skin is warm and dry.      Coloration: Skin is not jaundiced.   Neurological:      Mental Status: She is alert and oriented to person, place, and time. Mental status is at baseline.   Psychiatric:         Mood and Affect: Mood normal.         Behavior: Behavior normal.         Assessment/Plan:     Diagnosis and associated orders:     No diagnosis found.   Comments/MDM:     1. Dyspepsia  Cardiovascular and abdominal exams are negative.  She denies vomiting, or vomiting blood, denies any blood in her stool.  Patient's symptoms are consistent with GERD, and she did just  finish a course of prednisone and antibiotics after which the symptoms started.  She does endorse a feeling of heartburn when she lies down flat.  She states she eats her biggest meals at breakfast and lunch, then takes a nap, does not eat much for dinner.  Her symptoms arise when she lies down to take her nap, and they feel better when she gets upright.  She has been taking OTC Prilosec 20 mg as needed for the symptoms and states that it does help.  I discussed with her that I think the antibiotics and prednisone may have caused some dyspepsia, and she is getting GERD symptoms, I will send in a prescription for increase strength omeprazole, and I instructed her to take it daily for 8 weeks, or until she follows up with her PCP and receives new instructions.  I did instruct her to follow-up with PCP as soon as she can.  We did discuss red flags, and when to come back to urgent care versus when to go to the ER.  I instructed her that some relief measures include sleeping and napping propped up instead of lying flat, do not take a nap immediately after eating lunch, wait 1 to 2 hours, avoid alcohol and spicy foods.  She states she understands all instructions and is agreeable to the plan of care.  - omeprazole (PRILOSEC) 40 MG delayed-release capsule; Take 1 Capsule by mouth every day for 60 days.  Dispense: 60 Capsule; Refill: 0         Pt is clinically stable at today's acute urgent care visit.  No acute distress noted. Appropriate for outpatient management at this time.       Discussed DDx, management options (risks,benefits, and alternatives to planned treatment), natural progression and supportive care.  Patient states they have good understanding and the treatment plan was agreed upon. Questions were encouraged and answered   Return to urgent care prn if new or worsening sx or if there is no improvement in condition prn.    Advised the patient to follow-up with the primary care physician for recheck,  reevaluation, and consideration of further management.  I instructed patient to get a pharmacy consult when picking up any prescribed medications.  Strict ER precautions discussed for any severe abdominal pain, fever, chills, nausea and vomiting, vomiting blood or blood in stool, difficulty swallowing, lethargy or altered level of consciousness.  Patient states they understand all instructions.     I personally reviewed prior external notes and test results pertinent to today's visit.  I have independently reviewed and interpreted all diagnostics ordered during this urgent care acute visit.        Please note that this dictation was created using voice recognition software. I have made a reasonable attempt to correct obvious errors, but I expect that there are errors of grammar and possibly content that I did not discover before finalizing the note.    This note was electronically signed by ALFREDO Cee

## 2023-09-05 ASSESSMENT — ENCOUNTER SYMPTOMS
SENSORY CHANGE: 0
DIZZINESS: 0
BLURRED VISION: 0
HEADACHES: 0
EYE REDNESS: 0
MYALGIAS: 0
STRIDOR: 0
WEIGHT LOSS: 0
PALPITATIONS: 0
SORE THROAT: 0
BRUISES/BLEEDS EASILY: 0
COUGH: 0
SPEECH CHANGE: 0
ABDOMINAL PAIN: 0
NAUSEA: 0
SHORTNESS OF BREATH: 0
FEVER: 0
DEPRESSION: 0
NERVOUS/ANXIOUS: 0
HEARTBURN: 0
SINUS PAIN: 0
WHEEZING: 0
DOUBLE VISION: 0
WEAKNESS: 0
EYE DISCHARGE: 0
VOMITING: 0
EYE PAIN: 0
DIARRHEA: 0
CHILLS: 0

## 2023-10-10 ENCOUNTER — OFFICE VISIT (OUTPATIENT)
Dept: MEDICAL GROUP | Facility: LAB | Age: 78
End: 2023-10-10
Payer: MEDICARE

## 2023-10-10 VITALS
SYSTOLIC BLOOD PRESSURE: 158 MMHG | OXYGEN SATURATION: 94 % | DIASTOLIC BLOOD PRESSURE: 70 MMHG | HEIGHT: 70 IN | TEMPERATURE: 98.6 F | HEART RATE: 88 BPM | RESPIRATION RATE: 14 BRPM | WEIGHT: 171.2 LBS | BODY MASS INDEX: 24.51 KG/M2

## 2023-10-10 DIAGNOSIS — E11.9 TYPE 2 DIABETES MELLITUS WITHOUT COMPLICATION, WITHOUT LONG-TERM CURRENT USE OF INSULIN (HCC): ICD-10-CM

## 2023-10-10 DIAGNOSIS — K21.9 GASTROESOPHAGEAL REFLUX DISEASE, UNSPECIFIED WHETHER ESOPHAGITIS PRESENT: ICD-10-CM

## 2023-10-10 DIAGNOSIS — M54.31 RIGHT SIDED SCIATICA: ICD-10-CM

## 2023-10-10 DIAGNOSIS — R09.89 RALES 1/4 WAY UP POSTERIOR CHEST WALL ON RIGHT SIDE: ICD-10-CM

## 2023-10-10 LAB
HBA1C MFR BLD: 6.5 % (ref ?–5.8)
POCT INT CON NEG: NEGATIVE
POCT INT CON POS: POSITIVE

## 2023-10-10 PROCEDURE — 83036 HEMOGLOBIN GLYCOSYLATED A1C: CPT | Performed by: FAMILY MEDICINE

## 2023-10-10 PROCEDURE — 99214 OFFICE O/P EST MOD 30 MIN: CPT | Performed by: FAMILY MEDICINE

## 2023-10-10 PROCEDURE — 3077F SYST BP >= 140 MM HG: CPT | Performed by: FAMILY MEDICINE

## 2023-10-10 PROCEDURE — 3078F DIAST BP <80 MM HG: CPT | Performed by: FAMILY MEDICINE

## 2023-10-10 NOTE — PROGRESS NOTES
"Subjective:     CC: DM, sciatica, cold    HPI:   Jillian presents today with multiple concerns.  Has had upper respiratory symptoms for about the last 10 days including runny nose, fever, aches, cough, and subsequent diarrhea.  Overall things are improving and no further fevers, no current trouble breathing.  She does have persistent cough.    Has history of recurrent right-sided sciatica that is flared today.  Usually will only last 1 to 2 days every few months.  No numbness or weakness, no incontinence.    Was started on PPI by urgent care for dyspepsia after she had been on doxycycline and prednisone for COPD flare.  Reports her dyspepsia symptoms have resolved and wondering if she can stop this.    Medications, past medical history, allergies, and social history have been reviewed and updated.      Objective:       Exam:  BP (!) 158/70 (BP Location: Left arm, Patient Position: Sitting, BP Cuff Size: Adult)   Pulse 88   Temp 37 °C (98.6 °F)   Resp 14   Ht 1.778 m (5' 10\")   Wt 77.7 kg (171 lb 3.2 oz)   SpO2 94%   BMI 24.56 kg/m²  Body mass index is 24.56 kg/m².    Constitutional: Alert. Well appearing. No distress.  Skin: Warm, dry, good turgor, no visible rashes.  ENMT: Moist mucous membranes. Normal dentition.  Oropharynx is clear.  Tympanic membranes are clear.  No cervical LAD.  No sinus tenderness.  Respiratory: Normal effort.  Rales present to the right lung base.  Cardiovascular: Regular rate and rhythm. Normal S1/S2. No murmurs, rubs or gallops.   Monofilament testing with a 10 gram force: sensation intact: intact bilaterally  Visual Inspection: Feet without maceration, ulcers, fissures.  Pedal pulses: intact bilaterally   Neuro: Moves all four extremities. No facial droop.  Psych: Answers questions appropriately. Normal affect and mood.    Assessment & Plan:     78 y.o. female with the following -     1. Type 2 diabetes mellitus without complication, without long-term current use of insulin " (HCC)  A1c 6.5%.  Continue metformin.  Labs previously ordered, additional screenings ordered as below.  - POCT Hemoglobin A1C  - MICROALBUMIN CREAT RATIO URINE; Future  - Diabetic Monofilament LE Exam    2. Right sided sciatica  Currently with mild flare that started today.  She has flares every few months that last for 1 to 2 days this is generally well controlled.  No red flag signs or symptoms.  Continue with conservative management using Tylenol, limited NSAIDs.  Follow-up if not improving.    3. Gastroesophageal reflux disease, unspecified whether esophagitis present  Started on PPI for dyspepsia at urgent care after she had doxycycline and prednisone for COPD flare.  Symptoms have resolved and she will stop PPI.    4. Rales 1/4 way up posterior chest wall on right side  Recent upper respiratory symptoms including congestion, fever, cough.  Cough is lingering and she does have rales to the right posterior base.  CXR for further evaluation.  Continue to treat symptomatically for viral URI if CXR is clear.  - DX-CHEST-2 VIEWS; Future      Please note that this note was created using voice recognition software.

## 2023-10-11 ENCOUNTER — HOSPITAL ENCOUNTER (OUTPATIENT)
Dept: RADIOLOGY | Facility: MEDICAL CENTER | Age: 78
End: 2023-10-11
Attending: FAMILY MEDICINE
Payer: MEDICARE

## 2023-10-11 DIAGNOSIS — R09.89 RALES 1/4 WAY UP POSTERIOR CHEST WALL ON RIGHT SIDE: ICD-10-CM

## 2023-10-11 PROCEDURE — 71046 X-RAY EXAM CHEST 2 VIEWS: CPT

## 2023-11-07 ENCOUNTER — HOSPITAL ENCOUNTER (OUTPATIENT)
Dept: LAB | Facility: MEDICAL CENTER | Age: 78
End: 2023-11-07
Attending: FAMILY MEDICINE
Payer: MEDICARE

## 2023-11-07 DIAGNOSIS — E11.9 TYPE 2 DIABETES MELLITUS WITHOUT COMPLICATION, WITHOUT LONG-TERM CURRENT USE OF INSULIN (HCC): ICD-10-CM

## 2023-11-07 LAB
CREAT UR-MCNC: 96.97 MG/DL
MICROALBUMIN UR-MCNC: 2.1 MG/DL
MICROALBUMIN/CREAT UR: 22 MG/G (ref 0–30)

## 2023-11-07 PROCEDURE — 82570 ASSAY OF URINE CREATININE: CPT

## 2023-11-07 PROCEDURE — 82043 UR ALBUMIN QUANTITATIVE: CPT

## 2023-11-13 DIAGNOSIS — J45.30 MILD PERSISTENT ASTHMA WITHOUT COMPLICATION: ICD-10-CM

## 2023-11-13 RX ORDER — FLUTICASONE FUROATE, UMECLIDINIUM BROMIDE AND VILANTEROL TRIFENATATE 200; 62.5; 25 UG/1; UG/1; UG/1
POWDER RESPIRATORY (INHALATION)
Qty: 180 EACH | Refills: 0 | Status: SHIPPED | OUTPATIENT
Start: 2023-11-13 | End: 2024-02-24 | Stop reason: SDUPTHER

## 2023-11-13 NOTE — TELEPHONE ENCOUNTER
Have we ever prescribed this med? Yes.  If yes, what date? 7/31/2023    Last OV: 10/224/2022 - Grace SERRATO     Next OV: none     DX:  Mild persistent asthma without complication [J45.30]     Medications: TRELEGY ELLIPTA 200-62.5-25 MCG/ACT AEROSOL POWDER, BREATH ACTIVATED

## 2023-11-14 ENCOUNTER — OFFICE VISIT (OUTPATIENT)
Dept: MEDICAL GROUP | Facility: LAB | Age: 78
End: 2023-11-14
Payer: MEDICARE

## 2023-11-14 VITALS
HEIGHT: 70 IN | SYSTOLIC BLOOD PRESSURE: 120 MMHG | HEART RATE: 88 BPM | TEMPERATURE: 96.8 F | WEIGHT: 177.4 LBS | OXYGEN SATURATION: 96 % | DIASTOLIC BLOOD PRESSURE: 74 MMHG | BODY MASS INDEX: 25.4 KG/M2

## 2023-11-14 DIAGNOSIS — E11.9 TYPE 2 DIABETES MELLITUS WITHOUT COMPLICATION, WITHOUT LONG-TERM CURRENT USE OF INSULIN (HCC): ICD-10-CM

## 2023-11-14 DIAGNOSIS — I10 ESSENTIAL HYPERTENSION: ICD-10-CM

## 2023-11-14 PROCEDURE — 99214 OFFICE O/P EST MOD 30 MIN: CPT | Performed by: FAMILY MEDICINE

## 2023-11-14 PROCEDURE — 3078F DIAST BP <80 MM HG: CPT | Performed by: FAMILY MEDICINE

## 2023-11-14 PROCEDURE — 3074F SYST BP LT 130 MM HG: CPT | Performed by: FAMILY MEDICINE

## 2023-11-14 RX ORDER — FLUTICASONE FUROATE, UMECLIDINIUM BROMIDE AND VILANTEROL TRIFENATATE 200; 62.5; 25 UG/1; UG/1; UG/1
1 POWDER RESPIRATORY (INHALATION)
COMMUNITY
End: 2023-11-14

## 2023-11-14 NOTE — PROGRESS NOTES
"Subjective:     CC: HTN    HPI:   Jillian presents today for follow-up on hypertension.  BP elevated at last visit.  Has not been checking at home but much improved today-120/74.  She continues on amlodipine 10 mg, losartan 100 mg, HCTZ 25 mg and metoprolol 25 mg daily.      Medications, past medical history, allergies, and social history have been reviewed and updated.      Objective:       Exam:  /74   Pulse 88   Temp 36 °C (96.8 °F) (Temporal)   Ht 1.778 m (5' 10\")   Wt 80.5 kg (177 lb 6.4 oz)   SpO2 96%   BMI 25.45 kg/m²  Body mass index is 25.45 kg/m².    Constitutional: Alert. Well appearing. No distress.  Skin: Warm, dry, good turgor, no visible rashes.  Respiratory: Normal effort.   Neuro: Moves all four extremities. No facial droop.  Psych: Answers questions appropriately. Normal affect and mood.    Assessment & Plan:     78 y.o. female with the following -     1. Essential hypertension  Blood pressure at goal.  Continue amlodipine, losartan, HCTZ, metoprolol.    2. Type 2 diabetes mellitus without complication, without long-term current use of insulin ([facility])  A1c 6.5% 1 month ago.  Recheck in 2 months.  Continue metformin.         Please note that this note was created using voice recognition software.      "

## 2023-11-24 ENCOUNTER — OFFICE VISIT (OUTPATIENT)
Dept: URGENT CARE | Facility: CLINIC | Age: 78
End: 2023-11-24
Payer: MEDICARE

## 2023-11-24 VITALS
HEART RATE: 76 BPM | RESPIRATION RATE: 16 BRPM | DIASTOLIC BLOOD PRESSURE: 80 MMHG | SYSTOLIC BLOOD PRESSURE: 130 MMHG | TEMPERATURE: 98.5 F | BODY MASS INDEX: 24.2 KG/M2 | OXYGEN SATURATION: 96 % | HEIGHT: 70 IN | WEIGHT: 169 LBS

## 2023-11-24 DIAGNOSIS — M79.604 LUMBAR PAIN WITH RADIATION DOWN RIGHT LEG: ICD-10-CM

## 2023-11-24 DIAGNOSIS — M54.50 LUMBAR PAIN WITH RADIATION DOWN RIGHT LEG: ICD-10-CM

## 2023-11-24 PROCEDURE — 3075F SYST BP GE 130 - 139MM HG: CPT | Performed by: REGISTERED NURSE

## 2023-11-24 PROCEDURE — 99214 OFFICE O/P EST MOD 30 MIN: CPT | Performed by: REGISTERED NURSE

## 2023-11-24 PROCEDURE — 3079F DIAST BP 80-89 MM HG: CPT | Performed by: REGISTERED NURSE

## 2023-11-24 RX ORDER — PREDNISONE 10 MG/1
20 TABLET ORAL DAILY
Qty: 10 TABLET | Refills: 0 | Status: SHIPPED | OUTPATIENT
Start: 2023-11-24 | End: 2023-11-29

## 2023-11-24 ASSESSMENT — ENCOUNTER SYMPTOMS
DIZZINESS: 0
CHILLS: 0
FEVER: 0
SHORTNESS OF BREATH: 0

## 2023-11-24 NOTE — PROGRESS NOTES
"Subjective:   Jillian Gottlieb is a 78 y.o. female who presents for Leg Pain (X 7 days, possible sciatica of RT leg.)    HPI  Right lumbar pain that shoots down right leg all the way to the foot. Does not recall a specific event that triggered the symptoms. Tried aleve which did help. Hx of herniated disc unsure where.  Hx of kidney cancer and she follows with urology. Hx DM2 last A1C 6.5%    Denies fever, trauma, recent surgery, new onset numbness tingling or weakness in lower extremities, saddle paresthesia, incontinence, urinary retention, IV drug use, unexplained weight loss.    No history of stomach bleeding, kidney dysfunction.     Review of Systems   Constitutional:  Negative for chills and fever.   Respiratory:  Negative for shortness of breath.    Cardiovascular:  Negative for chest pain.   Neurological:  Negative for dizziness.     Medications, Allergies, and current problem list reviewed today in Epic.     Objective:     /80   Pulse 76   Temp 36.9 °C (98.5 °F) (Temporal)   Resp 16   Ht 1.778 m (5' 10\")   Wt 76.7 kg (169 lb)   SpO2 96%     Physical Exam  Vitals and nursing note reviewed.   Constitutional:       Appearance: Normal appearance. She is not ill-appearing or toxic-appearing.   HENT:      Mouth/Throat:      Mouth: Mucous membranes are moist.   Eyes:      Pupils: Pupils are equal, round, and reactive to light.   Cardiovascular:      Rate and Rhythm: Normal rate and regular rhythm.      Heart sounds: No murmur heard.  Pulmonary:      Effort: Pulmonary effort is normal.      Breath sounds: Normal breath sounds.   Musculoskeletal:         General: Normal range of motion.      Cervical back: Normal and normal range of motion.      Thoracic back: Normal.      Lumbar back: Tenderness present. No bony tenderness. Normal range of motion. Negative right straight leg raise test and negative left straight leg raise test.        Back:       Comments: Lower extremity neurovascular intact distally, " equal bilat.   Skin:     General: Skin is warm and dry.      Capillary Refill: Capillary refill takes less than 2 seconds.      Findings: No rash.   Neurological:      General: No focal deficit present.      Mental Status: She is alert and oriented to person, place, and time.      Cranial Nerves: No cranial nerve deficit.      Sensory: No sensory deficit.      Motor: No weakness.      Coordination: Coordination normal.      Gait: Gait abnormal (antalgic).   Psychiatric:         Mood and Affect: Mood normal.       Assessment/Plan:     1. Lumbar pain with radiation down right leg  predniSONE (DELTASONE) 10 MG Tab    Referral to Physical Therapy        Differential diagnosis discussed     1 week of right lumbar pain with radiation down the leg consistent with sciatica, does have a history of this.  No trauma or injury.  Does have history of herniated disc but unsure where.  No red flag signs or symptoms at this time.  Vital signs are reassuring.  On exam there is some tenderness in the right lumbar region no bony tenderness.  Neurovascular intact distally and equal bilaterally, lower extremities.  Antalgic gait.  History of diabetes last A1c 6.5%, last kidney function on file appears normal.  Discussed risk-benefit of steroids and at this point we will move forward with short course of prednisone.  Also referral to physical therapy.     Return to clinic or go to ED if symptoms worsen or persist. Indications for ED discussed at length. Patient/Parent/Guardian voices understanding.     Follow-up with your primary care provider in 3-5 days.     Red flag symptoms discussed. All side effects of medication discussed including allergic response, GI upset, tendon injury, rash, sedation etc.    I personally reviewed prior external notes and test results pertinent to today's visit as well as additional imaging and testing completed in clinic today.     Please note that this dictation was created using voice recognition software.  I have made every reasonable attempt to correct obvious errors, but I expect that there are errors of grammar and possibly content that I did not discover before finalizing the note.    This note was electronically signed by OMID Ramon

## 2023-11-28 DIAGNOSIS — M54.31 RIGHT SIDED SCIATICA: ICD-10-CM

## 2023-11-29 ENCOUNTER — PATIENT MESSAGE (OUTPATIENT)
Dept: HEALTH INFORMATION MANAGEMENT | Facility: OTHER | Age: 78
End: 2023-11-29

## 2023-12-01 ENCOUNTER — PATIENT MESSAGE (OUTPATIENT)
Dept: MEDICAL GROUP | Facility: LAB | Age: 78
End: 2023-12-01
Payer: MEDICARE

## 2023-12-01 DIAGNOSIS — M54.31 RIGHT SIDED SCIATICA: ICD-10-CM

## 2023-12-01 RX ORDER — GABAPENTIN 100 MG/1
100 CAPSULE ORAL 3 TIMES DAILY
Qty: 180 CAPSULE | Refills: 1 | Status: SHIPPED | OUTPATIENT
Start: 2023-12-01 | End: 2024-01-23

## 2024-01-04 ENCOUNTER — HOSPITAL ENCOUNTER (OUTPATIENT)
Dept: LAB | Facility: MEDICAL CENTER | Age: 79
End: 2024-01-04
Attending: FAMILY MEDICINE
Payer: MEDICARE

## 2024-01-04 DIAGNOSIS — E03.9 HYPOTHYROIDISM, UNSPECIFIED TYPE: ICD-10-CM

## 2024-01-04 DIAGNOSIS — E78.5 HYPERLIPIDEMIA, UNSPECIFIED HYPERLIPIDEMIA TYPE: ICD-10-CM

## 2024-01-04 DIAGNOSIS — J44.9 CHRONIC OBSTRUCTIVE PULMONARY DISEASE, UNSPECIFIED COPD TYPE (HCC): ICD-10-CM

## 2024-01-04 LAB
ALBUMIN SERPL BCP-MCNC: 4.5 G/DL (ref 3.2–4.9)
ALBUMIN/GLOB SERPL: 1.7 G/DL
ALP SERPL-CCNC: 47 U/L (ref 30–99)
ALT SERPL-CCNC: 21 U/L (ref 2–50)
ANION GAP SERPL CALC-SCNC: 11 MMOL/L (ref 7–16)
AST SERPL-CCNC: 18 U/L (ref 12–45)
BASOPHILS # BLD AUTO: 0.3 % (ref 0–1.8)
BASOPHILS # BLD: 0.03 K/UL (ref 0–0.12)
BILIRUB SERPL-MCNC: 0.4 MG/DL (ref 0.1–1.5)
BUN SERPL-MCNC: 27 MG/DL (ref 8–22)
CALCIUM ALBUM COR SERPL-MCNC: 9.1 MG/DL (ref 8.5–10.5)
CALCIUM SERPL-MCNC: 9.5 MG/DL (ref 8.5–10.5)
CHLORIDE SERPL-SCNC: 100 MMOL/L (ref 96–112)
CHOLEST SERPL-MCNC: 135 MG/DL (ref 100–199)
CO2 SERPL-SCNC: 27 MMOL/L (ref 20–33)
CREAT SERPL-MCNC: 0.71 MG/DL (ref 0.5–1.4)
EOSINOPHIL # BLD AUTO: 0.09 K/UL (ref 0–0.51)
EOSINOPHIL NFR BLD: 0.8 % (ref 0–6.9)
ERYTHROCYTE [DISTWIDTH] IN BLOOD BY AUTOMATED COUNT: 43.6 FL (ref 35.9–50)
FASTING STATUS PATIENT QL REPORTED: NORMAL
GFR SERPLBLD CREATININE-BSD FMLA CKD-EPI: 87 ML/MIN/1.73 M 2
GLOBULIN SER CALC-MCNC: 2.7 G/DL (ref 1.9–3.5)
GLUCOSE SERPL-MCNC: 142 MG/DL (ref 65–99)
HCT VFR BLD AUTO: 48 % (ref 37–47)
HDLC SERPL-MCNC: 49 MG/DL
HGB BLD-MCNC: 15.5 G/DL (ref 12–16)
IMM GRANULOCYTES # BLD AUTO: 0.05 K/UL (ref 0–0.11)
IMM GRANULOCYTES NFR BLD AUTO: 0.5 % (ref 0–0.9)
LDLC SERPL CALC-MCNC: 51 MG/DL
LYMPHOCYTES # BLD AUTO: 2.82 K/UL (ref 1–4.8)
LYMPHOCYTES NFR BLD: 25.6 % (ref 22–41)
MCH RBC QN AUTO: 30.6 PG (ref 27–33)
MCHC RBC AUTO-ENTMCNC: 32.3 G/DL (ref 32.2–35.5)
MCV RBC AUTO: 94.9 FL (ref 81.4–97.8)
MONOCYTES # BLD AUTO: 0.78 K/UL (ref 0–0.85)
MONOCYTES NFR BLD AUTO: 7.1 % (ref 0–13.4)
NEUTROPHILS # BLD AUTO: 7.25 K/UL (ref 1.82–7.42)
NEUTROPHILS NFR BLD: 65.7 % (ref 44–72)
NRBC # BLD AUTO: 0 K/UL
NRBC BLD-RTO: 0 /100 WBC (ref 0–0.2)
PLATELET # BLD AUTO: 426 K/UL (ref 164–446)
PMV BLD AUTO: 10.8 FL (ref 9–12.9)
POTASSIUM SERPL-SCNC: 4.1 MMOL/L (ref 3.6–5.5)
PROT SERPL-MCNC: 7.2 G/DL (ref 6–8.2)
RBC # BLD AUTO: 5.06 M/UL (ref 4.2–5.4)
SODIUM SERPL-SCNC: 138 MMOL/L (ref 135–145)
TRIGL SERPL-MCNC: 175 MG/DL (ref 0–149)
TSH SERPL DL<=0.005 MIU/L-ACNC: 1.79 UIU/ML (ref 0.38–5.33)
WBC # BLD AUTO: 11 K/UL (ref 4.8–10.8)

## 2024-01-04 PROCEDURE — 36415 COLL VENOUS BLD VENIPUNCTURE: CPT

## 2024-01-04 PROCEDURE — 80053 COMPREHEN METABOLIC PANEL: CPT

## 2024-01-04 PROCEDURE — 80061 LIPID PANEL: CPT

## 2024-01-04 PROCEDURE — 85025 COMPLETE CBC W/AUTO DIFF WBC: CPT

## 2024-01-04 PROCEDURE — 84443 ASSAY THYROID STIM HORMONE: CPT

## 2024-01-12 ENCOUNTER — HOSPITAL ENCOUNTER (OUTPATIENT)
Dept: RADIOLOGY | Facility: MEDICAL CENTER | Age: 79
End: 2024-01-12
Attending: UROLOGY
Payer: MEDICARE

## 2024-01-12 DIAGNOSIS — Z85.528 PERSONAL HISTORY OF OTHER MALIGNANT NEOPLASM OF KIDNEY: ICD-10-CM

## 2024-01-12 PROCEDURE — A9579 GAD-BASE MR CONTRAST NOS,1ML: HCPCS | Performed by: UROLOGY

## 2024-01-12 PROCEDURE — 74183 MRI ABD W/O CNTR FLWD CNTR: CPT

## 2024-01-12 PROCEDURE — 700117 HCHG RX CONTRAST REV CODE 255: Performed by: UROLOGY

## 2024-01-12 RX ADMIN — GADOTERIDOL 17 ML: 279.3 INJECTION, SOLUTION INTRAVENOUS at 14:09

## 2024-01-22 RX ORDER — METFORMIN HYDROCHLORIDE 500 MG/1
TABLET, EXTENDED RELEASE ORAL
Qty: 270 TABLET | Refills: 3 | Status: SHIPPED | OUTPATIENT
Start: 2024-01-22

## 2024-01-22 RX ORDER — METOPROLOL SUCCINATE 25 MG/1
25 TABLET, EXTENDED RELEASE ORAL DAILY
Qty: 90 TABLET | Refills: 3 | Status: SHIPPED | OUTPATIENT
Start: 2024-01-22

## 2024-01-22 RX ORDER — SIMVASTATIN 20 MG
TABLET ORAL
Qty: 90 TABLET | Refills: 3 | Status: SHIPPED | OUTPATIENT
Start: 2024-01-22

## 2024-01-22 NOTE — TELEPHONE ENCOUNTER
Received request via: Pharmacy    Was the patient seen in the last year in this department? Yes    Does the patient have an active prescription (recently filled or refills available) for medication(s) requested? No    Pharmacy Name: [facility]    Does the [facility] and need 100 day supply (blood pressure, diabetes and cholesterol meds only)? Patient does not have SCP

## 2024-01-22 NOTE — TELEPHONE ENCOUNTER
Received request via: Pharmacy    Was the patient seen in the last year in this department? Yes    Does the patient have an active prescription (recently filled or refills available) for medication(s) requested? No    Pharmacy Name: [facility]    Does the patient have group home Plus and need 100 day supply (blood pressure, diabetes and cholesterol meds only)? Patient does not have SCP

## 2024-01-22 NOTE — TELEPHONE ENCOUNTER
Received request via: Pharmacy    Was the patient seen in the last year in this department? Yes    Does the patient have an active prescription (recently filled or refills available) for medication(s) requested? No    Pharmacy Name: [facility]    Does the patient have [facility] and need 100 day supply (blood pressure, diabetes and cholesterol meds only)? Patient does not have SCP

## 2024-01-23 ENCOUNTER — OFFICE VISIT (OUTPATIENT)
Dept: MEDICAL GROUP | Facility: LAB | Age: 79
End: 2024-01-23
Payer: MEDICARE

## 2024-01-23 VITALS
BODY MASS INDEX: 25.2 KG/M2 | HEIGHT: 70 IN | HEART RATE: 83 BPM | SYSTOLIC BLOOD PRESSURE: 124 MMHG | OXYGEN SATURATION: 96 % | WEIGHT: 176 LBS | RESPIRATION RATE: 12 BRPM | DIASTOLIC BLOOD PRESSURE: 62 MMHG | TEMPERATURE: 97.6 F

## 2024-01-23 DIAGNOSIS — M54.31 RIGHT SIDED SCIATICA: ICD-10-CM

## 2024-01-23 DIAGNOSIS — D72.828 OTHER ELEVATED WHITE BLOOD CELL (WBC) COUNT: ICD-10-CM

## 2024-01-23 DIAGNOSIS — M25.551 CHRONIC RIGHT HIP PAIN: ICD-10-CM

## 2024-01-23 DIAGNOSIS — E11.9 TYPE 2 DIABETES MELLITUS WITHOUT COMPLICATION, WITHOUT LONG-TERM CURRENT USE OF INSULIN (HCC): ICD-10-CM

## 2024-01-23 DIAGNOSIS — G89.29 CHRONIC RIGHT HIP PAIN: ICD-10-CM

## 2024-01-23 DIAGNOSIS — J44.9 CHRONIC OBSTRUCTIVE PULMONARY DISEASE, UNSPECIFIED COPD TYPE (HCC): ICD-10-CM

## 2024-01-23 LAB
HBA1C MFR BLD: 6.7 % (ref ?–5.8)
POCT INT CON NEG: NEGATIVE
POCT INT CON POS: POSITIVE

## 2024-01-23 PROCEDURE — 99214 OFFICE O/P EST MOD 30 MIN: CPT | Performed by: FAMILY MEDICINE

## 2024-01-23 PROCEDURE — 3074F SYST BP LT 130 MM HG: CPT | Performed by: FAMILY MEDICINE

## 2024-01-23 PROCEDURE — 83036 HEMOGLOBIN GLYCOSYLATED A1C: CPT | Performed by: FAMILY MEDICINE

## 2024-01-23 PROCEDURE — 3078F DIAST BP <80 MM HG: CPT | Performed by: FAMILY MEDICINE

## 2024-01-23 ASSESSMENT — FIBROSIS 4 INDEX: FIB4 SCORE: 0.72

## 2024-01-23 ASSESSMENT — PATIENT HEALTH QUESTIONNAIRE - PHQ9: CLINICAL INTERPRETATION OF PHQ2 SCORE: 0

## 2024-01-23 NOTE — PROGRESS NOTES
"Subjective:     CC: DM follow up    HPI:   Jillian presents today to follow-up on type 2 diabetes.  A1c today is 6.7%, she continues on metformin-1500 mg daily.  Has been following with interventional pain management and orthopedics for right-sided sciatica and right hip pain.  She has seen improvement with epidural but they do also think there is some pain from right hip osteoarthritis, considering right total hip replacement.  Recent labs notable for a mild leukocytosis.    Medications, past medical history, allergies, and social history have been reviewed and updated.      Objective:       Exam:  /62   Pulse 83   Temp 36.4 °C (97.6 °F) (Temporal)   Resp 12   Ht 1.778 m (5' 10\")   Wt 79.8 kg (176 lb)   SpO2 96%   BMI 25.25 kg/m²  Body mass index is 25.25 kg/m².    Constitutional: Alert. Well appearing. No distress.  Respiratory: Normal effort. Lungs are clear to auscultation bilaterally.  Cardiovascular: Regular rate and rhythm. Normal S1/S2. No murmurs, rubs or gallops.   Neuro: Moves all four extremities. No facial droop.  Psych: Answers questions appropriately. Normal affect and mood.      Assessment & Plan:     78 y.o. female with the following -     1. Type 2 diabetes mellitus without complication, without long-term current use of insulin ([facility])  A1c 6.7%.  Continue metformin.  Labs and screenings up-to-date.  - POCT Hemoglobin A1C    2. Chronic obstructive pulmonary disease, unspecified COPD type (HCC)  Chronic and stable, continue Trelegy daily.  Has albuterol as needed.    3. Other elevated white blood cell (WBC) count  Noted on recent labs, will recheck.  - CBC WITH DIFFERENTIAL; Future    4. Right sided sciatica  5. Chronic right hip pain  Has been following with interventional pain management and orthopedics for right-sided sciatica and right hip pain.  She has seen improvement with epidural but they do also think there is some pain from right hip osteoarthritis, considering right total hip " replacement.      Please note that this note was created using voice recognition software.

## 2024-01-29 PROBLEM — M16.11 PRIMARY OSTEOARTHRITIS OF RIGHT HIP: Status: ACTIVE | Noted: 2024-01-29

## 2024-02-02 ENCOUNTER — APPOINTMENT (OUTPATIENT)
Dept: PHYSICAL THERAPY | Facility: MEDICAL CENTER | Age: 79
End: 2024-02-02
Attending: FAMILY MEDICINE
Payer: MEDICARE

## 2024-02-06 ENCOUNTER — APPOINTMENT (OUTPATIENT)
Dept: PHYSICAL THERAPY | Facility: MEDICAL CENTER | Age: 79
End: 2024-02-06
Attending: FAMILY MEDICINE
Payer: MEDICARE

## 2024-02-09 ENCOUNTER — APPOINTMENT (OUTPATIENT)
Dept: PHYSICAL THERAPY | Facility: MEDICAL CENTER | Age: 79
End: 2024-02-09
Attending: FAMILY MEDICINE
Payer: MEDICARE

## 2024-02-13 ENCOUNTER — APPOINTMENT (OUTPATIENT)
Dept: PHYSICAL THERAPY | Facility: MEDICAL CENTER | Age: 79
End: 2024-02-13
Attending: FAMILY MEDICINE
Payer: MEDICARE

## 2024-02-16 ENCOUNTER — APPOINTMENT (OUTPATIENT)
Dept: PHYSICAL THERAPY | Facility: MEDICAL CENTER | Age: 79
End: 2024-02-16
Attending: FAMILY MEDICINE
Payer: MEDICARE

## 2024-02-20 ENCOUNTER — APPOINTMENT (OUTPATIENT)
Dept: PHYSICAL THERAPY | Facility: MEDICAL CENTER | Age: 79
End: 2024-02-20
Attending: FAMILY MEDICINE
Payer: MEDICARE

## 2024-02-21 RX ORDER — LEVOTHYROXINE SODIUM 88 UG/1
88 TABLET ORAL
Qty: 90 TABLET | Refills: 3 | Status: SHIPPED | OUTPATIENT
Start: 2024-02-21

## 2024-02-21 RX ORDER — FENOFIBRATE 160 MG/1
160 TABLET ORAL DAILY
Qty: 90 TABLET | Refills: 3 | Status: SHIPPED | OUTPATIENT
Start: 2024-02-21

## 2024-02-21 NOTE — TELEPHONE ENCOUNTER
Received request via: Pharmacy    Was the patient seen in the last year in this department? Yes    Does the patient have an active prescription (recently filled or refills available) for medication(s) requested? No    [facility] Name[facility]    Does the patient have care home [facility] and need 100 day supply (blood pressure, diabetes and cholesterol meds only)? Patient does not have SCP

## 2024-02-23 ENCOUNTER — APPOINTMENT (OUTPATIENT)
Dept: PHYSICAL THERAPY | Facility: MEDICAL CENTER | Age: 79
End: 2024-02-23
Attending: FAMILY MEDICINE
Payer: MEDICARE

## 2024-02-23 ENCOUNTER — PATIENT MESSAGE (OUTPATIENT)
Dept: MEDICAL GROUP | Facility: LAB | Age: 79
End: 2024-02-23
Payer: MEDICARE

## 2024-02-23 DIAGNOSIS — J45.30 MILD PERSISTENT ASTHMA WITHOUT COMPLICATION: ICD-10-CM

## 2024-02-24 ENCOUNTER — OFFICE VISIT (OUTPATIENT)
Dept: URGENT CARE | Facility: CLINIC | Age: 79
End: 2024-02-24
Payer: MEDICARE

## 2024-02-24 VITALS
HEART RATE: 68 BPM | WEIGHT: 176 LBS | RESPIRATION RATE: 14 BRPM | TEMPERATURE: 97.2 F | SYSTOLIC BLOOD PRESSURE: 122 MMHG | OXYGEN SATURATION: 97 % | BODY MASS INDEX: 24.64 KG/M2 | DIASTOLIC BLOOD PRESSURE: 70 MMHG | HEIGHT: 71 IN

## 2024-02-24 DIAGNOSIS — J45.30 MILD PERSISTENT ASTHMA WITHOUT COMPLICATION: ICD-10-CM

## 2024-02-24 PROCEDURE — 99213 OFFICE O/P EST LOW 20 MIN: CPT | Performed by: FAMILY MEDICINE

## 2024-02-24 PROCEDURE — 3078F DIAST BP <80 MM HG: CPT | Performed by: FAMILY MEDICINE

## 2024-02-24 PROCEDURE — 3074F SYST BP LT 130 MM HG: CPT | Performed by: FAMILY MEDICINE

## 2024-02-24 RX ORDER — FLUTICASONE FUROATE, UMECLIDINIUM BROMIDE AND VILANTEROL TRIFENATATE 200; 62.5; 25 UG/1; UG/1; UG/1
POWDER RESPIRATORY (INHALATION)
Qty: 180 EACH | Refills: 0 | Status: SHIPPED | OUTPATIENT
Start: 2024-02-24

## 2024-02-24 ASSESSMENT — ENCOUNTER SYMPTOMS
WHEEZING: 1
GASTROINTESTINAL NEGATIVE: 1
CONSTITUTIONAL NEGATIVE: 1

## 2024-02-24 ASSESSMENT — FIBROSIS 4 INDEX: FIB4 SCORE: 0.72

## 2024-02-24 NOTE — PROGRESS NOTES
"Subjective:   Jillian Gottlieb is a 78 y.o. female who presents for Medication Refill (Medication refill Trelegy Ellipta 200 mcg/62.5 mcg/25 mcg)      HPI    Review of Systems   Constitutional: Negative.    HENT:  Positive for congestion.    Respiratory:  Positive for wheezing.    Gastrointestinal: Negative.    Genitourinary: Negative.        Medications, Allergies, and current problem list reviewed today in Epic.     Objective:     /70   Pulse 68   Temp 36.2 °C (97.2 °F) (Temporal)   Resp 14   Ht 1.791 m (5' 10.5\")   Wt 79.8 kg (176 lb)   SpO2 97%     Physical Exam  Vitals and nursing note reviewed.   Constitutional:       Appearance: Normal appearance.   HENT:      Head: Normocephalic and atraumatic.      Right Ear: Tympanic membrane normal.      Left Ear: Tympanic membrane normal.      Nose: Congestion present.      Mouth/Throat:      Pharynx: Oropharynx is clear.   Cardiovascular:      Rate and Rhythm: Normal rate and regular rhythm.      Pulses: Normal pulses.      Heart sounds: Normal heart sounds.   Pulmonary:      Effort: Pulmonary effort is normal.      Breath sounds: Normal breath sounds.   Musculoskeletal:      Cervical back: Normal range of motion and neck supple.   Neurological:      Mental Status: She is alert.         Assessment/Plan:     Diagnosis and associated orders:     1. Mild persistent asthma without complication  fluticasone-umeclidinium-vilanterol (TRELEGY ELLIPTA) 200-62.5-25 mcg/act inhaler         Comments/MDM:              Differential diagnosis, natural history, supportive care, and indications for immediate follow-up discussed.    Advised the patient to follow-up with the primary care physician for recheck, reevaluation, and consideration of further management.    Please note that this dictation was created using voice recognition software. I have made a reasonable attempt to correct obvious errors, but I expect that there are errors of grammar and possibly content that I " did not discover before finalizing the note.    This note was electronically signed by Kasi Slater M.D.

## 2024-02-27 RX ORDER — FLUTICASONE FUROATE, UMECLIDINIUM BROMIDE AND VILANTEROL TRIFENATATE 200; 62.5; 25 UG/1; UG/1; UG/1
POWDER RESPIRATORY (INHALATION)
Qty: 180 EACH | Refills: 3 | Status: SHIPPED | OUTPATIENT
Start: 2024-02-27

## 2024-03-04 NOTE — TELEPHONE ENCOUNTER
Received request via: Pharmacy    Was the patient seen in the last year in this department? Yes LOV: 1/23/24    Does the patient have an active prescription (recently filled or refills available) for medication(s) requested? No    [facility]    Does the patient have senior living Plus and need 100 day supply (blood pressure, diabetes and cholesterol meds only)? Patient does not have SCP

## 2024-03-05 RX ORDER — ESTRADIOL 0.1 MG/G
CREAM VAGINAL
Qty: 43 G | Refills: 3 | Status: SHIPPED | OUTPATIENT
Start: 2024-03-05

## 2024-03-07 ENCOUNTER — PATIENT MESSAGE (OUTPATIENT)
Dept: MEDICAL GROUP | Facility: LAB | Age: 79
End: 2024-03-07
Payer: MEDICARE

## 2024-03-07 RX ORDER — AMLODIPINE BESYLATE 10 MG/1
10 TABLET ORAL DAILY
Qty: 90 TABLET | Refills: 3 | Status: SHIPPED | OUTPATIENT
Start: 2024-03-07

## 2024-03-07 NOTE — PROGRESS NOTES
Received request via: Patient    Was the patient seen in the last year in this department? Yes    Does the patient have an active prescription (recently filled or refills available) for medication(s) requested? No    [facility]    Does the patient have group home Plus and need 100 day supply (blood pressure, diabetes and cholesterol meds only)? Patient does not have SCP

## 2024-03-18 DIAGNOSIS — I10 ESSENTIAL HYPERTENSION: ICD-10-CM

## 2024-03-18 NOTE — TELEPHONE ENCOUNTER
Received request via: Pharmacy    Was the patient seen in the last year in this department? Yes    Does the patient have an active prescription (recently filled or refills available) for medication(s) requested? No    Pharmacy Name: [facility]    Does the patient have care home Plus and need 100 day supply (blood pressure, diabetes and cholesterol meds only)? Patient does not have SCP

## 2024-03-19 RX ORDER — LOSARTAN POTASSIUM 100 MG/1
100 TABLET ORAL DAILY
Qty: 90 TABLET | Refills: 3 | Status: SHIPPED | OUTPATIENT
Start: 2024-03-19

## 2024-03-27 ENCOUNTER — HOSPITAL ENCOUNTER (EMERGENCY)
Facility: MEDICAL CENTER | Age: 79
End: 2024-03-27
Attending: EMERGENCY MEDICINE
Payer: MEDICARE

## 2024-03-27 VITALS
OXYGEN SATURATION: 97 % | WEIGHT: 171.74 LBS | HEIGHT: 70 IN | DIASTOLIC BLOOD PRESSURE: 71 MMHG | BODY MASS INDEX: 24.59 KG/M2 | HEART RATE: 84 BPM | TEMPERATURE: 97.8 F | SYSTOLIC BLOOD PRESSURE: 144 MMHG | RESPIRATION RATE: 18 BRPM

## 2024-03-27 DIAGNOSIS — T50.905A ADVERSE EFFECT OF DRUG, INITIAL ENCOUNTER: ICD-10-CM

## 2024-03-27 DIAGNOSIS — Z51.89 ENCOUNTER FOR WOUND RE-CHECK: ICD-10-CM

## 2024-03-27 LAB
ALBUMIN SERPL BCP-MCNC: 4 G/DL (ref 3.2–4.9)
ALBUMIN/GLOB SERPL: 1.3 G/DL
ALP SERPL-CCNC: 86 U/L (ref 30–99)
ALT SERPL-CCNC: 13 U/L (ref 2–50)
ANION GAP SERPL CALC-SCNC: 16 MMOL/L (ref 7–16)
AST SERPL-CCNC: 14 U/L (ref 12–45)
BASOPHILS # BLD AUTO: 0 % (ref 0–1.8)
BASOPHILS # BLD: 0 K/UL (ref 0–0.12)
BILIRUB SERPL-MCNC: 0.3 MG/DL (ref 0.1–1.5)
BUN SERPL-MCNC: 30 MG/DL (ref 8–22)
CALCIUM ALBUM COR SERPL-MCNC: 9.5 MG/DL (ref 8.5–10.5)
CALCIUM SERPL-MCNC: 9.5 MG/DL (ref 8.4–10.2)
CHLORIDE SERPL-SCNC: 97 MMOL/L (ref 96–112)
CO2 SERPL-SCNC: 21 MMOL/L (ref 20–33)
CREAT SERPL-MCNC: 0.82 MG/DL (ref 0.5–1.4)
EOSINOPHIL # BLD AUTO: 0.14 K/UL (ref 0–0.51)
EOSINOPHIL NFR BLD: 1 % (ref 0–6.9)
ERYTHROCYTE [DISTWIDTH] IN BLOOD BY AUTOMATED COUNT: 44.9 FL (ref 35.9–50)
GFR SERPLBLD CREATININE-BSD FMLA CKD-EPI: 73 ML/MIN/1.73 M 2
GLOBULIN SER CALC-MCNC: 3.1 G/DL (ref 1.9–3.5)
GLUCOSE SERPL-MCNC: 200 MG/DL (ref 65–99)
HCT VFR BLD AUTO: 43.5 % (ref 37–47)
HGB BLD-MCNC: 14.7 G/DL (ref 12–16)
LYMPHOCYTES # BLD AUTO: 3.41 K/UL (ref 1–4.8)
LYMPHOCYTES NFR BLD: 24 % (ref 22–41)
MANUAL DIFF BLD: NORMAL
MCH RBC QN AUTO: 31.2 PG (ref 27–33)
MCHC RBC AUTO-ENTMCNC: 33.8 G/DL (ref 32.2–35.5)
MCV RBC AUTO: 92.4 FL (ref 81.4–97.8)
MONOCYTES # BLD AUTO: 0.28 K/UL (ref 0–0.85)
MONOCYTES NFR BLD AUTO: 2 % (ref 0–13.4)
NEUTROPHILS # BLD AUTO: 10.37 K/UL (ref 1.82–7.42)
NEUTROPHILS NFR BLD: 70 % (ref 44–72)
NEUTS BAND NFR BLD MANUAL: 3 % (ref 0–10)
NRBC # BLD AUTO: 0 K/UL
NRBC BLD-RTO: 0 /100 WBC (ref 0–0.2)
PLATELET # BLD AUTO: 629 K/UL (ref 164–446)
PLATELET BLD QL SMEAR: NORMAL
PMV BLD AUTO: 9.5 FL (ref 9–12.9)
POTASSIUM SERPL-SCNC: 3.6 MMOL/L (ref 3.6–5.5)
PROT SERPL-MCNC: 7.1 G/DL (ref 6–8.2)
RBC # BLD AUTO: 4.71 M/UL (ref 4.2–5.4)
RBC BLD AUTO: NORMAL
SODIUM SERPL-SCNC: 134 MMOL/L (ref 135–145)
WBC # BLD AUTO: 14.2 K/UL (ref 4.8–10.8)

## 2024-03-27 PROCEDURE — 85027 COMPLETE CBC AUTOMATED: CPT

## 2024-03-27 PROCEDURE — 700102 HCHG RX REV CODE 250 W/ 637 OVERRIDE(OP): Performed by: EMERGENCY MEDICINE

## 2024-03-27 PROCEDURE — 36415 COLL VENOUS BLD VENIPUNCTURE: CPT

## 2024-03-27 PROCEDURE — 85007 BL SMEAR W/DIFF WBC COUNT: CPT

## 2024-03-27 PROCEDURE — A9270 NON-COVERED ITEM OR SERVICE: HCPCS | Performed by: EMERGENCY MEDICINE

## 2024-03-27 PROCEDURE — 80053 COMPREHEN METABOLIC PANEL: CPT

## 2024-03-27 PROCEDURE — 99284 EMERGENCY DEPT VISIT MOD MDM: CPT

## 2024-03-27 RX ORDER — CEFDINIR 300 MG/1
300 CAPSULE ORAL ONCE
Status: COMPLETED | OUTPATIENT
Start: 2024-03-27 | End: 2024-03-27

## 2024-03-27 RX ORDER — CEFDINIR 300 MG/1
300 CAPSULE ORAL 2 TIMES DAILY
Qty: 20 CAPSULE | Refills: 0 | Status: ON HOLD | OUTPATIENT
Start: 2024-03-27 | End: 2024-04-02 | Stop reason: SDUPTHER

## 2024-03-27 RX ADMIN — CEFDINIR 300 MG: 300 CAPSULE ORAL at 20:32

## 2024-03-27 ASSESSMENT — FIBROSIS 4 INDEX: FIB4 SCORE: 0.72

## 2024-03-28 NOTE — DISCHARGE INSTRUCTIONS
Continue to change his dressing as needed.  There is no signs of significant infection.  The antibiotic will be changed I do not know if that was causing a rash.  This antibiotic as last reaction to penicillin allergies.  Return for any change or worsening symptoms, please continue follow-up with your orthopedic surgeon.

## 2024-03-28 NOTE — ED TRIAGE NOTES
"Chief Complaint   Patient presents with    Wound Check     Post op R hip. Reports she was placed on Keflex d/t infection in same. Would like hip to be checked and \"re bandaged\" but states this is improving. Denies known fevers.     Medication Reaction     Reports she is on day 3 of keflex and noticed L lower facial redness which started today. Speaks in complete sentences, resp e/u.     Physical Exam  Pulmonary:      Effort: Pulmonary effort is normal.   Skin:     General: Skin is warm and dry.   Neurological:      Mental Status: She is alert.         "

## 2024-03-28 NOTE — ED PROVIDER NOTES
"ER Provider Note    Scribed for Kenneth Harmno D.O. by Shayne Kendrick. 3/27/2024  7:15 PM    Primary Care Provider: Reinaldo Dubose M.D.    CHIEF COMPLAINT  Chief Complaint   Patient presents with    Wound Check     Post op R hip. Reports she was placed on Keflex d/t infection in same. Would like hip to be checked and \"re bandaged\" but states this is improving. Denies known fevers.     Medication Reaction     Reports she is on day 3 of keflex and noticed L lower facial redness which started today. Speaks in complete sentences, resp e/u.       HPI/ROS    Jillian Gottlieb is a 78 y.o. female who presents to the Emergency Department for evaluation of a post-operative wound to the right hip, onset two days ago. The patient had a right hip replacement procedure done three days ago by Dr. Castaneda (Ortho). The day after the procedure the patient had to return to the clinic to have her incision re-sutured due to wound dehiscence. At this time she was started on keflex due to possible infection. However, since being started on keflex she has continued to experience redness around the incision site. This prompted her to present to the ED for further evaluation. She also reports associated symptoms of left sided lower facial redness that started today. She denies any fevers, shortness of breath, nausea, or vomiting. There are no known alleviating or exacerbating factors.      ROS as per HPI.    PAST MEDICAL HISTORY  Past Medical History:   Diagnosis Date    Acid reflux     Arthritis     knees, hands, hips, neck-Osteo    Asthma     inhalers daily    Breath shortness     asthma related    Bronchitis 08/04/14    Cancer ([facility]) 01/13    R kidney    Chronic pain of left knee 8/22/2017    Chronic pain of right knee 7/1/2016    COPD (chronic obstructive pulmonary disease) ([facility])     COPD (chronic obstructive pulmonary disease) ([facility]) 6/30/2016    Diabetes ([facility])     \"pre\"    Dyspnea     Emphysema of lung ([facility])     Heart burn     " Hypertension     Impaired fasting glucose 8/24/2015    Indigestion     Mass of left lower leg 7/27/2016    Personal history of renal cancer 4/20/2015    Renal disorder     stones    Renal disorder 2013    cancer right kidney 7% removed    Skin tag 6/24/2019    Unspecified disorder of thyroid     benign tumor removal    Unspecified hypothyroidism 4/20/2015       SURGICAL HISTORY  Past Surgical History:   Procedure Laterality Date    MI TOTAL HIP ARTHROPLASTY Right 3/19/2024    Procedure: RIGHT TOTAL HIP ARTHROPLASTY;  Surgeon: Ricky Castaneda M.D.;  Location: [facility];  Service: Orthopedics    MI CLOSURE OF VAGINA  1/4/2021    Procedure: COLPOCLEISIS, LE FORT, PARTIAL;  Surgeon: Rafi Ingram M.D.;  Location: SURGERY SAME DAY [facility];  Service: Gynecology    KNEE ARTHROPLASTY TOTAL Left 12/18/2017    Procedure: KNEE ARTHROPLASTY TOTAL;  Surgeon: Ricky Castaneda M.D.;  Location: [facility];  Service: Orthopedics    KNEE ARTHROPLASTY TOTAL Right 10/17/2016    Procedure: KNEE ARTHROPLASTY TOTAL;  Surgeon: Ricky Castaneda M.D.;  Location: [facility];  Service:     RECOVERY  3/4/2016    Procedure: IR2-PERQ NEPHRO-URETERAL CATH RIGHT-DR. RIDDLE-Surgery to follow in [facility] ;  Surgeon: Ir-Recovery Surgery;  Location: [facility];  Service:     CYSTOSCOPY STENT REMOVAL Right 3/4/2016    Procedure: RIGID CYSTOSCOPY STENT REMOVAL;  Surgeon: Fredy Riddle M.D.;  Location: [facility];  Service:     URETEROSCOPY Right 3/4/2016    Procedure: URETEROSCOPY;  Surgeon: Fredy Riddle M.D.;  Location: [facility];  Service:     PERCUTANEOUS NEPHROSTOLITHOTOMY Right 3/4/2016    Procedure: PERCUTANEOUS NEPHROSTOLITHOTOMY FOR NEPHROLITHOTRIPSY;  Surgeon: Fredy Riddle M.D.;  Location: [facility];  Service:     CYSTOSCOPY STENT PLACEMENT Right 2/12/2016    Procedure: CYSTOSCOPY STENT PLACEMENT;  Surgeon: Fredy Riddle M.D.;  Location:  SURGERY [facility];  Service:     CYSTOSCOPY STENT PLACEMENT Right 6/26/2015    Procedure: CYSTOSCOPY STENT PLACEMENT RIGID POSSIBLE STENT;  Surgeon: Fredy Cintron M.D.;  Location: SURGERY [facility];  Service:     URETEROSCOPY N/A 6/26/2015    Procedure: URETEROSCOPY;  Surgeon: Fredy Cintron M.D.;  Location: SURGERY [facility];  Service:     LASERTRIPSY Right 6/26/2015    Procedure: LASERTRIPSY LITHO;  Surgeon: Fredy Cintron M.D.;  Location: [facility];  Service:     URETEROSCOPY  8/20/2014    Performed by Fredy Cintron M.D. at [facility]    LASERTRIPSY  8/20/2014    Performed by Fredy Cintron M.D. at SURGERY [facility]    CYSTOSCOPY  8/20/2014    Performed by Fredy Cintron M.D. at [facility]    CYSTOSCOPY STENT PLACEMENT  1/31/2013    Performed by Fredy Cintron M.D. at SURGERY [facility]    URETEROSCOPY  1/31/2013    Performed by Fredy Cintron M.D. at [facility]    NEPHRECTOMY PARTIAL  1/31/2013    Performed by Fredy Cintron M.D. at SURGERY [facility]    LASERTRIPSY  1/31/2013    Performed by Fredy Cintron M.D. at [facility]    OTHER ORTHOPEDIC SURGERY  2006    right knee meniscus    OTHER ORTHOPEDIC SURGERY  1996    ORIF right arm    OTHER  1995    sinus surgery    GYN SURGERY  1994    hysterectomy    OTHER  1980    thyroid lumpectomy    BREAST BIOPSY Left 1976    benign    CUATE BY LAPAROSCOPY      OTHER ORTHOPEDIC SURGERY      achilles tendon reattach     TONSILLECTOMY         FAMILY HISTORY  Family History   Problem Relation Age of Onset    Kidney Disease Mother     Colon Cancer Father        SOCIAL HISTORY   reports that she has never smoked. She has never used smokeless tobacco. She reports that she does not drink alcohol and does not use drugs.    CURRENT MEDICATIONS  Previous Medications    ALBUTEROL (PROVENTIL) 2.5MG/3ML NEBU SOLN SOLUTION FOR NEBULIZATION    Take 3 mL by nebulization every four hours as needed  "for Shortness of Breath.    ALBUTEROL 108 (90 BASE) MCG/ACT AERO SOLN INHALATION AEROSOL    Inhale 2 Puffs every four hours as needed for Shortness of Breath. FOR SHORTNESS OF BREATH    AMLODIPINE (NORVASC) 10 MG TAB    Take 1 Tablet by mouth every day.    BIOTIN 10 MG TAB    Take  by mouth.    CEPHALEXIN (KEFLEX) 500 MG CAP    Take 1 Capsule by mouth 3 times a day.    ESTRADIOL (ESTRACE) 0.1 MG/GM VAGINAL CREAM    INSERT 1 GRAM VAGINALLY ONCE DAILY THREE TIMES A WEEK    FENOFIBRATE (TRIGLIDE) 160 MG TABLET    Take 1 tablet by mouth once daily    FLUTICASONE-UMECLIDINIUM-VILANTEROL (TRELEGY ELLIPTA) 200-62.5-25 MCG/ACT INHALER    INHALE 1 PUFF ONCE DAILY .    FLUTICASONE-UMECLIDINIUM-VILANTEROL (TRELEGY ELLIPTA) 200-62.5-25 MCG/ACT INHALER    INHALE 1 PUFF ONCE DAILY . APPOINTMENT REQUIRED FOR FUTURE REFILLS    GLUCOSAMINE SULFATE 500 MG CAP    Take 500 mg by mouth 3 times a day with meals.    HYDROCHLOROTHIAZIDE 25 MG TAB    Take 1 tablet by mouth once daily    LEVOTHYROXINE (SYNTHROID) 88 MCG TAB    TAKE 1 TABLET BY MOUTH IN THE MORNING ON AN EMPTY STOMACH    LOSARTAN (COZAAR) 100 MG TAB    Take 1 tablet by mouth once daily    METFORMIN ER (GLUCOPHAGE XR) 500 MG TABLET SR 24 HR    Take 3 tablets by mouth once daily    METOPROLOL SR (TOPROL XL) 25 MG TABLET SR 24 HR    Take 1 tablet by mouth once daily    MULTIVITAMIN (THERAGRAN) TAB    Take 1 Tab by mouth every day.    RALOXIFENE (EVISTA) 60 MG TAB    TAKE 1 TABLET BY MOUTH ONCE DAILY IN THE MORNING    SIMVASTATIN (ZOCOR) 20 MG TAB    TAKE 1 TABLET BY MOUTH ONCE DAILY IN THE EVENING       ALLERGIES  Alcohol and Pcn [penicillins]    PHYSICAL EXAM  BP (!) 162/87   Pulse (!) 102   Temp 36.6 °C (97.8 °F) (Temporal)   Resp 20   Ht 1.778 m (5' 10\")   Wt 77.9 kg (171 lb 11.8 oz)   SpO2 96%   BMI 24.64 kg/m²     General: No acute distress.  HENT: Normocephalic, Mucus membranes are moist.   Chest: Lungs have even and unlabored respirations, Clear to auscultation. "   Cardiovascular: Regular rate and regular rhythm, No peripheral cyanosis.  Abdomen: Non distended.  Skin: Mild flushing of the left cheek, Right hip incision with no wound dehiscence, area with staples have surrounding erythema and serious drainage, no purulence.  Neuro: Awake, Conversive, Able to relay recent events.  Psychiatric: Calm and cooperative.       EXTERNAL RECORDS REVIEWED  Review of patient's past medical records show recent hip replacement.     INITIAL ASSESSMENT  Patient had wound dehiscence that was closed and now is complaining of redness around the area that is not improving with keflex. There is some facial redness, very non-specific. Lab tests will be done to evaluate for signs of infection.     ED Observation Status? No; Patient does not meet criteria for ED Observation.     DIAGNOSTIC STUDIES    Labs:   Results for orders placed or performed during the hospital encounter of 03/27/24   CBC WITH DIFFERENTIAL   Result Value Ref Range    WBC 14.2 (H) 4.8 - 10.8 K/uL    RBC 4.71 4.20 - 5.40 M/uL    Hemoglobin 14.7 12.0 - 16.0 g/dL    Hematocrit 43.5 37.0 - 47.0 %    MCV 92.4 81.4 - 97.8 fL    MCH 31.2 27.0 - 33.0 pg    MCHC 33.8 32.2 - 35.5 g/dL    RDW 44.9 35.9 - 50.0 fL    Platelet Count 629 (H) 164 - 446 K/uL    MPV 9.5 9.0 - 12.9 fL    Neutrophils-Polys 70.00 44.00 - 72.00 %    Lymphocytes 24.00 22.00 - 41.00 %    Monocytes 2.00 0.00 - 13.40 %    Eosinophils 1.00 0.00 - 6.90 %    Basophils 0.00 0.00 - 1.80 %    Nucleated RBC 0.00 0.00 - 0.20 /100 WBC    Neutrophils (Absolute) 10.37 (H) 1.82 - 7.42 K/uL    Lymphs (Absolute) 3.41 1.00 - 4.80 K/uL    Monos (Absolute) 0.28 0.00 - 0.85 K/uL    Eos (Absolute) 0.14 0.00 - 0.51 K/uL    Baso (Absolute) 0.00 0.00 - 0.12 K/uL    NRBC (Absolute) 0.00 K/uL   COMP METABOLIC PANEL   Result Value Ref Range    Sodium 134 (L) 135 - 145 mmol/L    Potassium 3.6 3.6 - 5.5 mmol/L    Chloride 97 96 - 112 mmol/L    Co2 21 20 - 33 mmol/L    Anion Gap 16.0 7.0 - 16.0     Glucose 200 (H) 65 - 99 mg/dL    Bun 30 (H) 8 - 22 mg/dL    Creatinine 0.82 0.50 - 1.40 mg/dL    Calcium 9.5 8.4 - 10.2 mg/dL    Correct Calcium 9.5 8.5 - 10.5 mg/dL    AST(SGOT) 14 12 - 45 U/L    ALT(SGPT) 13 2 - 50 U/L    Alkaline Phosphatase 86 30 - 99 U/L    Total Bilirubin 0.3 0.1 - 1.5 mg/dL    Albumin 4.0 3.2 - 4.9 g/dL    Total Protein 7.1 6.0 - 8.2 g/dL    Globulin 3.1 1.9 - 3.5 g/dL    A-G Ratio 1.3 g/dL   DIFFERENTIAL MANUAL   Result Value Ref Range    Bands-Stabs 3.00 0.00 - 10.00 %    Manual Diff Status PERFORMED    PLATELET ESTIMATE   Result Value Ref Range    Plt Estimation Increased    MORPHOLOGY   Result Value Ref Range    RBC Morphology Normal    ESTIMATED GFR   Result Value Ref Range    GFR (CKD-EPI) 73 >60 mL/min/1.73 m 2          COURSE & MEDICAL DECISION MAKING     COURSE AND PLAN  7:15 PM - Patient seen and examined at bedside. Discussed plan of care, including lab work to assess for possible infection. Patient agrees to the plan of care. Ordered for CBC with differential and CMP to evaluate her symptoms.     ED Summary: Patient's wound does not show concerns of cellulitis, there is serosanguineous drainage there is no purulent drainage.  White blood cell count is minimally elevated but there is no consideration for sepsis at this time.  Patient is on Ancef, and she has a rash.  Cefdinir will be attempted.  This will be appropriate treatment for cellulitis.  She is stable for discharge home.  The patient was not tachycardic on arrival but after some rest her heart rate is normal.  There is no fever no signs of sepsis      DISPOSITION AND DISCUSSIONS  The patient will return for new or worsening symptoms and is stable at the time of discharge.    DISPOSITION:  Patient will be discharged home in stable condition.    FOLLOW UP:  Reinaldo Dubose M.D.  [address] [id number] [phone]    In 1 week        OUTPATIENT MEDICATIONS:  New Prescriptions    CEFDINIR  (OMNICEF) 300 MG CAP    Take 1 Capsule by mouth 2 times a day.         FINAL DIAGNOSIS  1. Adverse effect of drug, initial encounter    2. Encounter for wound re-check        Shayne SOTOMAYOR (Scribe), am scribing for, and in the presence of, Kenneth Harmon D.O..    Electronically signed by: Shayne Kendrick (Scribe), 3/27/2024    Kenneth SOTOMAYOR D.O. personally performed the services described in this documentation, as scribed by Shayne Kendrick in my presence, and it is both accurate and complete.     The note accurately reflects work and decisions made by me.  Kenneth Harmon D.O.  3/27/2024  8:33 PM

## 2024-03-28 NOTE — ED NOTES
Vital signs taken and recorded. IV removed. Discharge in stable condition via wheelchair accompanied by spouse. Health teachings given to patient and family with full understanding of the information given. No personal belongings left.

## 2024-04-01 ENCOUNTER — APPOINTMENT (OUTPATIENT)
Dept: ADMISSIONS | Facility: MEDICAL CENTER | Age: 79
End: 2024-04-01
Attending: ORTHOPAEDIC SURGERY
Payer: MEDICARE

## 2024-04-01 ENCOUNTER — ANESTHESIA EVENT (OUTPATIENT)
Dept: SURGERY | Facility: MEDICAL CENTER | Age: 79
End: 2024-04-01
Payer: MEDICARE

## 2024-04-01 VITALS — HEIGHT: 70 IN | BODY MASS INDEX: 24.64 KG/M2

## 2024-04-01 DIAGNOSIS — M16.11 PRIMARY OSTEOARTHRITIS OF RIGHT HIP: ICD-10-CM

## 2024-04-01 DIAGNOSIS — S70.01XA HEMATOMA OF HIP, RIGHT, INITIAL ENCOUNTER: ICD-10-CM

## 2024-04-01 LAB
SCCMEC + MECA PNL NOSE NAA+PROBE: NEGATIVE
SCCMEC + MECA PNL NOSE NAA+PROBE: NEGATIVE

## 2024-04-01 PROCEDURE — 87641 MR-STAPH DNA AMP PROBE: CPT

## 2024-04-01 PROCEDURE — 87640 STAPH A DNA AMP PROBE: CPT | Mod: XU

## 2024-04-01 RX ORDER — PREGABALIN 50 MG/1
50 CAPSULE ORAL NIGHTLY PRN
COMMUNITY

## 2024-04-01 RX ORDER — IBUPROFEN 200 MG
200 TABLET ORAL EVERY 6 HOURS PRN
COMMUNITY

## 2024-04-01 RX ORDER — ACETAMINOPHEN 500 MG
500 TABLET ORAL EVERY 6 HOURS PRN
COMMUNITY

## 2024-04-01 NOTE — PREPROCEDURE INSTRUCTIONS
Pre-admit telephone appointment completed. Reviewed the Preparing for your procedure handout with patient over the phone.  Patient instructed per pharmacy guidelines regarding taking, holding, or contacting provider for instructions on regularly prescribed medications before surgery. Instructed to take the following medications the day of surgery with a sip of water per pharmacy medication guidelines: Norvasc, omnicef, trelegy, synthroid, toprol, and if needed-tylenol, albuterol MDI/nebulizer.

## 2024-04-02 ENCOUNTER — ANESTHESIA (OUTPATIENT)
Dept: SURGERY | Facility: MEDICAL CENTER | Age: 79
End: 2024-04-02
Payer: MEDICARE

## 2024-04-02 ENCOUNTER — HOSPITAL ENCOUNTER (OUTPATIENT)
Facility: MEDICAL CENTER | Age: 79
End: 2024-04-02
Attending: ORTHOPAEDIC SURGERY | Admitting: ORTHOPAEDIC SURGERY
Payer: MEDICARE

## 2024-04-02 VITALS
SYSTOLIC BLOOD PRESSURE: 127 MMHG | HEART RATE: 77 BPM | TEMPERATURE: 97.9 F | BODY MASS INDEX: 23.88 KG/M2 | RESPIRATION RATE: 16 BRPM | WEIGHT: 166.78 LBS | HEIGHT: 70 IN | DIASTOLIC BLOOD PRESSURE: 64 MMHG | OXYGEN SATURATION: 92 %

## 2024-04-02 LAB — GLUCOSE BLD STRIP.AUTO-MCNC: 135 MG/DL (ref 65–99)

## 2024-04-02 PROCEDURE — A9270 NON-COVERED ITEM OR SERVICE: HCPCS | Performed by: ANESTHESIOLOGY

## 2024-04-02 PROCEDURE — 160025 RECOVERY II MINUTES (STATS): Performed by: ORTHOPAEDIC SURGERY

## 2024-04-02 PROCEDURE — 87070 CULTURE OTHR SPECIMN AEROBIC: CPT

## 2024-04-02 PROCEDURE — 160009 HCHG ANES TIME/MIN: Performed by: ORTHOPAEDIC SURGERY

## 2024-04-02 PROCEDURE — 160036 HCHG PACU - EA ADDL 30 MINS PHASE I: Performed by: ORTHOPAEDIC SURGERY

## 2024-04-02 PROCEDURE — 160027 HCHG SURGERY MINUTES - 1ST 30 MINS LEVEL 2: Performed by: ORTHOPAEDIC SURGERY

## 2024-04-02 PROCEDURE — 700111 HCHG RX REV CODE 636 W/ 250 OVERRIDE (IP): Mod: JZ | Performed by: ORTHOPAEDIC SURGERY

## 2024-04-02 PROCEDURE — 87147 CULTURE TYPE IMMUNOLOGIC: CPT

## 2024-04-02 PROCEDURE — 700111 HCHG RX REV CODE 636 W/ 250 OVERRIDE (IP): Performed by: ANESTHESIOLOGY

## 2024-04-02 PROCEDURE — 700101 HCHG RX REV CODE 250: Performed by: ANESTHESIOLOGY

## 2024-04-02 PROCEDURE — 700111 HCHG RX REV CODE 636 W/ 250 OVERRIDE (IP): Performed by: ORTHOPAEDIC SURGERY

## 2024-04-02 PROCEDURE — 26990 DRAINAGE OF PELVIS LESION: CPT | Mod: 78,RT | Performed by: ORTHOPAEDIC SURGERY

## 2024-04-02 PROCEDURE — 87077 CULTURE AEROBIC IDENTIFY: CPT

## 2024-04-02 PROCEDURE — 700102 HCHG RX REV CODE 250 W/ 637 OVERRIDE(OP): Performed by: ANESTHESIOLOGY

## 2024-04-02 PROCEDURE — 87205 SMEAR GRAM STAIN: CPT

## 2024-04-02 PROCEDURE — 82962 GLUCOSE BLOOD TEST: CPT

## 2024-04-02 PROCEDURE — 160035 HCHG PACU - 1ST 60 MINS PHASE I: Performed by: ORTHOPAEDIC SURGERY

## 2024-04-02 PROCEDURE — 160048 HCHG OR STATISTICAL LEVEL 1-5: Performed by: ORTHOPAEDIC SURGERY

## 2024-04-02 PROCEDURE — 87075 CULTR BACTERIA EXCEPT BLOOD: CPT

## 2024-04-02 PROCEDURE — 160038 HCHG SURGERY MINUTES - EA ADDL 1 MIN LEVEL 2: Performed by: ORTHOPAEDIC SURGERY

## 2024-04-02 PROCEDURE — 87186 SC STD MICRODIL/AGAR DIL: CPT

## 2024-04-02 PROCEDURE — 160002 HCHG RECOVERY MINUTES (STAT): Performed by: ORTHOPAEDIC SURGERY

## 2024-04-02 PROCEDURE — 160046 HCHG PACU - 1ST 60 MINS PHASE II: Performed by: ORTHOPAEDIC SURGERY

## 2024-04-02 PROCEDURE — 700105 HCHG RX REV CODE 258: Performed by: ORTHOPAEDIC SURGERY

## 2024-04-02 RX ORDER — ONDANSETRON 2 MG/ML
INJECTION INTRAMUSCULAR; INTRAVENOUS PRN
Status: DISCONTINUED | OUTPATIENT
Start: 2024-04-02 | End: 2024-04-02 | Stop reason: SURG

## 2024-04-02 RX ORDER — AMOXICILLIN 250 MG
1 CAPSULE ORAL NIGHTLY
Status: CANCELLED | OUTPATIENT
Start: 2024-04-02

## 2024-04-02 RX ORDER — TRANEXAMIC ACID 100 MG/ML
INJECTION, SOLUTION INTRAVENOUS PRN
Status: DISCONTINUED | OUTPATIENT
Start: 2024-04-02 | End: 2024-04-02 | Stop reason: SURG

## 2024-04-02 RX ORDER — PHENYLEPHRINE HCL IN 0.9% NACL 0.5 MG/5ML
SYRINGE (ML) INTRAVENOUS PRN
Status: DISCONTINUED | OUTPATIENT
Start: 2024-04-02 | End: 2024-04-02 | Stop reason: SURG

## 2024-04-02 RX ORDER — HYDRALAZINE HYDROCHLORIDE 20 MG/ML
5 INJECTION INTRAMUSCULAR; INTRAVENOUS
Status: DISCONTINUED | OUTPATIENT
Start: 2024-04-02 | End: 2024-04-02 | Stop reason: HOSPADM

## 2024-04-02 RX ORDER — OXYCODONE HCL 5 MG/5 ML
10 SOLUTION, ORAL ORAL
Status: COMPLETED | OUTPATIENT
Start: 2024-04-02 | End: 2024-04-02

## 2024-04-02 RX ORDER — HALOPERIDOL 5 MG/ML
1 INJECTION INTRAMUSCULAR EVERY 6 HOURS PRN
Status: CANCELLED | OUTPATIENT
Start: 2024-04-02

## 2024-04-02 RX ORDER — DIPHENHYDRAMINE HYDROCHLORIDE 50 MG/ML
25 INJECTION INTRAMUSCULAR; INTRAVENOUS EVERY 6 HOURS PRN
Status: CANCELLED | OUTPATIENT
Start: 2024-04-02

## 2024-04-02 RX ORDER — ACETAMINOPHEN 500 MG
1000 TABLET ORAL EVERY 6 HOURS PRN
Status: CANCELLED | OUTPATIENT
Start: 2024-04-07

## 2024-04-02 RX ORDER — DEXAMETHASONE SODIUM PHOSPHATE 4 MG/ML
4 INJECTION, SOLUTION INTRA-ARTICULAR; INTRALESIONAL; INTRAMUSCULAR; INTRAVENOUS; SOFT TISSUE
Status: CANCELLED | OUTPATIENT
Start: 2024-04-02

## 2024-04-02 RX ORDER — EPHEDRINE SULFATE 50 MG/ML
5 INJECTION, SOLUTION INTRAVENOUS
Status: DISCONTINUED | OUTPATIENT
Start: 2024-04-02 | End: 2024-04-02 | Stop reason: HOSPADM

## 2024-04-02 RX ORDER — ALBUTEROL SULFATE 2.5 MG/3ML
2.5 SOLUTION RESPIRATORY (INHALATION) EVERY 4 HOURS PRN
Status: CANCELLED | OUTPATIENT
Start: 2024-04-02

## 2024-04-02 RX ORDER — DIPHENHYDRAMINE HYDROCHLORIDE 50 MG/ML
12.5 INJECTION INTRAMUSCULAR; INTRAVENOUS
Status: DISCONTINUED | OUTPATIENT
Start: 2024-04-02 | End: 2024-04-02 | Stop reason: HOSPADM

## 2024-04-02 RX ORDER — LIDOCAINE HYDROCHLORIDE 20 MG/ML
INJECTION, SOLUTION EPIDURAL; INFILTRATION; INTRACAUDAL; PERINEURAL PRN
Status: DISCONTINUED | OUTPATIENT
Start: 2024-04-02 | End: 2024-04-02 | Stop reason: SURG

## 2024-04-02 RX ORDER — BISACODYL 10 MG
10 SUPPOSITORY, RECTAL RECTAL
Status: CANCELLED | OUTPATIENT
Start: 2024-04-02

## 2024-04-02 RX ORDER — ONDANSETRON 2 MG/ML
4 INJECTION INTRAMUSCULAR; INTRAVENOUS
Status: DISCONTINUED | OUTPATIENT
Start: 2024-04-02 | End: 2024-04-02 | Stop reason: HOSPADM

## 2024-04-02 RX ORDER — SODIUM CHLORIDE, SODIUM LACTATE, POTASSIUM CHLORIDE, CALCIUM CHLORIDE 600; 310; 30; 20 MG/100ML; MG/100ML; MG/100ML; MG/100ML
INJECTION, SOLUTION INTRAVENOUS CONTINUOUS
Status: ACTIVE | OUTPATIENT
Start: 2024-04-02 | End: 2024-04-02

## 2024-04-02 RX ORDER — OXYCODONE HYDROCHLORIDE 5 MG/1
5 TABLET ORAL
Status: CANCELLED | OUTPATIENT
Start: 2024-04-02

## 2024-04-02 RX ORDER — CEFAZOLIN SODIUM 1 G/3ML
2 INJECTION, POWDER, FOR SOLUTION INTRAMUSCULAR; INTRAVENOUS ONCE
Status: COMPLETED | OUTPATIENT
Start: 2024-04-02 | End: 2024-04-02

## 2024-04-02 RX ORDER — DEXAMETHASONE SODIUM PHOSPHATE 4 MG/ML
INJECTION, SOLUTION INTRA-ARTICULAR; INTRALESIONAL; INTRAMUSCULAR; INTRAVENOUS; SOFT TISSUE PRN
Status: DISCONTINUED | OUTPATIENT
Start: 2024-04-02 | End: 2024-04-02 | Stop reason: SURG

## 2024-04-02 RX ORDER — SCOLOPAMINE TRANSDERMAL SYSTEM 1 MG/1
1 PATCH, EXTENDED RELEASE TRANSDERMAL
Status: CANCELLED | OUTPATIENT
Start: 2024-04-02

## 2024-04-02 RX ORDER — AMOXICILLIN 250 MG
1 CAPSULE ORAL
Status: CANCELLED | OUTPATIENT
Start: 2024-04-02

## 2024-04-02 RX ORDER — POLYETHYLENE GLYCOL 3350 17 G/17G
1 POWDER, FOR SOLUTION ORAL 2 TIMES DAILY PRN
Status: CANCELLED | OUTPATIENT
Start: 2024-04-02

## 2024-04-02 RX ORDER — OXYCODONE HCL 5 MG/5 ML
5 SOLUTION, ORAL ORAL
Status: COMPLETED | OUTPATIENT
Start: 2024-04-02 | End: 2024-04-02

## 2024-04-02 RX ORDER — DOCUSATE SODIUM 100 MG/1
100 CAPSULE, LIQUID FILLED ORAL 2 TIMES DAILY
Status: CANCELLED | OUTPATIENT
Start: 2024-04-02

## 2024-04-02 RX ORDER — ACETAMINOPHEN 500 MG
1000 TABLET ORAL EVERY 6 HOURS
Status: CANCELLED | OUTPATIENT
Start: 2024-04-02 | End: 2024-04-07

## 2024-04-02 RX ORDER — HYDROMORPHONE HYDROCHLORIDE 1 MG/ML
0.5 INJECTION, SOLUTION INTRAMUSCULAR; INTRAVENOUS; SUBCUTANEOUS
Status: CANCELLED | OUTPATIENT
Start: 2024-04-02

## 2024-04-02 RX ORDER — IBUPROFEN 400 MG/1
800 TABLET ORAL 3 TIMES DAILY PRN
Status: CANCELLED | OUTPATIENT
Start: 2024-04-07

## 2024-04-02 RX ORDER — VANCOMYCIN HYDROCHLORIDE 1 G/20ML
INJECTION, POWDER, LYOPHILIZED, FOR SOLUTION INTRAVENOUS
Status: COMPLETED | OUTPATIENT
Start: 2024-04-02 | End: 2024-04-02

## 2024-04-02 RX ORDER — ASPIRIN 81 MG/1
81 TABLET ORAL DAILY
Status: CANCELLED | OUTPATIENT
Start: 2024-04-03

## 2024-04-02 RX ORDER — ENEMA 19; 7 G/133ML; G/133ML
1 ENEMA RECTAL
Status: CANCELLED | OUTPATIENT
Start: 2024-04-02

## 2024-04-02 RX ORDER — ACETAMINOPHEN 500 MG
1000 TABLET ORAL ONCE
Status: COMPLETED | OUTPATIENT
Start: 2024-04-02 | End: 2024-04-02

## 2024-04-02 RX ORDER — OXYCODONE HYDROCHLORIDE 10 MG/1
10 TABLET ORAL
Status: CANCELLED | OUTPATIENT
Start: 2024-04-02

## 2024-04-02 RX ORDER — ONDANSETRON 2 MG/ML
4 INJECTION INTRAMUSCULAR; INTRAVENOUS EVERY 4 HOURS PRN
Status: CANCELLED | OUTPATIENT
Start: 2024-04-02

## 2024-04-02 RX ORDER — IBUPROFEN 400 MG/1
800 TABLET ORAL 3 TIMES DAILY
Status: CANCELLED | OUTPATIENT
Start: 2024-04-02 | End: 2024-04-07

## 2024-04-02 RX ORDER — ALBUTEROL SULFATE 90 UG/1
2 AEROSOL, METERED RESPIRATORY (INHALATION) EVERY 4 HOURS PRN
Status: CANCELLED | OUTPATIENT
Start: 2024-04-02

## 2024-04-02 RX ADMIN — TRANEXAMIC ACID 1000 MG: 100 INJECTION, SOLUTION INTRAVENOUS at 14:14

## 2024-04-02 RX ADMIN — ONDANSETRON 4 MG: 2 INJECTION INTRAMUSCULAR; INTRAVENOUS at 14:13

## 2024-04-02 RX ADMIN — FENTANYL CITRATE 50 MCG: 50 INJECTION, SOLUTION INTRAMUSCULAR; INTRAVENOUS at 14:13

## 2024-04-02 RX ADMIN — Medication 100 MCG: at 14:39

## 2024-04-02 RX ADMIN — PROPOFOL 150 MG: 10 INJECTION, EMULSION INTRAVENOUS at 14:13

## 2024-04-02 RX ADMIN — ACETAMINOPHEN 1000 MG: 500 TABLET, FILM COATED ORAL at 13:49

## 2024-04-02 RX ADMIN — SODIUM CHLORIDE, POTASSIUM CHLORIDE, SODIUM LACTATE AND CALCIUM CHLORIDE: 600; 310; 30; 20 INJECTION, SOLUTION INTRAVENOUS at 12:45

## 2024-04-02 RX ADMIN — DEXAMETHASONE SODIUM PHOSPHATE 4 MG: 4 INJECTION INTRA-ARTICULAR; INTRALESIONAL; INTRAMUSCULAR; INTRAVENOUS; SOFT TISSUE at 14:13

## 2024-04-02 RX ADMIN — CEFAZOLIN 2 G: 1 INJECTION, POWDER, FOR SOLUTION INTRAMUSCULAR; INTRAVENOUS at 14:13

## 2024-04-02 RX ADMIN — LIDOCAINE HYDROCHLORIDE 60 MG: 20 INJECTION, SOLUTION EPIDURAL; INFILTRATION; INTRACAUDAL at 14:13

## 2024-04-02 RX ADMIN — Medication 100 MCG: at 14:47

## 2024-04-02 RX ADMIN — FENTANYL CITRATE 50 MCG: 50 INJECTION, SOLUTION INTRAMUSCULAR; INTRAVENOUS at 14:36

## 2024-04-02 ASSESSMENT — FIBROSIS 4 INDEX: FIB4 SCORE: 0.48

## 2024-04-02 ASSESSMENT — PAIN SCALES - GENERAL: PAIN_LEVEL: 4

## 2024-04-02 ASSESSMENT — PAIN DESCRIPTION - PAIN TYPE: TYPE: SURGICAL PAIN

## 2024-04-02 NOTE — LETTER
April 1, 2024    Patient Name: Jillian Gottlieb  Surgeon Name: Ricky Castaneda M.D.  Surgery Facility: [facility] ([address] [place])  Surgery Date: 4/2/2024    The time of your surgery is not final and may change up to and until the day of your surgery. You will be contacted 24-48 hours prior to your surgery date with your check-in and surgery time.    If you will not be at one of the below numbers please call the surgery scheduler at [phone]  Preferred Phone: [phone]    BEFORE YOUR SURGERY   Pre Registration and/or Lab Work must be done within and no earlier than 28 days prior to your surgery date. Your scheduled facility will contact you regarding all required preregistration and/or lab work. If you have not been contacted within 7 days of your scheduled procedure please call [facility] at [phone] for an appointment as soon as possible.    DAY OF YOUR SURGERY  Nothing to eat or drink after midnight     Refrain from smoking any substance after midnight prior to surgery. Smoking may interfere with the anesthetic and frequently produces nausea during the recovery period.    Continue taking all lifesaving medications. Including the morning of your surgery with small sip of water.    Please do NOT take on the day of surgery:  Diuretics: examples- furosemide (Lasix), spironolactone, hydrochlorothiazide  ACE-inhibitors: examples- lisinopril, ramipril, enalapril  “ARBs”: examples- losartan, Olmesartan, valsartan    Please arrive at the hospital/surgery center at the check-in time provided.     An adult will need to bring you and take you home after your surgery.     AFTER YOUR SURGERY  Post op Appointment:   Date: 4/15/24   Time: 8:40 AM   With: Ricky Castaneda MD   Location: [address] [place] [address] [place] [address]    - No dental work for 3-6 months after your surgery.  - You must have someone provide transportation post surgery and someone to  monitor you for at least 24 hours post-surgery. If you don't have either of these your appointment will be canceled.     TIME OFF WORK  FMLA or Disability forms can be faxed directly to: [phone] or you may drop them off at [address] [place] [place] [address]. Our office charges a $35.00 fee per form. Forms will be completed within 10 business days of drop off and payment received. For the status of your forms you may contact our disability office directly [phone].

## 2024-04-02 NOTE — ANESTHESIA PREPROCEDURE EVALUATION
Case: [id number] Date/Time: 04/02/24 1345    Procedure: RIGHT HIP INCISION AND DRAINAGE HEMATOMA    Diagnosis: Hematoma of hip, right, initial encounter [S70.01XA]    Pre-op diagnosis: Hematoma of hip, right, initial encounter [S70.01XA]    Location:  OR  / SURGERY [facility]    Surgeons: Ricky Castaneda M.D.            Relevant Problems   PULMONARY   (positive) COPD (chronic obstructive pulmonary disease) (HCC)   (positive) Mild persistent asthma without complication      CARDIAC   (positive) Essential hypertension      GI   (positive) GERD (gastroesophageal reflux disease)         (positive) Kidney stones      ENDO   (positive) Hypothyroidism   (positive) Type 2 diabetes mellitus without complication, without long-term current use of insulin (HCC)       Physical Exam    Airway   Mallampati: II  TM distance: >3 FB  Neck ROM: full       Cardiovascular - normal exam  Rhythm: regular  Rate: normal  (-) murmur     Dental - normal exam           Pulmonary - normal exam  Breath sounds clear to auscultation     Abdominal    Neurological - normal exam                   Anesthesia Plan    ASA 2       Plan - general       Airway plan will be LMA        Plan Factors:   Patient was not previously instructed to abstain from smoking on day of procedure.  Patient did not smoke on day of procedure.      Induction: intravenous    Postoperative Plan: Postoperative administration of opioids is intended.    Pertinent diagnostic labs and testing reviewed    Informed Consent:    Anesthetic plan and risks discussed with patient.    Use of blood products discussed with: patient whom consented to blood products.

## 2024-04-02 NOTE — OR NURSING
"1512: To PACU post right hip incision and drainage of hematoma. Pt is extubated, breathing is spontaneous and unlabored. Provena dressing in place w/o air leak. No drainage.    1524: Pt is slightly more awake. States having \"a little pain\".    1531: Pt is restless. States pain is 4/10 and would like pain medication, see MAR.    1536: Pt refused pain medication. Returned to ADS.    1602: Pain remains tolerable, no nausea.    1615: No change. Meets criteria for stage ll.  "

## 2024-04-02 NOTE — ANESTHESIA TIME REPORT
Anesthesia Start and Stop Event Times       Date Time Event    4/2/2024 1349 Ready for Procedure     1409 Anesthesia Start     1515 Anesthesia Stop          Responsible Staff  04/02/24      Name Role Begin End    Blossom Vanegas M.D. Anesth 1409 1515          Overtime Reason:  overtime    Comments:

## 2024-04-02 NOTE — ANESTHESIA POSTPROCEDURE EVALUATION
Patient: Jillian Gottlieb    Procedure Summary       Date: 04/02/24 Room / Location:  OR 02 / SURGERY [facility]    Anesthesia Start: 1409 Anesthesia Stop: 1515    Procedure: RIGHT HIP INCISION AND DRAINAGE HEMATOMA (Right: Hip) Diagnosis:       Hematoma of hip, right, initial encounter      (Hematoma of hip, right, initial encounter [S70.01XA])    Surgeons: Ricky Castaneda M.D. Responsible Provider: Blossom Vanegas M.D.    Anesthesia Type: general ASA Status: 2            Final Anesthesia Type: general  Last vitals  BP   Blood Pressure : 135/64    Temp   36.7 °C (98.1 °F)    Pulse   75   Resp   16    SpO2   94 %      Anesthesia Post Evaluation    Patient location during evaluation: PACU  Patient participation: complete - patient participated  Level of consciousness: awake and alert  Pain score: 4    Airway patency: patent  Anesthetic complications: no  Cardiovascular status: hemodynamically stable  Respiratory status: acceptable  Hydration status: euvolemic    PONV: none          No notable events documented.     Nurse Pain Score: 4 (NPRS)

## 2024-04-02 NOTE — OR NURSING
1628 : Patient arrived to phase II from PACU 1 via gurney. Report received from RN. Respirations are spontaneous and unlabored. Dressing is CDI. VSS on RA. RLE; pedal pulse is 2+, cap refill less than 3 seconds, warm.    1644: Family at bedside.    1650: Patient education completed, patient and family deny further questions.     1654: Patient wheeled to vehicle by CNA at this time. Dc'd to care of family post uneventful stay in PACU 2.

## 2024-04-02 NOTE — ANESTHESIA PROCEDURE NOTES
Airway    Date/Time: 4/2/2024 2:13 PM    Performed by: Blossom Vanegas M.D.  Authorized by: Blossom Vanegas M.D.    Location:  OR  Urgency:  Elective  Difficult Airway: No    Indications for Airway Management:  Anesthesia      Spontaneous Ventilation: absent    Sedation Level:  Deep  Preoxygenated: Yes    Patient Position:  Sniffing  MILS Maintained Throughout: No    Mask Difficulty Assessment:  0 - not attempted  Final Airway Type:  Supraglottic airway  Final Supraglottic Airway:  Standard LMA    SGA Size:  4  Number of Attempts at Approach:  1  Number of Other Approaches Attempted:  0

## 2024-04-02 NOTE — H&P
"Surgery Orthopedic History & Physical Note    Date  4/1/2024    Primary Care Physician  Reinaldo Dubose M.D.    CC  Pre-Op Diagnosis Codes:     * Hematoma of hip, right, initial encounter [S70.01XA]    HPI  This is a 78 y.o. female who presented with pain and drainage of the right hip.  She underwent a right total hip replacement on 3/19/2024.  She is coming in for an incision and drainage of the right hip.      Past Medical History:   Diagnosis Date    Acid reflux     Arthritis     knees, hands, hips, neck-Osteo    Asthma     inhalers daily    Breath shortness     asthma related    Bronchitis 08/04/2014    Cancer ([facility]) 01/2013    R kidney-partial nephrectomy, no chemo or radiation.    Chronic pain of left knee 08/22/2017    Chronic pain of right knee 07/01/2016    COPD (chronic obstructive pulmonary disease) ([facility])     COPD (chronic obstructive pulmonary disease) ([facility]) 06/30/2016    Diabetes ([facility])     \"pre\"    Dyspnea     Emphysema of lung ([facility])     Heart burn     OTC tums or prilosec prn    Hypertension     Impaired fasting glucose 08/24/2015    Indigestion     OTC tums or prilosec prn    Mass of left lower leg 07/27/2016    Pain in right hip 04/01/2024    mild, rates 1-2/10  S/P HALLEY    Personal history of renal cancer 04/20/2015    Renal disorder     stones    Renal disorder 2013    cancer right kidney 7% removed    Skin tag 06/24/2019    Unspecified disorder of thyroid     benign tumor removal    Unspecified hypothyroidism 04/20/2015       Past Surgical History:   Procedure Laterality Date    NE TOTAL HIP ARTHROPLASTY Right 03/19/2024    Procedure: RIGHT TOTAL HIP ARTHROPLASTY;  Surgeon: Ricky Castaneda M.D.;  Location: [facility];  Service: Orthopedics    NE CLOSURE OF VAGINA  01/04/2021    Procedure: COLPOCLEISIS, LE FORT, PARTIAL;  Surgeon: Rafi Ingram M.D.;  Location: SURGERY SAME DAY [facility];  Service: Gynecology    KNEE ARTHROPLASTY TOTAL Left 12/18/2017    Procedure: KNEE " ARTHROPLASTY TOTAL;  Surgeon: Ricky Castaneda M.D.;  Location: SURGERY [facility];  Service: Orthopedics    KNEE ARTHROPLASTY TOTAL Right 10/17/2016    Procedure: KNEE ARTHROPLASTY TOTAL;  Surgeon: Ricky Castaneda M.D.;  Location: SURGERY [facility];  Service:     RECOVERY  03/04/2016    Procedure: IR2-PERQ NEPHRO-URETERAL CATH RIGHT-DR. RIDDLE-Surgery to follow in [facility] ;  Surgeon: Ir-Rec[facility] Surgery;  Location: SURGERY [facility];  Service:     CYSTOSCOPY STENT REMOVAL Right 03/04/2016    Procedure: RIGID CYSTOSCOPY STENT REMOVAL;  Surgeon: Fredy Riddle M.D.;  Location: SURGERY [facility];  Service:     URETEROSCOPY Right 03/04/2016    Procedure: URETEROSCOPY;  Surgeon: Fredy Riddle M.D.;  Location: [facility];  Service:     PERCUTANEOUS NEPHROSTOLITHOTOMY Right 03/04/2016    Procedure: PERCUTANEOUS NEPHROSTOLITHOTOMY FOR NEPHROLITHOTRIPSY;  Surgeon: Fredy Riddle M.D.;  Location: [facility];  Service:     CYSTOSCOPY STENT PLACEMENT Right 02/12/2016    Procedure: CYSTOSCOPY STENT PLACEMENT;  Surgeon: Fredy Riddle M.D.;  Location: [facility];  Service:     CYSTOSCOPY STENT PLACEMENT Right 06/26/2015    Procedure: CYSTOSCOPY STENT PLACEMENT RIGID POSSIBLE STENT;  Surgeon: Fredy Riddle M.D.;  Location: [facility];  Service:     URETEROSCOPY N/A 06/26/2015    Procedure: URETEROSCOPY;  Surgeon: Fredy Riddle M.D.;  Location: [facility];  Service:     LASERTRIPSY Right 06/26/2015    Procedure: LASERTRIPSY LITHO;  Surgeon: Fredy Riddle M.D.;  Location: [facility];  Service:     URETEROSCOPY  08/20/2014    Performed by Fredy Riddle M.D. at [facility]    LASERTRIPSY  08/20/2014    Performed by Fredy Riddle M.D. at [facility]    CYSTOSCOPY  08/20/2014    Performed by Fredy Riddle M.D. at [facility]    CYSTOSCOPY STENT PLACEMENT  01/31/2013    Performed by Fredy Riddle M.D. at  SURGERY [facility]    URETEROSCOPY  01/31/2013    Performed by Fredy Cintron M.D. at SURGERY [facility]    NEPHRECTOMY PARTIAL  01/31/2013    Performed by Fredy Cintron M.D. at SURGERY [facility]    LASERTRIPSY  01/31/2013    Performed by Fredy Cintron M.D. at SURGERY [facility]    OTHER ORTHOPEDIC SURGERY  2006    right knee meniscus    OTHER ORTHOPEDIC SURGERY  1996    ORIF right arm    OTHER  1995    sinus surgery    GYN SURGERY  1994    hysterectomy    OTHER  1980    thyroid lumpectomy    BREAST BIOPSY Left 1976    benign    CATARACT EXTRACTION WITH IOL Bilateral     CUATE BY LAPAROSCOPY      ORIF, ANKLE      Achilles surgery    ORIF, HIP  03/19/2024    ORIF, KNEE  5 and 7 years ago    OTHER ORTHOPEDIC SURGERY      achilles tendon reattach     TONSILLECTOMY         No current facility-administered medications for this encounter.     Current Outpatient Medications   Medication Sig Dispense Refill    cephALEXin (KEFLEX) 500 MG Cap Take 1 Capsule by mouth 3 times a day. (Patient not taking: Reported on 4/1/2024) 15 Capsule 0    ibuprofen (MOTRIN) 200 MG Tab Take 200 mg by mouth every 6 hours as needed.      acetaminophen (TYLENOL) 500 MG Tab Take 500 mg by mouth every 6 hours as needed.      pregabalin (LYRICA) 50 MG capsule Take 50 mg by mouth at bedtime as needed.      cefdinir (OMNICEF) 300 MG Cap Take 1 Capsule by mouth 2 times a day. 20 Capsule 0    losartan (COZAAR) 100 MG Tab Take 1 tablet by mouth once daily 90 Tablet 3    amLODIPine (NORVASC) 10 MG Tab Take 1 Tablet by mouth every day. 90 Tablet 3    estradiol (ESTRACE) 0.1 MG/GM vaginal cream INSERT 1 GRAM VAGINALLY ONCE DAILY THREE TIMES A WEEK (Patient taking differently: Insert  into the vagina. INSERT 1 GRAM VAGINALLY ONCE DAILY TWICE A WEEK) 43 g 3    fluticasone-umeclidinium-vilanterol (TRELEGY ELLIPTA) 200-62.5-25 mcg/act inhaler INHALE 1 PUFF ONCE DAILY . 180 Each 3    fenofibrate (TRIGLIDE) 160 MG tablet Take 1 tablet by mouth  once daily 90 Tablet 3    levothyroxine (SYNTHROID) 88 MCG Tab TAKE 1 TABLET BY MOUTH IN THE MORNING ON AN EMPTY STOMACH 90 Tablet 3    metFORMIN ER (GLUCOPHAGE XR) 500 MG TABLET SR 24 HR Take 3 tablets by mouth once daily 270 Tablet 3    metoprolol SR (TOPROL XL) 25 MG TABLET SR 24 HR Take 1 tablet by mouth once daily 90 Tablet 3    simvastatin (ZOCOR) 20 MG Tab TAKE 1 TABLET BY MOUTH ONCE DAILY IN THE EVENING 90 Tablet 3    hydroCHLOROthiazide 25 MG Tab Take 1 tablet by mouth once daily 90 Tablet 3    raloxifene (EVISTA) 60 MG Tab TAKE 1 TABLET BY MOUTH ONCE DAILY IN THE MORNING 90 Tablet 3    albuterol (PROVENTIL) 2.5mg/3ml Nebu Soln solution for nebulization Take 3 mL by nebulization every four hours as needed for Shortness of Breath. 30 Each 1    Biotin 10 MG Tab Take  by mouth every day.      albuterol 108 (90 Base) MCG/ACT Aero Soln inhalation aerosol Inhale 2 Puffs every four hours as needed for Shortness of Breath. FOR SHORTNESS OF BREATH 1 Each 11    glucosamine Sulfate 500 MG Cap Take 500 mg by mouth 3 times a day with meals.      multivitamin (THERAGRAN) Tab Take 1 Tab by mouth every day.         Social History     Socioeconomic History    Marital status:      Spouse name: Not on file    Number of children: Not on file    Years of education: Not on file    Highest education level: Not on file   Occupational History    Not on file   Tobacco Use    Smoking status: Never    Smokeless tobacco: Never   Vaping Use    Vaping Use: Never used   Substance and Sexual Activity    Alcohol use: Never     Comment: allergic to it    Drug use: Never    Sexual activity: Never     Partners: Male     Birth control/protection: Post-Menopausal   Other Topics Concern    Not on file   Social History Narrative    Not on file     Social Determinants of Health     Financial Resource Strain: Not on file   Food Insecurity: Not on file   Transportation Needs: Not on file   Physical Activity: Not on file   Stress: Not on file    Social Connections: Not on file   Intimate Partner Violence: Not on file   Housing Stability: Not on file       Family History   Problem Relation Age of Onset    Kidney Disease Mother     Colon Cancer Father        Allergies  Alcohol, Keflex [cephalexin], and Pcn [penicillins]    Review of Systems  Negative    Physical Exam  HENT:      Head: Normocephalic and atraumatic.      Nose: Nose normal.   Eyes:      Extraocular Movements: Extraocular movements intact.   Cardiovascular:      Rate and Rhythm: Normal rate.   Pulmonary:      Effort: Pulmonary effort is normal.   Musculoskeletal:      Cervical back: Neck supple.   Neurological:      Mental Status: She is alert and oriented to person, place, and time.   Psychiatric:         Mood and Affect: Mood normal.         Behavior: Behavior normal.     Skin: incision looks okay in the proximal part but distally there is a very fluctuant swelling and the staple line was removed and there is clearly an unsealed incision there at the seroma/hematoma that is draining and we could express that we get a fair amount of fluid out of there today but the skin edges are ratty and red and inflamed. Proximally the suture lines located in the proximal placed staple line looks fine there is no real swelling there.     Vital Signs                          Labs:                    Radiology:  No new images today.        Assessment/Plan:  Pre-Op Diagnosis Codes:     * Hematoma of hip, right, initial encounter [S70.01XA]  Procedure(s):  RIGHT HIP INCISION AND DRAINAGE HEMATOMA

## 2024-04-02 NOTE — DISCHARGE INSTRUCTIONS
If any questions arise, call your provider.  If your provider is not available, please feel free to call the Surgical Center at [phone].    MEDICATIONS: Resume taking daily medication.  Take prescribed pain medication with food.  If no medication is prescribed, you may take non-aspirin pain medication if needed.  PAIN MEDICATION CAN BE VERY CONSTIPATING.  Take a stool softener or laxative such as senokot, pericolace, or milk of magnesia if needed.    Last pain medication given: None    What to Expect Post Anesthesia    Rest and take it easy for the first 24 hours.  A responsible adult is recommended to remain with you during that time.  It is normal to feel sleepy.  We encourage you to not do anything that requires balance, judgment or coordination.    FOR 24 HOURS DO NOT:  Drive, operate machinery or run household appliances.  Drink beer or alcoholic beverages.  Make important decisions or sign legal documents.    To avoid nausea, slowly advance diet as tolerated, avoiding spicy or greasy foods for the first day.  Add more substantial food to your diet according to your provider's instructions.  Babies can be fed formula or breast milk as soon as they are hungry.  INCREASE FLUIDS AND FIBER TO AVOID CONSTIPATION.    MILD FLU-LIKE SYMPTOMS ARE NORMAL.  YOU MAY EXPERIENCE GENERALIZED MUSCLE ACHES, THROAT IRRITATION, HEADACHE AND/OR SOME NAUSEA.      Dr. Castaneda's Home Care Instructions:  Ambulate every 2-3 hours while awake  OK to walk outside if comfortable  Ice to hip and thigh every 2-3 hours while awake  OK to sleep in any position that is comfortable  No sitting more than 2 hours at a time

## 2024-04-02 NOTE — OP REPORT
Operative Report    Date: 4/2/2024    Surgeon: Ricky Castaneda M.D.    Anesthesiologist: Blossom Vanegas M.D.     Anesthesia Type: General     Pre-operative Diagnosis: Seroma with wound dehiscence 2 weeks status post right total hip    Post-operative Diagnosis: Same    Procedure: Irrigation and debridement right hip seroma with primary wound closure    Findings: A deep seroma below the fascia distally right total hip incision; no purulence; no fluid aspirated from right hip joint    Procedure: Patient was brought to the operating room and anesthesia was administered.  Ancef and vancomycin given IV as well as tranexamic acid.  Patient was placed in the left lateral decubitus position on a well-padded table axillary roll placed and secured with a Stulberg positioners.  Right hip and leg are prepped and draped in usual sterile manner.    We remove the last remaining distal staples.  The peripheral 2 mm of skin subcutaneous tissue were sharply incised the distal aspect of the wound and along the sides.  There was still a seroma draining from the wound which was sent for culture with a swab.  The skin was completely closed and intact proximally and the proximal half of the wound.  Distally the fascia was opened over the last couple inches and a seroma did track deep to the fascia around the thigh musculature.  We can sweep with the finger around proximally but could not feel the joint and.  We aspirated the hip separately through the skin to avoid the incision and were not able to aspirate any fluid from the hip joint.    We irrigated the subfascial and superficial layers with 6 L of normal saline and combine that with 2 separate soakings with diluted Betadine.  Bleeders was cauterized were needed we placed powdered vancomycin deep to the fascia and closed that distal aspect of the fascia with interrupted #1 PDS sutures.  We placed a #1 PDS deep in the subcutaneous tissue and then closed remainder the subcutaneous  tissue with interrupted 2-0 Monocryl, subcuticular 3-0 Monocryl and Dermabond.  Prevena dressing was placed.    Patient was stable during the procedure and at the time of this dictation.

## 2024-04-03 LAB
GRAM STN SPEC: NORMAL
SIGNIFICANT IND 70042: NORMAL
SITE SITE: NORMAL
SOURCE SOURCE: NORMAL

## 2024-04-05 LAB
BACTERIA WND AEROBE CULT: ABNORMAL
GRAM STN SPEC: ABNORMAL
SIGNIFICANT IND 70042: ABNORMAL
SITE SITE: ABNORMAL
SOURCE SOURCE: ABNORMAL

## 2024-04-07 LAB
BACTERIA SPEC ANAEROBE CULT: NORMAL
SIGNIFICANT IND 70042: NORMAL
SITE SITE: NORMAL
SOURCE SOURCE: NORMAL

## 2024-04-08 ENCOUNTER — HOSPITAL ENCOUNTER (EMERGENCY)
Facility: MEDICAL CENTER | Age: 79
End: 2024-04-08
Attending: EMERGENCY MEDICINE
Payer: MEDICARE

## 2024-04-08 VITALS
HEIGHT: 70 IN | WEIGHT: 169.31 LBS | OXYGEN SATURATION: 98 % | HEART RATE: 82 BPM | RESPIRATION RATE: 18 BRPM | DIASTOLIC BLOOD PRESSURE: 85 MMHG | TEMPERATURE: 98.9 F | BODY MASS INDEX: 24.24 KG/M2 | SYSTOLIC BLOOD PRESSURE: 140 MMHG

## 2024-04-08 DIAGNOSIS — Z51.89 ENCOUNTER FOR WOUND RE-CHECK: ICD-10-CM

## 2024-04-08 PROCEDURE — 99284 EMERGENCY DEPT VISIT MOD MDM: CPT

## 2024-04-08 ASSESSMENT — FIBROSIS 4 INDEX: FIB4 SCORE: 0.48

## 2024-04-08 NOTE — ED TRIAGE NOTES
"Pt comes in   c/o drainage from wound vac has changed colors since yesterday   recent R hip surgery that needed a 2nd surgery to clean out infection  all was done here    has been healing well until this morning noted discoloration of drainage  brown and reddish in color  concerned infection is back    has not f/u w/ [place] \"They did not call me back\"  no pain and ambulating strong and steady w/ walker  "

## 2024-04-08 NOTE — ED PROVIDER NOTES
ED Provider Note    CHIEF COMPLAINT  Chief Complaint   Patient presents with    Wound Re-Check     Recent R hip surgery 3/19/2024 at [facility]   then came here the Thursday after because of possible infection starting   had surgery again for clean out of wound  has wound vac in place   was doing well until this morning   having brownish reddish drainage  concerned and here for evaluation         EXTERNAL RECORDS REVIEWED  Inpatient Notes surgery on 4-2-2024 by Dr. Castnaeda secondary of seroma and wound dehiscence after hip replacement    HPI/ROS  LIMITATION TO HISTORY   Select: : None  OUTSIDE HISTORIAN(S):  Significant other  at bedside    Jillian Gottlieb is a 78 y.o. female who presents to the ED for wound check.  The patient states that she has a wound VAC on her right hip, 6 days ago she had a opening up of her previous incision with wound VAC placement and evacuation of seroma.  The patient states she has had no drainage but yesterday she walked for 15 minutes, last night she started having some drainage, bloody, brown.  She has no pain no fevers, she tried to contact Dr. Castaneda who originally did not get back to her bed approximately 15 minutes prior to me seeing the patient he messaged her that he will see her at the office.    PAST MEDICAL HISTORY   has a past medical history of Acid reflux, Arthritis, Asthma, Breath shortness, Bronchitis (08/04/2014), Cancer ([facility]) (01/2013), Chronic pain of left knee (08/22/2017), Chronic pain of right knee (07/01/2016), COPD (chronic obstructive pulmonary disease) ([facility]), COPD (chronic obstructive pulmonary disease) ([facility]) (06/30/2016), Diabetes ([facility]), Dyspnea, Emphysema of lung ([facility]), Heart burn, Hypertension, Impaired fasting glucose (08/24/2015), Indigestion, Mass of left lower leg (07/27/2016), Pain in right hip (04/01/2024), Personal history of renal cancer (04/20/2015), Renal disorder, Renal disorder (2013), Skin tag (06/24/2019), Unspecified disorder of thyroid, and  Unspecified hypothyroidism (04/20/2015).    SURGICAL HISTORY   has a past surgical history that includes bartolo by laparoscopy; tonsillectomy; cystoscopy stent placement (01/31/2013); ureteroscopy (01/31/2013); nephrectomy partial (01/31/2013); lasertripsy (01/31/2013); gyn surgery (1994); ureteroscopy (08/20/2014); lasertripsy (08/20/2014); cystoscopy (08/20/2014); other (1980); other (1995); cystoscopy stent placement (Right, 06/26/2015); ureteroscopy (N/A, 06/26/2015); lasertripsy (Right, 06/26/2015); cystoscopy stent placement (Right, 02/12/2016); recovery (03/04/2016); cystoscopy stent removal (Right, 03/04/2016); ureteroscopy (Right, 03/04/2016); percutaneous nephrostolithotomy (Right, 03/04/2016); other orthopedic surgery; other orthopedic surgery (2006); other orthopedic surgery (1996); knee arthroplasty total (Right, 10/17/2016); knee arthroplasty total (Left, 12/18/2017); breast biopsy (Left, 1976); closure of vagina (01/04/2021); total hip arthroplasty (Right, 03/19/2024); orif, hip (03/19/2024); orif, knee (5 and 7 years ago); orif, ankle; cataract extraction with iol (Bilateral); and incision and drainage orthopedic (Right, 4/2/2024).    FAMILY HISTORY  Family History   Problem Relation Age of Onset    Kidney Disease Mother     Colon Cancer Father        SOCIAL HISTORY  Social History     Tobacco Use    Smoking status: Never    Smokeless tobacco: Never   Vaping Use    Vaping Use: Never used   Substance and Sexual Activity    Alcohol use: Never     Comment: allergic to it    Drug use: Never    Sexual activity: Never     Partners: Male     Birth control/protection: Post-Menopausal       CURRENT MEDICATIONS  Home Medications       Reviewed by Suad Montano R.N. (Registered Nurse) on 04/08/24 at 1223  Med List Status: Partial     Medication Last Dose Status   acetaminophen (TYLENOL) 500 MG Tab  Active   albuterol (PROVENTIL) 2.5mg/3ml Nebu Soln solution for nebulization  Active   albuterol 108 (90 Base)  "MCG/ACT Aero Soln inhalation aerosol  Active   amLODIPine (NORVASC) 10 MG Tab  Active   Biotin 10 MG Tab  Active   cefdinir (OMNICEF) 300 MG Cap  Active   doxycycline (VIBRAMYCIN) 100 MG Tab  Active   estradiol (ESTRACE) 0.1 MG/GM vaginal cream  Active   fenofibrate (TRIGLIDE) 160 MG tablet  Active   fluticasone-umeclidinium-vilanterol (TRELEGY ELLIPTA) 200-62.5-25 mcg/act inhaler  Active   glucosamine Sulfate 500 MG Cap  Active   hydroCHLOROthiazide 25 MG Tab  Active   ibuprofen (MOTRIN) 200 MG Tab  Active   levothyroxine (SYNTHROID) 88 MCG Tab  Active   losartan (COZAAR) 100 MG Tab  Active   metFORMIN ER (GLUCOPHAGE XR) 500 MG TABLET SR 24 HR  Active   metoprolol SR (TOPROL XL) 25 MG TABLET SR 24 HR  Active   multivitamin (THERAGRAN) Tab  Active   pregabalin (LYRICA) 50 MG capsule  Active   raloxifene (EVISTA) 60 MG Tab  Active   simvastatin (ZOCOR) 20 MG Tab  Active                    ALLERGIES  Allergies   Allergen Reactions    Alcohol Vomiting, Nausea and Unspecified     injested-facial numbness, n/v  RXN=50 years ago    Keflex [Cephalexin] Swelling     Facial swelling    Pcn [Penicillins] Unspecified     Flushed; \"felt weird\"  Tolerates cephalosporins  RXN=age 30's       PHYSICAL EXAM  VITAL SIGNS: /88   Pulse 84   Temp 37.3 °C (99.2 °F) (Temporal)   Resp 18   Ht 1.778 m (5' 10\")   Wt 76.8 kg (169 lb 5 oz)   SpO2 99%   BMI 24.29 kg/m²    Wound VAC in the right hip with minimal drainage out of the tubing, no surrounding erythema, no surrounding drainage, nontender to palpation  ASSESSMENT, COURSE AND PLAN  Care Narrative: Wound VAC appears well, no erythema, no drainage, some in the tubing, the patient has messaged Dr. Castaneda who will see her in the office, I recommend the patient go see Dr. Sawyer and since he is the one who performed the operation otherwise I believe it is likely draining from her having increasing activity yesterday.  Patient will return with any other concerns.         The " patient will return for new or worsening symptoms and is stable at the time of discharge.    The patient is referred to a primary physician for blood pressure management, diabetic screening, and for all other preventative health concerns.        DISPOSITION:  Patient will be discharged home in stable condition.    FOLLOW UP:  Ricky Castaneda M.D.  [address] [facility] [address]  [phone]      Go to the clinic for check      OUTPATIENT MEDICATIONS:  New Prescriptions    No medications on file       FINAL DIAGNOSIS  1. Encounter for wound re-check           Electronically signed by: Tate Gonsales M.D., 4/8/2024 1:36 PM

## 2024-04-17 ENCOUNTER — PATIENT MESSAGE (OUTPATIENT)
Dept: MEDICAL GROUP | Facility: LAB | Age: 79
End: 2024-04-17
Payer: MEDICARE

## 2024-04-17 DIAGNOSIS — J45.30 MILD PERSISTENT ASTHMA WITHOUT COMPLICATION: ICD-10-CM

## 2024-04-17 RX ORDER — ALBUTEROL SULFATE 90 UG/1
2 AEROSOL, METERED RESPIRATORY (INHALATION) EVERY 4 HOURS PRN
Qty: 8 G | Refills: 3 | Status: SHIPPED | OUTPATIENT
Start: 2024-04-17

## 2024-04-17 NOTE — PATIENT COMMUNICATION
Received request via: Patient    Was the patient seen in the last year in this department? Yes    Does the patient have an active prescription (recently filled or refills available) for medication(s) requested? No    Pharmacy Name: [facility], [address]     Does the patient have MCFP Plus and need 100 day supply (blood pressure, diabetes and cholesterol meds only)? Patient does not have SCP

## 2024-05-18 DIAGNOSIS — J45.30 MILD PERSISTENT ASTHMA WITHOUT COMPLICATION: ICD-10-CM

## 2024-07-23 ENCOUNTER — OFFICE VISIT (OUTPATIENT)
Dept: MEDICAL GROUP | Facility: LAB | Age: 79
End: 2024-07-23
Payer: MEDICARE

## 2024-07-23 VITALS
BODY MASS INDEX: 24.68 KG/M2 | TEMPERATURE: 97.5 F | SYSTOLIC BLOOD PRESSURE: 108 MMHG | OXYGEN SATURATION: 97 % | WEIGHT: 172.4 LBS | HEIGHT: 70 IN | DIASTOLIC BLOOD PRESSURE: 70 MMHG | RESPIRATION RATE: 16 BRPM | HEART RATE: 75 BPM

## 2024-07-23 DIAGNOSIS — Z78.0 POST-MENOPAUSAL: ICD-10-CM

## 2024-07-23 DIAGNOSIS — E11.9 TYPE 2 DIABETES MELLITUS WITHOUT COMPLICATION, WITHOUT LONG-TERM CURRENT USE OF INSULIN (HCC): ICD-10-CM

## 2024-07-23 LAB
HBA1C MFR BLD: 6.5 % (ref ?–5.8)
POCT INT CON NEG: NEGATIVE
POCT INT CON POS: POSITIVE

## 2024-07-23 PROCEDURE — 3078F DIAST BP <80 MM HG: CPT | Performed by: FAMILY MEDICINE

## 2024-07-23 PROCEDURE — 99214 OFFICE O/P EST MOD 30 MIN: CPT | Performed by: FAMILY MEDICINE

## 2024-07-23 PROCEDURE — 3074F SYST BP LT 130 MM HG: CPT | Performed by: FAMILY MEDICINE

## 2024-07-23 PROCEDURE — 83036 HEMOGLOBIN GLYCOSYLATED A1C: CPT | Performed by: FAMILY MEDICINE

## 2024-07-23 ASSESSMENT — FIBROSIS 4 INDEX: FIB4 SCORE: 0.48

## 2024-08-16 RX ORDER — FLUTICASONE FUROATE, UMECLIDINIUM BROMIDE AND VILANTEROL TRIFENATATE 200; 62.5; 25 UG/1; UG/1; UG/1
POWDER RESPIRATORY (INHALATION)
Qty: 180 EACH | Refills: 0 | OUTPATIENT
Start: 2024-08-16

## 2024-09-03 ENCOUNTER — APPOINTMENT (OUTPATIENT)
Dept: RADIOLOGY | Facility: MEDICAL CENTER | Age: 79
End: 2024-09-03
Attending: FAMILY MEDICINE
Payer: MEDICARE

## 2024-09-03 DIAGNOSIS — Z78.0 POST-MENOPAUSAL: ICD-10-CM

## 2024-09-03 PROCEDURE — 77080 DXA BONE DENSITY AXIAL: CPT

## 2024-10-08 RX ORDER — RALOXIFENE HYDROCHLORIDE 60 MG/1
60 TABLET, FILM COATED ORAL EVERY MORNING
Qty: 90 TABLET | Refills: 3 | Status: SHIPPED | OUTPATIENT
Start: 2024-10-08

## 2024-11-09 DIAGNOSIS — I10 ESSENTIAL HYPERTENSION: ICD-10-CM

## 2024-11-09 NOTE — TELEPHONE ENCOUNTER
Received request via: Pharmacy    Was the patient seen in the last year in this department? Yes  LOV : 7/23/2024   Does the patient have an active prescription (recently filled or refills available) for medication(s) requested? No    Pharmacy Name: [facility]    Does the patient have M[facility] Plus and need 100-day supply? (This applies to ALL medications) Patient does not have SCP

## 2024-11-12 RX ORDER — HYDROCHLOROTHIAZIDE 25 MG/1
25 TABLET ORAL DAILY
Qty: 90 TABLET | Refills: 3 | Status: SHIPPED | OUTPATIENT
Start: 2024-11-12

## 2024-11-27 RX ORDER — LEVOTHYROXINE SODIUM 88 UG/1
88 TABLET ORAL
Qty: 90 TABLET | Refills: 0 | Status: SHIPPED | OUTPATIENT
Start: 2024-11-27

## 2024-11-27 NOTE — TELEPHONE ENCOUNTER
Received request via: Pharmacy    Was the patient seen in the last year in this department? Yes    Does the patient have an active prescription (recently filled or refills available) for medication(s) requested? No    Pharmacy Name:   [facility] [address] [place] [place] [address]  [address]  [place] [address]  Phone: [phone] Fax: [phone]          Does the patient have longterm Plus and need 100-day supply? (This applies to ALL medications) Patient does not have SCP

## 2024-12-21 ENCOUNTER — OFFICE VISIT (OUTPATIENT)
Dept: URGENT CARE | Facility: CLINIC | Age: 79
End: 2024-12-21
Payer: MEDICARE

## 2024-12-21 VITALS
WEIGHT: 174 LBS | HEIGHT: 70 IN | TEMPERATURE: 98.3 F | OXYGEN SATURATION: 98 % | HEART RATE: 74 BPM | SYSTOLIC BLOOD PRESSURE: 126 MMHG | DIASTOLIC BLOOD PRESSURE: 60 MMHG | BODY MASS INDEX: 24.91 KG/M2 | RESPIRATION RATE: 20 BRPM

## 2024-12-21 DIAGNOSIS — B96.89 ACUTE BACTERIAL SINUSITIS: ICD-10-CM

## 2024-12-21 DIAGNOSIS — J01.90 ACUTE BACTERIAL SINUSITIS: ICD-10-CM

## 2024-12-21 PROCEDURE — 3078F DIAST BP <80 MM HG: CPT

## 2024-12-21 PROCEDURE — 99214 OFFICE O/P EST MOD 30 MIN: CPT

## 2024-12-21 PROCEDURE — 3074F SYST BP LT 130 MM HG: CPT

## 2024-12-21 RX ORDER — AZITHROMYCIN 1 G/1
0.5 POWDER, FOR SUSPENSION ORAL ONCE
Qty: 1 EACH | Refills: 0 | Status: SHIPPED | OUTPATIENT
Start: 2024-12-21 | End: 2024-12-21

## 2024-12-21 RX ORDER — CIPROFLOXACIN 500 MG/1
500 TABLET, FILM COATED ORAL 2 TIMES DAILY
Qty: 10 TABLET | Refills: 0 | Status: CANCELLED | OUTPATIENT
Start: 2024-12-21 | End: 2024-12-26

## 2024-12-21 RX ORDER — AZITHROMYCIN 250 MG/1
250 TABLET, FILM COATED ORAL DAILY
Qty: 7 TABLET | Refills: 0 | Status: SHIPPED | OUTPATIENT
Start: 2024-12-21 | End: 2024-12-28

## 2024-12-21 RX ORDER — ERGOCALCIFEROL 1.25 MG/1
CAPSULE, LIQUID FILLED ORAL
COMMUNITY

## 2024-12-21 ASSESSMENT — ENCOUNTER SYMPTOMS
LOSS OF CONSCIOUSNESS: 0
PALPITATIONS: 0
COUGH: 0
MYALGIAS: 0
CHILLS: 0
NAUSEA: 0
ABDOMINAL PAIN: 0
VOMITING: 0
FALLS: 0
FOCAL WEAKNESS: 0
BLURRED VISION: 0
DIARRHEA: 0
SINUS PAIN: 1
SHORTNESS OF BREATH: 0
SINUS PRESSURE: 1
HEADACHES: 1
FEVER: 0
DIZZINESS: 0
WEAKNESS: 0

## 2024-12-21 ASSESSMENT — FIBROSIS 4 INDEX: FIB4 SCORE: 0.49

## 2024-12-21 NOTE — PROGRESS NOTES
"Subjective:     Jillian Gottlieb is a 79 y.o. female who presents for Sinus Problem (Started with Lt  eye hurting, now temple and teeth hurt x 5 days, sinus pressure )      Sinus Problem  This is a new problem. The current episode started in the past 7 days. The problem has been gradually worsening since onset. There has been no fever. Her pain is at a severity of 8/10. Associated symptoms include congestion, ear pain, headaches and sinus pressure. Pertinent negatives include no chills, coughing or shortness of breath. Past treatments include oral decongestants, nasal decongestants and lying down. The treatment provided no relief.       Review of Systems   Constitutional:  Positive for malaise/fatigue. Negative for chills and fever.   HENT:  Positive for congestion, ear pain, sinus pressure and sinus pain. Negative for ear discharge.    Eyes:  Negative for blurred vision.   Respiratory:  Negative for cough and shortness of breath.    Cardiovascular:  Negative for chest pain, palpitations and leg swelling.   Gastrointestinal:  Negative for abdominal pain, diarrhea, nausea and vomiting.   Musculoskeletal:  Negative for falls and myalgias.   Skin:  Negative for itching and rash.   Neurological:  Positive for headaches. Negative for dizziness, focal weakness, loss of consciousness and weakness.        CURRENT MEDICATIONS:  albuterol Aers  amLODIPine Tabs  Biotin Tabs  estradiol  fenofibrate  glucosamine Sulfate Caps  hydroCHLOROthiazide Tabs  levothyroxine Tabs  losartan Tabs  metFORMIN ER Tb24  metoprolol SR Tb24  multivitamin Tabs  raloxifene Tabs  simvastatin Tabs  Trelegy Ellipta Aepb  vitamin D2 (Ergocalciferol) Caps    Allergies:     Allergies   Allergen Reactions    Alcohol Vomiting, Nausea and Unspecified     injested-facial numbness, n/v  RXN=50 years ago    Keflex [Cephalexin] Swelling     Facial swelling    Pcn [Penicillins] Unspecified     Flushed; \"felt weird\"  Tolerates cephalosporins  RXN=age 30's "       Current Problems: Jillian Gottlieb does not have any pertinent problems on file.  Past Surgical Hx:    Past Surgical History:   Procedure Laterality Date    INCISION AND DRAINAGE ORTHOPEDIC Right 4/2/2024    Procedure: RIGHT HIP INCISION AND DRAINAGE HEMATOMA;  Surgeon: Ricky Castaneda M.D.;  Location: SURGERY [facility];  Service: Orthopedics    NE TOTAL HIP ARTHROPLASTY Right 03/19/2024    Procedure: RIGHT TOTAL HIP ARTHROPLASTY;  Surgeon: Ricky Castaneda M.D.;  Location: [facility] [place];  Service: Orthopedics    NE CLOSURE OF VAGINA  01/04/2021    Procedure: COLPOCLEISIS, LE FORT, PARTIAL;  Surgeon: Rafi Ingram M.D.;  Location: SURGERY SAME DAY [facility];  Service: Gynecology    KNEE ARTHROPLASTY TOTAL Left 12/18/2017    Procedure: KNEE ARTHROPLASTY TOTAL;  Surgeon: Ricky Castaneda M.D.;  Location: [facility];  Service: Orthopedics    KNEE ARTHROPLASTY TOTAL Right 10/17/2016    Procedure: KNEE ARTHROPLASTY TOTAL;  Surgeon: Ricky Castaneda M.D.;  Location: [facility];  Service:     RECOVERY  03/04/2016    Procedure: IR2-PERQ NEPHRO-URETERAL CATH RIGHT-DR. CINTRON-Surgery to follow in [facility] ;  Surgeon: Ir-Recovery Surgery;  Location: [facility];  Service:     CYSTOSCOPY STENT REMOVAL Right 03/04/2016    Procedure: RIGID CYSTOSCOPY STENT REMOVAL;  Surgeon: Fredy Cintron M.D.;  Location: [facility];  Service:     URETEROSCOPY Right 03/04/2016    Procedure: URETEROSCOPY;  Surgeon: Fredy Cintron M.D.;  Location: [facility];  Service:     PERCUTANEOUS NEPHROSTOLITHOTOMY Right 03/04/2016    Procedure: PERCUTANEOUS NEPHROSTOLITHOTOMY FOR NEPHROLITHOTRIPSY;  Surgeon: Fredy Cintron M.D.;  Location: [facility];  Service:     CYSTOSCOPY STENT PLACEMENT Right 02/12/2016    Procedure: CYSTOSCOPY STENT PLACEMENT;  Surgeon: Fredy Cintron M.D.;  Location: [facility];  Service:     CYSTOSCOPY STENT PLACEMENT Right  06/26/2015    Procedure: CYSTOSCOPY STENT PLACEMENT RIGID POSSIBLE STENT;  Surgeon: Fredy Cintron M.D.;  Location: SURGERY [facility];  Service:     URETEROSCOPY N/A 06/26/2015    Procedure: URETEROSCOPY;  Surgeon: Fredy Cintron M.D.;  Location: SURGERY [facility];  Service:     LASERTRIPSY Right 06/26/2015    Procedure: LASERTRIPSY LITHO;  Surgeon: Fredy Cintron M.D.;  Location: SURGERY [facility];  Service:     URETEROSCOPY  08/20/2014    Performed by Fredy Cintron M.D. at SURGERY [facility]    LASERTRIPSY  08/20/2014    Performed by Fredy Cintron M.D. at SURGERY [facility]    CYSTOSCOPY  08/20/2014    Performed by Fredy Cintron M.D. at SURGERY [facility]    CYSTOSCOPY STENT PLACEMENT  01/31/2013    Performed by Fredy Cintron M.D. at SURGERY [facility]    URETEROSCOPY  01/31/2013    Performed by Fredy Cintron M.D. at [facility]    NEPHRECTOMY PARTIAL  01/31/2013    Performed by Fredy Cintron M.D. at SURGERY [facility]    LASERTRIPSY  01/31/2013    Performed by Fredy Cintron M.D. at SURGERY [facility]    OTHER ORTHOPEDIC SURGERY  2006    right knee meniscus    OTHER ORTHOPEDIC SURGERY  1996    ORIF right arm    OTHER  1995    sinus surgery    GYN SURGERY  1994    hysterectomy    OTHER  1980    thyroid lumpectomy    BREAST BIOPSY Left 1976    benign    CATARACT EXTRACTION WITH IOL Bilateral     CUATE BY LAPAROSCOPY      ORIF, ANKLE      Achilles surgery    ORIF, HIP  03/19/2024    ORIF, KNEE  5 and 7 years ago    OTHER ORTHOPEDIC SURGERY      achilles tendon reattach     TONSILLECTOMY        Past Social Hx:  reports that she has never smoked. She has never used smokeless tobacco. She reports that she does not drink alcohol and does not use drugs.   Past Family Hx:  Jillian Gottlieb family history includes Colon Cancer in her father; Kidney Disease in her mother.     (Allergies, Medications, & Tobacco/Substance Use were reconciled by the Medical Assistant and  "reviewed by myself. The family history is prepopulated)       Objective:     /60 (BP Location: Left arm, Patient Position: Sitting, BP Cuff Size: Adult)   Pulse 74   Temp 36.8 °C (98.3 °F) (Temporal)   Resp 20   Ht 1.778 m (5' 10\")   Wt 78.9 kg (174 lb)   SpO2 98%   BMI 24.97 kg/m²     Physical Exam  Constitutional:       General: She is not in acute distress.     Appearance: Normal appearance. She is not ill-appearing, toxic-appearing or diaphoretic.   HENT:      Head: Normocephalic and atraumatic.      Nose: Nose normal.   Eyes:      General: No scleral icterus.        Right eye: No discharge.         Left eye: No discharge.   Cardiovascular:      Rate and Rhythm: Normal rate and regular rhythm.      Heart sounds: Normal heart sounds. No murmur heard.     No friction rub. No gallop.   Pulmonary:      Effort: Pulmonary effort is normal. No respiratory distress.      Breath sounds: No stridor. No wheezing, rhonchi or rales.   Chest:      Chest wall: No tenderness.   Abdominal:      General: Abdomen is flat.      Palpations: Abdomen is soft.   Musculoskeletal:         General: Normal range of motion.      Cervical back: No rigidity.   Skin:     General: Skin is warm and dry.   Neurological:      General: No focal deficit present.      Mental Status: She is alert and oriented to person, place, and time. Mental status is at baseline.   Psychiatric:         Behavior: Behavior normal.         Judgment: Judgment normal.         Assessment/Plan:     Jillian was seen today for sinus problem.    Diagnoses and all orders for this visit:    Acute bacterial sinusitis  -     azithromycin (ZITHROMAX) 1 GM powder; Take 0.5 Packets by mouth one time for 1 dose.     Based on history presenting illness, including personal risk factors of COPD, history of asthma, JONNATHAN, and combination with review of systems and physical exam for likely etiology is an uncomplicated acute bacterial sinusitis.  Attempted over-the-counter " decongestions and nasal decongestions however symptoms have progressed.  I do think patient would benefit at this time from a course of oral antibiotics.  I do not think she needs steroids at this time but discussed strict return and ED precautions should symptoms continue to worsen.  Vital signs are stable.  All questions were answered.  Discussed risks benefits and indications for azithromycin.    Differential diagnosis, natural history, supportive care, and indications for immediate follow-up discussed.    Advised the patient to follow-up with the primary care physician for recheck, reevaluation, and consideration of further management.    Please note that this dictation was created using voice recognition software. I have made reasonable attempt to correct obvious errors, but I expect that there are errors of grammar and possibly content that I did not discover before finalizing the note.    This note was electronically signed by Coreen Krishna MD PhD

## 2025-01-07 RX ORDER — METOPROLOL SUCCINATE 25 MG/1
25 TABLET, EXTENDED RELEASE ORAL DAILY
Qty: 90 TABLET | Refills: 3 | Status: SHIPPED | OUTPATIENT
Start: 2025-01-07

## 2025-01-07 RX ORDER — METFORMIN HYDROCHLORIDE 500 MG/1
TABLET, EXTENDED RELEASE ORAL
Qty: 270 TABLET | Refills: 3 | Status: SHIPPED | OUTPATIENT
Start: 2025-01-07

## 2025-01-07 RX ORDER — SIMVASTATIN 20 MG
TABLET ORAL
Qty: 90 TABLET | Refills: 3 | Status: SHIPPED | OUTPATIENT
Start: 2025-01-07

## 2025-01-23 ENCOUNTER — OFFICE VISIT (OUTPATIENT)
Dept: MEDICAL GROUP | Facility: LAB | Age: 80
End: 2025-01-23
Payer: MEDICARE

## 2025-01-23 VITALS
BODY MASS INDEX: 25.08 KG/M2 | SYSTOLIC BLOOD PRESSURE: 108 MMHG | HEART RATE: 71 BPM | TEMPERATURE: 97.9 F | WEIGHT: 175.2 LBS | OXYGEN SATURATION: 94 % | HEIGHT: 70 IN | DIASTOLIC BLOOD PRESSURE: 70 MMHG | RESPIRATION RATE: 14 BRPM

## 2025-01-23 DIAGNOSIS — E03.9 HYPOTHYROIDISM, UNSPECIFIED TYPE: ICD-10-CM

## 2025-01-23 DIAGNOSIS — I10 ESSENTIAL HYPERTENSION: ICD-10-CM

## 2025-01-23 DIAGNOSIS — E11.9 TYPE 2 DIABETES MELLITUS WITHOUT COMPLICATION, WITHOUT LONG-TERM CURRENT USE OF INSULIN (HCC): ICD-10-CM

## 2025-01-23 PROBLEM — S70.01XA HEMATOMA OF HIP, RIGHT, INITIAL ENCOUNTER: Status: RESOLVED | Noted: 2024-04-01 | Resolved: 2025-01-23

## 2025-01-23 LAB
HBA1C MFR BLD: 6.8 % (ref ?–5.8)
POCT INT CON NEG: NEGATIVE
POCT INT CON POS: POSITIVE

## 2025-01-23 PROCEDURE — 83036 HEMOGLOBIN GLYCOSYLATED A1C: CPT | Performed by: FAMILY MEDICINE

## 2025-01-23 PROCEDURE — 99214 OFFICE O/P EST MOD 30 MIN: CPT | Performed by: FAMILY MEDICINE

## 2025-01-23 PROCEDURE — 3078F DIAST BP <80 MM HG: CPT | Performed by: FAMILY MEDICINE

## 2025-01-23 PROCEDURE — 3074F SYST BP LT 130 MM HG: CPT | Performed by: FAMILY MEDICINE

## 2025-01-23 ASSESSMENT — FIBROSIS 4 INDEX: FIB4 SCORE: 0.49

## 2025-01-23 ASSESSMENT — PATIENT HEALTH QUESTIONNAIRE - PHQ9: CLINICAL INTERPRETATION OF PHQ2 SCORE: 0

## 2025-01-23 NOTE — PROGRESS NOTES
Subjective:     CC: Follow up    HPI:   Jillian presents today to follow-up on type 2 diabetes.  A1c today is 6.8%.  Continues on metformin 1500 mg daily.  Does note that she could improve diet and limit pasta.    Current Outpatient Medications   Medication Sig Dispense Refill    metFORMIN ER (GLUCOPHAGE XR) 500 MG TABLET SR 24 HR Take 3 tablets by mouth once daily 270 Tablet 3    metoprolol SR (TOPROL XL) 25 MG TABLET SR 24 HR Take 1 tablet by mouth once daily 90 Tablet 3    simvastatin (ZOCOR) 20 MG Tab TAKE 1 TABLET BY MOUTH ONCE DAILY IN THE EVENING 90 Tablet 3    vitamin D2, Ergocalciferol, (DRISDOL) 1.25 MG (23144 UT) Cap capsule Take  by mouth every 7 days.      levothyroxine (SYNTHROID) 88 MCG Tab TAKE 1 TABLET BY MOUTH IN THE MORNING ON AN EMPTY STOMACH 90 Tablet 0    hydroCHLOROthiazide 25 MG Tab Take 1 tablet by mouth once daily 90 Tablet 3    raloxifene (EVISTA) 60 MG Tab TAKE 1 TABLET BY MOUTH ONCE DAILY IN THE MORNING 90 Tablet 3    albuterol 108 (90 Base) MCG/ACT Aero Soln inhalation aerosol Inhale 2 Puffs every four hours as needed for Shortness of Breath. FOR SHORTNESS OF BREATH 8 g 3    losartan (COZAAR) 100 MG Tab Take 1 tablet by mouth once daily 90 Tablet 3    amLODIPine (NORVASC) 10 MG Tab Take 1 Tablet by mouth every day. 90 Tablet 3    estradiol (ESTRACE) 0.1 MG/GM vaginal cream INSERT 1 GRAM VAGINALLY ONCE DAILY THREE TIMES A WEEK (Patient taking differently: Insert  into the vagina. INSERT 1 GRAM VAGINALLY ONCE DAILY TWICE A WEEK) 43 g 3    fluticasone-umeclidinium-vilanterol (TRELEGY ELLIPTA) 200-62.5-25 mcg/act inhaler INHALE 1 PUFF ONCE DAILY . 180 Each 3    fenofibrate (TRIGLIDE) 160 MG tablet Take 1 tablet by mouth once daily 90 Tablet 3    Biotin 10 MG Tab Take  by mouth every day.      glucosamine Sulfate 500 MG Cap Take 500 mg by mouth 3 times a day with meals.      multivitamin (THERAGRAN) Tab Take 1 Tab by mouth every day.       No current facility-administered medications for this  "visit.       Medications, past medical history, allergies, and social history have been reviewed and updated.      Objective:       Exam:  /70 (BP Location: Left arm, Patient Position: Sitting, BP Cuff Size: Adult)   Pulse 71   Temp 36.6 °C (97.9 °F) (Temporal)   Resp 14   Ht 1.778 m (5' 10\")   Wt 79.5 kg (175 lb 3.2 oz)   SpO2 94%   BMI 25.14 kg/m²  Body mass index is 25.14 kg/m².    Constitutional: Alert. Well appearing. No distress.  Skin: Warm, dry, good turgor, no visible rashes.  Respiratory: Normal effort. Lungs are clear to auscultation bilaterally.  Cardiovascular: Regular rate and rhythm. Normal S1/S2. No murmurs, rubs or gallops.   Monofilament testing with a 10 gram force: sensation intact: decreased on right  Visual Inspection: Feet without maceration, ulcers, fissures.  Pedal pulses: intact bilaterally  Neuro: Moves all four extremities. No facial droop.  Psych: Answers questions appropriately. Normal affect and mood.    Assessment & Plan:     79 y.o. female with the following -     1. Type 2 diabetes mellitus without complication, without long-term current use of insulin ([facility])  A1C 6.8, goal 7.0 or less.  Continue metformin 1500 mg daily.  Labs as below.  Recheck 6 months.  - POCT Hemoglobin A1C  - Lipid Profile; Future  - MICROALBUMIN CREAT RATIO URINE; Future  - Diabetic Monofilament LE Exam  - CBC WITH DIFFERENTIAL; Future  - Comp Metabolic Panel; Future    2. Essential hypertension  Blood pressure at goal.  Continue losartan 100 mg, amlodipine 10 mg, and HCTZ 25 mg daily    3. Hypothyroidism, unspecified type  Continue levothyroxine 88 mcg daily.  Check TSH.  - TSH WITH REFLEX TO FT4; Future         Please note that this note was created using voice recognition software.      "

## 2025-01-30 RX ORDER — FENOFIBRATE 160 MG/1
160 TABLET ORAL DAILY
Qty: 90 TABLET | Refills: 3 | Status: SHIPPED | OUTPATIENT
Start: 2025-01-30

## 2025-03-05 DIAGNOSIS — I10 ESSENTIAL HYPERTENSION: ICD-10-CM

## 2025-03-05 RX ORDER — LOSARTAN POTASSIUM 100 MG/1
100 TABLET ORAL DAILY
Qty: 90 TABLET | Refills: 3 | Status: SHIPPED | OUTPATIENT
Start: 2025-03-05

## 2025-03-05 NOTE — TELEPHONE ENCOUNTER
Received request via: Pharmacy    Was the patient seen in the last year in this department? Yes    Does the patient have an active prescription (recently filled or refills available) for medication(s) requested? No    Pharmacy Name: [facility]     Does the patient have [facility] and need 100-day supply? (This applies to ALL medications) Patient does not have SCP

## 2025-03-06 RX ORDER — AMLODIPINE BESYLATE 10 MG/1
10 TABLET ORAL DAILY
Qty: 90 TABLET | Refills: 3 | Status: SHIPPED | OUTPATIENT
Start: 2025-03-06

## 2025-03-11 RX ORDER — ESTRADIOL 0.1 MG/G
CREAM VAGINAL
Qty: 43 G | Refills: 3 | Status: SHIPPED | OUTPATIENT
Start: 2025-03-11

## 2025-04-01 ENCOUNTER — EH NON-PROVIDER (OUTPATIENT)
Dept: OCCUPATIONAL MEDICINE | Facility: CLINIC | Age: 80
End: 2025-04-01

## 2025-04-01 ENCOUNTER — HOSPITAL ENCOUNTER (OUTPATIENT)
Facility: MEDICAL CENTER | Age: 80
End: 2025-04-01
Attending: PREVENTIVE MEDICINE
Payer: COMMERCIAL

## 2025-04-01 DIAGNOSIS — Z02.89 ENCOUNTER FOR OCCUPATIONAL HEALTH EXAMINATION: ICD-10-CM

## 2025-04-01 DIAGNOSIS — Z02.89 ENCOUNTER FOR OCCUPATIONAL HEALTH EXAMINATION: Primary | ICD-10-CM

## 2025-04-01 PROCEDURE — 86735 MUMPS ANTIBODY: CPT | Performed by: PREVENTIVE MEDICINE

## 2025-04-01 PROCEDURE — 86762 RUBELLA ANTIBODY: CPT | Performed by: PREVENTIVE MEDICINE

## 2025-04-01 PROCEDURE — 86765 RUBEOLA ANTIBODY: CPT | Performed by: PREVENTIVE MEDICINE

## 2025-04-01 PROCEDURE — 86480 TB TEST CELL IMMUN MEASURE: CPT | Performed by: PREVENTIVE MEDICINE

## 2025-04-01 PROCEDURE — 86787 VARICELLA-ZOSTER ANTIBODY: CPT | Performed by: PREVENTIVE MEDICINE

## 2025-04-03 LAB
GAMMA INTERFERON BACKGROUND BLD IA-ACNC: 0.03 IU/ML
M TB IFN-G BLD-IMP: NEGATIVE
M TB IFN-G CD4+ BCKGRND COR BLD-ACNC: 0 IU/ML
MEV IGG SER-ACNC: >300 AU/ML
MITOGEN IGNF BCKGRD COR BLD-ACNC: >10 IU/ML
MUV IGG SER IA-ACNC: >300 AU/ML
QFT TB2 - NIL TBQ2: 0.02 IU/ML
RUBV AB SER QL: 251 IU/ML
VZV IGG SER IA-ACNC: 18.7 S/CO

## 2025-04-12 ENCOUNTER — OFFICE VISIT (OUTPATIENT)
Dept: URGENT CARE | Facility: CLINIC | Age: 80
End: 2025-04-12
Payer: MEDICARE

## 2025-04-12 VITALS
SYSTOLIC BLOOD PRESSURE: 116 MMHG | WEIGHT: 174.2 LBS | RESPIRATION RATE: 14 BRPM | BODY MASS INDEX: 24.94 KG/M2 | OXYGEN SATURATION: 94 % | HEART RATE: 76 BPM | TEMPERATURE: 96.8 F | HEIGHT: 70 IN | DIASTOLIC BLOOD PRESSURE: 64 MMHG

## 2025-04-12 DIAGNOSIS — J44.1 COPD WITH ACUTE EXACERBATION (HCC): ICD-10-CM

## 2025-04-12 PROCEDURE — 3078F DIAST BP <80 MM HG: CPT | Performed by: PHYSICIAN ASSISTANT

## 2025-04-12 PROCEDURE — 99214 OFFICE O/P EST MOD 30 MIN: CPT | Performed by: PHYSICIAN ASSISTANT

## 2025-04-12 PROCEDURE — 3074F SYST BP LT 130 MM HG: CPT | Performed by: PHYSICIAN ASSISTANT

## 2025-04-12 RX ORDER — AZITHROMYCIN 250 MG/1
TABLET, FILM COATED ORAL
Qty: 6 TABLET | Refills: 0 | Status: SHIPPED | OUTPATIENT
Start: 2025-04-12

## 2025-04-12 RX ORDER — PREDNISONE 20 MG/1
TABLET ORAL
Qty: 10 TABLET | Refills: 0 | Status: SHIPPED | OUTPATIENT
Start: 2025-04-12

## 2025-04-12 ASSESSMENT — ENCOUNTER SYMPTOMS
ABDOMINAL PAIN: 0
SHORTNESS OF BREATH: 1
FEVER: 0
SPUTUM PRODUCTION: 1
SORE THROAT: 0
WHEEZING: 1
VOMITING: 0
HEADACHES: 1
COUGH: 1
NAUSEA: 0
DIARRHEA: 0
CHILLS: 0

## 2025-04-12 ASSESSMENT — FIBROSIS 4 INDEX: FIB4 SCORE: 0.49

## 2025-04-12 NOTE — PROGRESS NOTES
Subjective     Jillian Gottlieb is a 79 y.o. female who presents with Cough (x 4 days )    HPI:  Jillian Gottlieb is a 79 y.o. female who presents today for evaluation of a cough.  Started to get sick with URI symptoms for 5 days ago.  Started with sinus congestion and runny nose but quickly moved down to her chest.  Cough was initially productive of clear sputum but now has yellow sputum.  She does have asthma and COPD.  Has been using her albuterol inhaler, Trelegy inhaler, nebulizer machine.  Says that these provide modest relief of her symptoms but nothing is really improving.  She continues to have some wheezing and chest tightness associated with her symptoms.  She has not had any fever.        Review of Systems   Constitutional:  Positive for malaise/fatigue. Negative for chills and fever.   HENT:  Positive for congestion. Negative for sore throat.    Respiratory:  Positive for cough, sputum production, shortness of breath and wheezing.    Gastrointestinal:  Negative for abdominal pain, diarrhea, nausea and vomiting.   Neurological:  Positive for headaches.           PMH:  has a past medical history of Acid reflux, Arthritis, Asthma, Breath shortness, Bronchitis (08/04/2014), Cancer ([facility]) (01/2013), Chronic pain of left knee (08/22/2017), Chronic pain of right knee (07/01/2016), COPD (chronic obstructive pulmonary disease) ([facility]), COPD (chronic obstructive pulmonary disease) ([facility]) (06/30/2016), Diabetes ([facility]), Dyspnea, Emphysema of lung ([facility]), Heart burn, Hypertension, Impaired fasting glucose (08/24/2015), Indigestion, Mass of left lower leg (07/27/2016), Pain in right hip (04/01/2024), Personal history of renal cancer (04/20/2015), Renal disorder, Renal disorder (2013), Skin tag (06/24/2019), Unspecified disorder of thyroid, and Unspecified hypothyroidism (04/20/2015).    She has no past medical history of Encounter for long-term (current) use of other medications.  MEDS:   Current Outpatient Medications:      estradiol (ESTRACE) 0.1 MG/GM vaginal cream, INSERT 1 GRAM VAGINALLY ONCE DAILY THREE TIMES A WEEK, Disp: 43 g, Rfl: 3    amLODIPine (NORVASC) 10 MG Tab, Take 1 tablet by mouth once daily, Disp: 90 Tablet, Rfl: 3    losartan (COZAAR) 100 MG Tab, Take 1 tablet by mouth once daily, Disp: 90 Tablet, Rfl: 3    fenofibrate (TRIGLIDE) 160 MG tablet, Take 1 tablet by mouth once daily, Disp: 90 Tablet, Rfl: 3    metFORMIN ER (GLUCOPHAGE XR) 500 MG TABLET SR 24 HR, Take 3 tablets by mouth once daily, Disp: 270 Tablet, Rfl: 3    metoprolol SR (TOPROL XL) 25 MG TABLET SR 24 HR, Take 1 tablet by mouth once daily, Disp: 90 Tablet, Rfl: 3    simvastatin (ZOCOR) 20 MG Tab, TAKE 1 TABLET BY MOUTH ONCE DAILY IN THE EVENING, Disp: 90 Tablet, Rfl: 3    vitamin D2, Ergocalciferol, (DRISDOL) 1.25 MG (12584 UT) Cap capsule, Take  by mouth every 7 days., Disp: , Rfl:     levothyroxine (SYNTHROID) 88 MCG Tab, TAKE 1 TABLET BY MOUTH IN THE MORNING ON AN EMPTY STOMACH, Disp: 90 Tablet, Rfl: 0    hydroCHLOROthiazide 25 MG Tab, Take 1 tablet by mouth once daily, Disp: 90 Tablet, Rfl: 3    raloxifene (EVISTA) 60 MG Tab, TAKE 1 TABLET BY MOUTH ONCE DAILY IN THE MORNING, Disp: 90 Tablet, Rfl: 3    albuterol 108 (90 Base) MCG/ACT Aero Soln inhalation aerosol, Inhale 2 Puffs every four hours as needed for Shortness of Breath. FOR SHORTNESS OF BREATH, Disp: 8 g, Rfl: 3    fluticasone-umeclidinium-vilanterol (TRELEGY ELLIPTA) 200-62.5-25 mcg/act inhaler, INHALE 1 PUFF ONCE DAILY ., Disp: 180 Each, Rfl: 3    Biotin 10 MG Tab, Take  by mouth every day., Disp: , Rfl:     glucosamine Sulfate 500 MG Cap, Take 500 mg by mouth 3 times a day with meals., Disp: , Rfl:     multivitamin (THERAGRAN) Tab, Take 1 Tab by mouth every day., Disp: , Rfl:   ALLERGIES:   Allergies   Allergen Reactions    Alcohol Vomiting, Nausea and Unspecified     injested-facial numbness, n/v  RXN=50 years ago    Keflex [Cephalexin] Swelling     Facial swelling    Pcn [Penicillins]  "Unspecified     Flushed; \"felt weird\"  Tolerates cephalosporins  RXN=age 30's     SURGHX:   Past Surgical History:   Procedure Laterality Date    INCISION AND DRAINAGE ORTHOPEDIC Right 4/2/2024    Procedure: RIGHT HIP INCISION AND DRAINAGE HEMATOMA;  Surgeon: Ricky Castaneda M.D.;  Location: SURGERY [facility];  Service: Orthopedics    [place] TOTAL HIP ARTHROPLASTY Right 03/19/2024    Procedure: RIGHT TOTAL HIP ARTHROPLASTY;  Surgeon: Ricky Castaneda M.D.;  Location: [facility] [place];  Service: Orthopedics    [place] CLOSURE OF VAGINA  01/04/2021    Procedure: COLPOCLEISIS, LE FORT, PARTIAL;  Surgeon: Rafi Ingram M.D.;  Location: SURGERY SAME DAY [facility];  Service: Gynecology    KNEE ARTHROPLASTY TOTAL Left 12/18/2017    Procedure: KNEE ARTHROPLASTY TOTAL;  Surgeon: Ricky Castaneda M.D.;  Location: [facility];  Service: Orthopedics    KNEE ARTHROPLASTY TOTAL Right 10/17/2016    Procedure: KNEE ARTHROPLASTY TOTAL;  Surgeon: Ricky Castaneda M.D.;  Location: [facility];  Service:     RECOVERY  03/04/2016    Procedure: IR2-PERQ NEPHRO-URETERAL CATH RIGHT-DR. RIDDLE-Surgery to follow in [facility] ;  Surgeon: Ir-Recovery Surgery;  Location: [facility];  Service:     CYSTOSCOPY STENT REMOVAL Right 03/04/2016    Procedure: RIGID CYSTOSCOPY STENT REMOVAL;  Surgeon: Fredy Riddle M.D.;  Location: [facility];  Service:     URETEROSCOPY Right 03/04/2016    Procedure: URETEROSCOPY;  Surgeon: Fredy Riddle M.D.;  Location: [facility];  Service:     PERCUTANEOUS NEPHROSTOLITHOTOMY Right 03/04/2016    Procedure: PERCUTANEOUS NEPHROSTOLITHOTOMY FOR NEPHROLITHOTRIPSY;  Surgeon: Fredy Riddle M.D.;  Location: [facility];  Service:     CYSTOSCOPY STENT PLACEMENT Right 02/12/2016    Procedure: CYSTOSCOPY STENT PLACEMENT;  Surgeon: Fredy Riddle M.D.;  Location: [facility];  Service:     CYSTOSCOPY STENT PLACEMENT Right 06/26/2015    " "Procedure: CYSTOSCOPY STENT PLACEMENT RIGID POSSIBLE STENT;  Surgeon: Fredy Cintron M.D.;  Location: [facility];  Service:     URETEROSCOPY N/A 06/26/2015    Procedure: URETEROSCOPY;  Surgeon: Fredy Cintron M.D.;  Location: SURGERY [facility];  Service:     LASERTRIPSY Right 06/26/2015    Procedure: LASERTRIPSY LITHO;  Surgeon: Fredy Cintron M.D.;  Location: SURGERY [facility];  Service:     URETEROSCOPY  08/20/2014    Performed by Fredy Cintron M.D. at SURGERY [facility]    LASERTRIPSY  08/20/2014    Performed by Fredy Cintron M.D. at SURGERY [facility]    CYSTOSCOPY  08/20/2014    Performed by Fredy Cintron M.D. at [facility]    CYSTOSCOPY STENT PLACEMENT  01/31/2013    Performed by Fredy Cintron M.D. at [facility]    URETEROSCOPY  01/31/2013    Performed by Fredy Cintron M.D. at [facility]    NEPHRECTOMY PARTIAL  01/31/2013    Performed by Fredy Cintron M.D. at [facility]    LASERTRIPSY  01/31/2013    Performed by Fredy Cintron M.D. at SURGERY [facility]    OTHER ORTHOPEDIC SURGERY  2006    right knee meniscus    OTHER ORTHOPEDIC SURGERY  1996    ORIF right arm    OTHER  1995    sinus surgery    GYN SURGERY  1994    hysterectomy    OTHER  1980    thyroid lumpectomy    BREAST BIOPSY Left 1976    benign    CATARACT EXTRACTION WITH IOL Bilateral     CUATE BY LAPAROSCOPY      ORIF, ANKLE      Achilles surgery    ORIF, HIP  03/19/2024    ORIF, KNEE  5 and 7 years ago    OTHER ORTHOPEDIC SURGERY      achilles tendon reattach     TONSILLECTOMY       SOCHX:  reports that she has never smoked. She has never used smokeless tobacco. She reports that she does not drink alcohol and does not use drugs.  FH: Family history was reviewed, no pertinent findings to report        Objective     /64 (BP Location: Right arm, Patient Position: Sitting, BP Cuff Size: Adult)   Pulse 76   Temp 36 °C (96.8 °F)   Resp 14   Ht 1.778 m (5' 10\")  "  Wt 79 kg (174 lb 3.2 oz)   SpO2 94%   BMI 25.00 kg/m²      Physical Exam  Constitutional:       Appearance: She is well-developed.   HENT:      Head: Normocephalic and atraumatic.      Right Ear: Tympanic membrane, ear canal and external ear normal.      Left Ear: Tympanic membrane, ear canal and external ear normal.      Nose: Mucosal edema and congestion present. No rhinorrhea.      Mouth/Throat:      Lips: Pink.      Mouth: Mucous membranes are moist.      Pharynx: Oropharynx is clear.   Eyes:      Conjunctiva/sclera: Conjunctivae normal.      Pupils: Pupils are equal, round, and reactive to light.   Cardiovascular:      Rate and Rhythm: Normal rate and regular rhythm.      Heart sounds: Normal heart sounds. No murmur heard.  Pulmonary:      Effort: Pulmonary effort is normal.      Breath sounds: Wheezing present.   Musculoskeletal:      Cervical back: Normal range of motion.   Lymphadenopathy:      Cervical: No cervical adenopathy.   Skin:     General: Skin is warm and dry.      Capillary Refill: Capillary refill takes less than 2 seconds.   Neurological:      Mental Status: She is alert and oriented to person, place, and time.   Psychiatric:         Behavior: Behavior normal.         Judgment: Judgment normal.           Assessment & Plan       1. COPD with acute exacerbation (HCC)  - azithromycin (ZITHROMAX) 250 MG Tab; 500 mg in a single loading dose on day 1, followed by 250 mg once daily on days 2 to 5  Dispense: 6 Tablet; Refill: 0  - predniSONE (DELTASONE) 20 MG Tab; Take 2 tabs once daily for 5 days  Dispense: 10 Tablet; Refill: 0  Patient's symptoms and exam findings seem consistent with COPD exacerbation.  Will start her on azithromycin and short burst of oral prednisone.  She may continue to use her inhalers and nebulizer solution as needed.        Differential Diagnosis, natural history, and supportive care discussed. Return to the Urgent Care or follow up with your PCP if symptoms fail to  resolve, or for any new or worsening symptoms. Emergency room precautions discussed. Patient and/or family appears understanding of information.

## 2025-04-19 ENCOUNTER — OFFICE VISIT (OUTPATIENT)
Dept: URGENT CARE | Facility: CLINIC | Age: 80
End: 2025-04-19
Payer: MEDICARE

## 2025-04-19 ENCOUNTER — HOSPITAL ENCOUNTER (OUTPATIENT)
Facility: MEDICAL CENTER | Age: 80
End: 2025-04-19
Attending: PHYSICIAN ASSISTANT
Payer: MEDICARE

## 2025-04-19 VITALS
BODY MASS INDEX: 24.77 KG/M2 | SYSTOLIC BLOOD PRESSURE: 118 MMHG | OXYGEN SATURATION: 94 % | HEIGHT: 70 IN | HEART RATE: 90 BPM | RESPIRATION RATE: 14 BRPM | TEMPERATURE: 97.3 F | DIASTOLIC BLOOD PRESSURE: 64 MMHG | WEIGHT: 173 LBS

## 2025-04-19 DIAGNOSIS — N30.00 ACUTE CYSTITIS WITHOUT HEMATURIA: ICD-10-CM

## 2025-04-19 DIAGNOSIS — J44.1 COPD EXACERBATION (HCC): ICD-10-CM

## 2025-04-19 LAB
APPEARANCE UR: NORMAL
BILIRUB UR STRIP-MCNC: NEGATIVE MG/DL
COLOR UR AUTO: YELLOW
GLUCOSE UR STRIP.AUTO-MCNC: NEGATIVE MG/DL
KETONES UR STRIP.AUTO-MCNC: NEGATIVE MG/DL
LEUKOCYTE ESTERASE UR QL STRIP.AUTO: NORMAL
NITRITE UR QL STRIP.AUTO: POSITIVE
PH UR STRIP.AUTO: 5.5 [PH] (ref 5–8)
PROT UR QL STRIP: NEGATIVE MG/DL
RBC UR QL AUTO: NEGATIVE
SP GR UR STRIP.AUTO: 1.03
UROBILINOGEN UR STRIP-MCNC: 0.2 MG/DL

## 2025-04-19 PROCEDURE — 87077 CULTURE AEROBIC IDENTIFY: CPT

## 2025-04-19 PROCEDURE — 99213 OFFICE O/P EST LOW 20 MIN: CPT | Performed by: PHYSICIAN ASSISTANT

## 2025-04-19 PROCEDURE — 87186 SC STD MICRODIL/AGAR DIL: CPT

## 2025-04-19 PROCEDURE — 3078F DIAST BP <80 MM HG: CPT | Performed by: PHYSICIAN ASSISTANT

## 2025-04-19 PROCEDURE — 3074F SYST BP LT 130 MM HG: CPT | Performed by: PHYSICIAN ASSISTANT

## 2025-04-19 PROCEDURE — 81002 URINALYSIS NONAUTO W/O SCOPE: CPT | Performed by: PHYSICIAN ASSISTANT

## 2025-04-19 PROCEDURE — 87086 URINE CULTURE/COLONY COUNT: CPT

## 2025-04-19 RX ORDER — CEFDINIR 300 MG/1
300 CAPSULE ORAL 2 TIMES DAILY
Qty: 14 CAPSULE | Refills: 0 | Status: SHIPPED | OUTPATIENT
Start: 2025-04-19 | End: 2025-04-26

## 2025-04-19 RX ORDER — METHYLPREDNISOLONE 4 MG/1
TABLET ORAL
Qty: 21 TABLET | Refills: 0 | Status: SHIPPED | OUTPATIENT
Start: 2025-04-19

## 2025-04-19 ASSESSMENT — ENCOUNTER SYMPTOMS
NAUSEA: 0
ABDOMINAL PAIN: 0
DIZZINESS: 0
HEMOPTYSIS: 0
FEVER: 0
VOMITING: 0
SHORTNESS OF BREATH: 0
COUGH: 1
SORE THROAT: 0
WHEEZING: 1
DIARRHEA: 0
HEADACHES: 0
SPUTUM PRODUCTION: 0
FLANK PAIN: 0
CHILLS: 0

## 2025-04-19 ASSESSMENT — FIBROSIS 4 INDEX: FIB4 SCORE: 0.49

## 2025-04-19 NOTE — PROGRESS NOTES
"Subjective     Jillian Gottlieb is a 79 y.o. female who presents with Dysuria (Diarrhea, sinus infection/crackling sounds in chest so taking Abx, itchy and burning started a week ago but now it feels numb )            Patient was seen 1 week ago and treated with a Z-noah and Prednisone for a COPD exacerbation. She reports mild improvement but still has a cough and wheezing. She finished her antibiotics and steroids 3 days ago. She has been using her rescue and ICS inhalers. She also reports dysuria for the past few days with itching. She took OTC Monistat and AZO with minimal relief. She denies fever or chills. No nausea, vomiting or flank pain.      Past Medical History:   Diagnosis Date    Acid reflux     Arthritis     knees, hands, hips, neck-Osteo    Asthma     inhalers daily    Breath shortness     asthma related    Bronchitis 08/04/2014    Cancer ([facility]) 01/2013    R kidney-partial nephrectomy, no chemo or radiation.    Chronic pain of left knee 08/22/2017    Chronic pain of right knee 07/01/2016    COPD (chronic obstructive pulmonary disease) ([facility])     COPD (chronic obstructive pulmonary disease) ([facility]) 06/30/2016    Diabetes ([facility])     \"pre\"    Dyspnea     Emphysema of lung ([facility])     Heart burn     OTC tums or prilosec prn    Hypertension     Impaired fasting glucose 08/24/2015    Indigestion     OTC tums or prilosec prn    Mass of left lower leg 07/27/2016    Pain in right hip 04/01/2024    mild, rates 1-2/10  S/P HALLEY    Personal history of renal cancer 04/20/2015    Renal disorder     stones    Renal disorder 2013    cancer right kidney 7% removed    Skin tag 06/24/2019    Unspecified disorder of thyroid     benign tumor removal    Unspecified hypothyroidism 04/20/2015     Past Surgical History:   Procedure Laterality Date    INCISION AND DRAINAGE ORTHOPEDIC Right 4/2/2024    Procedure: RIGHT HIP INCISION AND DRAINAGE HEMATOMA;  Surgeon: Ricky Castaneda M.D.;  Location: SURGERY [facility];  Service: " Orthopedics    NC TOTAL HIP ARTHROPLASTY Right 03/19/2024    Procedure: RIGHT TOTAL HIP ARTHROPLASTY;  Surgeon: Ricky Castaneda M.D.;  Location: [facility];  Service: Orthopedics    NC CLOSURE OF VAGINA  01/04/2021    Procedure: COLPOCLEISIS, LE FORT, PARTIAL;  Surgeon: Rafi Ingram M.D.;  Location: SURGERY SAME DAY [facility];  Service: Gynecology    KNEE ARTHROPLASTY TOTAL Left 12/18/2017    Procedure: KNEE ARTHROPLASTY TOTAL;  Surgeon: Ricky Castaneda M.D.;  Location: SURGERY [facility];  Service: Orthopedics    KNEE ARTHROPLASTY TOTAL Right 10/17/2016    Procedure: KNEE ARTHROPLASTY TOTAL;  Surgeon: Ricky Castaneda M.D.;  Location: SURGERY [facility];  Service:     RECOVERY  03/04/2016    Procedure: IR2-PERQ NEPHRO-URETERAL CATH RIGHT-DR. RIDDLE-Surgery to follow in [facility] ;  Surgeon: [facility];  Location: SURGERY [facility];  Service:     CYSTOSCOPY STENT REMOVAL Right 03/04/2016    Procedure: RIGID CYSTOSCOPY STENT REMOVAL;  Surgeon: Fredy Riddle M.D.;  Location: SURGERY [facility];  Service:     URETEROSCOPY Right 03/04/2016    Procedure: URETEROSCOPY;  Surgeon: Fredy Riddle M.D.;  Location: [facility];  Service:     PERCUTANEOUS NEPHROSTOLITHOTOMY Right 03/04/2016    Procedure: PERCUTANEOUS NEPHROSTOLITHOTOMY FOR NEPHROLITHOTRIPSY;  Surgeon: Fredy Riddle M.D.;  Location: [facility];  Service:     CYSTOSCOPY STENT PLACEMENT Right 02/12/2016    Procedure: CYSTOSCOPY STENT PLACEMENT;  Surgeon: Fredy Ridlde M.D.;  Location: SURGERY [facility];  Service:     CYSTOSCOPY STENT PLACEMENT Right 06/26/2015    Procedure: CYSTOSCOPY STENT PLACEMENT RIGID POSSIBLE STENT;  Surgeon: Fredy Riddle M.D.;  Location: [facility];  Service:     URETEROSCOPY N/A 06/26/2015    Procedure: URETEROSCOPY;  Surgeon: Fredy Riddle M.D.;  Location: [facility];  Service:     LASERTRIPSY Right 06/26/2015    Procedure:  LASERTRIPSY LITHO;  Surgeon: Fredy Cintron M.D.;  Location: SURGERY [facility];  Service:     URETEROSCOPY  08/20/2014    Performed by Fredy Cintron M.D. at SURGERY [facility]    LASERTRIPSY  08/20/2014    Performed by Fredy Cintron M.D. at SURGERY [facility]    CYSTOSCOPY  08/20/2014    Performed by Fredy Cintron M.D. at SURGERY [facility]    CYSTOSCOPY STENT PLACEMENT  01/31/2013    Performed by Fredy Cintron M.D. at SURGERY [facility]    URETEROSCOPY  01/31/2013    Performed by Fredy Cintron M.D. at SURGERY [facility]    NEPHRECTOMY PARTIAL  01/31/2013    Performed by Fredy Cintron M.D. at SURGERY [facility]    LASERTRIPSY  01/31/2013    Performed by Fredy Cintron M.D. at SURGERY [facility]    OTHER ORTHOPEDIC SURGERY  2006    right knee meniscus    OTHER ORTHOPEDIC SURGERY  1996    ORIF right arm    OTHER  1995    sinus surgery    GYN SURGERY  1994    hysterectomy    OTHER  1980    thyroid lumpectomy    BREAST BIOPSY Left 1976    benign    CATARACT EXTRACTION WITH IOL Bilateral     CUATE BY LAPAROSCOPY      ORIF, ANKLE      Achilles surgery    ORIF, HIP  03/19/2024    ORIF, KNEE  5 and 7 years ago    OTHER ORTHOPEDIC SURGERY      achilles tendon reattach     TONSILLECTOMY           Family History   Problem Relation Age of Onset    Kidney Disease Mother     Colon Cancer Father      Allergies:  Alcohol, Keflex [cephalexin], and Pcn [penicillins]    Medications, Allergies, and current problem list reviewed today in Epic    Review of Systems   Constitutional:  Negative for chills, fever and malaise/fatigue.   HENT:  Negative for congestion and sore throat.    Respiratory:  Positive for cough and wheezing. Negative for hemoptysis, sputum production and shortness of breath.    Cardiovascular:  Negative for chest pain.   Gastrointestinal:  Negative for abdominal pain, diarrhea, nausea and vomiting.   Genitourinary:  Positive for dysuria and frequency. Negative for flank pain,  "hematuria and urgency.   Neurological:  Negative for dizziness and headaches.        All other systems reviewed and are negative.         Objective     /64 (BP Location: Right arm, Patient Position: Sitting, BP Cuff Size: Adult)   Pulse 90   Temp 36.3 °C (97.3 °F) (Temporal)   Resp 14   Ht 1.778 m (5' 10\")   Wt 78.5 kg (173 lb)   SpO2 94%   BMI 24.82 kg/m²      Physical Exam  Constitutional:       General: She is not in acute distress.     Appearance: She is not ill-appearing.   HENT:      Head: Normocephalic and atraumatic.      Nose: Nose normal.      Mouth/Throat:      Mouth: Mucous membranes are moist.      Pharynx: No posterior oropharyngeal erythema.   Cardiovascular:      Rate and Rhythm: Normal rate and regular rhythm.      Heart sounds: Normal heart sounds. No murmur heard.  Pulmonary:      Effort: Pulmonary effort is normal. No respiratory distress.      Breath sounds: No stridor. Wheezing and rhonchi present. No rales.      Comments: Mild diffuse wheezes and rhonchi  Skin:     General: Skin is warm and dry.   Neurological:      General: No focal deficit present.      Mental Status: She is alert and oriented to person, place, and time.   Psychiatric:         Mood and Affect: Mood normal.         Behavior: Behavior normal.         Thought Content: Thought content normal.         Judgment: Judgment normal.               Lab Results   Component Value Date/Time    POCCOLOR yellow 04/19/2025 03:05 PM    POCCOLOR Yellow 02/12/2016 04:22 AM    POCAPPEAR cloudy 04/19/2025 03:05 PM    POCAPPEAR Clear 02/12/2016 04:22 AM    POCLEUKEST trace 04/19/2025 03:05 PM    POCLEUKEST Negative 02/12/2016 04:22 AM    POCNITRITE positive 04/19/2025 03:05 PM    POCNITRITE Negative 02/12/2016 04:22 AM    POCUROBILIGE 0.2 04/19/2025 03:05 PM    POCPROTEIN negative 04/19/2025 03:05 PM    POCPROTEIN Trace (A) 02/12/2016 04:22 AM    POCURPH 5.5 04/19/2025 03:05 PM    POCURPH 7.0 02/12/2016 04:22 AM    POCBLOOD negative " 04/19/2025 03:05 PM    POCBLOOD Trace-intact (A) 02/12/2016 04:22 AM    POCSPGRV 1.030 04/19/2025 03:05 PM    POCSPGRV 1.020 02/12/2016 04:22 AM    POCKETONES negative 04/19/2025 03:05 PM    POCKETONES Negative 02/12/2016 04:22 AM    POCBILIRUBIN negative 04/19/2025 03:05 PM    POCGLUCUA negative 04/19/2025 03:05 PM    POCGLUCUA 100 (A) 02/12/2016 04:22 AM                           Assessment & Plan  Acute cystitis without hematuria    Orders:    POCT Urinalysis    cefdinir (OMNICEF) 300 MG Cap; Take 1 Capsule by mouth 2 times a day for 7 days.    URINE CULTURE(NEW); Future    COPD exacerbation (HCC)    Orders:    cefdinir (OMNICEF) 300 MG Cap; Take 1 Capsule by mouth 2 times a day for 7 days.    methylPREDNISolone (MEDROL DOSEPAK) 4 MG Tablet Therapy Pack; Follow schedule on package instructions.         Differential diagnoses, Supportive care, and indications for immediate follow-up discussed with patient.   Pathogenesis of diagnosis discussed including typical length and natural progression.   Instructed to return to clinic or nearest emergency department for any change in condition, further concerns, or worsening of symptoms.    The patient demonstrated a good understanding and agreed with the treatment plan.    Angelina Elmore P.A.-C.

## 2025-04-20 DIAGNOSIS — N30.00 ACUTE CYSTITIS WITHOUT HEMATURIA: ICD-10-CM

## 2025-04-29 RX ORDER — LEVOTHYROXINE SODIUM 88 UG/1
88 TABLET ORAL
Qty: 90 TABLET | Refills: 2 | Status: SHIPPED | OUTPATIENT
Start: 2025-04-29

## 2025-07-11 ENCOUNTER — OFFICE VISIT (OUTPATIENT)
Dept: MEDICAL GROUP | Facility: LAB | Age: 80
End: 2025-07-11
Payer: MEDICARE

## 2025-07-11 VITALS
HEART RATE: 68 BPM | OXYGEN SATURATION: 96 % | TEMPERATURE: 98.7 F | WEIGHT: 173 LBS | SYSTOLIC BLOOD PRESSURE: 120 MMHG | DIASTOLIC BLOOD PRESSURE: 70 MMHG | RESPIRATION RATE: 16 BRPM | HEIGHT: 70 IN | BODY MASS INDEX: 24.77 KG/M2

## 2025-07-11 DIAGNOSIS — E11.42 TYPE 2 DIABETES MELLITUS WITH DIABETIC POLYNEUROPATHY, WITHOUT LONG-TERM CURRENT USE OF INSULIN (HCC): Primary | ICD-10-CM

## 2025-07-11 DIAGNOSIS — K21.9 GASTROESOPHAGEAL REFLUX DISEASE, UNSPECIFIED WHETHER ESOPHAGITIS PRESENT: ICD-10-CM

## 2025-07-11 DIAGNOSIS — E11.42 DIABETIC POLYNEUROPATHY ASSOCIATED WITH TYPE 2 DIABETES MELLITUS (HCC): ICD-10-CM

## 2025-07-11 LAB
HBA1C MFR BLD: 6.9 % (ref ?–5.8)
POCT INT CON NEG: NEGATIVE
POCT INT CON POS: POSITIVE

## 2025-07-11 PROCEDURE — 99214 OFFICE O/P EST MOD 30 MIN: CPT | Performed by: FAMILY MEDICINE

## 2025-07-11 PROCEDURE — 3078F DIAST BP <80 MM HG: CPT | Performed by: FAMILY MEDICINE

## 2025-07-11 PROCEDURE — 83036 HEMOGLOBIN GLYCOSYLATED A1C: CPT | Performed by: FAMILY MEDICINE

## 2025-07-11 PROCEDURE — G2211 COMPLEX E/M VISIT ADD ON: HCPCS | Performed by: FAMILY MEDICINE

## 2025-07-11 PROCEDURE — 3074F SYST BP LT 130 MM HG: CPT | Performed by: FAMILY MEDICINE

## 2025-07-11 RX ORDER — OMEPRAZOLE 20 MG/1
20 CAPSULE, DELAYED RELEASE ORAL 2 TIMES DAILY
COMMUNITY

## 2025-07-11 RX ORDER — GABAPENTIN 100 MG/1
100 CAPSULE ORAL DAILY
Qty: 30 CAPSULE | Refills: 1 | Status: SHIPPED | OUTPATIENT
Start: 2025-07-11

## 2025-07-11 ASSESSMENT — FIBROSIS 4 INDEX: FIB4 SCORE: 0.49

## 2025-07-11 NOTE — PROGRESS NOTES
"Subjective:     CC: DM follow up    HPI:   Jillian presents today to follow-up on type 2 diabetes.  A1c today is 6.9%.  She continues on metformin 1500 mg daily.  Noticed some worsening heartburn symptoms.  No dysphagia, melena, blood in stool.  She increase her Prilosec to 20 mg twice daily and this helped.    She has also noted some worsening diabetic neuropathy symptoms to the feet.    Has a wart to the right index finger she would like treated.    Current Medications[1]    Medications, past medical history, allergies, and social history have been reviewed and updated.      Objective:       Exam:  /70   Pulse 68   Temp 37.1 °C (98.7 °F)   Resp 16   Ht 1.778 m (5' 10\")   Wt 78.5 kg (173 lb)   SpO2 96%   BMI 24.82 kg/m²  Body mass index is 24.82 kg/m².    Constitutional: Alert. Well appearing. No distress.  Skin:Verrucous papule present to the right index finger.  Respiratory: Normal effort.   Neuro: Moves all four extremities. No facial droop.  Psych: Answers questions appropriately. Normal affect and mood.    Assessment & Plan:     79 y.o. female with the following -     1. Type 2 diabetes mellitus with diabetic polyneuropathy, without long-term current use of insulin (HCC) (Primary)  A1C 6.9.  Goal 7.0 or less.  Continue metformin 1500 mg daily.  Recheck 3 months.  - POCT Hemoglobin A1C    2. Gastroesophageal reflux disease, unspecified whether esophagitis present  Recent worsening of symptoms, no red flag symptoms.  She did see improvement with increase of Prilosec to 20 mg twice daily.  Continue to monitor, consider referral to GI to discuss EGD if worsening.    3. Diabetic polyneuropathy associated with type 2 diabetes mellitus (HCC)  Worsening, start gabapentin.  Discussed 100 mg 3 times daily but she would prefer to try lowest dose and will start at 100 mg daily.  - gabapentin (NEURONTIN) 100 MG Cap; Take 1 Capsule by mouth every day.  Dispense: 30 Capsule; Refill: 1    As the patient's PCP, I " am the continued focal point for all health care services for the patient's needs and ongoing medical care. The services result in a comprehensive, longitudinal, and continuous relationship with the patient and involves delivery of care that is accessible, coordinated with other practitioners and providers, and integrated with the broader health care landscape.       Please note that this note was created using voice recognition software.           [1]   Current Outpatient Medications   Medication Sig Dispense Refill    fluticasone-umeclidinium-vilanterol (TRELEGY ELLIPTA) 200-62.5-25 mcg/PUFF inhaler INHALE 1 PUFF ONCE DAILY . 180 Each 3    levothyroxine (SYNTHROID) 88 MCG Tab TAKE 1 TABLET BY MOUTH IN THE MORNING ON AN EMPTY STOMACH 90 Tablet 2    methylPREDNISolone (MEDROL DOSEPAK) 4 MG Tablet Therapy Pack Follow schedule on package instructions. 21 Tablet 0    azithromycin (ZITHROMAX) 250 MG Tab 500 mg in a single loading dose on day 1, followed by 250 mg once daily on days 2 to 5 6 Tablet 0    predniSONE (DELTASONE) 20 MG Tab Take 2 tabs once daily for 5 days 10 Tablet 0    estradiol (ESTRACE) 0.1 MG/GM vaginal cream INSERT 1 GRAM VAGINALLY ONCE DAILY THREE TIMES A WEEK 43 g 3    amLODIPine (NORVASC) 10 MG Tab Take 1 tablet by mouth once daily 90 Tablet 3    losartan (COZAAR) 100 MG Tab Take 1 tablet by mouth once daily 90 Tablet 3    fenofibrate (TRIGLIDE) 160 MG tablet Take 1 tablet by mouth once daily 90 Tablet 3    metFORMIN ER (GLUCOPHAGE XR) 500 MG TABLET SR 24 HR Take 3 tablets by mouth once daily 270 Tablet 3    metoprolol SR (TOPROL XL) 25 MG TABLET SR 24 HR Take 1 tablet by mouth once daily 90 Tablet 3    simvastatin (ZOCOR) 20 MG Tab TAKE 1 TABLET BY MOUTH ONCE DAILY IN THE EVENING 90 Tablet 3    vitamin D2, Ergocalciferol, (DRISDOL) 1.25 MG (10101 UT) Cap capsule Take  by mouth every 7 days.      hydroCHLOROthiazide 25 MG Tab Take 1 tablet by mouth once daily 90 Tablet 3    raloxifene (EVISTA) 60 MG  Tab TAKE 1 TABLET BY MOUTH ONCE DAILY IN THE MORNING 90 Tablet 3    albuterol 108 (90 Base) MCG/ACT Aero Soln inhalation aerosol Inhale 2 Puffs every four hours as needed for Shortness of Breath. FOR SHORTNESS OF BREATH 8 g 3    Biotin 10 MG Tab Take  by mouth every day.      glucosamine Sulfate 500 MG Cap Take 500 mg by mouth 3 times a day with meals.      multivitamin (THERAGRAN) Tab Take 1 Tab by mouth every day.       No current facility-administered medications for this visit.

## 2025-07-14 ENCOUNTER — HOSPITAL ENCOUNTER (OUTPATIENT)
Dept: LAB | Facility: MEDICAL CENTER | Age: 80
End: 2025-07-14
Attending: FAMILY MEDICINE
Payer: MEDICARE

## 2025-07-14 DIAGNOSIS — E11.9 TYPE 2 DIABETES MELLITUS WITHOUT COMPLICATION, WITHOUT LONG-TERM CURRENT USE OF INSULIN (HCC): ICD-10-CM

## 2025-07-14 DIAGNOSIS — E03.9 HYPOTHYROIDISM, UNSPECIFIED TYPE: ICD-10-CM

## 2025-07-14 LAB
ALBUMIN SERPL BCP-MCNC: 4.5 G/DL (ref 3.2–4.9)
ALBUMIN/GLOB SERPL: 2 G/DL
ALP SERPL-CCNC: 45 U/L (ref 30–99)
ALT SERPL-CCNC: 32 U/L (ref 2–50)
ANION GAP SERPL CALC-SCNC: 18 MMOL/L (ref 7–16)
AST SERPL-CCNC: 31 U/L (ref 12–45)
BASOPHILS # BLD AUTO: 0.4 % (ref 0–1.8)
BASOPHILS # BLD: 0.03 K/UL (ref 0–0.12)
BILIRUB SERPL-MCNC: 0.5 MG/DL (ref 0.1–1.5)
BUN SERPL-MCNC: 22 MG/DL (ref 8–22)
CALCIUM ALBUM COR SERPL-MCNC: 8.9 MG/DL (ref 8.5–10.5)
CALCIUM SERPL-MCNC: 9.3 MG/DL (ref 8.5–10.5)
CHLORIDE SERPL-SCNC: 101 MMOL/L (ref 96–112)
CHOLEST SERPL-MCNC: 147 MG/DL (ref 100–199)
CO2 SERPL-SCNC: 21 MMOL/L (ref 20–33)
CREAT SERPL-MCNC: 0.71 MG/DL (ref 0.5–1.4)
CREAT UR-MCNC: 70.5 MG/DL
EOSINOPHIL # BLD AUTO: 0.12 K/UL (ref 0–0.51)
EOSINOPHIL NFR BLD: 1.5 % (ref 0–6.9)
ERYTHROCYTE [DISTWIDTH] IN BLOOD BY AUTOMATED COUNT: 44.1 FL (ref 35.9–50)
FASTING STATUS PATIENT QL REPORTED: NORMAL
GFR SERPLBLD CREATININE-BSD FMLA CKD-EPI: 86 ML/MIN/1.73 M 2
GLOBULIN SER CALC-MCNC: 2.2 G/DL (ref 1.9–3.5)
GLUCOSE SERPL-MCNC: 138 MG/DL (ref 65–99)
HCT VFR BLD AUTO: 45.4 % (ref 37–47)
HDLC SERPL-MCNC: 42 MG/DL
HGB BLD-MCNC: 15.1 G/DL (ref 12–16)
IMM GRANULOCYTES # BLD AUTO: 0.06 K/UL (ref 0–0.11)
IMM GRANULOCYTES NFR BLD AUTO: 0.7 % (ref 0–0.9)
LDLC SERPL CALC-MCNC: 55 MG/DL
LYMPHOCYTES # BLD AUTO: 2.48 K/UL (ref 1–4.8)
LYMPHOCYTES NFR BLD: 30.1 % (ref 22–41)
MCH RBC QN AUTO: 31.3 PG (ref 27–33)
MCHC RBC AUTO-ENTMCNC: 33.3 G/DL (ref 32.2–35.5)
MCV RBC AUTO: 94.2 FL (ref 81.4–97.8)
MICROALBUMIN UR-MCNC: <1.2 MG/DL
MICROALBUMIN/CREAT UR: NORMAL MG/G (ref 0–30)
MONOCYTES # BLD AUTO: 0.61 K/UL (ref 0–0.85)
MONOCYTES NFR BLD AUTO: 7.4 % (ref 0–13.4)
NEUTROPHILS # BLD AUTO: 4.95 K/UL (ref 1.82–7.42)
NEUTROPHILS NFR BLD: 59.9 % (ref 44–72)
NRBC # BLD AUTO: 0 K/UL
NRBC BLD-RTO: 0 /100 WBC (ref 0–0.2)
PLATELET # BLD AUTO: 339 K/UL (ref 164–446)
PMV BLD AUTO: 10.6 FL (ref 9–12.9)
POTASSIUM SERPL-SCNC: 3.8 MMOL/L (ref 3.6–5.5)
PROT SERPL-MCNC: 6.7 G/DL (ref 6–8.2)
RBC # BLD AUTO: 4.82 M/UL (ref 4.2–5.4)
SODIUM SERPL-SCNC: 140 MMOL/L (ref 135–145)
TRIGL SERPL-MCNC: 250 MG/DL (ref 0–149)
TSH SERPL DL<=0.005 MIU/L-ACNC: 1.3 UIU/ML (ref 0.38–5.33)
WBC # BLD AUTO: 8.3 K/UL (ref 4.8–10.8)

## 2025-07-14 PROCEDURE — 82043 UR ALBUMIN QUANTITATIVE: CPT

## 2025-07-14 PROCEDURE — 82570 ASSAY OF URINE CREATININE: CPT

## 2025-07-14 PROCEDURE — 36415 COLL VENOUS BLD VENIPUNCTURE: CPT

## 2025-07-14 PROCEDURE — 80061 LIPID PANEL: CPT

## 2025-07-14 PROCEDURE — 85025 COMPLETE CBC W/AUTO DIFF WBC: CPT

## 2025-07-14 PROCEDURE — 80053 COMPREHEN METABOLIC PANEL: CPT

## 2025-07-14 PROCEDURE — 84443 ASSAY THYROID STIM HORMONE: CPT

## 2025-07-15 ENCOUNTER — RESULTS FOLLOW-UP (OUTPATIENT)
Dept: MEDICAL GROUP | Facility: LAB | Age: 80
End: 2025-07-15
Payer: MEDICARE

## (undated) DEVICE — CATHETER IV 20 GA X 1-1/4 ---SURG.& SDS ONLY--- (50EA/BX)

## (undated) DEVICE — GLOVE BIOGEL INDICATOR SZ 8.5 SURGICAL PF LTX - (50/BX 4BX/CA)

## (undated) DEVICE — GLOVE BIOGEL ECLIPSE PF LATEX SIZE 8.0  (50PR/BX)

## (undated) DEVICE — CANISTER SUCTION RIGID RED 1500CC (40EA/CA)

## (undated) DEVICE — GLOVE BIOGEL PI ORTHO SZ 7 PF LF (40PR/BX)

## (undated) DEVICE — GOWN WARMING STANDARD FLEX - (30/CA)

## (undated) DEVICE — SUTURE 0 VICRYL PLUS CT-1 - 36 INCH (36/BX)

## (undated) DEVICE — SUTURE 0 VICRYL PLUS CT-2 - 8 X 18 INCH (12/BX)

## (undated) DEVICE — SUTURE GENERAL

## (undated) DEVICE — KIT ANESTHESIA W/CIRCUIT & 3/LT BAG W/FILTER (20EA/CA)

## (undated) DEVICE — PAD UNIVERSAL MULTI USE (1/EA)

## (undated) DEVICE — NEPTUNE 4 PORT MANIFOLD - (20/PK)

## (undated) DEVICE — SYRINGE SAFETY 3 ML 18 GA X 1 1/2 BLUNT LL (100/BX 8BX/CA)

## (undated) DEVICE — SENSOR SPO2 NEO LNCS ADHESIVE (20/BX) SEE USER NOTES

## (undated) DEVICE — SUCTION INSTRUMENT YANKAUER BULBOUS TIP W/O VENT (50EA/CA)

## (undated) DEVICE — TUBE E-T HI-LO CUFF 7.0MM (10EA/PK)

## (undated) DEVICE — BLADE SAGITTAL SYSTEM 18MM

## (undated) DEVICE — GOWN SURGICAL XX-LARGE - (28EA/CA) SIRUS NON REINFORCED

## (undated) DEVICE — SODIUM CHL. IRRIGATION 0.9% 3000ML (4EA/CA 65CA/PF)

## (undated) DEVICE — SODIUM CHL IRRIGATION 0.9% 1000ML (12EA/CA)

## (undated) DEVICE — TUBE CONNECTING SUCTION - CLEAR PLASTIC STERILE 72 IN (50EA/CA)

## (undated) DEVICE — ELECTRODE DUAL RETURN W/ CORD - (50/PK)

## (undated) DEVICE — LENS/HOOD FOR SPACESUIT - (32/PK) PEEL AWAY FACE

## (undated) DEVICE — PAD PREP 24 X 48 CUFFED - (100/CA)

## (undated) DEVICE — SUTURE 2-0 VICRYL PLUS CT-1 - 8 X 18 INCH(12/BX)

## (undated) DEVICE — SUTURE 1 PDS-2 PLUS CTX - (24/BX)

## (undated) DEVICE — TUBE, CULTURE AEROBIC

## (undated) DEVICE — TIP INTPLS HFLO ML ORFC BTRY - (12/CS)  FOR SURGILAV

## (undated) DEVICE — STOCKINET TUBULAR 6IN STERILE - 6 X 48YDS (25/CA)

## (undated) DEVICE — MASK ANESTHESIA ADULT  - (100/CA)

## (undated) DEVICE — SWAB ANAEROBIC SPEC.COLLECTOR - (25/PK 4PK/CA 100EA/CA)

## (undated) DEVICE — CHLORAPREP 26 ML APPLICATOR - ORANGE TINT(25/CA)

## (undated) DEVICE — SPINAL

## (undated) DEVICE — SET EXTENSION WITH 2 PORTS (48EA/CA) ***PART #2C8610 IS A SUBSTITUTE*****

## (undated) DEVICE — GLOVE SZ 7.5 BIOGEL PI MICRO - PF LF (50PR/BX)

## (undated) DEVICE — HEAD HOLDER JUNIOR/ADULT

## (undated) DEVICE — LACTATED RINGERS INJ 1000 ML - (14EA/CA 60CA/PF)

## (undated) DEVICE — BRIEF STRETCH MATERNITY M/L - FITS 20-60IN (5EA/BG 20BG/CA)

## (undated) DEVICE — CANISTER SUCTION 3000ML MECHANICAL FILTER AUTO SHUTOFF MEDI-VAC NONSTERILE LF DISP  (40EA/CA)

## (undated) DEVICE — PROTECTOR ULNA NERVE - (36PR/CA)

## (undated) DEVICE — HANDPIECE 10FT INTPLS SCT PLS IRRIGATION HAND CONTROL SET (6/PK)

## (undated) DEVICE — TUBING CLEARLINK DUO-VENT - C-FLO (48EA/CA)

## (undated) DEVICE — SET LEADWIRE 5 LEAD BEDSIDE DISPOSABLE ECG (1SET OF 5/EA)

## (undated) DEVICE — MIXER BONE CEMENT REVOLUTION - W/FEMORAL PRESSURIZER (6/CA)

## (undated) DEVICE — DRESSING POST OP BORDER 4 X 10 (5EA/BX)

## (undated) DEVICE — GLOVE BIOGEL SZ 8 SURGICAL PF LTX - (50PR/BX 4BX/CA)

## (undated) DEVICE — SUTURE 2-0 8-18IN VCRL + ANTI - (12/BX)

## (undated) DEVICE — PAD SANITARY 11IN MAXI IND WRAPPED  (12EA/PK 24PK/CA)

## (undated) DEVICE — BLADE SAGITTAL DUAL 25MM

## (undated) DEVICE — MASK AIRWAY FLEXIBLE SINGLE-USE SIZE 3 CHILDREN (10EA/BX)

## (undated) DEVICE — GLOVE BIOGEL PI INDICATOR SZ 7.5 SURGICAL PF LF -(50/BX 4BX/CA)

## (undated) DEVICE — SYRINGE SAFETY TB 1 ML 27 GA X 1/2 IN (100/BX 4BX/CA)

## (undated) DEVICE — TRAY SPINAL ANESTHESIA NON-SAFETY (10/CA)

## (undated) DEVICE — TOWEL STOP TIMEOUT SAFETY FLAG (40EA/CA)

## (undated) DEVICE — PACK TOTAL KNEE  (1/CA)

## (undated) DEVICE — Device

## (undated) DEVICE — GLOVE BIOGEL SZ 7.5 SURGICAL PF LTX - (50PR/BX 4BX/CA)

## (undated) DEVICE — SUTURE 2-0 CHROMIC GUT CT-1 27 (36PK/BX)"

## (undated) DEVICE — SLEEVE VASO CALF MED - (10PR/CA)

## (undated) DEVICE — SLEEVE, VASO, THIGH, MED

## (undated) DEVICE — SUTURE 0 VICRYL PLUS CT-2 - 27 INCH (36/BX)

## (undated) DEVICE — GLOVE BIOGEL PI INDICATOR SZ 7.0 SURGICAL PF LF - (50/BX 4BX/CA)

## (undated) DEVICE — ELECTRODE 850 FOAM ADHESIVE - HYDROGEL RADIOTRNSPRNT (50/PK)

## (undated) DEVICE — GLOVE SZ 7 BIOGEL PI MICRO - PF LF (50PR/BX 4BX/CA)

## (undated) DEVICE — CATHETER FOLEY ROBINSON 14FR 16IN STRL (12EA/CA)

## (undated) DEVICE — WATER IRRIG. STER. 1500 ML - (9/CA)

## (undated) DEVICE — GLOVE BIOGEL SZ 7 SURGICAL PF LTX - (50PR/BX 4BX/CA)

## (undated) DEVICE — KIT ROOM DECONTAMINATION

## (undated) DEVICE — COVER LIGHT HANDLE FLEXIBLE - SOFT (2EA/PK 80PK/CA)

## (undated) DEVICE — GLOVE BIOGEL SZ 8.5 SURGICAL PF LTX - (50PR/BX 4BX/CA)

## (undated) DEVICE — TRAY SRGPRP PVP IOD WT PRP - (20/CA)

## (undated) DEVICE — TOURNIQUET, STERILE 34 (BLUE)

## (undated) DEVICE — SUTURE 3-0 MONOCRYL PLUS PS-1 - 27 INCH (36/BX)